# Patient Record
Sex: FEMALE | Race: BLACK OR AFRICAN AMERICAN | Employment: OTHER | URBAN - METROPOLITAN AREA
[De-identification: names, ages, dates, MRNs, and addresses within clinical notes are randomized per-mention and may not be internally consistent; named-entity substitution may affect disease eponyms.]

---

## 2020-06-22 VITALS — BODY MASS INDEX: 38.83 KG/M2 | WEIGHT: 211 LBS | HEIGHT: 62 IN

## 2020-06-22 DIAGNOSIS — T14.91XA SUICIDE ATTEMPT (HCC): ICD-10-CM

## 2020-06-22 PROBLEM — F32.9 MAJOR DEPRESSION: Status: ACTIVE | Noted: 2020-06-22

## 2020-06-22 PROBLEM — G43.909 MIGRAINE: Status: ACTIVE | Noted: 2020-06-22

## 2020-06-22 PROBLEM — F31.9 BIPOLAR 1 DISORDER (HCC): Status: ACTIVE | Noted: 2018-01-23

## 2020-06-22 PROBLEM — E11.9 DIABETES MELLITUS TYPE 2, CONTROLLED (HCC): Status: ACTIVE | Noted: 2020-06-22

## 2020-06-22 PROBLEM — F41.9 ANXIETY: Status: ACTIVE | Noted: 2020-06-22

## 2020-06-22 PROBLEM — G47.30 SLEEP APNEA: Status: ACTIVE | Noted: 2018-01-23

## 2020-06-22 PROBLEM — K74.00 LIVER FIBROSIS: Status: ACTIVE | Noted: 2020-06-22

## 2020-06-22 PROBLEM — I10 HYPERTENSION: Status: ACTIVE | Noted: 2020-06-22

## 2020-06-22 RX ORDER — BLOOD SUGAR DIAGNOSTIC
STRIP MISCELLANEOUS SEE ADMIN INSTRUCTIONS
COMMUNITY
End: 2020-12-16 | Stop reason: SDUPTHER

## 2020-06-22 RX ORDER — METOPROLOL SUCCINATE 50 MG/1
TABLET, EXTENDED RELEASE ORAL DAILY
COMMUNITY
End: 2020-12-16 | Stop reason: ALTCHOICE

## 2020-06-22 RX ORDER — ALLOPURINOL 300 MG/1
TABLET ORAL DAILY
COMMUNITY

## 2020-06-22 RX ORDER — LANOLIN ALCOHOL/MO/W.PET/CERES
400 CREAM (GRAM) TOPICAL 3 TIMES DAILY
COMMUNITY

## 2020-06-22 RX ORDER — DULAGLUTIDE 0.75 MG/.5ML
0.75 INJECTION, SOLUTION SUBCUTANEOUS
COMMUNITY
End: 2020-12-16 | Stop reason: ALTCHOICE

## 2020-06-22 RX ORDER — ARIPIPRAZOLE 15 MG/1
15 TABLET ORAL DAILY
COMMUNITY
End: 2020-10-15 | Stop reason: SDUPTHER

## 2020-06-22 RX ORDER — ALPRAZOLAM 0.5 MG/1
TABLET ORAL
COMMUNITY
End: 2020-07-30

## 2020-06-22 RX ORDER — PANTOPRAZOLE SODIUM 40 MG/1
40 TABLET, DELAYED RELEASE ORAL DAILY
COMMUNITY

## 2020-06-22 RX ORDER — HYDRALAZINE HYDROCHLORIDE 25 MG/1
25 TABLET, FILM COATED ORAL 2 TIMES DAILY
COMMUNITY
End: 2020-12-16 | Stop reason: DRUGHIGH

## 2020-06-22 RX ORDER — ATORVASTATIN CALCIUM 40 MG/1
TABLET, FILM COATED ORAL DAILY
COMMUNITY
End: 2020-12-16 | Stop reason: ALTCHOICE

## 2020-06-22 RX ORDER — CITALOPRAM 20 MG/1
TABLET, FILM COATED ORAL DAILY
COMMUNITY
End: 2021-01-06

## 2020-06-22 RX ORDER — SODIUM BICARBONATE 650 MG/1
TABLET ORAL 2 TIMES DAILY
COMMUNITY

## 2020-06-22 RX ORDER — NITROFURANTOIN 25; 75 MG/1; MG/1
100 CAPSULE ORAL 2 TIMES DAILY
COMMUNITY
End: 2020-12-16 | Stop reason: ALTCHOICE

## 2020-06-22 RX ORDER — TERCONAZOLE 4 MG/G
1 CREAM VAGINAL
COMMUNITY
End: 2020-12-16 | Stop reason: ALTCHOICE

## 2020-06-22 RX ORDER — LANCETS 33 GAUGE
EACH MISCELLANEOUS
COMMUNITY
End: 2020-12-16 | Stop reason: SDUPTHER

## 2020-06-22 RX ORDER — GABAPENTIN 100 MG/1
CAPSULE ORAL 3 TIMES DAILY
COMMUNITY

## 2020-07-29 LAB
CREATININE, EXTERNAL: 1.96
LDL-C, EXTERNAL: 66

## 2020-07-30 DIAGNOSIS — F41.1 GENERALIZED ANXIETY DISORDER: Primary | ICD-10-CM

## 2020-07-30 RX ORDER — ALPRAZOLAM 0.5 MG/1
TABLET ORAL
Qty: 60 TAB | Refills: 2 | Status: SHIPPED | OUTPATIENT
Start: 2020-07-30 | End: 2020-11-02 | Stop reason: SDUPTHER

## 2020-08-10 ENCOUNTER — VIRTUAL VISIT (OUTPATIENT)
Dept: BEHAVIORAL/MENTAL HEALTH CLINIC | Age: 60
End: 2020-08-10
Payer: MEDICARE

## 2020-08-10 DIAGNOSIS — F33.1 MODERATE EPISODE OF RECURRENT MAJOR DEPRESSIVE DISORDER (HCC): Primary | ICD-10-CM

## 2020-08-10 PROCEDURE — 99441 PR PHYS/QHP TELEPHONE EVALUATION 5-10 MIN: CPT | Performed by: NURSE PRACTITIONER

## 2020-08-10 RX ORDER — BUPROPION HYDROCHLORIDE 150 MG/1
150 TABLET, EXTENDED RELEASE ORAL 2 TIMES DAILY
Qty: 180 TAB | Refills: 0 | Status: SHIPPED | OUTPATIENT
Start: 2020-08-10 | End: 2020-10-15 | Stop reason: SDUPTHER

## 2020-08-10 NOTE — PROGRESS NOTES
Landry Shaw is a 64 y.o. female who presents today for the following:  Chief Complaint   Patient presents with    Follow-up     \"I'm down in the dumps. \"    Depression       Allergies   Allergen Reactions    Septra [Sulfamethoprim] Unknown (comments)       Current Outpatient Medications   Medication Sig    buPROPion SR (WELLBUTRIN SR) 150 mg SR tablet Take 1 Tab by mouth two (2) times a day for 90 days.  ALPRAZolam (XANAX) 0.5 mg tablet Take 1 tablet by mouth twice daily as needed    allopurinoL (ZYLOPRIM) 300 mg tablet Take  by mouth daily.  ARIPiprazole (ABILIFY) 15 mg tablet Take 15 mg by mouth daily.  atorvastatin (LIPITOR) 40 mg tablet Take  by mouth daily.  citalopram (CELEXA) 20 mg tablet Take  by mouth daily.  gabapentin (NEURONTIN) 100 mg capsule Take  by mouth three (3) times daily.  hydrALAZINE (APRESOLINE) 25 mg tablet Take 25 mg by mouth two (2) times a day.  magnesium oxide (MAG-OX) 400 mg tablet Take 400 mg by mouth three (3) times daily.  metoprolol succinate (TOPROL-XL) 50 mg XL tablet Take  by mouth daily.  nitrofurantoin, macrocrystal-monohydrate, (MACROBID) 100 mg capsule Take 100 mg by mouth two (2) times a day.  insulin NPH/insulin regular (NovoLIN 70/30 U-100 Insulin) 100 unit/mL (70-30) injection 40 Units by SubCUTAneous route Before breakfast and dinner.  lancets (One Touch Delica) 33 gauge misc by Does Not Apply route.  glucose blood VI test strips (OneTouch Verio test strips) strip by Does Not Apply route See Admin Instructions.  pantoprazole (PROTONIX) 40 mg tablet Take 40 mg by mouth daily.  sodium bicarbonate 650 mg tablet Take  by mouth two (2) times a day.  terconazole (TERAZOL 7) 0.4 % vaginal cream Insert 1 Applicator into vagina nightly.  dulaglutide (Trulicity) 0.42 SL/7.9 mL sub-q pen 0.75 mg by SubCUTAneous route every seven (7) days. No current facility-administered medications for this visit.         Past Medical History:   Diagnosis Date    Anxiety 6/22/2020    Bipolar 1 disorder ([facility] 1/23/2018    Diabetes mellitus type 2, controlled ([facility] 6/22/2020    Hypertension 6/22/2020    Liver fibrosis 6/22/2020    Major depression 6/22/2020    Migraine 6/22/2020    Sleep apnea 1/23/2018    Suicide attempt ([facility] 6/22/2020 2013       Past Surgical History:   Procedure Laterality Date    HX APPENDECTOMY      HX CHOLECYSTECTOMY         No family history on file.     Social History     Socioeconomic History    Marital status: SINGLE     Spouse name: Not on file    Number of children: 0    Years of education: Not on file    Highest education level: Associate degree: academic program   Occupational History    Not on file   Social Needs    Financial resource strain: Not very hard    Food insecurity     Worry: Never true     Inability: Never true    Transportation needs     Medical: No     Non-medical: No   Tobacco Use    Smoking status: Never Smoker    Smokeless tobacco: Never Used   Substance and Sexual Activity    Alcohol use: Not Currently    Drug use: Never    Sexual activity: Not Currently   Lifestyle    Physical activity     Days per week: Not on file     Minutes per session: Not on file    Stress: Not on file   Relationships    Social connections     Talks on phone: Not on file     Gets together: Not on file     Attends Sikhism service: Not on file     Active member of club or organization: Not on file     Attends meetings of clubs or organizations: Not on file     Relationship status: Not on file    Intimate partner violence     Fear of current or ex partner: Not on file     Emotionally abused: Not on file     Physically abused: Not on file     Forced sexual activity: Not on file   Other Topics Concern     Service Not Asked    Blood Transfusions Not Asked    Caffeine Concern Not Asked    Occupational Exposure Not Asked   [place] Hazards Not Asked    Sleep Concern Not Asked    Stress Concern Not Asked    Weight Concern Not Asked    Special Diet Not Asked    Back Care Not Asked    Exercise Not Asked    Bike Helmet Not Asked   [address] Not Asked    Self-Exams Not Asked   Social History Narrative    Not on file         Miss Aimee Jacobs consents to telephone visit. She follows up at the clinic for anxiety disorder and recurrent major depression. Patient is prescribed Xanax 0.5 mg tablet take 1 tablet twice daily as needed for anxiety, citalopram 20 mg tablet take 1 tablet daily and Abilify 15 mg tablet take 1 tablet at bedtime. She reports feeling depressed for the past few months and symptoms have worsened over the past 6 weeks. She reports reduced appetite, lack of motivation, low energy, depressed mood, crying spells and increased sleeping. No suicidal or homicidal thinking noted. Risk for suicide is low to moderate based on history but there is no acute concern at this time. No psychotic symptoms reported. On today's visit, she is requesting to take another medication along with current medications. She has tried BuSpar in the past, which caused confusion. She is receptive to trying Wellbutrin. No smoking, alcohol or drug use noted. Review of Systems   Gastrointestinal: Positive for diarrhea. Musculoskeletal: Positive for joint pain. Psychiatric/Behavioral: Positive for depression. All other systems reviewed and are negative. There were no vitals taken for this visit. Physical Exam  Neurological:      Mental Status: She is alert and oriented to person, place, and time. Psychiatric:         Attention and Perception: Attention and perception normal.         Mood and Affect: Mood is depressed. Speech: Speech normal.         Behavior: Behavior normal. Behavior is cooperative. Thought Content:  Thought content normal.         Cognition and Memory: Cognition and memory normal.         Judgment: Judgment normal.        Plan:    I will do a trial of bupropion  mg tablet take 1 tablet twice daily. Side effect profile discussed. She may continue citalopram, Abilify and Xanax at current doses. Counseling recommended. Advised patient to avoid alcohol and drug use. There are no diagnoses linked to this encounter.

## 2020-10-13 ENCOUNTER — TELEPHONE (OUTPATIENT)
Dept: BEHAVIORAL/MENTAL HEALTH CLINIC | Age: 60
End: 2020-10-13

## 2020-10-15 ENCOUNTER — VIRTUAL VISIT (OUTPATIENT)
Dept: BEHAVIORAL/MENTAL HEALTH CLINIC | Age: 60
End: 2020-10-15
Payer: MEDICARE

## 2020-10-15 DIAGNOSIS — F33.0 MILD EPISODE OF RECURRENT MAJOR DEPRESSIVE DISORDER (HCC): ICD-10-CM

## 2020-10-15 DIAGNOSIS — F33.1 MODERATE EPISODE OF RECURRENT MAJOR DEPRESSIVE DISORDER (HCC): ICD-10-CM

## 2020-10-15 DIAGNOSIS — F31.70 BIPOLAR DISORDER IN REMISSION (HCC): Primary | ICD-10-CM

## 2020-10-15 PROCEDURE — 99212 OFFICE O/P EST SF 10 MIN: CPT | Performed by: NURSE PRACTITIONER

## 2020-10-15 RX ORDER — ARIPIPRAZOLE 10 MG/1
10 TABLET ORAL DAILY
Qty: 90 TAB | Refills: 0 | Status: SHIPPED | OUTPATIENT
Start: 2020-10-15 | End: 2020-12-16 | Stop reason: ALTCHOICE

## 2020-10-15 RX ORDER — BUPROPION HYDROCHLORIDE 200 MG/1
200 TABLET, EXTENDED RELEASE ORAL DAILY
Qty: 90 TAB | Refills: 0 | Status: SHIPPED | OUTPATIENT
Start: 2020-10-15 | End: 2020-12-16 | Stop reason: ALTCHOICE

## 2020-10-15 NOTE — PROGRESS NOTES
Jevon Moyer is a 64 y.o. female who presents today for the following:  Chief Complaint   Patient presents with    Follow-up     \"I'm so sleepy. \"    Depression    Anxiety       Allergies   Allergen Reactions    Septra [Sulfamethoprim] Unknown (comments)       Current Outpatient Medications   Medication Sig    ARIPiprazole (ABILIFY) 10 mg tablet Take 1 Tab by mouth daily for 90 days.  buPROPion SR (WELLBUTRIN, ZYBAN) 200 mg SR tablet Take 1 Tab by mouth daily for 90 days. Indications: bipolar depression    ALPRAZolam (XANAX) 0.5 mg tablet Take 1 tablet by mouth twice daily as needed    allopurinoL (ZYLOPRIM) 300 mg tablet Take  by mouth daily.  atorvastatin (LIPITOR) 40 mg tablet Take  by mouth daily.  citalopram (CELEXA) 20 mg tablet Take  by mouth daily.  gabapentin (NEURONTIN) 100 mg capsule Take  by mouth three (3) times daily.  hydrALAZINE (APRESOLINE) 25 mg tablet Take 25 mg by mouth two (2) times a day.  magnesium oxide (MAG-OX) 400 mg tablet Take 400 mg by mouth three (3) times daily.  metoprolol succinate (TOPROL-XL) 50 mg XL tablet Take  by mouth daily.  nitrofurantoin, macrocrystal-monohydrate, (MACROBID) 100 mg capsule Take 100 mg by mouth two (2) times a day.  insulin NPH/insulin regular (NovoLIN 70/30 U-100 Insulin) 100 unit/mL (70-30) injection 40 Units by SubCUTAneous route Before breakfast and dinner.  lancets (One Touch Delica) 33 gauge misc by Does Not Apply route.  glucose blood VI test strips (OneTouch Verio test strips) strip by Does Not Apply route See Admin Instructions.  pantoprazole (PROTONIX) 40 mg tablet Take 40 mg by mouth daily.  sodium bicarbonate 650 mg tablet Take  by mouth two (2) times a day.  terconazole (TERAZOL 7) 0.4 % vaginal cream Insert 1 Applicator into vagina nightly.  dulaglutide (Trulicity) 3.89 HB/9.1 mL sub-q pen 0.75 mg by SubCUTAneous route every seven (7) days.      No current facility-administered medications for this visit. Past Medical History:   Diagnosis Date    Anxiety 6/22/2020    Bipolar 1 disorder ([facility] 1/23/2018    Diabetes mellitus type 2, controlled ([facility] 6/22/2020    Hypertension 6/22/2020    Liver fibrosis 6/22/2020    Major depression 6/22/2020    Migraine 6/22/2020    Sleep apnea 1/23/2018    Suicide attempt ([facility] 6/22/2020 2013       Past Surgical History:   Procedure Laterality Date    HX APPENDECTOMY      HX CHOLECYSTECTOMY         History reviewed. No pertinent family history.     Social History     Socioeconomic History    Marital status: SINGLE     Spouse name: Not on file    Number of children: 0    Years of education: Not on file    Highest education level: Associate degree: academic program   Occupational History    Not on file   Social Needs    Financial resource strain: Not very hard    Food insecurity     Worry: Never true     Inability: Never true    Transportation needs     Medical: No     Non-medical: No   Tobacco Use    Smoking status: Never Smoker    Smokeless tobacco: Never Used   Substance and Sexual Activity    Alcohol use: Not Currently    Drug use: Never    Sexual activity: Not Currently   Lifestyle    Physical activity     Days per week: Not on file     Minutes per session: Not on file    Stress: Not on file   Relationships    Social connections     Talks on phone: Not on file     Gets together: Not on file     Attends Mosque service: Not on file     Active member of club or organization: Not on file     Attends meetings of clubs or organizations: Not on file     Relationship status: Not on file    Intimate partner violence     Fear of current or ex partner: Not on file     Emotionally abused: Not on file     Physically abused: Not on file     Forced sexual activity: Not on file   Other Topics Concern     Service Not Asked    Blood Transfusions Not Asked    Caffeine Concern Not Asked    Occupational Exposure Not Asked   [place] Hazards Not Asked    Sleep Concern Not Asked    Stress Concern Not Asked    Weight Concern Not Asked    Special Diet Not Asked    Back Care Not Asked    Exercise Not Asked    Bike Helmet Not Asked   [address] Not Asked    Self-Exams Not Asked   Social History Narrative    Not on file         Ms. Brad Jameson follows up in the clinic for major depression and anxiety disorder. It is noted patient has history of bipolar disorder, inpatient psychiatric admission and history of suicide attempt. Patient is prescribed Xanax 0.5 mg tablet take 1 tablet twice daily as needed for anxiety, citalopram 20 mg tablet take 1 tablet daily, bupropion  mg tablet take 1 tablet twice daily and Abilify 15 mg tablet take 1 tablet at bedtime. Telephone visit requested. Patient reports that for the past few weeks she has been sleeping a lot more than usual and experiencing low energy. Patient shares that she has \"chronically low\" magnesium and vitamin B12, however her vitamin B12 is not low enough to receive injections at this time. She plans to follow-up with her primary care provider next week. She denies feeling depressed and anxiety is well controlled with Xanax as needed. She reports initially the Wellbutrin helped as evidenced by she had more energy and felt better. Patient believes the Wellbutrin has definitely improved her mood because she no longer feels depressed and is looking forward to [place]. She denies suicidal/homicidal thinking, risk for suicide is low to moderate based on history but there is no acute concern at this time. Protective factor-family. Patient denies psychotic symptoms and none noted. No problems with appetite. Patient lives in the home with her sister in a stable and supportive home environment. No smoking, alcohol or drug use noted. Review of Systems   Constitutional: Positive for malaise/fatigue.    Musculoskeletal:        Pain in upper right quad sometimes-patient reports pain is related to \"history of pancreatitis and stage 2 fibrosis of the liver. \"   All other systems reviewed and are negative. There were no vitals taken for this visit. Physical Exam  Psychiatric:         Attention and Perception: Attention and perception normal.         Mood and Affect: Mood normal.         Speech: Speech normal.         Behavior: Behavior normal. Behavior is cooperative. Thought Content: Thought content normal.         Cognition and Memory: Cognition and memory normal.         Judgment: Judgment normal.        Plan:    Reduce Abilify to 10 mg tablet take 1 tablet daily. Change bupropion SR to 200 mg tablet 1 tablet in the morning and continue 150 mg tablet 1 tablet in the evening. Continue citalopram and Xanax as prescribed. Advised patient to follow-up with primary care provider as appropriate. Advised patient to call 911 or go to the emergency department for suicidal or homicidal thinking. Advised patient to avoid alcohol and drug use. There are no diagnoses linked to this encounter.

## 2020-11-01 DIAGNOSIS — F33.1 MODERATE EPISODE OF RECURRENT MAJOR DEPRESSIVE DISORDER (HCC): ICD-10-CM

## 2020-11-01 DIAGNOSIS — F41.1 GENERALIZED ANXIETY DISORDER: ICD-10-CM

## 2020-11-02 RX ORDER — ALPRAZOLAM 0.5 MG/1
TABLET ORAL
Qty: 60 TAB | Refills: 0 | OUTPATIENT
Start: 2020-11-02

## 2020-11-02 RX ORDER — BUPROPION HYDROCHLORIDE 150 MG/1
TABLET, EXTENDED RELEASE ORAL
Qty: 180 TAB | Refills: 0 | OUTPATIENT
Start: 2020-11-02

## 2020-11-02 RX ORDER — ALPRAZOLAM 0.5 MG/1
TABLET ORAL
Qty: 60 TAB | Refills: 2 | Status: SHIPPED | OUTPATIENT
Start: 2020-11-02 | End: 2020-12-16 | Stop reason: ALTCHOICE

## 2020-12-16 ENCOUNTER — OFFICE VISIT (OUTPATIENT)
Dept: ENDOCRINOLOGY | Age: 60
End: 2020-12-16
Payer: MEDICARE

## 2020-12-16 VITALS
WEIGHT: 218 LBS | DIASTOLIC BLOOD PRESSURE: 81 MMHG | TEMPERATURE: 98.2 F | BODY MASS INDEX: 36.32 KG/M2 | SYSTOLIC BLOOD PRESSURE: 154 MMHG | HEART RATE: 86 BPM | HEIGHT: 65 IN | OXYGEN SATURATION: 98 %

## 2020-12-16 DIAGNOSIS — E11.40 TYPE 2 DIABETES MELLITUS WITH DIABETIC NEUROPATHY, WITH LONG-TERM CURRENT USE OF INSULIN (HCC): ICD-10-CM

## 2020-12-16 DIAGNOSIS — Z79.4 TYPE 2 DIABETES MELLITUS WITH HYPERGLYCEMIA, WITH LONG-TERM CURRENT USE OF INSULIN (HCC): Primary | ICD-10-CM

## 2020-12-16 DIAGNOSIS — N18.30 CKD STAGE 3 DUE TO TYPE 2 DIABETES MELLITUS (HCC): ICD-10-CM

## 2020-12-16 DIAGNOSIS — I12.9 HYPERTENSIVE RENAL DISEASE: ICD-10-CM

## 2020-12-16 DIAGNOSIS — Z79.4 TYPE 2 DIABETES MELLITUS WITH DIABETIC NEUROPATHY, WITH LONG-TERM CURRENT USE OF INSULIN (HCC): ICD-10-CM

## 2020-12-16 DIAGNOSIS — E78.2 MIXED HYPERLIPIDEMIA: ICD-10-CM

## 2020-12-16 DIAGNOSIS — E66.01 SEVERE OBESITY (HCC): ICD-10-CM

## 2020-12-16 DIAGNOSIS — E11.22 CKD STAGE 3 DUE TO TYPE 2 DIABETES MELLITUS (HCC): ICD-10-CM

## 2020-12-16 DIAGNOSIS — E11.65 TYPE 2 DIABETES MELLITUS WITH HYPERGLYCEMIA, WITH LONG-TERM CURRENT USE OF INSULIN (HCC): Primary | ICD-10-CM

## 2020-12-16 LAB
GLUCOSE POC: 183 MG/DL
HBA1C MFR BLD HPLC: 8.9 %

## 2020-12-16 PROCEDURE — 99214 OFFICE O/P EST MOD 30 MIN: CPT | Performed by: INTERNAL MEDICINE

## 2020-12-16 PROCEDURE — G8754 DIAS BP LESS 90: HCPCS | Performed by: INTERNAL MEDICINE

## 2020-12-16 PROCEDURE — 83036 HEMOGLOBIN GLYCOSYLATED A1C: CPT | Performed by: INTERNAL MEDICINE

## 2020-12-16 PROCEDURE — G9717 DOC PT DX DEP/BP F/U NT REQ: HCPCS | Performed by: INTERNAL MEDICINE

## 2020-12-16 PROCEDURE — 3017F COLORECTAL CA SCREEN DOC REV: CPT | Performed by: INTERNAL MEDICINE

## 2020-12-16 PROCEDURE — G8753 SYS BP > OR = 140: HCPCS | Performed by: INTERNAL MEDICINE

## 2020-12-16 PROCEDURE — 2022F DILAT RTA XM EVC RTNOPTHY: CPT | Performed by: INTERNAL MEDICINE

## 2020-12-16 PROCEDURE — 82962 GLUCOSE BLOOD TEST: CPT | Performed by: INTERNAL MEDICINE

## 2020-12-16 PROCEDURE — G8417 CALC BMI ABV UP PARAM F/U: HCPCS | Performed by: INTERNAL MEDICINE

## 2020-12-16 PROCEDURE — 3046F HEMOGLOBIN A1C LEVEL >9.0%: CPT | Performed by: INTERNAL MEDICINE

## 2020-12-16 PROCEDURE — G8427 DOCREV CUR MEDS BY ELIG CLIN: HCPCS | Performed by: INTERNAL MEDICINE

## 2020-12-16 RX ORDER — BLOOD SUGAR DIAGNOSTIC
STRIP MISCELLANEOUS
Qty: 100 STRIP | Refills: 3 | Status: SHIPPED | OUTPATIENT
Start: 2020-12-16 | End: 2022-01-05

## 2020-12-16 RX ORDER — INSULIN ASPART 100 [IU]/ML
INJECTION, SUSPENSION SUBCUTANEOUS
Qty: 9 ADJUSTABLE DOSE PRE-FILLED PEN SYRINGE | Refills: 3 | Status: SHIPPED | OUTPATIENT
Start: 2020-12-16 | End: 2021-03-17 | Stop reason: SDUPTHER

## 2020-12-16 RX ORDER — PEN NEEDLE, DIABETIC 30 GX3/16"
NEEDLE, DISPOSABLE MISCELLANEOUS
Qty: 1 PACKAGE | Refills: 5 | Status: SHIPPED | OUTPATIENT
Start: 2020-12-16 | End: 2021-03-17 | Stop reason: SDUPTHER

## 2020-12-16 RX ORDER — ALPRAZOLAM 0.5 MG/1
TABLET ORAL
COMMUNITY
End: 2021-01-29

## 2020-12-16 RX ORDER — ATORVASTATIN CALCIUM 40 MG/1
TABLET, FILM COATED ORAL
COMMUNITY
End: 2020-12-16 | Stop reason: SDUPTHER

## 2020-12-16 RX ORDER — HYDRALAZINE HYDROCHLORIDE 50 MG/1
50 TABLET, FILM COATED ORAL 3 TIMES DAILY
Qty: 90 TAB | Refills: 3 | Status: SHIPPED | OUTPATIENT
Start: 2020-12-16 | End: 2021-01-15

## 2020-12-16 RX ORDER — ACETAMINOPHEN AND CODEINE PHOSPHATE 300; 30 MG/1; MG/1
TABLET ORAL
Status: ON HOLD | COMMUNITY
End: 2021-08-22 | Stop reason: CLARIF

## 2020-12-16 RX ORDER — LANCETS 33 GAUGE
EACH MISCELLANEOUS
Qty: 100 LANCET | Refills: 3 | Status: SHIPPED | OUTPATIENT
Start: 2020-12-16 | End: 2022-06-16

## 2020-12-16 RX ORDER — ARIPIPRAZOLE 15 MG/1
TABLET ORAL
COMMUNITY
End: 2021-01-02

## 2020-12-16 RX ORDER — ATORVASTATIN CALCIUM 40 MG/1
40 TABLET, FILM COATED ORAL
Qty: 30 TAB | Refills: 3 | Status: SHIPPED | OUTPATIENT
Start: 2020-12-16 | End: 2021-01-15

## 2020-12-16 NOTE — LETTER
12/16/2020 Patient: Geneva Gann YOB: 1960 Date of Visit: 12/16/2020 Isadora Gonzalez PA-C 
[address] [place] [id number] Via Fax: [phone] Dear Isadora Gonzalez PA-C, Thank you for referring Ms. Denita Moser to [address] [place] [address] for evaluation. My notes for this consultation are attached. If you have questions, please do not hesitate to call me. I look forward to following your patient along with you. Sincerely, Hermilo Lo MD

## 2020-12-16 NOTE — PROGRESS NOTES
History and Physical    Patient: Elsa Segura MRN: [id number]  SSN: [id number]    YOB: 1960  Age: 64 y.o. Sex: female      Subjective:      Elsa Segura is a 64 y.o. female with past medical history of hypertension, hyperlipidemia, chronic kidney disease stage III, bipolar disorder, GERD is here for type 2 diabetes mellitus. She is in primary care of Zully Gill np. She is also in care of nephrologist Dr. Jordin Magallon. Last visit patient had to stop taking Trulicity because she could not afford the co-pay. She is taking NovoLog 70/30, 45 units twice a day. She has low blood glucose in the 60s few times per month and she has to drink juice to bring her glucose up. During the day she does not generally have low blood glucose. She is checking blood glucose twice a day. She tells me it is ranging from 120-1 170s. She did not bring her glucometer today. She has not been taking atorvastatin. Reporting compliance with blood pressure medications but blood pressure stays high. She is currently still suffering with a UTI. She was doing better on Trulicity but she thinks when she will have new insurance in January, it might cover it. Glucometer reading: Patient did not bring glucometer today.     Updated diabetes history:  · Diagnosis: 8 years    · Current treatment: NovoLog 70/30, 45 units twice a day,     · Past treatment: Lantus, Humalog, Levemir, Novolin N, Novolin R, Trulicity (expensive)    · Glucose checks: once a day, fasting runs in 400s    · Hyperglycemia: yes    · Hypoglycemia: no    · Meals per day: 3, Breakfast: cereal, sandwich, boiled eggs, toast, lunch: skips, Dinner: fried chicken, pork, beans, hamburger, , snacks: banana, crackers, pop corn, sweet tea    · Exercise: no    · DM related hospitalizations: no        Complications of DM:    · CAD: no    · CVA: no    · PVD: no    · Amputations: no     · Retinopathy: no; last exam was 6-2020    · Gastropathy: no    · Nephropathy: yes ckd stage 3, Dr. Jillian Avalos  · Neuropathy: no        Medications:    · Statin: atorvastatin 40    · ACE-I: no    · ASA: no        · Diabetes education: no    Past Medical History:   Diagnosis Date    Anxiety 6/22/2020    Bipolar 1 disorder ([facility] 1/23/2018    Diabetes mellitus type 2, controlled ([facility] 6/22/2020    Hypertension 6/22/2020    Liver fibrosis 6/22/2020    Major depression 6/22/2020    Migraine 6/22/2020    Sleep apnea 1/23/2018    Suicide attempt ([facility] 6/22/2020 2013     Past Surgical History:   Procedure Laterality Date    HX APPENDECTOMY      HX CHOLECYSTECTOMY        Family History   Problem Relation Age of Onset    Heart Attack Mother     Heart Attack Father      Social History     Tobacco Use    Smoking status: Never Smoker    Smokeless tobacco: Never Used   Substance Use Topics    Alcohol use: Not Currently      Prior to Admission medications    Medication Sig Start Date End Date Taking? Authorizing Provider   acetaminophen-codeine (TYLENOL #3) 300-30 mg per tablet acetaminophen 300 mg-codeine 30 mg tablet   Yes Provider, Historical   ALPRAZolam (Xanax) 0.5 mg tablet Xanax 0.5 mg tablet   Take 1 tablet 3 times a day by oral route. Yes Provider, Historical   ARIPiprazole (Abilify) 15 mg tablet Abilify 15 mg tablet   Take 1 tablet every day by oral route. Yes Provider, Historical   hydrALAZINE (APRESOLINE) 50 mg tablet Take 1 Tab by mouth three (3) times daily for 30 days. 12/16/20 1/15/21 Yes Mary Kate Chung MD   atorvastatin (LIPITOR) 40 mg tablet Take 1 Tab by mouth nightly for 30 days.  12/16/20 1/15/21 Yes Mary Kate Chung MD   insulin aspart protamine/insulin aspart (NOVOLOG MIX 70/30) 100 unit/mL (70-30) inpn Take 50 units in the morning and 40 units in evening 12/16/20  Yes Mary Kate Chung MD   lancets (One Touch Delica) 33 gauge misc Check glucose 3 times a day 12/16/20  Yes Mary Kate Chung MD   glucose blood VI test strips (OneTouch Verio test strips) strip Check glucose 3 times a day 12/16/20  Yes Fernando Rodrigues MD   Insulin Needles, Disposable, 31 gauge x 5/16\" ndle Twice a day 12/16/20  Yes Fernando Rodrigues MD   allopurinoL (ZYLOPRIM) 300 mg tablet Take  by mouth daily. Yes Provider, Historical   citalopram (CELEXA) 20 mg tablet Take  by mouth daily. Yes Provider, Historical   gabapentin (NEURONTIN) 100 mg capsule Take  by mouth three (3) times daily. Yes Provider, Historical   magnesium oxide (MAG-OX) 400 mg tablet Take 400 mg by mouth three (3) times daily. Yes Provider, Historical   pantoprazole (PROTONIX) 40 mg tablet Take 40 mg by mouth daily. Yes Provider, Historical   sodium bicarbonate 650 mg tablet Take  by mouth two (2) times a day. Yes Provider, Historical        Allergies   Allergen Reactions    Adhesive Tape-Silicones Unknown (comments)     Blisters    Septra [Sulfamethoprim] Unknown (comments)    Sulfa (Sulfonamide Antibiotics) Hives       Review of Systems:  ROS    A comprehensive review of systems was preformed and it is negative except mentioned in HPI    Objective:     Vitals:    12/16/20 1137   BP: (!) 154/81   Pulse: 86   Temp: 98.2 °F (36.8 °C)   TempSrc: Temporal   SpO2: 98%   Weight: 218 lb (98.9 kg)   Height: 5' 5\" (1.651 m)        Physical Exam:    Physical Exam  Vitals signs and nursing note reviewed. Constitutional:       Appearance: Normal appearance. HENT:      Head: Normocephalic and atraumatic. Cardiovascular:      Rate and Rhythm: Normal rate and regular rhythm. Pulmonary:      Effort: Pulmonary effort is normal.      Breath sounds: Normal breath sounds. Neurological:      Mental Status: She is alert.           Labs and Imaging:    Last 3 Recorded Weights in this Encounter    12/16/20 1137   Weight: 218 lb (98.9 kg)        No results found for: HBA1C, HGBE8, EJS8CGHE, KCN4PNPB, PZM5UMAN     Assessment:     Patient Active Problem List   Diagnosis Code    Bipolar 1 disorder ([facility] F31.9    Sleep apnea G47.30    Major depression F32.9    Anxiety F41.9    Type 2 diabetes mellitus with hyperglycemia, with long-term current use of insulin (HCC) E11.65, Z79.4    Liver fibrosis K74.00    Hypertension I10    Migraine G43.909    Suicide attempt ([facility] T14.91XA    Severe obesity ([facility] E66.01    Type 2 diabetes mellitus with diabetic neuropathy [facility] E11.40    CKD stage 3 due to type 2 diabetes mellitus ([facility] E11.22, N18.30    Mixed hyperlipidemia E78.2           Plan:     type II diabetes mellitus uncontrolled  Hemoglobin A1c is 11.6% on 11-6-2019, 11.2% on 2-5-2020, 8.9% today    Fingerstick blood glucose is 183 mg/dL today. Up to date with diabetes related annual labs: yes    Up to date with diabetic eye exam: yes 6/2020    plan:  Patient is not able to afford Trulicity currently, but new insurance in January might cover it. So we will try to put her back next year. For now I will decrease NovoLog 70/30 to 40 units in the evening instead of 45 units as patient is having nocturnal hypoglycemia sometimes. Increased to 50 units in the morning. Advised patient to avoid skipping lunch. Walk more regularly and encouraged to follow diabetic diet. Check glucose 3 times a day and bring glucometer to next visit in 3 months    Not a good candidate for metformin because of CKD stage III, not to get good candidate for SGLT2 inhibitor because of frequent UTI and vaginal yeast infections. hypertensive disorder  Currently on metoprolol succinate 50 mg daily, hydralazine was increased from 10 mg daily to 25 mg bid. Blood pressure continues to be high. Increase hydralazine to 50 mg 3 times a day. mixed hyperlipidemia  11-6-2019: Triglycerides elevated at 379, LDL normal at 61. Patient has not been taking atorvastatin regularly. Discussed with patient importance of compliance. Check lipid profile today.     peripheral neuropathy with type 2 diabetes  on gabapentin    chronic kidney disease stage 3  GFR was 40 in October 2019. Following with nephrologist every 4 months. Last appointment was last month and she was told that everything is staying stable. .    Orders Placed This Encounter    LIPID PANEL    METABOLIC PANEL, COMPREHENSIVE    TSH RFX ON ABNORMAL TO FREE T4    hydrALAZINE (APRESOLINE) 50 mg tablet     Sig: Take 1 Tab by mouth three (3) times daily for 30 days. Dispense:  90 Tab     Refill:  3    atorvastatin (LIPITOR) 40 mg tablet     Sig: Take 1 Tab by mouth nightly for 30 days.      Dispense:  30 Tab     Refill:  3    insulin aspart protamine/insulin aspart (NOVOLOG MIX 70/30) 100 unit/mL (70-30) inpn     Sig: Take 50 units in the morning and 40 units in evening     Dispense:  9 Adjustable Dose Pre-filled Pen Syringe     Refill:  3    lancets (One Touch Delica) 33 gauge misc     Sig: Check glucose 3 times a day     Dispense:  100 Lancet     Refill:  3    glucose blood VI test strips (OneTouch Verio test strips) strip     Sig: Check glucose 3 times a day     Dispense:  100 Strip     Refill:  3    Insulin Needles, Disposable, 31 gauge x 5/16\" ndle     Sig: Twice a day     Dispense:  1 Package     Refill:  5        Signed By: Morgan Lyons MD     December 16, 2020      Return to clinic 3 months

## 2020-12-16 NOTE — PATIENT INSTRUCTIONS
Increase Novolog 70-30 to 50 units in the morning (cut back to 45 if you start having low glucose during the day), decrease to 40 units in the evening, always take it before meals. Increase Hydralazine to 50 mg 3 times a day Check glucose 3 times a day

## 2020-12-17 LAB
ALBUMIN SERPL-MCNC: 4 G/DL (ref 3.8–4.9)
ALBUMIN/GLOB SERPL: 1 {RATIO} (ref 1.2–2.2)
ALP SERPL-CCNC: 181 IU/L (ref 39–117)
ALT SERPL-CCNC: 16 IU/L (ref 0–32)
AST SERPL-CCNC: 38 IU/L (ref 0–40)
BILIRUB SERPL-MCNC: 0.6 MG/DL (ref 0–1.2)
BUN SERPL-MCNC: 17 MG/DL (ref 6–24)
BUN/CREAT SERPL: 8 (ref 9–23)
CALCIUM SERPL-MCNC: 9.5 MG/DL (ref 8.7–10.2)
CHLORIDE SERPL-SCNC: 100 MMOL/L (ref 96–106)
CHOLEST SERPL-MCNC: 233 MG/DL (ref 100–199)
CO2 SERPL-SCNC: 23 MMOL/L (ref 20–29)
CREAT SERPL-MCNC: 2.12 MG/DL (ref 0.57–1)
GLOBULIN SER CALC-MCNC: 4 G/DL (ref 1.5–4.5)
GLUCOSE SERPL-MCNC: 148 MG/DL (ref 65–99)
HDLC SERPL-MCNC: 26 MG/DL
LDLC SERPL CALC-MCNC: 139 MG/DL (ref 0–99)
POTASSIUM SERPL-SCNC: 4.3 MMOL/L (ref 3.5–5.2)
PROT SERPL-MCNC: 8 G/DL (ref 6–8.5)
SODIUM SERPL-SCNC: 138 MMOL/L (ref 134–144)
TRIGL SERPL-MCNC: 370 MG/DL (ref 0–149)
TSH SERPL DL<=0.005 MIU/L-ACNC: 1.31 UIU/ML (ref 0.45–4.5)
VLDLC SERPL CALC-MCNC: 68 MG/DL (ref 5–40)

## 2020-12-17 NOTE — PROGRESS NOTES
Please inform patient that her cholesterol is very high. She had told me that she has not been taking atorvastatin. Please take it regularly at bedtime, otherwise you are at very high risk for heart attack or stroke. Thyroid function is okay, kidney function is low but she is seeing the nephrologist for this. Liver enzymes look okay.

## 2020-12-22 ENCOUNTER — TELEPHONE (OUTPATIENT)
Dept: ENDOCRINOLOGY | Age: 60
End: 2020-12-22

## 2020-12-22 NOTE — TELEPHONE ENCOUNTER
Left patient a vm asking to give the office a call back in regards to labs    ----- Message from Allen Sigala MD sent at 12/17/2020  1:07 PM EST -----  Please inform patient that her cholesterol is very high. She had told me that she has not been taking atorvastatin. Please take it regularly at bedtime, otherwise you are at very high risk for heart attack or stroke. Thyroid function is okay, kidney function is low but she is seeing the nephrologist for this. Liver enzymes look okay.

## 2020-12-22 NOTE — TELEPHONE ENCOUNTER
Patient notified    ----- Message from Harleen Troncoso MD sent at 12/17/2020  1:07 PM EST -----  Please inform patient that her cholesterol is very high. She had told me that she has not been taking atorvastatin. Please take it regularly at bedtime, otherwise you are at very high risk for heart attack or stroke. Thyroid function is okay, kidney function is low but she is seeing the nephrologist for this. Liver enzymes look okay.

## 2021-01-02 RX ORDER — ARIPIPRAZOLE 15 MG/1
TABLET ORAL
Qty: 90 TAB | Refills: 0 | Status: SHIPPED | OUTPATIENT
Start: 2021-01-02 | End: 2021-04-18

## 2021-01-06 ENCOUNTER — HOSPITAL ENCOUNTER (EMERGENCY)
Age: 61
Discharge: HOME | End: 2021-01-06
Attending: EMERGENCY MEDICINE
Payer: MEDICARE

## 2021-01-06 ENCOUNTER — APPOINTMENT (OUTPATIENT)
Dept: GENERAL RADIOLOGY | Age: 61
End: 2021-01-06
Attending: EMERGENCY MEDICINE
Payer: MEDICARE

## 2021-01-06 VITALS
OXYGEN SATURATION: 97 % | SYSTOLIC BLOOD PRESSURE: 184 MMHG | DIASTOLIC BLOOD PRESSURE: 91 MMHG | HEIGHT: 59 IN | RESPIRATION RATE: 20 BRPM | WEIGHT: 217 LBS | BODY MASS INDEX: 43.75 KG/M2 | TEMPERATURE: 98.6 F | HEART RATE: 85 BPM

## 2021-01-06 DIAGNOSIS — R07.89 ATYPICAL CHEST PAIN: Primary | ICD-10-CM

## 2021-01-06 LAB
ALBUMIN SERPL-MCNC: 3.6 G/DL (ref 3.5–5)
ALBUMIN/GLOB SERPL: 0.7 {RATIO} (ref 1.1–2.2)
ALP SERPL-CCNC: 164 U/L (ref 45–117)
ALT SERPL-CCNC: 20 U/L (ref 12–78)
ANION GAP SERPL CALC-SCNC: 13 MMOL/L (ref 5–15)
AST SERPL W P-5'-P-CCNC: 40 U/L (ref 15–37)
BASOPHILS # BLD: 0.1 K/UL (ref 0–0.2)
BASOPHILS NFR BLD: 1 % (ref 0–2.5)
BILIRUB SERPL-MCNC: 0.4 MG/DL (ref 0.2–1)
BUN SERPL-MCNC: 16 MG/DL (ref 6–20)
BUN/CREAT SERPL: 8 (ref 12–20)
CA-I BLD-MCNC: 9.3 MG/DL (ref 8.5–10.1)
CHLORIDE SERPL-SCNC: 103 MMOL/L (ref 97–108)
CO2 SERPL-SCNC: 25 MMOL/L (ref 21–32)
CREAT SERPL-MCNC: 2.02 MG/DL (ref 0.55–1.02)
EOSINOPHIL # BLD: 0.1 K/UL (ref 0–0.7)
EOSINOPHIL NFR BLD: 2 % (ref 0.9–2.9)
ERYTHROCYTE [DISTWIDTH] IN BLOOD BY AUTOMATED COUNT: 15.7 % (ref 11.5–14.5)
GLOBULIN SER CALC-MCNC: 5.2 G/DL (ref 2–4)
GLUCOSE SERPL-MCNC: 105 MG/DL (ref 65–100)
HCT VFR BLD AUTO: 31.9 % (ref 36–46)
HGB BLD-MCNC: 10.1 G/DL (ref 13.5–17.5)
LYMPHOCYTES # BLD: 2.2 K/UL (ref 1–4.8)
LYMPHOCYTES NFR BLD: 31 % (ref 20.5–51.1)
MCH RBC QN AUTO: 25.2 PG (ref 31–34)
MCHC RBC AUTO-ENTMCNC: 31.8 G/DL (ref 31–36)
MCV RBC AUTO: 79.5 FL (ref 80–100)
MONOCYTES # BLD: 0.4 K/UL (ref 0.2–2.4)
MONOCYTES NFR BLD: 6 % (ref 1.7–9.3)
NEUTS SEG # BLD: 4.4 K/UL (ref 1.8–7.7)
NEUTS SEG NFR BLD: 60 % (ref 42–75)
NRBC # BLD: 0 K/UL
NRBC BLD-RTO: 0.1 PER 100 WBC
PLATELET # BLD AUTO: 195 K/UL
PMV BLD AUTO: 8.6 FL (ref 6.5–11.5)
POTASSIUM SERPL-SCNC: 3.9 MMOL/L (ref 3.5–5.1)
PROT SERPL-MCNC: 8.8 G/DL (ref 6.4–8.2)
RBC # BLD AUTO: 4.01 M/UL (ref 4.5–5.9)
SODIUM SERPL-SCNC: 141 MMOL/L (ref 136–145)
TROPONIN I SERPL-MCNC: <0.05 NG/ML
WBC # BLD AUTO: 7.2 K/UL (ref 4.4–11.3)

## 2021-01-06 PROCEDURE — 84484 ASSAY OF TROPONIN QUANT: CPT

## 2021-01-06 PROCEDURE — 71046 X-RAY EXAM CHEST 2 VIEWS: CPT

## 2021-01-06 PROCEDURE — 36415 COLL VENOUS BLD VENIPUNCTURE: CPT

## 2021-01-06 PROCEDURE — 99283 EMERGENCY DEPT VISIT LOW MDM: CPT

## 2021-01-06 PROCEDURE — 85025 COMPLETE CBC W/AUTO DIFF WBC: CPT

## 2021-01-06 PROCEDURE — 80053 COMPREHEN METABOLIC PANEL: CPT

## 2021-01-06 PROCEDURE — 93005 ELECTROCARDIOGRAM TRACING: CPT

## 2021-01-06 RX ORDER — BUPROPION HYDROCHLORIDE 200 MG/1
TABLET, EXTENDED RELEASE ORAL
COMMUNITY
Start: 2021-01-04 | End: 2021-03-17 | Stop reason: ALTCHOICE

## 2021-01-06 RX ORDER — CITALOPRAM 20 MG/1
TABLET, FILM COATED ORAL
Qty: 90 TAB | Refills: 0 | Status: SHIPPED | OUTPATIENT
Start: 2021-01-06 | End: 2021-05-06

## 2021-01-06 NOTE — ED TRIAGE NOTES
Pt rpeorts L side chest pain, L breast pain, and Sob x3 days. SOB worse with exertion. Denies cough, fever, chills. A&O, ambulatory. Resp even and unlabored.  No acute distress noted

## 2021-01-07 ENCOUNTER — TELEPHONE (OUTPATIENT)
Dept: BEHAVIORAL/MENTAL HEALTH CLINIC | Age: 61
End: 2021-01-07

## 2021-01-07 LAB
ATRIAL RATE: 84 BPM
CALCULATED P AXIS, ECG09: 47 DEGREES
CALCULATED R AXIS, ECG10: 20 DEGREES
CALCULATED T AXIS, ECG11: 36 DEGREES
DIAGNOSIS, 93000: NORMAL
P-R INTERVAL, ECG05: 156 MS
Q-T INTERVAL, ECG07: 419 MS
QRS DURATION, ECG06: 99 MS
QTC CALCULATION (BEZET), ECG08: 496 MS
VENTRICULAR RATE, ECG03: 84 BPM

## 2021-01-08 ENCOUNTER — TELEPHONE (OUTPATIENT)
Dept: BEHAVIORAL/MENTAL HEALTH CLINIC | Age: 61
End: 2021-01-08

## 2021-01-11 ENCOUNTER — TRANSCRIBE ORDER (OUTPATIENT)
Dept: REGISTRATION | Age: 61
End: 2021-01-11

## 2021-01-11 ENCOUNTER — TRANSCRIBE ORDER (OUTPATIENT)
Dept: SCHEDULING | Age: 61
End: 2021-01-11

## 2021-01-11 ENCOUNTER — HOSPITAL ENCOUNTER (OUTPATIENT)
Dept: CT IMAGING | Age: 61
Discharge: HOME | End: 2021-01-11
Payer: MEDICARE

## 2021-01-11 DIAGNOSIS — R51.9 FACIAL PAIN: Primary | ICD-10-CM

## 2021-01-11 DIAGNOSIS — R51.9 HEAD ACHE: Primary | ICD-10-CM

## 2021-01-11 DIAGNOSIS — R51.9 HEAD ACHE: ICD-10-CM

## 2021-01-11 PROCEDURE — 70450 CT HEAD/BRAIN W/O DYE: CPT

## 2021-01-29 ENCOUNTER — TELEPHONE (OUTPATIENT)
Dept: BEHAVIORAL/MENTAL HEALTH CLINIC | Age: 61
End: 2021-01-29

## 2021-01-29 DIAGNOSIS — F41.1 GENERALIZED ANXIETY DISORDER: Primary | ICD-10-CM

## 2021-01-29 RX ORDER — ALPRAZOLAM 0.5 MG/1
TABLET ORAL
Qty: 60 TAB | Refills: 0 | Status: SHIPPED | OUTPATIENT
Start: 2021-01-29 | End: 2021-03-01

## 2021-02-09 ENCOUNTER — VIRTUAL VISIT (OUTPATIENT)
Dept: BEHAVIORAL/MENTAL HEALTH CLINIC | Age: 61
End: 2021-02-09
Payer: MEDICARE

## 2021-02-09 DIAGNOSIS — F41.1 GENERALIZED ANXIETY DISORDER: Primary | ICD-10-CM

## 2021-02-09 DIAGNOSIS — F31.9 BIPOLAR DEPRESSION (HCC): ICD-10-CM

## 2021-02-09 PROCEDURE — G8417 CALC BMI ABV UP PARAM F/U: HCPCS | Performed by: NURSE PRACTITIONER

## 2021-02-09 PROCEDURE — G8756 NO BP MEASURE DOC: HCPCS | Performed by: NURSE PRACTITIONER

## 2021-02-09 PROCEDURE — 3017F COLORECTAL CA SCREEN DOC REV: CPT | Performed by: NURSE PRACTITIONER

## 2021-02-09 PROCEDURE — G9717 DOC PT DX DEP/BP F/U NT REQ: HCPCS | Performed by: NURSE PRACTITIONER

## 2021-02-09 PROCEDURE — G8427 DOCREV CUR MEDS BY ELIG CLIN: HCPCS | Performed by: NURSE PRACTITIONER

## 2021-02-09 PROCEDURE — 99441 PR PHYS/QHP TELEPHONE EVALUATION 5-10 MIN: CPT | Performed by: NURSE PRACTITIONER

## 2021-02-09 RX ORDER — BUPROPION HYDROCHLORIDE 150 MG/1
150 TABLET, EXTENDED RELEASE ORAL EVERY EVENING
Qty: 90 TAB | Refills: 0 | Status: SHIPPED | OUTPATIENT
Start: 2021-02-09 | End: 2021-04-19 | Stop reason: SDUPTHER

## 2021-02-09 NOTE — PROGRESS NOTES
Ev Shaw is a 64 y.o. female who presents today for the following:  Chief Complaint   Patient presents with    Medication Management     \"A few weeks I was down in the dumps but am better now. \"    Depression    Anxiety    Follow-up     Ev Shaw, who was evaluated through a synchronous (real-time) audio telephone encounter, and/or her healthcare decision maker, is aware that it is a billable service, with coverage as determined by her insurance carrier. She provided verbal consent to proceed: YES Consent obtained within past 12 months: Yes, and patient identification was verified. It was conducted pursuant to the emergency declaration under the [facility], [address] [place] [address] and the [employer] Act. A caregiver was present when appropriate. Ability to conduct physical exam was limited. I was home. The patient was home. Length of telephone visit 8 minutes and 41 seconds. Allergies   Allergen Reactions    Adhesive Tape-Silicones Unknown (comments)     Blisters    Septra [Sulfamethoprim] Unknown (comments)    Sulfa (Sulfonamide Antibiotics) Hives       Current Outpatient Medications   Medication Sig    buPROPion SR (WELLBUTRIN SR) 150 mg SR tablet Take 1 Tab by mouth every evening for 90 days.     ALPRAZolam (XANAX) 0.5 mg tablet Take 1 tablet by mouth twice daily as needed    citalopram (CELEXA) 20 mg tablet Take 1 tablet by mouth once daily    buPROPion SR (WELLBUTRIN, ZYBAN) 200 mg SR tablet TAKE 1 TABLET BY MOUTH ONCE DAILY FOR BIPOLAR DEPRESSION    ARIPiprazole (ABILIFY) 15 mg tablet TAKE 1 TABLET BY MOUTH AT BEDTIME    acetaminophen-codeine (TYLENOL #3) 300-30 mg per tablet acetaminophen 300 mg-codeine 30 mg tablet    insulin aspart protamine/insulin aspart (NOVOLOG MIX 70/30) 100 unit/mL (70-30) inpn Take 50 units in the morning and 40 units in evening    lancets (One Touch Delica) 33 gauge misc Check glucose 3 times a day    glucose blood VI test strips (OneTouch Verio test strips) strip Check glucose 3 times a day    Insulin Needles, Disposable, 31 gauge x 5/16\" ndle Twice a day    allopurinoL (ZYLOPRIM) 300 mg tablet Take  by mouth daily.  gabapentin (NEURONTIN) 100 mg capsule Take  by mouth three (3) times daily.  magnesium oxide (MAG-OX) 400 mg tablet Take 400 mg by mouth three (3) times daily.  pantoprazole (PROTONIX) 40 mg tablet Take 40 mg by mouth daily.  sodium bicarbonate 650 mg tablet Take  by mouth two (2) times a day. No current facility-administered medications for this visit.         Past Medical History:   Diagnosis Date    Anxiety 6/22/2020    [facility] 1/23/2018    Diabetes mellitus type 2, controlled ([facility] 6/22/2020    Hypertension 6/22/2020    Liver fibrosis 6/22/2020    Major depression 6/22/2020    Migraine 6/22/2020    Sleep apnea 1/23/2018    Suicide attempt ([facility] 6/22/2020 2013       Past Surgical History:   Procedure Laterality Date    HX APPENDECTOMY      HX CHOLECYSTECTOMY         Family History   Problem Relation Age of Onset    Heart Attack Mother     Heart Attack Father        Social History     Socioeconomic History    Marital status: SINGLE     Spouse name: Not on file    Number of children: 0    Years of education: Not on file    Highest education level: Associate degree: academic program   Occupational History    Not on file   Social Needs    Financial resource strain: Not very hard    Food insecurity     Worry: Never true     Inability: Never true    Transportation needs     Medical: No     Non-medical: No   Tobacco Use    Smoking status: Never Smoker    Smokeless tobacco: Never Used   Substance and Sexual Activity    Alcohol use: Not Currently    Drug use: Never    Sexual activity: Not Currently   Lifestyle    Physical activity     Days per week: Not on file     Minutes per session: Not on file  Stress: Not on file   Relationships    Social connections     Talks on phone: Not on file     Gets together: Not on file     Attends [employer] service: Not on file     Active member of club or organization: Not on file     Attends meetings of clubs or organizations: Not on file     Relationship status: Not on file    Intimate partner violence     Fear of current or ex partner: Not on file     Emotionally abused: Not on file     Physically abused: Not on file     Forced sexual activity: Not on file   Other Topics Concern     Service Not Asked    Blood Transfusions Not Asked    Caffeine Concern Not Asked    Occupational Exposure Not Asked   Brendolyn Timbo Hazards Not Asked    Sleep Concern Not Asked    Stress Concern Not Asked    Weight Concern Not Asked    Special Diet Not Asked    Back Care Not Asked    Exercise Not Asked    Bike Helmet Not Asked   [address] Not Asked    Self-Exams Not Asked   Social History Narrative    Not on file         Ms. Abraham Peralta follows up in the clinic for major depression and anxiety disorder. Patient is unable to do virtual visit due to no camera, telephone visit required. It is noted patient has history of bipolar disorder, inpatient psychiatric admission and history of suicide attempt. Patient is prescribed Xanax 0.5 mg tablet take 1 tablet twice daily as needed for anxiety, citalopram 20 mg tablet take 1 tablet daily, bupropion  mg tablet take 1 tab daily and Abilify 15 mg tablet take 1 tablet at bedtime. Patient reports mild depression and anxiety for the past 2 months. She denies suicidal/homicidal thinking, risk for suicide is low to moderate based on history but there is no acute concern at this time. She reports low energy and increased need for sleep. Patient shares that her sister is concerned that she sleeps too much. Appetite is stable. She denies psychotic symptoms and none noted.   Patient reports that overall her mood has been stable and she is not experiencing deep states of depression like she did in the past.  She is interested in joining a support group and is receptive to medication adjustment. No smoking, alcohol or drug use noted. She lives with her sister in a stable home environment.           Review of Systems   Neurological: Positive for headaches. Psychiatric/Behavioral: Positive for depression. The patient is nervous/anxious. All other systems reviewed and are negative. There were no vitals taken for this visit. Physical Exam  Psychiatric:         Attention and Perception: Attention and perception normal.         Mood and Affect: Mood is anxious and depressed. Speech: Speech normal.         Behavior: Behavior normal. Behavior is cooperative. Thought Content: Thought content normal.         Cognition and Memory: Cognition and memory normal.         Judgment: Judgment normal.        Plan: For depression-add bupropion  mg tablet take 1 tablet at 5 PM.  She may continue the above medications as prescribed. Counseling is encouraged. Advised patient to avoid alcohol and drug use. Follow-up with medical provider as appropriate. Advised patient to call 911 or go to the emergency department for suicidal/homicidal thinking, patient may call the clinic for any issues.

## 2021-03-01 DIAGNOSIS — F41.1 GENERALIZED ANXIETY DISORDER: ICD-10-CM

## 2021-03-01 RX ORDER — ALPRAZOLAM 0.5 MG/1
TABLET ORAL
Qty: 60 TAB | Refills: 0 | Status: SHIPPED | OUTPATIENT
Start: 2021-03-01 | End: 2021-03-30

## 2021-03-17 ENCOUNTER — OFFICE VISIT (OUTPATIENT)
Dept: ENDOCRINOLOGY | Age: 61
End: 2021-03-17
Payer: MEDICARE

## 2021-03-17 VITALS
OXYGEN SATURATION: 99 % | TEMPERATURE: 97.5 F | HEART RATE: 84 BPM | DIASTOLIC BLOOD PRESSURE: 71 MMHG | HEIGHT: 59 IN | BODY MASS INDEX: 44.55 KG/M2 | SYSTOLIC BLOOD PRESSURE: 131 MMHG | WEIGHT: 221 LBS

## 2021-03-17 DIAGNOSIS — E78.2 MIXED HYPERLIPIDEMIA: ICD-10-CM

## 2021-03-17 DIAGNOSIS — E11.65 TYPE 2 DIABETES MELLITUS WITH HYPERGLYCEMIA, WITH LONG-TERM CURRENT USE OF INSULIN (HCC): Primary | ICD-10-CM

## 2021-03-17 DIAGNOSIS — Z79.4 TYPE 2 DIABETES MELLITUS WITH HYPERGLYCEMIA, WITH LONG-TERM CURRENT USE OF INSULIN (HCC): Primary | ICD-10-CM

## 2021-03-17 DIAGNOSIS — I12.9 HYPERTENSIVE RENAL DISEASE: ICD-10-CM

## 2021-03-17 LAB
GLUCOSE POC: 132 MG/DL
HBA1C MFR BLD HPLC: 8.1 %

## 2021-03-17 PROCEDURE — 83036 HEMOGLOBIN GLYCOSYLATED A1C: CPT | Performed by: INTERNAL MEDICINE

## 2021-03-17 PROCEDURE — 3017F COLORECTAL CA SCREEN DOC REV: CPT | Performed by: INTERNAL MEDICINE

## 2021-03-17 PROCEDURE — 2022F DILAT RTA XM EVC RTNOPTHY: CPT | Performed by: INTERNAL MEDICINE

## 2021-03-17 PROCEDURE — G8417 CALC BMI ABV UP PARAM F/U: HCPCS | Performed by: INTERNAL MEDICINE

## 2021-03-17 PROCEDURE — G8754 DIAS BP LESS 90: HCPCS | Performed by: INTERNAL MEDICINE

## 2021-03-17 PROCEDURE — G9717 DOC PT DX DEP/BP F/U NT REQ: HCPCS | Performed by: INTERNAL MEDICINE

## 2021-03-17 PROCEDURE — 82962 GLUCOSE BLOOD TEST: CPT | Performed by: INTERNAL MEDICINE

## 2021-03-17 PROCEDURE — 3046F HEMOGLOBIN A1C LEVEL >9.0%: CPT | Performed by: INTERNAL MEDICINE

## 2021-03-17 PROCEDURE — G8427 DOCREV CUR MEDS BY ELIG CLIN: HCPCS | Performed by: INTERNAL MEDICINE

## 2021-03-17 PROCEDURE — 99214 OFFICE O/P EST MOD 30 MIN: CPT | Performed by: INTERNAL MEDICINE

## 2021-03-17 PROCEDURE — G8752 SYS BP LESS 140: HCPCS | Performed by: INTERNAL MEDICINE

## 2021-03-17 RX ORDER — METOPROLOL SUCCINATE 50 MG/1
TABLET, EXTENDED RELEASE ORAL
COMMUNITY
Start: 2021-03-02

## 2021-03-17 RX ORDER — AMLODIPINE BESYLATE 2.5 MG/1
TABLET ORAL
COMMUNITY
Start: 2021-03-04 | End: 2021-08-25

## 2021-03-17 RX ORDER — HYDRALAZINE HYDROCHLORIDE 25 MG/1
TABLET, FILM COATED ORAL
COMMUNITY
Start: 2021-01-29 | End: 2021-03-17 | Stop reason: DRUGHIGH

## 2021-03-17 RX ORDER — DULAGLUTIDE 0.75 MG/.5ML
0.75 INJECTION, SOLUTION SUBCUTANEOUS
Qty: 4 SYRINGE | Refills: 3 | Status: SHIPPED | OUTPATIENT
Start: 2021-03-17 | End: 2021-04-14

## 2021-03-17 RX ORDER — INSULIN ASPART 100 [IU]/ML
INJECTION, SUSPENSION SUBCUTANEOUS
Qty: 8 ADJUSTABLE DOSE PRE-FILLED PEN SYRINGE | Refills: 3 | Status: SHIPPED | OUTPATIENT
Start: 2021-03-17 | End: 2021-07-07 | Stop reason: SDUPTHER

## 2021-03-17 RX ORDER — ATORVASTATIN CALCIUM 40 MG/1
TABLET, FILM COATED ORAL
COMMUNITY
Start: 2021-03-01 | End: 2021-03-18 | Stop reason: SDUPTHER

## 2021-03-17 RX ORDER — HYDRALAZINE HYDROCHLORIDE 50 MG/1
50 TABLET, FILM COATED ORAL 3 TIMES DAILY
Qty: 90 TAB | Refills: 3 | Status: SHIPPED | OUTPATIENT
Start: 2021-03-17 | End: 2021-04-16

## 2021-03-17 RX ORDER — PEN NEEDLE, DIABETIC 30 GX3/16"
NEEDLE, DISPOSABLE MISCELLANEOUS
Qty: 1 PACKAGE | Refills: 5 | Status: SHIPPED | OUTPATIENT
Start: 2021-03-17 | End: 2021-07-07 | Stop reason: SDUPTHER

## 2021-03-17 NOTE — PATIENT INSTRUCTIONS
Novolog 70/30 45 units in the morning and 30 units before dinner If you start trulicity, decrease Novolog 70/30 as follows: 
40 units in morning and 25 units before dinner

## 2021-03-17 NOTE — LETTER
3/17/2021 Patient: Rosaura Maguire YOB: 1960 Date of Visit: 3/17/2021 Dung Uriarte PA-C 
[address] [place] [id number] [address] Fax: [phone] Dear Dung Uriarte PA-C, Thank you for referring Ms. Reuben Alcantara to [address] [place] [address] for evaluation. My notes for this consultation are attached. If you have questions, please do not hesitate to call me. I look forward to following your patient along with you. Sincerely, Pooja Torres MD

## 2021-03-17 NOTE — PROGRESS NOTES
History and Physical    Patient: Calderon Granger MRN: [id number]  SSN: [id number]    YOB: 1960  Age: 64 y.o. Sex: female      Subjective:      Calderon Granger is a 64 y.o. female with past medical history of hypertension, hyperlipidemia, chronic kidney disease stage III, bipolar disorder, GERD is here for type 2 diabetes mellitus. She is in primary care of Ashok Bhatti np. She is also in care of nephrologist Dr. Welton Kayser. Last year we had to stop Trulicity because of high co-pay. She has new insurance this year and she would like to see if Trulicity will be affordable with that. She was doing well on it when she took it last year. At the last appointment NovoLog 70/30 was decreased from 45 units twice a day to 45 units in the morning and 40 units in the evening before dinner, as patient was having low blood glucose at night. However, she continues to wake up in the middle of the night feeling hungry and when she checks her blood glucose it is down in the 80s and 70s. This has not happened during the day. Labs done after the last visit came back showing that cholesterol was high. She was not taking atorvastatin regularly at that time. After the labs she started taking atorvastatin 40 mg regularly at bedtime. Regarding CKD stage III: Last appointment with nephrologist was earlier in March 2021. She was told everything is looking stable. Glucometer reading: Patient is checking blood glucose 1-2 times per day.   Readings are ranging from 86 to 239 mg/dL    Updated diabetes history:  · Diagnosis: 8 years    · Current treatment: NovoLog 70/30, 45 units in the morning and 40 units before dinner    · Past treatment: Lantus, Humalog, Levemir, Novolin N, Novolin R, Trulicity (expensive)    · Glucose checks: once a day, fasting runs in 400s    · Hyperglycemia: yes    · Hypoglycemia: no    · Meals per day: 3, Breakfast: cereal, sandwich, boiled eggs, toast, lunch: skips, Dinner: fried chicken, pork, beans, hamburger, , snacks: banana, crackers, pop corn, sweet tea    · Exercise: no    · DM related hospitalizations: no        Complications of DM:    · CAD: no    · CVA: no    · PVD: no    · Amputations: no     · Retinopathy: no; last exam was 6-2020    · Gastropathy: no    · Nephropathy: yes ckd stage 3, Dr. Manju Gardner  · Neuropathy: no        Medications:    · Statin: atorvastatin 40    · ACE-I: no    · ASA: no        · Diabetes education: no    Past Medical History:   Diagnosis Date    Anxiety 6/22/2020    [facility] 1/23/2018    Diabetes mellitus type 2, controlled ([facility] 6/22/2020    Hypertension 6/22/2020    Liver fibrosis 6/22/2020    Major depression 6/22/2020    Migraine 6/22/2020    Sleep apnea 1/23/2018    Suicide attempt ([facility] 6/22/2020 2013     Past Surgical History:   Procedure Laterality Date    HX APPENDECTOMY      HX CHOLECYSTECTOMY        Family History   Problem Relation Age of Onset    Heart Attack Mother     Heart Attack Father      Social History     Tobacco Use    Smoking status: Never Smoker    Smokeless tobacco: Never Used   Substance Use Topics    Alcohol use: Not Currently      Prior to Admission medications    Medication Sig Start Date End Date Taking? Authorizing Provider   metoprolol succinate (TOPROL-XL) 50 mg XL tablet TAKE 1 TABLET BY MOUTH ONCE DAILY 3/2/21  Yes Provider, Historical   amLODIPine (NORVASC) 2.5 mg tablet TAKE 1 TABLET BY MOUTH ONCE DAILY 3/4/21  Yes Provider, Historical   atorvastatin (LIPITOR) 40 mg tablet TAKE 1 TABLET BY MOUTH AT BEDTIME MEDICATION INCREASED 3/1/21  Yes Provider, Historical   dulaglutide (Trulicity) 4.31 TL/5.0 mL sub-q pen 0.5 mL by SubCUTAneous route every seven (7) days for 28 days.  3/17/21 4/14/21 Yes Caty Glover MD   insulin aspart protamine/insulin aspart (NOVOLOG MIX 70/30) 100 unit/mL (70-30) inpn Take 45 units in the morning and 30 units in evening 3/17/21  Yes Izabel Block MD   Insulin Needles, Disposable, 31 gauge x 5/16\" ndle Twice a day 3/17/21  Yes Izabel Block MD   hydrALAZINE (APRESOLINE) 50 mg tablet Take 1 Tab by mouth three (3) times daily for 30 days. 3/17/21 4/16/21 Yes Izabel Block MD   ALPRAZolam [facility]) 0.5 mg tablet Take 1 tablet by mouth twice daily as needed 3/1/21  Yes Farshad-Lyndon Cecillia Butter, NP   buPROPion SR (WELLBUTRIN SR) 150 mg SR tablet Take 1 Tab by mouth every evening for 90 days. 2/9/21 5/10/21 Yes Farshad-LyndonJoyceillia Butter, NP   citalopram (CELEXA) 20 mg tablet Take 1 tablet by mouth once daily 1/6/21  Yes Yamel Hernandeza Butter, NP   ARIPiprazole (ABILIFY) 15 mg tablet TAKE 1 TABLET BY MOUTH AT BEDTIME 1/2/21  Yes Farshad-LyndonJoyceillia Butter, NP   acetaminophen-codeine (TYLENOL #3) 300-30 mg per tablet acetaminophen 300 mg-codeine 30 mg tablet   Yes Provider, Historical   lancets (One Touch Delica) 33 gauge misc Check glucose 3 times a day 12/16/20  Yes Izabel Block MD   glucose blood VI test strips (OneTouch Verio test strips) strip Check glucose 3 times a day 12/16/20  Yes Izabel Block MD   allopurinoL (ZYLOPRIM) 300 mg tablet Take  by mouth daily. Yes Provider, Historical   gabapentin (NEURONTIN) 100 mg capsule Take  by mouth three (3) times daily. Yes Provider, Historical   magnesium oxide (MAG-OX) 400 mg tablet Take 400 mg by mouth three (3) times daily. Yes Provider, Historical   pantoprazole (PROTONIX) 40 mg tablet Take 40 mg by mouth daily. Yes Provider, Historical   sodium bicarbonate 650 mg tablet Take  by mouth two (2) times a day.    Yes Provider, Historical        Allergies   Allergen Reactions    Adhesive Tape-Silicones Unknown (comments)     Blisters    Septra [Sulfamethoprim] Unknown (comments)    Sulfa (Sulfonamide Antibiotics) Hives       Review of Systems:  ROS    A comprehensive review of systems was preformed and it is negative except mentioned in HPI    Objective:     Vitals:    03/17/21 1027   BP: 131/71   Pulse: 84 Temp: 97.5 °F (36.4 °C)   TempSrc: Temporal   SpO2: 99%   Weight: 221 lb (100.2 kg)   Height: 4' 11\" (1.499 m)        Physical Exam:    Physical Exam  Vitals signs and nursing note reviewed. Constitutional:       Appearance: Normal appearance. HENT:      Head: Normocephalic and atraumatic. Cardiovascular:      Rate and Rhythm: Normal rate and regular rhythm. Pulmonary:      Effort: Pulmonary effort is normal.      Breath sounds: Normal breath sounds. Neurological:      Mental Status: She is alert. Labs and Imaging:  Results for Mike Baig (MRN [id number]) as of 3/17/2021 10:12   Ref. Range 1/6/2021 16:20   Sodium Latest Ref Range: 136 - 145 mmol/L 141   Potassium Latest Ref Range: 3.5 - 5.1 mmol/L 3.9   Chloride Latest Ref Range: 97 - 108 mmol/L 103   CO2 Latest Ref Range: 21 - 32 mmol/L 25   Anion gap Latest Ref Range: 5 - 15 mmol/L 13   Glucose Latest Ref Range: 65 - 100 mg/dL 105 (H)   BUN Latest Ref Range: 6 - 20 mg/dL 16   Creatinine Latest Ref Range: 0.55 - 1.02 mg/dL 2.02 (H)   BUN/Creatinine ratio Latest Ref Range: 12 - 20   8 (L)   Calcium Latest Ref Range: 8.5 - 10.1 mg/dL 9.3   GFR est non-AA Latest Ref Range: >60 ml/min/1.73m2 25 (L)   GFR est AA Latest Ref Range: >60 ml/min/1.73m2 31 (L)   Bilirubin, total Latest Ref Range: 0.2 - 1.0 mg/dL 0.4   Protein, total Latest Ref Range: 6.4 - 8.2 g/dL 8.8 (H)   Albumin Latest Ref Range: 3.5 - 5.0 g/dL 3.6   Globulin Latest Ref Range: 2.0 - 4.0 g/dL 5.2 (H)   A-G Ratio Latest Ref Range: 1.1 - 2.2   0.7 (L)   ALT Latest Ref Range: 12 - 78 U/L 20   AST Latest Ref Range: 15 - 37 U/L 40 (H)   Alk. phosphatase Latest Ref Range: 45 - 117 U/L 164 (H)   Results for Mike Baig (MRN [id number]) as of 3/17/2021 10:12   Ref.  Range 12/16/2020 12:08   Cholesterol, total Latest Ref Range: 100 - 199 mg/dL 233 (H)   HDL Cholesterol Latest Ref Range: >39 mg/dL 26 (L)   VLDL, calculated Latest Ref Range: 5 - 40 mg/dL 68 (H)   LDL, calculated Latest Ref Range: 0 - 99 mg/dL 139 (H)   Results for Yared Laureano (MRN [id number]) as of 3/17/2021 10:12   Ref. Range 12/16/2020 12:08   TSH Latest Ref Range: 0.450 - 4.500 uIU/mL 1.310     Last 3 Recorded Weights in this Encounter    03/17/21 1027   Weight: 221 lb (100.2 kg)        No results found for: HBA1C, HGBE8, AAH2EHMS, OEH8XSBP, CIR7CHSN     Assessment:     Patient Active Problem List   Diagnosis Code    Bipolar 1 disorder ([facility] F31.9    Sleep apnea G47.30    Major depression F32.9    Anxiety F41.9    Type 2 diabetes mellitus with hyperglycemia, with long-term current use of insulin ([facility] E11.65, Z79.4    Liver fibrosis K74.00    Hypertension I10    Migraine G43.909    Suicide attempt ([facility] T14.91XA    Severe obesity ([facility] E66.01    Type 2 diabetes mellitus with diabetic neuropathy ([facility] E11.40    CKD stage 3 due to type 2 diabetes mellitus ([facility] E11.22, N18.30    Mixed hyperlipidemia E78.2    Hypertensive renal disease I12.9           Plan:     type II diabetes mellitus uncontrolled  Hemoglobin A1c is 11.6% on 11-6-2019, 11.2% on 2-5-2020, 8.9% on 12-, 8.1% today. Fingerstick blood glucose is 132 mg/dL today. Up to date with diabetes related annual labs: yes     Up to date with diabetic eye exam: yes 6/2020    plan:  Patient is having low blood glucose at night. Decrease NovoLog 70/30 as follows: Continue 45 units in the morning, decreased to 30 units in the evening before dinner. I am going to send prescription for Trulicity 3.47 mg weekly. If she is able to get this, she should decrease NovoLog 70/30 as follows:  40 units in the morning, 25 units before dinner. Continue to check blood glucose twice a day and I will see her back in 3 months. Not a good candidate for metformin because of CKD stage III, not to get good candidate for SGLT2 inhibitor because of frequent UTI and vaginal yeast infections.     hypertensive disorder  Hydralazine was increased from 25 mg to 50 mg 3 times a day at the last visit. Blood pressure is looking good today. Continue the same. mixed hyperlipidemia  2019: Triglycerides elevated at 379, LDL normal at 61. Patient has not been taking atorvastatin regularly. 2020: Total cholesterol elevated at 233, triglycerides 370, . Advised patient to start taking atorvastatin 40 mg regularly. Plan:  Recheck lipid profile today. I will send a refill based on the result. peripheral neuropathy with type 2 diabetes  on gabapentin    chronic kidney disease stage 3  GFR was 40 in 2019. Following with nephrologist every 4 months. GFR was 31 in 2021. Orders Placed This Encounter    LIPID PANEL    AMB POC HEMOGLOBIN A1C    AMB POC GLUCOSE BLOOD, BY GLUCOSE MONITORING DEVICE    dulaglutide (Trulicity) 5.55 GM/3.2 mL sub-q pen     Si.5 mL by SubCUTAneous route every seven (7) days for 28 days. Dispense:  4 Syringe     Refill:  3    insulin aspart protamine/insulin aspart (NOVOLOG MIX 70/30) 100 unit/mL (70-30) inpn     Sig: Take 45 units in the morning and 30 units in evening     Dispense:  8 Adjustable Dose Pre-filled Pen Syringe     Refill:  3    Insulin Needles, Disposable, 31 gauge x 5/16\" ndle     Sig: Twice a day     Dispense:  1 Package     Refill:  5    hydrALAZINE (APRESOLINE) 50 mg tablet     Sig: Take 1 Tab by mouth three (3) times daily for 30 days.      Dispense:  90 Tab     Refill:  3        Signed By: Wendy Graff MD     2021      Return to clinic 3 months

## 2021-03-18 DIAGNOSIS — E78.2 MIXED HYPERLIPIDEMIA: Primary | ICD-10-CM

## 2021-03-18 LAB
CHOLEST SERPL-MCNC: 145 MG/DL (ref 100–199)
HDLC SERPL-MCNC: 30 MG/DL
LDLC SERPL CALC-MCNC: 71 MG/DL (ref 0–99)
TRIGL SERPL-MCNC: 273 MG/DL (ref 0–149)
VLDLC SERPL CALC-MCNC: 44 MG/DL (ref 5–40)

## 2021-03-18 RX ORDER — ATORVASTATIN CALCIUM 40 MG/1
40 TABLET, FILM COATED ORAL
Qty: 30 TAB | Refills: 4 | Status: SHIPPED | OUTPATIENT
Start: 2021-03-18 | End: 2021-04-17

## 2021-03-18 NOTE — PROGRESS NOTES
Please inform patient that her cholesterol is looking better. Continue to take atorvastatin 40 mg regularly at bedtime. I am going to send a refill.

## 2021-03-22 ENCOUNTER — TELEPHONE (OUTPATIENT)
Dept: ENDOCRINOLOGY | Age: 61
End: 2021-03-22

## 2021-03-22 NOTE — TELEPHONE ENCOUNTER
Left patient a vm asking to give the office a call back in regards to results    ----- Message from Mahnaz Alves MD sent at 3/18/2021 11:40 AM EDT -----  Please inform patient that her cholesterol is looking better. Continue to take atorvastatin 40 mg regularly at bedtime. I am going to send a refill.

## 2021-03-30 DIAGNOSIS — F41.1 GENERALIZED ANXIETY DISORDER: ICD-10-CM

## 2021-03-30 RX ORDER — ALPRAZOLAM 0.5 MG/1
TABLET ORAL
Qty: 60 TAB | Refills: 2 | Status: SHIPPED | OUTPATIENT
Start: 2021-03-30 | End: 2021-06-27

## 2021-04-01 ENCOUNTER — HOSPITAL ENCOUNTER (EMERGENCY)
Age: 61
Discharge: HOME | End: 2021-04-01
Attending: FAMILY MEDICINE
Payer: MEDICARE

## 2021-04-01 ENCOUNTER — APPOINTMENT (OUTPATIENT)
Dept: CT IMAGING | Age: 61
End: 2021-04-01
Attending: FAMILY MEDICINE
Payer: MEDICARE

## 2021-04-01 ENCOUNTER — TELEPHONE (OUTPATIENT)
Dept: ENDOCRINOLOGY | Age: 61
End: 2021-04-01

## 2021-04-01 VITALS
DIASTOLIC BLOOD PRESSURE: 87 MMHG | WEIGHT: 218 LBS | OXYGEN SATURATION: 97 % | BODY MASS INDEX: 40.12 KG/M2 | SYSTOLIC BLOOD PRESSURE: 149 MMHG | TEMPERATURE: 98.2 F | RESPIRATION RATE: 20 BRPM | HEART RATE: 76 BPM | HEIGHT: 62 IN

## 2021-04-01 DIAGNOSIS — D50.9 MICROCYTIC ANEMIA: ICD-10-CM

## 2021-04-01 DIAGNOSIS — R42 VERTIGO: Primary | ICD-10-CM

## 2021-04-01 LAB
BASOPHILS # BLD: 0.1 K/UL (ref 0–0.2)
BASOPHILS NFR BLD: 1 % (ref 0–2.5)
BNP SERPL-MCNC: 203 PG/ML
EOSINOPHIL # BLD: 0.1 K/UL (ref 0–0.7)
EOSINOPHIL NFR BLD: 1 % (ref 0.9–2.9)
ERYTHROCYTE [DISTWIDTH] IN BLOOD BY AUTOMATED COUNT: 16.5 % (ref 11.5–14.5)
HCT VFR BLD AUTO: 29.1 % (ref 36–46)
HGB BLD-MCNC: 9.3 G/DL (ref 13.5–17.5)
LYMPHOCYTES # BLD: 1.4 K/UL (ref 1–4.8)
LYMPHOCYTES NFR BLD: 20 % (ref 20.5–51.1)
MCH RBC QN AUTO: 24.7 PG (ref 31–34)
MCHC RBC AUTO-ENTMCNC: 32 G/DL (ref 31–36)
MCV RBC AUTO: 77.2 FL (ref 80–100)
MONOCYTES # BLD: 0.4 K/UL (ref 0.2–2.4)
MONOCYTES NFR BLD: 5 % (ref 1.7–9.3)
NEUTS SEG # BLD: 5.3 K/UL (ref 1.8–7.7)
NEUTS SEG NFR BLD: 73 % (ref 42–75)
NRBC # BLD: 0 K/UL
NRBC BLD-RTO: 0 PER 100 WBC
PLATELET # BLD AUTO: 192 K/UL
PMV BLD AUTO: 8.3 FL (ref 6.5–11.5)
RBC # BLD AUTO: 3.77 M/UL (ref 4.5–5.9)
TROPONIN I SERPL-MCNC: <0.05 NG/ML
WBC # BLD AUTO: 7.2 K/UL (ref 4.4–11.3)

## 2021-04-01 PROCEDURE — 36415 COLL VENOUS BLD VENIPUNCTURE: CPT

## 2021-04-01 PROCEDURE — 96374 THER/PROPH/DIAG INJ IV PUSH: CPT

## 2021-04-01 PROCEDURE — 85025 COMPLETE CBC W/AUTO DIFF WBC: CPT

## 2021-04-01 PROCEDURE — 84484 ASSAY OF TROPONIN QUANT: CPT

## 2021-04-01 PROCEDURE — 74011250636 HC RX REV CODE- 250/636: Performed by: FAMILY MEDICINE

## 2021-04-01 PROCEDURE — 70450 CT HEAD/BRAIN W/O DYE: CPT

## 2021-04-01 PROCEDURE — 99283 EMERGENCY DEPT VISIT LOW MDM: CPT

## 2021-04-01 PROCEDURE — 83880 ASSAY OF NATRIURETIC PEPTIDE: CPT

## 2021-04-01 PROCEDURE — 93005 ELECTROCARDIOGRAM TRACING: CPT

## 2021-04-01 RX ORDER — MECLIZINE HYDROCHLORIDE 25 MG/1
25 TABLET ORAL
Qty: 15 TAB | Refills: 0 | Status: SHIPPED | OUTPATIENT
Start: 2021-04-01 | End: 2021-04-11

## 2021-04-01 RX ORDER — ONDANSETRON 2 MG/ML
4 INJECTION INTRAMUSCULAR; INTRAVENOUS
Status: COMPLETED | OUTPATIENT
Start: 2021-04-01 | End: 2021-04-01

## 2021-04-01 RX ORDER — ONDANSETRON 4 MG/1
4 TABLET, ORALLY DISINTEGRATING ORAL
Qty: 15 TAB | Refills: 0 | Status: SHIPPED | OUTPATIENT
Start: 2021-04-01 | End: 2021-04-06

## 2021-04-01 RX ORDER — MECLIZINE HCL 12.5 MG 12.5 MG/1
25 TABLET ORAL DAILY
Status: DISCONTINUED | OUTPATIENT
Start: 2021-04-01 | End: 2021-04-01 | Stop reason: HOSPADM

## 2021-04-01 RX ORDER — MECLIZINE HCL 12.5 MG 12.5 MG/1
25 TABLET ORAL DAILY
Status: DISCONTINUED | OUTPATIENT
Start: 2021-04-02 | End: 2021-04-01

## 2021-04-01 RX ADMIN — SODIUM CHLORIDE 1000 ML: 9 INJECTION, SOLUTION INTRAVENOUS at 17:49

## 2021-04-01 RX ADMIN — MECLIZINE 25 MG: 12.5 TABLET ORAL at 17:50

## 2021-04-01 RX ADMIN — ONDANSETRON 4 MG: 2 INJECTION INTRAMUSCULAR; INTRAVENOUS at 17:49

## 2021-04-01 NOTE — ED PROVIDER NOTES
EMERGENCY DEPARTMENT HISTORY AND PHYSICAL EXAM      Date: 4/1/2021  Patient Name: Becky Barnes    History of Presenting Illness     Chief Complaint   Patient presents with    Dizziness       History Provided By:     HPI: Becky Barnes, is an extremely pleasant 64 y.o. female presenting to the ED with a chief complaint of dizziness and nausea with vomiting. She states the symptoms came on earlier this afternoon. She denies any associated chest pain, shortness of breath. She does not believe she has had similar symptoms in the past.  She has been trying to eat and drink but has not been able to keep things down. She denies any headaches, vision changes, hearing disturbances, numbness tingling or weakness in her extremities. She denies any fevers, chills or rigors. She denies neck pain. She is not having any abdominal pain nor urinary symptoms. There are no other complaints, changes, or physical findings at this time. PCP: Jarad Vital PA-C    No current facility-administered medications on file prior to encounter. Current Outpatient Medications on File Prior to Encounter   Medication Sig Dispense Refill    ALPRAZolam (XANAX) 0.5 mg tablet Take 1 tablet by mouth twice daily as needed 60 Tab 2    atorvastatin (LIPITOR) 40 mg tablet Take 1 Tab by mouth nightly for 30 days. 30 Tab 4    dulaglutide (Trulicity) 2.60 RW/5.1 mL sub-q pen 0.5 mL by SubCUTAneous route every seven (7) days for 28 days. 4 Syringe 3    insulin aspart protamine/insulin aspart (NOVOLOG MIX 70/30) 100 unit/mL (70-30) inpn Take 45 units in the morning and 30 units in evening 8 Adjustable Dose Pre-filled Pen Syringe 3    Insulin Needles, Disposable, 31 gauge x 5/16\" ndle Twice a day 1 Package 5    hydrALAZINE (APRESOLINE) 50 mg tablet Take 1 Tab by mouth three (3) times daily for 30 days. 90 Tab 3    buPROPion SR (WELLBUTRIN SR) 150 mg SR tablet Take 1 Tab by mouth every evening for 90 days.  90 Tab 0    citalopram (CELEXA) 20 mg tablet Take 1 tablet by mouth once daily 90 Tab 0    ARIPiprazole (ABILIFY) 15 mg tablet TAKE 1 TABLET BY MOUTH AT BEDTIME 90 Tab 0    lancets (One Touch Delica) 33 gauge misc Check glucose 3 times a day 100 Lancet 3    glucose blood VI test strips (OneTouch Verio test strips) strip Check glucose 3 times a day 100 Strip 3    metoprolol succinate (TOPROL-XL) 50 mg XL tablet TAKE 1 TABLET BY MOUTH ONCE DAILY      amLODIPine (NORVASC) 2.5 mg tablet TAKE 1 TABLET BY MOUTH ONCE DAILY      acetaminophen-codeine (TYLENOL #3) 300-30 mg per tablet acetaminophen 300 mg-codeine 30 mg tablet      allopurinoL (ZYLOPRIM) 300 mg tablet Take  by mouth daily.  gabapentin (NEURONTIN) 100 mg capsule Take  by mouth three (3) times daily.  magnesium oxide (MAG-OX) 400 mg tablet Take 400 mg by mouth three (3) times daily.  pantoprazole (PROTONIX) 40 mg tablet Take 40 mg by mouth daily.  sodium bicarbonate 650 mg tablet Take  by mouth two (2) times a day. Past History     Past Medical History:  Past Medical History:   Diagnosis Date    Anxiety 6/22/2020    Bipolar 1 disorder ([facility] [address] 1/23/2018    Diabetes mellitus type 2, controlled ([facility] [address] 6/22/2020    Hypertension 6/22/2020    Liver fibrosis 6/22/2020    Major depression 6/22/2020    Migraine 6/22/2020    Sleep apnea 1/23/2018    Suicide attempt ([facility] [address] 6/22/2020 2013       Past Surgical History:  Past Surgical History:   Procedure Laterality Date    HX APPENDECTOMY      HX CHOLECYSTECTOMY         Family History:  Family History   Problem Relation Age of Onset    Heart Attack Mother     Heart Attack Father        Social History:  Social History     Tobacco Use    Smoking status: Never Smoker    Smokeless tobacco: Never Used   Substance Use Topics    Alcohol use: Not Currently    Drug use: Never       Allergies:   Allergies   Allergen Reactions    Adhesive Tape-Silicones Unknown (comments)     Blisters    Septra [Sulfamethoprim] Unknown (comments)    Sulfa (Sulfonamide Antibiotics) Hives         Review of Systems     Review of Systems   Constitutional: Negative for activity change, appetite change, chills, fatigue and fever. HENT: Negative for congestion and sore throat. Eyes: Negative for photophobia and visual disturbance. Respiratory: Negative for cough, shortness of breath and wheezing. Cardiovascular: Negative for chest pain, palpitations and leg swelling. Gastrointestinal: Positive for nausea and vomiting. Negative for abdominal pain and diarrhea. Endocrine: Negative for cold intolerance and heat intolerance. Musculoskeletal: Negative for gait problem and joint swelling. Skin: Negative for color change and rash. Neurological: Positive for dizziness. Negative for headaches. Physical Exam     Physical Exam  Constitutional:       Appearance: She is well-developed. HENT:      Head: Normocephalic and atraumatic. Mouth/Throat:      Mouth: Mucous membranes are moist.      Pharynx: Oropharynx is clear. Eyes:      Extraocular Movements: Extraocular movements intact. Conjunctiva/sclera: Conjunctivae normal.      Pupils: Pupils are equal, round, and reactive to light. Neck:      Musculoskeletal: Normal range of motion and neck supple. Cardiovascular:      Rate and Rhythm: Normal rate and regular rhythm. Pulses: Normal pulses. Heart sounds: Normal heart sounds. No murmur. Pulmonary:      Effort: No respiratory distress. Breath sounds: No stridor. No wheezing, rhonchi or rales. Abdominal:      General: There is no distension. Tenderness: There is no abdominal tenderness. There is no rebound. Skin:     General: Skin is warm and dry. Neurological:      General: No focal deficit present. Mental Status: She is alert and oriented to person, place, and time. Cranial Nerves: No cranial nerve deficit. Sensory: No sensory deficit.       Motor: No weakness. Coordination: Coordination normal.      Gait: Gait normal.      Deep Tendon Reflexes: Reflexes normal.      Comments: No neurovascular deficits on exam.   Psychiatric:         Mood and Affect: Mood normal.         Behavior: Behavior normal.         Lab and Diagnostic Study Results     Labs -     Recent Results (from the past 12 hour(s))   EKG, 12 LEAD, INITIAL    Collection Time: 04/01/21  5:11 PM   Result Value Ref Range    Ventricular Rate 79 BPM    Atrial Rate 79 BPM    P-R Interval 164 ms    QRS Duration 101 ms    Q-T Interval 413 ms    QTC Calculation (Bezet) 474 ms    Calculated P Axis 44 degrees    Calculated R Axis 12 degrees    Calculated T Axis 33 degrees    Diagnosis       Sinus rhythm  Probable left atrial enlargement  Anteroseptal infarct, age indeterminate  Baseline wander in lead(s) I,III,aVR,aVL,V4,V6     CBC WITH AUTOMATED DIFF    Collection Time: 04/01/21  5:32 PM   Result Value Ref Range    WBC 7.2 4.4 - 11.3 K/uL    RBC 3.77 (L) 4.50 - 5.90 M/uL    HGB 9.3 (L) 13.5 - 17.5 g/dL    HCT 29.1 (L) 36 - 46 %    MCV 77.2 (L) 80 - 100 FL    MCH 24.7 (L) 31 - 34 PG    MCHC 32.0 31.0 - 36.0 g/dL    RDW 16.5 (H) 11.5 - 14.5 %    PLATELET 335 K/uL    MPV 8.3 6.5 - 11.5 FL    NRBC 0.0  WBC    ABSOLUTE NRBC 0.00 K/uL    NEUTROPHILS 73 42 - 75 %    LYMPHOCYTES 20 (L) 20.5 - 51.1 %    MONOCYTES 5 1.7 - 9.3 %    EOSINOPHILS 1 0.9 - 2.9 %    BASOPHILS 1 0.0 - 2.5 %    ABS. NEUTROPHILS 5.3 1.8 - 7.7 K/UL    ABS. LYMPHOCYTES 1.4 1.0 - 4.8 K/UL    ABS. MONOCYTES 0.4 0.2 - 2.4 K/UL    ABS. EOSINOPHILS 0.1 0.0 - 0.7 K/UL    ABS.  BASOPHILS 0.1 0.0 - 0.2 K/UL   BNP    Collection Time: 04/01/21  5:32 PM   Result Value Ref Range    NT pro- (H) <125 pg/mL   TROPONIN I    Collection Time: 04/01/21  5:32 PM   Result Value Ref Range    Troponin-I, Qt. <0.05 <0.05 ng/mL       Radiologic Studies -   @lastxrresult@  CT Results  (Last 48 hours)               04/01/21 1824  CT HEAD WO CONT Final result Impression:  Negative exam.                   Dose reduction: All CT scans at this facility are performed using radiation dose   reduction optimization techniques as appropriate to a performed exam, including   the following: Automated exposure control (8 cc), adjustment of the MAA and/or   KUB according to the patient's size, or the patient's use of iterative   reconstruction techniques (ASIR). Narrative:  CT SCAN OF THE HEAD WITHOUT IV CONTRAST:       INDICATION: Vertigo       COMPARISON:1/11/2021       The exam is negative for hemorrhage, infarct, mass lesion, midline shift or   abnormal extra-axial fluid collection. The ventricles and cisterns are normal.    The orbits, paranasal sinuses and temporal bones are normal.               CXR Results  (Last 48 hours)    None            Medical Decision Making   - I am the first provider for this patient. - I reviewed the vital signs, available nursing notes, past medical history, past surgical history, family history and social history. - Initial assessment performed. The patients presenting problems have been discussed, and they are in agreement with the care plan formulated and outlined with them. I have encouraged them to ask questions as they arise throughout their visit. Vital Signs-Reviewed the patient's vital signs. Patient Vitals for the past 12 hrs:   Temp Pulse Resp BP SpO2   04/01/21 1707     97 %   04/01/21 1702 98.2 °F (36.8 °C) 81 18 (!) 153/75 97 %         ED Course/ Provider Notes (Medical Decision Making):     Patient presented to the emergency department with a chief complaint of, nausea and vomiting. EKG is normal sinus rhythm without ST nor T wave abnormalities. Troponin is less than 0.05. CBC is notable for a microcytic anemia with a hemoglobin of 9.3. This is close to her baseline of 10. Patient was given a liter of normal saline as well as Zofran and meclizine.   She states she feels excellent and is no longer symptomatic. At the time of this dictation her CMP is pending to be drawn, however she states she wants to go home and does not want to wait for this lab. She understands this is an important part of her work-up but states she still wants to go home. She was ultimately discharged home in stable and ambulatory condition. Madhavi Hermosillo was given a thorough list of signs and symptoms that would warrant an immediate return to the emergency department. Otherwise Madhavi Hermosillo will follow up with PCP. Procedures   Medical Decision Makingedical Decision Making  Performed by: Mayra Mcnamara, DO  Procedures  None       Disposition   Disposition:     Home    All of the diagnostic tests were reviewed and questions answered. Diagnosis, care plan and treatment options were discussed. The patient understands the instructions and will follow up as directed. The patients results have been reviewed with them. They have been counseled regarding their diagnosis. The patient verbally convey understanding and agreement of the signs, symptoms, diagnosis, treatment and prognosis and additionally agrees to follow up as recommended with their PCP in 24 - 48 hours. They also agree with the care-plan and convey that all of their questions have been answered. I have also put together some discharge instructions for them that include: 1) educational information regarding their diagnosis, 2) how to care for their diagnosis at home, as well a 3) list of reasons why they would want to return to the ED prior to their follow-up appointment, should their condition change. DISCHARGE PLAN:    1. Current Discharge Medication List      START taking these medications    Details   meclizine (ANTIVERT) 25 mg tablet Take 1 Tab by mouth three (3) times daily as needed for Dizziness for up to 10 days.   Qty: 15 Tab, Refills: 0      ondansetron (Zofran ODT) 4 mg disintegrating tablet 1 Tab by SubLINGual route every eight (8) hours as needed for Nausea or Vomiting for up to 5 days. Qty: 15 Tab, Refills: 0         CONTINUE these medications which have NOT CHANGED    Details   ALPRAZolam (XANAX) 0.5 mg tablet Take 1 tablet by mouth twice daily as needed  Qty: 60 Tab, Refills: 2    Associated Diagnoses: Generalized anxiety disorder      atorvastatin (LIPITOR) 40 mg tablet Take 1 Tab by mouth nightly for 30 days. Qty: 30 Tab, Refills: 4    Associated Diagnoses: Mixed hyperlipidemia      dulaglutide (Trulicity) 1.55 EC/8.5 mL sub-q pen 0.5 mL by SubCUTAneous route every seven (7) days for 28 days. Qty: 4 Syringe, Refills: 3    Associated Diagnoses: Type 2 diabetes mellitus with hyperglycemia, with long-term current use of insulin ([facility])      insulin aspart protamine/insulin aspart (NOVOLOG MIX 70/30) 100 unit/mL (70-30) inpn Take 45 units in the morning and 30 units in evening  Qty: 8 Adjustable Dose Pre-filled Pen Syringe, Refills: 3    Associated Diagnoses: Type 2 diabetes mellitus with hyperglycemia, with long-term current use of insulin ([facility])      Insulin Needles, Disposable, 31 gauge x 5/16\" ndle Twice a day  Qty: 1 Package, Refills: 5    Associated Diagnoses: Type 2 diabetes mellitus with hyperglycemia, with long-term current use of insulin ([facility])      hydrALAZINE (APRESOLINE) 50 mg tablet Take 1 Tab by mouth three (3) times daily for 30 days. Qty: 90 Tab, Refills: 3    Associated Diagnoses: Hypertensive renal disease      buPROPion SR (WELLBUTRIN SR) 150 mg SR tablet Take 1 Tab by mouth every evening for 90 days.   Qty: 90 Tab, Refills: 0    Associated Diagnoses: Bipolar depression ([facility] [address]      citalopram (CELEXA) 20 mg tablet Take 1 tablet by mouth once daily  Qty: 90 Tab, Refills: 0      ARIPiprazole (ABILIFY) 15 mg tablet TAKE 1 TABLET BY MOUTH AT BEDTIME  Qty: 90 Tab, Refills: 0      lancets (One Touch Delica) 33 gauge misc Check glucose 3 times a day  Qty: 100 Lancet, Refills: 3    Associated Diagnoses: Type 2 diabetes mellitus with hyperglycemia, with long-term current use of insulin ([facility])      glucose blood VI test strips (OneTouch Verio test strips) strip Check glucose 3 times a day  Qty: 100 Strip, Refills: 3    Associated Diagnoses: Type 2 diabetes mellitus with hyperglycemia, with long-term current use of insulin ([facility])      metoprolol succinate (TOPROL-XL) 50 mg XL tablet TAKE 1 TABLET BY MOUTH ONCE DAILY      amLODIPine (NORVASC) 2.5 mg tablet TAKE 1 TABLET BY MOUTH ONCE DAILY      acetaminophen-codeine (TYLENOL #3) 300-30 mg per tablet acetaminophen 300 mg-codeine 30 mg tablet      allopurinoL (ZYLOPRIM) 300 mg tablet Take  by mouth daily. gabapentin (NEURONTIN) 100 mg capsule Take  by mouth three (3) times daily. magnesium oxide (MAG-OX) 400 mg tablet Take 400 mg by mouth three (3) times daily. pantoprazole (PROTONIX) 40 mg tablet Take 40 mg by mouth daily. sodium bicarbonate 650 mg tablet Take  by mouth two (2) times a day. 2.   Follow-up Information     Follow up With Specialties Details Why Contact Info    Jarad Vital PA-C Physician Assistant Schedule an appointment as soon as possible for a visit   [phone]              3.  Return to ED if worse       4. Current Discharge Medication List      START taking these medications    Details   meclizine (ANTIVERT) 25 mg tablet Take 1 Tab by mouth three (3) times daily as needed for Dizziness for up to 10 days. Qty: 15 Tab, Refills: 0      ondansetron (Zofran ODT) 4 mg disintegrating tablet 1 Tab by SubLINGual route every eight (8) hours as needed for Nausea or Vomiting for up to 5 days. Qty: 15 Tab, Refills: 0               Diagnosis     Clinical Impression:    1. Vertigo    2. Microcytic anemia        Attestations:    Arielle Bruce, DO    Please note that this dictation was completed with [employer], the [employer] voice recognition software.   Quite often unanticipated grammatical, syntax, homophones, and other interpretive errors are inadvertently transcribed by the computer software. Please disregard these errors. Please excuse any errors that have escaped final proofreading. Thank you.

## 2021-04-01 NOTE — DISCHARGE INSTRUCTIONS
Thank you! Thank you for allowing me to care for you in the emergency department. I sincerely hope that you are satisfied with your visit today. It is my goal to provide you with excellent care. Below you will find a list of your labs and imaging from your visit today. Should you have any questions regarding these results please do not hesitate to call the emergency department. Labs -     Recent Results (from the past 12 hour(s))   EKG, 12 LEAD, INITIAL    Collection Time: 04/01/21  5:11 PM   Result Value Ref Range    Ventricular Rate 79 BPM    Atrial Rate 79 BPM    P-R Interval 164 ms    QRS Duration 101 ms    Q-T Interval 413 ms    QTC Calculation (Bezet) 474 ms    Calculated P Axis 44 degrees    Calculated R Axis 12 degrees    Calculated T Axis 33 degrees    Diagnosis       Sinus rhythm  Probable left atrial enlargement  Anteroseptal infarct, age indeterminate  Baseline wander in lead(s) I,III,aVR,aVL,V4,V6     CBC WITH AUTOMATED DIFF    Collection Time: 04/01/21  5:32 PM   Result Value Ref Range    WBC 7.2 4.4 - 11.3 K/uL    RBC 3.77 (L) 4.50 - 5.90 M/uL    HGB 9.3 (L) 13.5 - 17.5 g/dL    HCT 29.1 (L) 36 - 46 %    MCV 77.2 (L) 80 - 100 FL    MCH 24.7 (L) 31 - 34 PG    MCHC 32.0 31.0 - 36.0 g/dL    RDW 16.5 (H) 11.5 - 14.5 %    PLATELET 845 K/uL    MPV 8.3 6.5 - 11.5 FL    NRBC 0.0  WBC    ABSOLUTE NRBC 0.00 K/uL    NEUTROPHILS 73 42 - 75 %    LYMPHOCYTES 20 (L) 20.5 - 51.1 %    MONOCYTES 5 1.7 - 9.3 %    EOSINOPHILS 1 0.9 - 2.9 %    BASOPHILS 1 0.0 - 2.5 %    ABS. NEUTROPHILS 5.3 1.8 - 7.7 K/UL    ABS. LYMPHOCYTES 1.4 1.0 - 4.8 K/UL    ABS. MONOCYTES 0.4 0.2 - 2.4 K/UL    ABS. EOSINOPHILS 0.1 0.0 - 0.7 K/UL    ABS.  BASOPHILS 0.1 0.0 - 0.2 K/UL   BNP    Collection Time: 04/01/21  5:32 PM   Result Value Ref Range    NT pro- (H) <125 pg/mL   TROPONIN I    Collection Time: 04/01/21  5:32 PM   Result Value Ref Range    Troponin-I, Qt. <0.05 <0.05 ng/mL       Radiologic Studies -   CT HEAD WO CONT   Final Result   Negative exam.               Dose reduction: All CT scans at this facility are performed using radiation dose   reduction optimization techniques as appropriate to a performed exam, including   the following: Automated exposure control (8 cc), adjustment of the MAA and/or   KUB according to the patient's size, or the patient's use of iterative   reconstruction techniques (ASIR). CT Results  (Last 48 hours)                 04/01/21 1824  CT HEAD WO CONT Final result    Impression:  Negative exam.                   Dose reduction: All CT scans at this facility are performed using radiation dose   reduction optimization techniques as appropriate to a performed exam, including   the following: Automated exposure control (8 cc), adjustment of the MAA and/or   KUB according to the patient's size, or the patient's use of iterative   reconstruction techniques (ASIR). Narrative:  CT SCAN OF THE HEAD WITHOUT IV CONTRAST:       INDICATION: Vertigo       COMPARISON:1/11/2021       The exam is negative for hemorrhage, infarct, mass lesion, midline shift or   abnormal extra-axial fluid collection. The ventricles and cisterns are normal.    The orbits, paranasal sinuses and temporal bones are normal.                 CXR Results  (Last 48 hours)      None               If you feel that you have not received excellent quality care or timely care, please ask to speak to the nurse manager. Please choose us in the future for your continued health care needs. ------------------------------------------------------------------------------------------------------------  The exam and treatment you received in the Emergency Department were for an urgent problem and are not intended as complete care.  It is important that you follow-up with a doctor, nurse practitioner, or physician assistant to:  (1) confirm your diagnosis,  (2) re-evaluation of changes in your illness and treatment, and  (3) for ongoing care. If your symptoms become worse or you do not improve as expected and you are unable to reach your usual health care provider, you should return to the Emergency Department. We are available 24 hours a day. Please take your discharge instructions with you when you go to your follow-up appointment. If you have any problem arranging a follow-up appointment, contact the Emergency Department immediately. If a prescription has been provided, please have it filled as soon as possible to prevent a delay in treatment. Read the entire medication instruction sheet provided to you by the pharmacy. If you have any questions or reservations about taking the medication due to side effects or interactions with other medications, please call your primary care physician or contact the ER to speak with the charge nurse. Make an appointment with your family doctor or the physician you were referred to for follow-up of this visit as instructed on your discharge paperwork, as this is a mandatory follow-up. Return to the ER if you are unable to be seen or if you are unable to be seen in a timely manner. If you have any problem arranging the follow-up visit, contact the Emergency Department immediately.

## 2021-04-01 NOTE — TELEPHONE ENCOUNTER
Spoke to patient. She tells me that since past 2 days she feels like the room is spinning, even when she is laying down, and when she stands up she starts to fall and she has been throwing up. She is not sure if this is because of hydralazine because we increased her dose from 25 to 50 mg. Explained to patient that her symptoms sounds like vertigo. We had increase hydralazine around 4 months back and she was doing well when I saw her a few weeks back so I do not think her symptoms are from hydralazine. Advised patient to call PCP for vertigo symptoms. Patient expressed understanding.

## 2021-04-01 NOTE — ED TRIAGE NOTES
3/27/21 pt had Hydralazine increased to 50mg x 3 times per day, since the increase pt reports \"being dizzy and sick to my stomach. \"     Pt has received both doses of Covid Vaccine. Pt ambulatory with minimal assistance. Pt denies CP, denies SOB.

## 2021-04-01 NOTE — TELEPHONE ENCOUNTER
Patient is calling about her medication hydralazine 50 mg making her sick room is spinning & vomiting started last night unable to take any medication. Can you please call her?

## 2021-04-02 LAB
ATRIAL RATE: 79 BPM
CALCULATED P AXIS, ECG09: 44 DEGREES
CALCULATED R AXIS, ECG10: 12 DEGREES
CALCULATED T AXIS, ECG11: 33 DEGREES
DIAGNOSIS, 93000: NORMAL
P-R INTERVAL, ECG05: 164 MS
Q-T INTERVAL, ECG07: 413 MS
QRS DURATION, ECG06: 101 MS
QTC CALCULATION (BEZET), ECG08: 474 MS
VENTRICULAR RATE, ECG03: 79 BPM

## 2021-04-18 RX ORDER — ARIPIPRAZOLE 15 MG/1
TABLET ORAL
Qty: 90 TAB | Refills: 0 | Status: SHIPPED | OUTPATIENT
Start: 2021-04-18 | End: 2021-07-19 | Stop reason: SDUPTHER

## 2021-04-19 ENCOUNTER — VIRTUAL VISIT (OUTPATIENT)
Dept: BEHAVIORAL/MENTAL HEALTH CLINIC | Age: 61
End: 2021-04-19
Payer: MEDICARE

## 2021-04-19 DIAGNOSIS — F31.9 BIPOLAR DEPRESSION (HCC): ICD-10-CM

## 2021-04-19 PROCEDURE — 99212 OFFICE O/P EST SF 10 MIN: CPT | Performed by: NURSE PRACTITIONER

## 2021-04-19 RX ORDER — BUPROPION HYDROCHLORIDE 150 MG/1
150 TABLET, EXTENDED RELEASE ORAL 2 TIMES DAILY
Qty: 180 TAB | Refills: 0 | Status: SHIPPED | OUTPATIENT
Start: 2021-04-19 | End: 2021-07-18

## 2021-04-19 NOTE — PROGRESS NOTES
Nima Mendoza is a 64 y.o. female who presents today for the following:  Chief Complaint   Patient presents with    Follow-up     \"Here lately I've been down in the dumps just from being sick. \"    Depression    Medication Management     Nima Mendoza, who was evaluated through a synchronous (real-time) {vaudio telephone  encounter, and/or her healthcare decision maker, is aware that it is a billable service, with coverage as determined by her insurance carrier. She provided verbal consent to proceed: YES Consent obtained within past 12 months: Yes, and patient identification was verified. This visit was conducted pursuant to the emergency declaration under the [facility], [address] [place] [address] authority and the [employer] and [employer] General Act. A caregiver was present when appropriate. Ability to conduct physical exam was limited. The patient was located in a state where the provider was credentialed to provide care. --Svetlana Ring NP on 4/19/2021 at 2:55 PM    Length of visit 5 minutes 43 minutes. Allergies   Allergen Reactions    Adhesive Tape-Silicones Unknown (comments)     Blisters    Septra [Sulfamethoprim] Unknown (comments)    Sulfa (Sulfonamide Antibiotics) Hives       Current Outpatient Medications   Medication Sig    buPROPion SR (WELLBUTRIN SR) 150 mg SR tablet Take 1 Tab by mouth two (2) times a day for 90 days.     ARIPiprazole (ABILIFY) 15 mg tablet TAKE 1 TABLET BY MOUTH AT BEDTIME    ALPRAZolam (XANAX) 0.5 mg tablet Take 1 tablet by mouth twice daily as needed    metoprolol succinate (TOPROL-XL) 50 mg XL tablet TAKE 1 TABLET BY MOUTH ONCE DAILY    amLODIPine (NORVASC) 2.5 mg tablet TAKE 1 TABLET BY MOUTH ONCE DAILY    insulin aspart protamine/insulin aspart (NOVOLOG MIX 70/30) 100 unit/mL (70-30) inpn Take 45 units in the morning and 30 units in evening    Insulin Needles, Disposable, 31 gauge x 5/16\" ndle Twice a day    citalopram (CELEXA) 20 mg tablet Take 1 tablet by mouth once daily    acetaminophen-codeine (TYLENOL #3) 300-30 mg per tablet acetaminophen 300 mg-codeine 30 mg tablet    lancets (One Touch Delica) 33 gauge misc Check glucose 3 times a day    glucose blood VI test strips (OneTouch Verio test strips) strip Check glucose 3 times a day    allopurinoL (ZYLOPRIM) 300 mg tablet Take  by mouth daily.  gabapentin (NEURONTIN) 100 mg capsule Take  by mouth three (3) times daily.  magnesium oxide (MAG-OX) 400 mg tablet Take 400 mg by mouth three (3) times daily.  pantoprazole (PROTONIX) 40 mg tablet Take 40 mg by mouth daily.  sodium bicarbonate 650 mg tablet Take  by mouth two (2) times a day. No current facility-administered medications for this visit.         Past Medical History:   Diagnosis Date    Anxiety 6/22/2020    Bipolar 1 disorder ([facility] 1/23/2018    Diabetes mellitus type 2, controlled ([facility] 6/22/2020    Hypertension 6/22/2020    Liver fibrosis 6/22/2020    Major depression 6/22/2020    Migraine 6/22/2020    Sleep apnea 1/23/2018    Suicide attempt ([facility] 6/22/2020 2013       Past Surgical History:   Procedure Laterality Date    HX APPENDECTOMY      HX CHOLECYSTECTOMY         Family History   Problem Relation Age of Onset    Heart Attack Mother     Heart Attack Father        Social History     Socioeconomic History    Marital status: SINGLE     Spouse name: Not on file    Number of children: 0    Years of education: Not on file    Highest education level: Associate degree: academic program   Occupational History    Not on file   Social Needs    Financial resource strain: Not very hard    Food insecurity     Worry: Never true     Inability: Never true    Transportation needs     Medical: No     Non-medical: No   Tobacco Use    Smoking status: Never Smoker    Smokeless tobacco: Never Used   Substance and Sexual Activity    Alcohol use: Not Currently    Drug use: Never    Sexual activity: Not Currently   Lifestyle    Physical activity     Days per week: Not on file     Minutes per session: Not on file    Stress: Not on file   Relationships    Social connections     Talks on phone: Not on file     Gets together: Not on file     Attends Sikhism service: Not on file     Active member of club or organization: Not on file     Attends meetings of clubs or organizations: Not on file     Relationship status: Not on file    Intimate partner violence     Fear of current or ex partner: Not on file     Emotionally abused: Not on file     Physically abused: Not on file     Forced sexual activity: Not on file   Other Topics Concern     Service Not Asked    Blood Transfusions Not Asked    Caffeine Concern Not Asked    Occupational Exposure Not Asked   Flora Emmer Hazards Not Asked    Sleep Concern Not Asked    Stress Concern Not Asked    Weight Concern Not Asked    Special Diet Not Asked    Back Care Not Asked    Exercise Not Asked    Bike Helmet Not Asked   [address] Not Asked    Self-Exams Not Asked   Social History Narrative    Not on file         Ms. eFrny Herndon follows up in the clinic for major depression and anxiety disorder. Patient is unable to do virtual visit, telephone visit required. It is noted patient has history of bipolar disorder, inpatient psychiatric admission and history of suicide attempt. Patient is prescribed Xanax 0.5 mg tablet take 1 tablet twice daily as needed for anxiety, citalopram 20 mg tablet take 1 tablet daily, bupropion  mg tablet take 1 tab daily and Abilify 15 mg tablet take 1 tablet at bedtime. Patient reports that over the past 2 weeks she has not been feeling well due to low hemoglobin which she was seen at the emergency department and plans to follow-up with her primary care provider Mrs. Renee Guo.   She reports feeling mildly depressed and having fluctuating appetite, lack of motivation and increased need for sleep. She recently had a tooth extracted which she reports required stitches. Patient denies suicidal/homicidal thinking, risk for suicide is low to moderate based on history but there is no acute concern at this time, protective factor-family. No psychotic symptoms noted and she denies on direct questioning. Patient denies smoking, alcohol and drug use. Review of Systems   Constitutional: Positive for malaise/fatigue. Psychiatric/Behavioral: Positive for depression. All other systems reviewed and are negative. There were no vitals taken for this visit. Physical Exam  Psychiatric:         Attention and Perception: Attention and perception normal.         Mood and Affect: Mood is depressed. Speech: Speech normal.         Behavior: Behavior normal. Behavior is cooperative. Thought Content: Thought content normal.         Cognition and Memory: Cognition and memory normal.         Judgment: Judgment normal.         Plan:    Change bupropion  mg tablet to take 1 tablet twice daily at 9 AM and 5 PM.  She may continue Xanax, Abilify and citalopram.  Patient is aware of risks associated with combination antidepressant use. Patient is aware of risks of benzodiazepine use such as increased risk for falls, physical dependence and confusion. Advised patient to avoid alcohol and drug use. Follow-up with medical provider as appropriate. Advised patient to call 911 or go to the emergency department for suicidal/homicidal thinking. Patient may call the clinic for any issues.

## 2021-05-06 RX ORDER — CITALOPRAM 20 MG/1
TABLET, FILM COATED ORAL
Qty: 90 TAB | Refills: 0 | Status: ON HOLD | OUTPATIENT
Start: 2021-05-06 | End: 2021-08-22 | Stop reason: CLARIF

## 2021-05-07 ENCOUNTER — TELEPHONE (OUTPATIENT)
Dept: ENDOCRINOLOGY | Age: 61
End: 2021-05-07

## 2021-05-07 NOTE — TELEPHONE ENCOUNTER
Patient notified    ----- Message from Ana Toledo MD sent at 3/18/2021 11:40 AM EDT -----  Please inform patient that her cholesterol is looking better. Continue to take atorvastatin 40 mg regularly at bedtime. I am going to send a refill.

## 2021-06-04 ENCOUNTER — TELEPHONE (OUTPATIENT)
Dept: BEHAVIORAL/MENTAL HEALTH CLINIC | Age: 61
End: 2021-06-04

## 2021-06-04 DIAGNOSIS — F41.1 GENERALIZED ANXIETY DISORDER: Primary | ICD-10-CM

## 2021-06-04 NOTE — TELEPHONE ENCOUNTER
Patient reports for the past week she has been very depressed and staying in bed. She reports needing counseling so she has already reached out to a counselor and is waiting for callback. For depressive symptoms-change bupropion SR to 200 mg take 1 tablet in the morning and continue 150 mg 1 tablet at 5 PM.  Patient reports that she has a lot of bupropion 200 mg left from previous prescription. Patient will follow up in the clinic Monday to make follow-up appointment. Length of telephone call 5 minutes and 20 seconds.

## 2021-06-24 ENCOUNTER — TELEPHONE (OUTPATIENT)
Dept: BEHAVIORAL/MENTAL HEALTH CLINIC | Age: 61
End: 2021-06-24

## 2021-06-24 NOTE — TELEPHONE ENCOUNTER
Pt sister left   wanting to let Cira Canas know the pt is not taking her wellbutrin, she said she does not want the pt to know she is calling. She said the pt wants to sleep all the time and can not do so taking the medication, She said the pt will tell you that it makes her nauseous.

## 2021-06-26 DIAGNOSIS — F41.1 GENERALIZED ANXIETY DISORDER: ICD-10-CM

## 2021-06-27 RX ORDER — ALPRAZOLAM 0.5 MG/1
TABLET ORAL
Qty: 60 TABLET | Refills: 0 | Status: SHIPPED | OUTPATIENT
Start: 2021-06-27 | End: 2021-07-29

## 2021-07-07 ENCOUNTER — OFFICE VISIT (OUTPATIENT)
Dept: ENDOCRINOLOGY | Age: 61
End: 2021-07-07
Payer: MEDICARE

## 2021-07-07 VITALS
OXYGEN SATURATION: 95 % | SYSTOLIC BLOOD PRESSURE: 133 MMHG | HEIGHT: 62 IN | TEMPERATURE: 97.9 F | DIASTOLIC BLOOD PRESSURE: 68 MMHG | HEART RATE: 87 BPM | WEIGHT: 218.4 LBS | BODY MASS INDEX: 40.19 KG/M2

## 2021-07-07 DIAGNOSIS — E11.65 TYPE 2 DIABETES MELLITUS WITH HYPERGLYCEMIA, WITH LONG-TERM CURRENT USE OF INSULIN (HCC): Primary | ICD-10-CM

## 2021-07-07 DIAGNOSIS — E78.2 MIXED HYPERLIPIDEMIA: ICD-10-CM

## 2021-07-07 DIAGNOSIS — Z79.4 TYPE 2 DIABETES MELLITUS WITH HYPERGLYCEMIA, WITH LONG-TERM CURRENT USE OF INSULIN (HCC): Primary | ICD-10-CM

## 2021-07-07 LAB
GLUCOSE POC: 171 MG/DL
HBA1C MFR BLD HPLC: 8.2 %

## 2021-07-07 PROCEDURE — G8417 CALC BMI ABV UP PARAM F/U: HCPCS | Performed by: INTERNAL MEDICINE

## 2021-07-07 PROCEDURE — 99214 OFFICE O/P EST MOD 30 MIN: CPT | Performed by: INTERNAL MEDICINE

## 2021-07-07 PROCEDURE — G8752 SYS BP LESS 140: HCPCS | Performed by: INTERNAL MEDICINE

## 2021-07-07 PROCEDURE — 2022F DILAT RTA XM EVC RTNOPTHY: CPT | Performed by: INTERNAL MEDICINE

## 2021-07-07 PROCEDURE — G8427 DOCREV CUR MEDS BY ELIG CLIN: HCPCS | Performed by: INTERNAL MEDICINE

## 2021-07-07 PROCEDURE — 3017F COLORECTAL CA SCREEN DOC REV: CPT | Performed by: INTERNAL MEDICINE

## 2021-07-07 PROCEDURE — G8754 DIAS BP LESS 90: HCPCS | Performed by: INTERNAL MEDICINE

## 2021-07-07 PROCEDURE — G9717 DOC PT DX DEP/BP F/U NT REQ: HCPCS | Performed by: INTERNAL MEDICINE

## 2021-07-07 PROCEDURE — 83036 HEMOGLOBIN GLYCOSYLATED A1C: CPT | Performed by: INTERNAL MEDICINE

## 2021-07-07 PROCEDURE — 3052F HG A1C>EQUAL 8.0%<EQUAL 9.0%: CPT | Performed by: INTERNAL MEDICINE

## 2021-07-07 PROCEDURE — 82962 GLUCOSE BLOOD TEST: CPT | Performed by: INTERNAL MEDICINE

## 2021-07-07 RX ORDER — ATORVASTATIN CALCIUM 40 MG/1
TABLET, FILM COATED ORAL
COMMUNITY
Start: 2021-06-03 | End: 2021-07-07 | Stop reason: SDUPTHER

## 2021-07-07 RX ORDER — CHLORHEXIDINE GLUCONATE 1.2 MG/ML
RINSE ORAL
Status: ON HOLD | COMMUNITY
Start: 2021-04-13 | End: 2021-08-22 | Stop reason: CLARIF

## 2021-07-07 RX ORDER — INSULIN ASPART 100 [IU]/ML
INJECTION, SUSPENSION SUBCUTANEOUS
Qty: 8 ADJUSTABLE DOSE PRE-FILLED PEN SYRINGE | Refills: 3 | Status: SHIPPED | OUTPATIENT
Start: 2021-07-07 | End: 2021-10-06 | Stop reason: SDUPTHER

## 2021-07-07 RX ORDER — ATORVASTATIN CALCIUM 40 MG/1
40 TABLET, FILM COATED ORAL
Qty: 90 TABLET | Refills: 1 | Status: ON HOLD | OUTPATIENT
Start: 2021-07-07 | End: 2021-08-22 | Stop reason: CLARIF

## 2021-07-07 RX ORDER — HYDRALAZINE HYDROCHLORIDE 50 MG/1
TABLET, FILM COATED ORAL
Status: ON HOLD | COMMUNITY
Start: 2021-06-26 | End: 2021-08-25 | Stop reason: SDUPTHER

## 2021-07-07 RX ORDER — PEN NEEDLE, DIABETIC 30 GX3/16"
NEEDLE, DISPOSABLE MISCELLANEOUS
Qty: 1 PACKAGE | Refills: 5 | Status: SHIPPED | OUTPATIENT
Start: 2021-07-07 | End: 2021-10-06 | Stop reason: SDUPTHER

## 2021-07-07 NOTE — LETTER
7/7/2021    Patient: Adam Giordano   YOB: 1960   Date of Visit: 7/7/2021     Lizbeth Diaz PA-C  [address] [place] [phone]  Via Fax: [phone]    Dear Lizbeth Diaz PA-C,      Thank you for referring Ms. Doris Stevenson to [address] [place] [address] ENDOCRINOLOGY for evaluation. My notes for this consultation are attached. If you have questions, please do not hesitate to call me. I look forward to following your patient along with you.       Sincerely,    Cari Middleton MD

## 2021-07-07 NOTE — PROGRESS NOTES
History and Physical    Patient: Vincent Ramsey MRN: [id number]  SSN: [id number]    YOB: 1960  Age: 63 y.o. Sex: female      Subjective:      Vincent Ramsey is a 63 y.o. female with past medical history of hypertension, hyperlipidemia, chronic kidney disease stage III, bipolar disorder, GERD is here for type 2 diabetes mellitus. She is in primary care of Atif Ibarra np. She is also in care of nephrologist Dr. Jordan Diaz. At the last visit I prescribed Trulicity again to see if it would be covered by her new insurance but it was not covered and it is too expensive, so she continues to be on only NovoLog 70/30. At the last visit we continued 45 units in the morning but decreased evening dose from 40 to 35 units, as she was having nocturnal hypoglycemia. Since the last visit she has had maybe 2 episodes of nocturnal hypoglycemia where she wakes up in the middle of the night feeling hungry. She has been very depressed and she has gone back to her psychiatrist.  No suicidal ideation. She is taking atorvastatin 40 mg regularly at bedtime. Regarding CKD stage III: Last appointment with nephrologist was earlier in March 2021. She was told everything is looking stable. Next appointment is next week. Glucometer reading: Patient is checking blood glucose 1-2 times per day.   Readings are ranging from 69 to 176 mg/dL    Updated diabetes history:  · Diagnosis: 8 years    · Current treatment: NovoLog 70/30, 45 units in the morning and 35 units before dinner    · Past treatment: Lantus, Humalog, Levemir, Novolin N, Novolin R, Trulicity (expensive)    · Glucose checks: once a day, fasting runs in 400s    · Hyperglycemia: yes    · Hypoglycemia: no    · Meals per day: 3, Breakfast: cereal, sandwich, boiled eggs, toast, lunch: skips, Dinner: fried chicken, pork, beans, hamburger, , snacks: banana, crackers, pop corn, sweet tea    · Exercise: no    · DM related hospitalizations: no        Complications of DM:    · CAD: no    · CVA: no    · PVD: no    · Amputations: no     · Retinopathy: no; last exam was 6-2020    · Gastropathy: no    · Nephropathy: yes ckd stage 3, Dr. Brad Beltran  · Neuropathy: no        Medications:    · Statin: atorvastatin 40    · ACE-I: no    · ASA: no        · Diabetes education: no    Past Medical History:   Diagnosis Date    Anxiety 6/22/2020    Bipolar 1 disorder ([facility] 1/23/2018    Diabetes mellitus type 2, controlled ([facility] 6/22/2020    Hypertension 6/22/2020    Liver fibrosis 6/22/2020    Major depression 6/22/2020    Migraine 6/22/2020    Sleep apnea 1/23/2018    Suicide attempt ([facility] 6/22/2020 2013     Past Surgical History:   Procedure Laterality Date    HX APPENDECTOMY      HX CHOLECYSTECTOMY        Family History   Problem Relation Age of Onset    Heart Attack Mother     Heart Attack Father      Social History     Tobacco Use    Smoking status: Never Smoker    Smokeless tobacco: Never Used   Substance Use Topics    Alcohol use: Not Currently      Prior to Admission medications    Medication Sig Start Date End Date Taking? Authorizing Provider   chlorhexidine (PERIDEX) 0.12 % solution RINSE WITH 1/2 OUNCE BY MOUTH FOR 30 SECONDS THEN SPIT OUT. use twice a day for 10 days 4/13/21  Yes Provider, Historical   hydrALAZINE (APRESOLINE) 50 mg tablet TAKE 1 TABLET BY MOUTH THREE TIMES DAILY 6/26/21  Yes Provider, Historical   atorvastatin (LIPITOR) 40 mg tablet Take 1 Tablet by mouth nightly for 90 days.  7/7/21 10/5/21 Yes Mirlande Cazares MD   insulin aspart protamine/insulin aspart (NOVOLOG MIX 70/30) 100 unit/mL (70-30) inpn Take 45 units in the morning and 30 units in evening 7/7/21  Yes Mirlande Cazares MD   Insulin Needles, Disposable, 31 gauge x 5/16\" ndle Twice a day 7/7/21  Yes Mirlande Cazares MD   ALPRAZolam Jennie Bagley) 0.5 mg tablet Take 1 tablet by mouth twice daily as needed 6/27/21  Yes Sally Hernandez, NP   citalopram (CELEXA) 20 mg tablet Take 1 tablet by mouth once daily 5/6/21  Yes Lupe Hernandez NP   buPROPion SR (WELLBUTRIN SR) 150 mg SR tablet Take 1 Tab by mouth two (2) times a day for 90 days. 4/19/21 7/18/21 Yes Lupe Hernandez NP   ARIPiprazole (ABILIFY) 15 mg tablet TAKE 1 TABLET BY MOUTH AT BEDTIME 4/18/21  Yes Lupe Hernandez NP   metoprolol succinate (TOPROL-XL) 50 mg XL tablet TAKE 1 TABLET BY MOUTH ONCE DAILY 3/2/21  Yes Provider, Historical   amLODIPine (NORVASC) 2.5 mg tablet TAKE 1 TABLET BY MOUTH ONCE DAILY 3/4/21  Yes Provider, Historical   acetaminophen-codeine (TYLENOL #3) 300-30 mg per tablet acetaminophen 300 mg-codeine 30 mg tablet   Yes Provider, Historical   lancets (One Touch Delica) 33 gauge misc Check glucose 3 times a day 12/16/20  Yes Karan Hall MD   glucose blood VI test strips (OneTouch Verio test strips) strip Check glucose 3 times a day 12/16/20  Yes Karan Hall MD   allopurinoL (ZYLOPRIM) 300 mg tablet Take  by mouth daily. Yes Provider, Historical   gabapentin (NEURONTIN) 100 mg capsule Take  by mouth three (3) times daily. Yes Provider, Historical   magnesium oxide (MAG-OX) 400 mg tablet Take 400 mg by mouth three (3) times daily. Yes Provider, Historical   pantoprazole (PROTONIX) 40 mg tablet Take 40 mg by mouth daily. Yes Provider, Historical   sodium bicarbonate 650 mg tablet Take  by mouth two (2) times a day.    Yes Provider, Historical        Allergies   Allergen Reactions    Adhesive Tape-Silicones Unknown (comments)     Blisters    Septra [Sulfamethoprim] Unknown (comments)    Sulfa (Sulfonamide Antibiotics) Hives       Review of Systems:  ROS    A comprehensive review of systems was preformed and it is negative except mentioned in HPI    Objective:     Vitals:    07/07/21 0923   BP: 133/68   Pulse: 87   Temp: 97.9 °F (36.6 °C)   TempSrc: Temporal   SpO2: 95%   Weight: 218 lb 6.4 oz (99.1 kg)   Height: 5' 2\" (1.575 m)        Physical Exam:    Physical Exam  Vitals and nursing note reviewed. Constitutional:       Appearance: Normal appearance. HENT:      Head: Normocephalic and atraumatic. Cardiovascular:      Rate and Rhythm: Normal rate and regular rhythm. Pulmonary:      Effort: Pulmonary effort is normal.      Breath sounds: Normal breath sounds. Neurological:      Mental Status: She is alert. diabetic foot exam:  Bilateral diabetic foot exam was performed today. Dorsalis pedis pulses 2+ bilaterally. Monofilament sensation normal bilaterally. No ulcers or skin breakdown. Labs and Imaging:  Results for Adebayo Barber (MRN [id number]) as of 3/17/2021 10:12   Ref. Range 1/6/2021 16:20   Sodium Latest Ref Range: 136 - 145 mmol/L 141   Potassium Latest Ref Range: 3.5 - 5.1 mmol/L 3.9   Chloride Latest Ref Range: 97 - 108 mmol/L 103   CO2 Latest Ref Range: 21 - 32 mmol/L 25   Anion gap Latest Ref Range: 5 - 15 mmol/L 13   Glucose Latest Ref Range: 65 - 100 mg/dL 105 (H)   BUN Latest Ref Range: 6 - 20 mg/dL 16   Creatinine Latest Ref Range: 0.55 - 1.02 mg/dL 2.02 (H)   BUN/Creatinine ratio Latest Ref Range: 12 - 20   8 (L)   Calcium Latest Ref Range: 8.5 - 10.1 mg/dL 9.3   GFR est non-AA Latest Ref Range: >60 ml/min/1.73m2 25 (L)   GFR est AA Latest Ref Range: >60 ml/min/1.73m2 31 (L)   Bilirubin, total Latest Ref Range: 0.2 - 1.0 mg/dL 0.4   Protein, total Latest Ref Range: 6.4 - 8.2 g/dL 8.8 (H)   Albumin Latest Ref Range: 3.5 - 5.0 g/dL 3.6   Globulin Latest Ref Range: 2.0 - 4.0 g/dL 5.2 (H)   A-G Ratio Latest Ref Range: 1.1 - 2.2   0.7 (L)   ALT Latest Ref Range: 12 - 78 U/L 20   AST Latest Ref Range: 15 - 37 U/L 40 (H)   Alk. phosphatase Latest Ref Range: 45 - 117 U/L 164 (H)   Results for Adebayo Barber (MRN [id number]) as of 3/17/2021 10:12   Ref.  Range 12/16/2020 12:08   Cholesterol, total Latest Ref Range: 100 - 199 mg/dL 233 (H)   HDL Cholesterol Latest Ref Range: >39 mg/dL 26 (L)   VLDL, calculated Latest Ref Range: 5 - 40 mg/dL 68 (H)   LDL, calculated Latest Ref Range: 0 - 99 mg/dL 139 (H)   Results for Denisse Curran (MRN [id number]) as of 3/17/2021 10:12   Ref. Range 12/16/2020 12:08   TSH Latest Ref Range: 0.450 - 4.500 uIU/mL 1.310     Last 3 Recorded Weights in this Encounter    07/07/21 0923   Weight: 218 lb 6.4 oz (99.1 kg)        No results found for: HBA1C, YUK2XIOR, RTU5BHJR, NTN6AHEF     Assessment:     Patient Active Problem List   Diagnosis Code    Bipolar 1 disorder ([facility] F31.9    Sleep apnea G47.30    Major depression F32.9    Anxiety F41.9    Type 2 diabetes mellitus with hyperglycemia, with long-term current use of insulin ([facility] E11.65, Z79.4    Liver fibrosis K74.00    Hypertension I10    Migraine G43.909    Suicide attempt ([facility] T14.91XA    Severe obesity ([facility] E66.01    Type 2 diabetes mellitus with diabetic neuropathy ([facility] E11.40    CKD stage 3 due to type 2 diabetes mellitus ([facility] E11.22, N18.30    Mixed hyperlipidemia E78.2    Hypertensive renal disease I12.9           Plan:     type II diabetes mellitus uncontrolled  Hemoglobin A1c is 11.6% on 11-6-2019, 11.2% on 2-5-2020, 8.9% on 12-, 8.1% on 3-, 8.2% today. Fingerstick blood glucose is 171 mg/dL today. Up to date with diabetes related annual labs: yes     Up to date with diabetic eye exam: yes 6/2020    plan:  Frequency of hypoglycemia has decreased but she is still having some fasting hypoglycemia. Decrease NovoLog 70/30 as follows: Continue 45 units in the morning, decreased to 30 units in the evening before dinner. Continue to check blood glucose twice a day and I will see her back in 3 months. Not a good candidate for metformin because of CKD stage III, not to get good candidate for SGLT2 inhibitor because of frequent UTI and vaginal yeast infections. hypertensive disorder  Hydralazine was increased from 25 mg to 50 mg 3 times a day at the last visit. Blood pressure is looking good today.   Continue the same.    mixed hyperlipidemia  11-6-2019: Triglycerides elevated at 379, LDL normal at 61. Patient has not been taking atorvastatin regularly. 12-6-2020: Total cholesterol elevated at 233, triglycerides 370, . Advised patient to start taking atorvastatin 40 mg regularly. 3-: Total cholesterol 145, triglycerides 273, LDL 71. Atorvastatin 40 mg was continued. peripheral neuropathy with type 2 diabetes  on gabapentin    chronic kidney disease stage 3  GFR was 40 in October 2019. Following with nephrologist every 4 months. GFR was 31 in January 2021. Next appointment with nephrologist is next week. Orders Placed This Encounter    AMB POC GLUCOSE BLOOD, BY GLUCOSE MONITORING DEVICE    AMB POC HEMOGLOBIN A1C    atorvastatin (LIPITOR) 40 mg tablet     Sig: Take 1 Tablet by mouth nightly for 90 days.      Dispense:  90 Tablet     Refill:  1    insulin aspart protamine/insulin aspart (NOVOLOG MIX 70/30) 100 unit/mL (70-30) inpn     Sig: Take 45 units in the morning and 30 units in evening     Dispense:  8 Adjustable Dose Pre-filled Pen Syringe     Refill:  3    Insulin Needles, Disposable, 31 gauge x 5/16\" ndle     Sig: Twice a day     Dispense:  1 Package     Refill:  5        Signed By: Duane Crumble, MD     July 7, 2021      Return to clinic 3 months

## 2021-07-19 ENCOUNTER — TELEPHONE (OUTPATIENT)
Dept: ENDOCRINOLOGY | Age: 61
End: 2021-07-19

## 2021-07-19 ENCOUNTER — OFFICE VISIT (OUTPATIENT)
Dept: BEHAVIORAL/MENTAL HEALTH CLINIC | Age: 61
End: 2021-07-19
Payer: MEDICARE

## 2021-07-19 VITALS — BODY MASS INDEX: 41.01 KG/M2 | WEIGHT: 224.2 LBS

## 2021-07-19 DIAGNOSIS — E11.65 TYPE 2 DIABETES MELLITUS WITH HYPERGLYCEMIA, WITH LONG-TERM CURRENT USE OF INSULIN (HCC): Primary | ICD-10-CM

## 2021-07-19 DIAGNOSIS — Z79.4 TYPE 2 DIABETES MELLITUS WITH HYPERGLYCEMIA, WITH LONG-TERM CURRENT USE OF INSULIN (HCC): Primary | ICD-10-CM

## 2021-07-19 DIAGNOSIS — F31.9 BIPOLAR DEPRESSION (HCC): Primary | ICD-10-CM

## 2021-07-19 PROCEDURE — G8756 NO BP MEASURE DOC: HCPCS | Performed by: NURSE PRACTITIONER

## 2021-07-19 PROCEDURE — 99214 OFFICE O/P EST MOD 30 MIN: CPT | Performed by: NURSE PRACTITIONER

## 2021-07-19 PROCEDURE — 3017F COLORECTAL CA SCREEN DOC REV: CPT | Performed by: NURSE PRACTITIONER

## 2021-07-19 PROCEDURE — G8417 CALC BMI ABV UP PARAM F/U: HCPCS | Performed by: NURSE PRACTITIONER

## 2021-07-19 PROCEDURE — G8427 DOCREV CUR MEDS BY ELIG CLIN: HCPCS | Performed by: NURSE PRACTITIONER

## 2021-07-19 PROCEDURE — G9717 DOC PT DX DEP/BP F/U NT REQ: HCPCS | Performed by: NURSE PRACTITIONER

## 2021-07-19 RX ORDER — ARIPIPRAZOLE 20 MG/1
20 TABLET ORAL DAILY
Qty: 90 TABLET | Refills: 0 | Status: SHIPPED | OUTPATIENT
Start: 2021-07-19 | End: 2021-11-29

## 2021-07-19 RX ORDER — INSULIN LISPRO 100 [IU]/ML
INJECTION, SUSPENSION SUBCUTANEOUS
Qty: 15 PEN | Refills: 1 | Status: ON HOLD | OUTPATIENT
Start: 2021-07-19 | End: 2021-08-22 | Stop reason: CLARIF

## 2021-07-19 NOTE — PROGRESS NOTES
Odalys Ibanez is a 63 y.o. female who presents today for the following:  Chief Complaint   Patient presents with    Follow-up     \"I'm down in the dumps. \"    Depression    Bipolar       Allergies   Allergen Reactions    Adhesive Tape-Silicones Unknown (comments)     Blisters    Septra [Sulfamethoprim] Unknown (comments)    Sulfa (Sulfonamide Antibiotics) Hives       Current Outpatient Medications   Medication Sig    ARIPiprazole (ABILIFY) 20 mg tablet Take 1 Tablet by mouth daily for 90 days.  insulin lispro protamin-lispro (HUMALOG 75-25 MIX) flexpen Inject 45 units in am and 30 units in pm    chlorhexidine (PERIDEX) 0.12 % solution RINSE WITH 1/2 OUNCE BY MOUTH FOR 30 SECONDS THEN SPIT OUT. use twice a day for 10 days    hydrALAZINE (APRESOLINE) 50 mg tablet TAKE 1 TABLET BY MOUTH THREE TIMES DAILY    atorvastatin (LIPITOR) 40 mg tablet Take 1 Tablet by mouth nightly for 90 days.  insulin aspart protamine/insulin aspart (NOVOLOG MIX 70/30) 100 unit/mL (70-30) inpn Take 45 units in the morning and 30 units in evening    Insulin Needles, Disposable, 31 gauge x 5/16\" ndle Twice a day    ALPRAZolam (XANAX) 0.5 mg tablet Take 1 tablet by mouth twice daily as needed    citalopram (CELEXA) 20 mg tablet Take 1 tablet by mouth once daily    metoprolol succinate (TOPROL-XL) 50 mg XL tablet TAKE 1 TABLET BY MOUTH ONCE DAILY    amLODIPine (NORVASC) 2.5 mg tablet TAKE 1 TABLET BY MOUTH ONCE DAILY    acetaminophen-codeine (TYLENOL #3) 300-30 mg per tablet acetaminophen 300 mg-codeine 30 mg tablet    lancets (One Touch Delica) 33 gauge misc Check glucose 3 times a day    glucose blood VI test strips (OneTouch Verio test strips) strip Check glucose 3 times a day    allopurinoL (ZYLOPRIM) 300 mg tablet Take  by mouth daily.  gabapentin (NEURONTIN) 100 mg capsule Take  by mouth three (3) times daily.  magnesium oxide (MAG-OX) 400 mg tablet Take 400 mg by mouth three (3) times daily.     pantoprazole (PROTONIX) 40 mg tablet Take 40 mg by mouth daily.  sodium bicarbonate 650 mg tablet Take  by mouth two (2) times a day. No current facility-administered medications for this visit.        Past Medical History:   Diagnosis Date    Anxiety 6/22/2020    Bipolar 1 disorder ([facility] 1/23/2018    [facility] 6[facility]22/2020    Hypertension 6/22/2020    Liver fibrosis 6/22/2020    Major depression 6/22/2020    Migraine 6/22/2020    Sleep apnea 1/23/2018    [facility] 6/22/2020 2013       Past Surgical History:   Procedure Laterality Date    HX APPENDECTOMY      HX CHOLECYSTECTOMY         Family History   Problem Relation Age of Onset    Heart Attack Mother     Heart Attack Father        Social History     Socioeconomic History    Marital status: SINGLE     Spouse name: Not on file    Number of children: 0    Years of education: Not on file    Highest education level: Associate degree: academic program   Occupational History    Not on file   Tobacco Use    Smoking status: Never Smoker    Smokeless tobacco: Never Used   Vaping Use    Vaping Use: Never assessed   Substance and Sexual Activity    Alcohol use: Not Currently    Drug use: Never    Sexual activity: Not Currently   Other Topics Concern     Service Not Asked    Blood Transfusions Not Asked    Caffeine Concern Not Asked    Occupational Exposure Not Asked    Hobby Hazards Not Asked    Sleep Concern Not Asked    Stress Concern Not Asked    Weight Concern Not Asked    Special Diet Not Asked    Back Care Not Asked    Exercise Not Asked    Bike Helmet Not Asked    Seat Belt Not Asked    Self-Exams Not Asked   Social History Narrative    Not on file     Social Determinants of Health     Financial Resource Strain: Low Risk     Difficulty of Paying Living Expenses: Not very hard   Food Insecurity: No Food Insecurity    Worried About Running Out of Food in the Last Year: Never true   [facility] Ran Out of Food in the Last Year: Never true   Transportation Needs: No Transportation Needs    Lack of Transportation (Medical): No    Lack of Transportation (Non-Medical): No   Physical Activity:     Days of Exercise per Week:     Minutes of Exercise per Session:    Stress:     Feeling of Stress :    Social Connections:     Frequency of Communication with Friends and Family:     Frequency of Social Gatherings with Friends and Family:     Attends Oriental orthodox Services:     Active Member of Clubs or Organizations:     Attends Club or Organization Meetings:     Marital Status:    Intimate Partner Violence:     Fear of Current or Ex-Partner:     Emotionally Abused:     Physically Abused:     Sexually Abused:          Ms. Aly Thorne follows up in the clinic for bipolar depression and anxiety disorder. Patient is currently prescribed Abilify 15 mg take 1 tablet daily, citalopram 20 mg take 1 tablet daily and Xanax 0.5 mg tablet take 1 tablet twice daily as needed for anxiety. Patient reports that she stopped taking bupropion because it made her sick. She has been tried on Zoloft, Effexor, Cymbalta, Prozac, Wellbutrin and BuSpar. Patient reports Mer Cruz made drive into a building and Cymbalta caused suicidal thinking. Patient presents to the clinic unaccompanied, fully alert and oriented. Her appearance is mildly disheveled. Patient reports she has been depressed since her sister moved in her home over the past several months. She reports her sister was recently diagnosed with lupus and had surgery on a brain aneurysm about 2 years ago. Patient mentions reduced appetite however she continues to gain weight. Patient shares that her mother told her that she was \"99 pounds at 3years old. \"  She reports that during her childhood she ate comet, last had comet 1 year ago daily for 4 months.   Patient also mentions eating cornstarch about 8 months ago and she is currently now taking 1 teaspoon of baking soda daily. Patient reports that she feels \"so much better\" after ingesting baking soda. Sleep-stable. No psychotic symptoms observed or reported. No suicidal/homicidal thinking noted, risk for suicide is moderate based on history but there is no acute concern at this time. She also mentions her cat Flualanna gets UTIs regularly. Patient lives in the home with his sister. No smoking, alcohol or drug use noted. Review of Systems   Constitutional: Positive for malaise/fatigue. Musculoskeletal: Positive for back pain. Psychiatric/Behavioral: Positive for depression. All other systems reviewed and are negative. Visit Vitals  Wt 101.7 kg (224 lb 3.2 oz)   BMI 41.01 kg/m²     Physical Exam  Psychiatric:         Attention and Perception: Attention and perception normal.         Mood and Affect: Mood is depressed. Speech: Speech normal.         Behavior: Behavior is withdrawn. Thought Content: Thought content normal.         Cognition and Memory: Cognition and memory normal.         Judgment: Judgment normal.            Plan: For mood-increase Abilify to 20 mg take 1 tablet daily. She may continue citalopram and Xanax as prescribed. On next visit, discussed GeneSight testing with patient if symptoms do not improve. Continue counseling with Stacia Fonseca. Follow-up with medical provider as appropriate. For emergencies-call 911 or go to the emergency department for suicidal/homicidal thinking. Advised patient to avoid alcohol and drug use.

## 2021-07-28 DIAGNOSIS — F41.1 GENERALIZED ANXIETY DISORDER: ICD-10-CM

## 2021-07-29 RX ORDER — ALPRAZOLAM 0.5 MG/1
TABLET ORAL
Qty: 60 TABLET | Refills: 0 | Status: SHIPPED | OUTPATIENT
Start: 2021-07-29 | End: 2021-08-26 | Stop reason: SDUPTHER

## 2021-08-10 ENCOUNTER — HOSPITAL ENCOUNTER (EMERGENCY)
Age: 61
Discharge: HOME | End: 2021-08-10
Attending: EMERGENCY MEDICINE
Payer: MEDICARE

## 2021-08-10 ENCOUNTER — APPOINTMENT (OUTPATIENT)
Dept: GENERAL RADIOLOGY | Age: 61
End: 2021-08-10
Attending: EMERGENCY MEDICINE
Payer: MEDICARE

## 2021-08-10 VITALS
RESPIRATION RATE: 20 BRPM | HEART RATE: 91 BPM | BODY MASS INDEX: 40.12 KG/M2 | SYSTOLIC BLOOD PRESSURE: 170 MMHG | WEIGHT: 218 LBS | HEIGHT: 62 IN | DIASTOLIC BLOOD PRESSURE: 93 MMHG | OXYGEN SATURATION: 97 % | TEMPERATURE: 98.6 F

## 2021-08-10 DIAGNOSIS — R07.9 ACUTE CHEST PAIN: Primary | ICD-10-CM

## 2021-08-10 DIAGNOSIS — N30.00 ACUTE CYSTITIS WITHOUT HEMATURIA: ICD-10-CM

## 2021-08-10 LAB
ALBUMIN SERPL-MCNC: 3 G/DL (ref 3.5–5)
ALBUMIN/GLOB SERPL: 0.6 {RATIO} (ref 1.1–2.2)
ALP SERPL-CCNC: 164 U/L (ref 45–117)
ALT SERPL-CCNC: 25 U/L (ref 12–78)
AMORPH CRY URNS QL MICRO: ABNORMAL
ANION GAP SERPL CALC-SCNC: 7 MMOL/L (ref 5–15)
APPEARANCE UR: ABNORMAL
AST SERPL W P-5'-P-CCNC: 40 U/L (ref 15–37)
ATRIAL RATE: 94 BPM
BACTERIA URNS QL MICRO: ABNORMAL /HPF
BASOPHILS # BLD: 0.1 K/UL (ref 0–0.2)
BASOPHILS NFR BLD: 1 % (ref 0–2.5)
BILIRUB SERPL-MCNC: 0.4 MG/DL (ref 0.2–1)
BILIRUB UR QL: NEGATIVE
BUN SERPL-MCNC: 22 MG/DL (ref 6–20)
BUN/CREAT SERPL: 9 (ref 12–20)
CA-I BLD-MCNC: 8.5 MG/DL (ref 8.5–10.1)
CALCULATED P AXIS, ECG09: 46 DEGREES
CALCULATED R AXIS, ECG10: 17 DEGREES
CALCULATED T AXIS, ECG11: 31 DEGREES
CHLORIDE SERPL-SCNC: 105 MMOL/L (ref 97–108)
CO2 SERPL-SCNC: 30 MMOL/L (ref 21–32)
COLOR UR: ABNORMAL
CREAT SERPL-MCNC: 2.57 MG/DL (ref 0.55–1.02)
DIAGNOSIS, 93000: NORMAL
EOSINOPHIL # BLD: 0.1 K/UL (ref 0–0.7)
EOSINOPHIL NFR BLD: 2 % (ref 0.9–2.9)
ERYTHROCYTE [DISTWIDTH] IN BLOOD BY AUTOMATED COUNT: 16.8 % (ref 11.5–14.5)
GLOBULIN SER CALC-MCNC: 4.7 G/DL (ref 2–4)
GLUCOSE SERPL-MCNC: 278 MG/DL (ref 65–100)
GLUCOSE UR STRIP.AUTO-MCNC: >1000 MG/DL
HCT VFR BLD AUTO: 24.2 % (ref 36–46)
HGB BLD-MCNC: 8.1 G/DL (ref 13.5–17.5)
HGB UR QL STRIP: NEGATIVE
INR PPP: 1.1 (ref 0.9–1.1)
KETONES UR QL STRIP.AUTO: NEGATIVE MG/DL
LEUKOCYTE ESTERASE UR QL STRIP.AUTO: ABNORMAL
LYMPHOCYTES # BLD: 1.7 K/UL (ref 1–4.8)
LYMPHOCYTES NFR BLD: 30 % (ref 20.5–51.1)
MAGNESIUM SERPL-MCNC: 1.3 MG/DL (ref 1.6–2.4)
MCH RBC QN AUTO: 24 PG (ref 31–34)
MCHC RBC AUTO-ENTMCNC: 33.5 G/DL (ref 31–36)
MCV RBC AUTO: 71.7 FL (ref 80–100)
MONOCYTES # BLD: 0.4 K/UL (ref 0.2–2.4)
MONOCYTES NFR BLD: 7 % (ref 1.7–9.3)
NEUTS SEG # BLD: 3.4 K/UL (ref 1.8–7.7)
NEUTS SEG NFR BLD: 60 % (ref 42–75)
NITRITE UR QL STRIP.AUTO: NEGATIVE
NRBC # BLD: 0 K/UL
NRBC BLD-RTO: 0.1 PER 100 WBC
P-R INTERVAL, ECG05: 151 MS
PH UR STRIP: 8.5 [PH] (ref 5–8)
PLATELET # BLD AUTO: 176 K/UL (ref 150–400)
PMV BLD AUTO: 7.7 FL (ref 6.5–11.5)
POTASSIUM SERPL-SCNC: 3.9 MMOL/L (ref 3.5–5.1)
PROT SERPL-MCNC: 7.7 G/DL (ref 6.4–8.2)
PROT UR STRIP-MCNC: 100 MG/DL
PROTHROMBIN TIME: 10.7 SEC (ref 9–11.1)
Q-T INTERVAL, ECG07: 379 MS
QRS DURATION, ECG06: 95 MS
QTC CALCULATION (BEZET), ECG08: 474 MS
RBC # BLD AUTO: 3.38 M/UL (ref 4.5–5.9)
RBC #/AREA URNS HPF: ABNORMAL /HPF (ref 0–3)
SODIUM SERPL-SCNC: 142 MMOL/L (ref 136–145)
SP GR UR REFRACTOMETRY: 1.02 (ref 1–1.03)
TROPONIN I SERPL-MCNC: <0.05 NG/ML
TROPONIN I SERPL-MCNC: <0.05 NG/ML
UROBILINOGEN UR QL STRIP.AUTO: 0.2 EU/DL (ref 0.2–1)
VENTRICULAR RATE, ECG03: 94 BPM
WBC # BLD AUTO: 5.6 K/UL (ref 4.4–11.3)
WBC URNS QL MICRO: ABNORMAL /HPF (ref 0–5)

## 2021-08-10 PROCEDURE — 85025 COMPLETE CBC W/AUTO DIFF WBC: CPT

## 2021-08-10 PROCEDURE — 83735 ASSAY OF MAGNESIUM: CPT

## 2021-08-10 PROCEDURE — 85610 PROTHROMBIN TIME: CPT

## 2021-08-10 PROCEDURE — 36415 COLL VENOUS BLD VENIPUNCTURE: CPT

## 2021-08-10 PROCEDURE — 80053 COMPREHEN METABOLIC PANEL: CPT

## 2021-08-10 PROCEDURE — 74011250636 HC RX REV CODE- 250/636: Performed by: EMERGENCY MEDICINE

## 2021-08-10 PROCEDURE — 84484 ASSAY OF TROPONIN QUANT: CPT

## 2021-08-10 PROCEDURE — 74011250637 HC RX REV CODE- 250/637: Performed by: EMERGENCY MEDICINE

## 2021-08-10 PROCEDURE — 99285 EMERGENCY DEPT VISIT HI MDM: CPT

## 2021-08-10 PROCEDURE — 71046 X-RAY EXAM CHEST 2 VIEWS: CPT

## 2021-08-10 PROCEDURE — 81001 URINALYSIS AUTO W/SCOPE: CPT

## 2021-08-10 PROCEDURE — 96365 THER/PROPH/DIAG IV INF INIT: CPT

## 2021-08-10 PROCEDURE — 96375 TX/PRO/DX INJ NEW DRUG ADDON: CPT

## 2021-08-10 PROCEDURE — 93005 ELECTROCARDIOGRAM TRACING: CPT

## 2021-08-10 RX ORDER — NITROGLYCERIN 0.4 MG/1
0.4 TABLET SUBLINGUAL
Status: COMPLETED | OUTPATIENT
Start: 2021-08-10 | End: 2021-08-10

## 2021-08-10 RX ORDER — GUAIFENESIN 100 MG/5ML
324 LIQUID (ML) ORAL
Status: COMPLETED | OUTPATIENT
Start: 2021-08-10 | End: 2021-08-10

## 2021-08-10 RX ORDER — CEPHALEXIN 500 MG/1
500 CAPSULE ORAL 2 TIMES DAILY
Qty: 10 CAPSULE | Refills: 0 | Status: SHIPPED | OUTPATIENT
Start: 2021-08-10 | End: 2021-08-15

## 2021-08-10 RX ORDER — MAGNESIUM SULFATE HEPTAHYDRATE 40 MG/ML
2 INJECTION, SOLUTION INTRAVENOUS
Status: COMPLETED | OUTPATIENT
Start: 2021-08-10 | End: 2021-08-10

## 2021-08-10 RX ORDER — CEPHALEXIN 250 MG/1
500 CAPSULE ORAL
Status: COMPLETED | OUTPATIENT
Start: 2021-08-10 | End: 2021-08-10

## 2021-08-10 RX ORDER — KETOROLAC TROMETHAMINE 30 MG/ML
30 INJECTION, SOLUTION INTRAMUSCULAR; INTRAVENOUS
Status: COMPLETED | OUTPATIENT
Start: 2021-08-10 | End: 2021-08-10

## 2021-08-10 RX ADMIN — NITROGLYCERIN 0.4 MG: 0.4 TABLET, ORALLY DISINTEGRATING SUBLINGUAL at 14:33

## 2021-08-10 RX ADMIN — ASPIRIN 324 MG: 81 TABLET, CHEWABLE ORAL at 14:32

## 2021-08-10 RX ADMIN — CEPHALEXIN 500 MG: 250 CAPSULE ORAL at 17:54

## 2021-08-10 RX ADMIN — KETOROLAC TROMETHAMINE 30 MG: 30 INJECTION, SOLUTION INTRAMUSCULAR; INTRAVENOUS at 16:07

## 2021-08-10 RX ADMIN — MAGNESIUM SULFATE HEPTAHYDRATE 2 G: 40 INJECTION, SOLUTION INTRAVENOUS at 16:07

## 2021-08-10 NOTE — ED TRIAGE NOTES
.  Chief Complaint   Patient presents with   Kiley Hawleydy Fall     pt states that she fell at home in shower 2 days ago, pt states that she was dizzy and lost balance in bathroom falling in shower. Pt complaining of pain to left throacic region of back pt states there is a bruise there. Pt VS stable on arrival    Shortness of Breath     pt states that she is SOB that begain 12 hrs ago, pt 97% on room air.  pt states using cpap at home and still feels SOB    Chest Pain     left breast pain radiates around trunk to back

## 2021-08-10 NOTE — ED PROVIDER NOTES
EMERGENCY DEPARTMENT HISTORY AND PHYSICAL EXAM      Date: 8/10/2021  Patient Name: Claire Zhou      History of Presenting Illness     Chief Complaint   Patient presents with   [place] Fall     pt states that she fell at home in shower 2 days ago, pt states that she was dizzy and lost balance in bathroom falling in shower. Pt complaining of pain to left throacic region of back pt states there is a bruise there. Pt VS stable on arrival    Shortness of Breath     pt states that she is SOB that begain 12 hrs ago, pt 97% on room air. pt states using cpap at home and still feels SOB    Chest Pain     left breast pain radiates around trunk to back       History Provided By: Patient    HPI: Claire Zhou, 63 y.o. female with a past medical history significant diabetes and hypertension presents to the ED with cc of pressure sensation in chest over the last 12 hours with intensity of 7/10 and shortness of breath while at rest not made worse with exertion, nonradiating left chest pain    There are no other complaints, changes, or physical findings at this time. PCP: Kenia Sykes PA-C    Current Outpatient Medications   Medication Sig Dispense Refill    ALPRAZolam (XANAX) 0.5 mg tablet Take 1 tablet by mouth twice daily as needed 60 Tablet 0    ARIPiprazole (ABILIFY) 20 mg tablet Take 1 Tablet by mouth daily for 90 days. 90 Tablet 0    insulin lispro protamin-lispro (HUMALOG 75-25 MIX) flexpen Inject 45 units in am and 30 units in pm 15 Pen 1    chlorhexidine (PERIDEX) 0.12 % solution RINSE WITH 1/2 OUNCE BY MOUTH FOR 30 SECONDS THEN SPIT OUT. use twice a day for 10 days      hydrALAZINE (APRESOLINE) 50 mg tablet TAKE 1 TABLET BY MOUTH THREE TIMES DAILY      atorvastatin (LIPITOR) 40 mg tablet Take 1 Tablet by mouth nightly for 90 days.  90 Tablet 1    insulin aspart protamine/insulin aspart (NOVOLOG MIX 70/30) 100 unit/mL (70-30) inpn Take 45 units in the morning and 30 units in evening 8 Adjustable Dose Pre-filled Pen Syringe 3    Insulin Needles, Disposable, 31 gauge x 5/16\" ndle Twice a day 1 Package 5    citalopram (CELEXA) 20 mg tablet Take 1 tablet by mouth once daily 90 Tab 0    metoprolol succinate (TOPROL-XL) 50 mg XL tablet TAKE 1 TABLET BY MOUTH ONCE DAILY      amLODIPine (NORVASC) 2.5 mg tablet TAKE 1 TABLET BY MOUTH ONCE DAILY      acetaminophen-codeine (TYLENOL #3) 300-30 mg per tablet acetaminophen 300 mg-codeine 30 mg tablet      lancets (One Touch Delica) 33 gauge misc Check glucose 3 times a day 100 Lancet 3    glucose blood VI test strips (OneTouch Verio test strips) strip Check glucose 3 times a day 100 Strip 3    allopurinoL (ZYLOPRIM) 300 mg tablet Take  by mouth daily.  gabapentin (NEURONTIN) 100 mg capsule Take  by mouth three (3) times daily.  magnesium oxide (MAG-OX) 400 mg tablet Take 400 mg by mouth three (3) times daily.  pantoprazole (PROTONIX) 40 mg tablet Take 40 mg by mouth daily.  sodium bicarbonate 650 mg tablet Take  by mouth two (2) times a day. Past History     Past Medical History:  Past Medical History:   Diagnosis Date    Anxiety 6/22/2020    Bipolar 1 disorder ([facility] 1/23/2018    Diabetes mellitus type 2, controlled ([facility] 6/22/2020    Hypertension 6/22/2020    Liver fibrosis 6/22/2020    Major depression 6/22/2020    Migraine 6/22/2020    Sleep apnea 1/23/2018    [facility]2020 [facility]       Past Surgical History:  Past Surgical History:   Procedure Laterality Date    HX APPENDECTOMY      HX CHOLECYSTECTOMY         Family History:  Family History   Problem Relation Age of Onset    Heart Attack Mother     Heart Attack Father        Social History:  Social History     Tobacco Use    Smoking status: Never Smoker    Smokeless tobacco: Never Used   Vaping Use    Vaping Use: Never assessed   Substance Use Topics    Alcohol use: Not Currently    Drug use: Never       Allergies:   Allergies   Allergen Reactions    Adhesive Tape-Silicones Unknown (comments)     Blisters    Septra [Sulfamethoprim] Unknown (comments)    Sulfa (Sulfonamide Antibiotics) Hives         Review of Systems     Review of Systems   Constitutional: Negative for chills and fever. HENT: Negative for rhinorrhea and sore throat. Eyes: Negative for pain and visual disturbance. Respiratory: Negative for cough and shortness of breath. Cardiovascular: Positive for chest pain. Negative for palpitations and leg swelling. Gastrointestinal: Negative for abdominal pain and vomiting. Endocrine: Negative for polydipsia and polyuria. Genitourinary: Negative for dysuria and urgency. Musculoskeletal: Negative for back pain and neck pain. Skin: Negative for color change and pallor. Neurological: Negative for weakness and numbness. Psychiatric/Behavioral: Negative. Physical Exam     Physical Exam  Vitals and nursing note reviewed. Constitutional:       General: She is not in acute distress. Appearance: She is well-developed. She is not ill-appearing, toxic-appearing or diaphoretic. HENT:      Head: Normocephalic and atraumatic. Eyes:      Extraocular Movements: Extraocular movements intact. Pupils: Pupils are equal, round, and reactive to light. Cardiovascular:      Rate and Rhythm: Normal rate and regular rhythm. Pulses: Normal pulses. Heart sounds: Normal heart sounds. Pulmonary:      Effort: Pulmonary effort is normal. No tachypnea. Breath sounds: Normal breath sounds. Abdominal:      General: Bowel sounds are normal.      Palpations: Abdomen is soft. Musculoskeletal:         General: Normal range of motion. Cervical back: Normal range of motion and neck supple. Skin:     General: Skin is warm and dry. Capillary Refill: Capillary refill takes less than 2 seconds. Neurological:      General: No focal deficit present. Mental Status: She is alert and oriented to person, place, and time. Psychiatric:         Mood and Affect: Mood normal.         Behavior: Behavior normal.         Lab and Diagnostic Study Results     Labs -     Recent Results (from the past 12 hour(s))   EKG, 12 LEAD, INITIAL    Collection Time: 08/10/21  1:39 PM   Result Value Ref Range    Ventricular Rate 94 BPM    Atrial Rate 94 BPM    P-R Interval 151 ms    QRS Duration 95 ms    Q-T Interval 379 ms    QTC Calculation (Bezet) 474 ms    Calculated P Axis 46 degrees    Calculated R Axis 17 degrees    Calculated T Axis 31 degrees    Diagnosis Sinus rhythm  Consider anterior infarct          Radiologic Studies -   [unfilled]  CT Results  (Last 48 hours)    None        CXR Results  (Last 48 hours)    None          Medical Decision Making and ED Course   - I am the first and primary provider for this patient AND AM THE PRIMARY PROVIDER OF RECORD. - I reviewed the vital signs, available nursing notes, past medical history, past surgical history, family history and social history. - Initial assessment performed. The patients presenting problems have been discussed, and the staff are in agreement with the care plan formulated and outlined with them. I have encouraged them to ask questions as they arise throughout their visit. Vital Signs-Reviewed the patient's vital signs.     Patient Vitals for the past 12 hrs:   Temp Pulse Resp BP SpO2   08/10/21 1338 98.6 °F (37 °C) 91 20 (!) 197/91 98 %     Records Reviewed: Nursing Notes    The patient presents with chest pain with a differential diagnosis of  ACS, arrhythmia, costochondritis and pnuemonia    ED Course:       ED Course as of Aug 10 1744   Tue Aug 10, 2021   1519 Patient states pressure sensation in chest is relieved still complaining of pain to her left back    [SB]   1743 Patient is pain-free wishes to go home she will follow-up with her primary and I told her I will give her referral to Tamika Alexander for cardiology follow-up    [SB]      ED Course User Index  [SB] Fabi Schrader MD         Provider Notes (Medical Decision Making): [facility]           Consultations:       Consultations: - NONE        Procedures and Critical Care       Performed by: Vic Shaw MD  PROCEDURES:  Procedures         HYPERTENSION COUNSELING: Education was provided to the patient today regarding their hypertension. Patient is made aware of their elevated blood pressure and is instructed to follow up this week with their Primary Care for a recheck. Patient is counseled regarding consequences of chronic, uncontrolled hypertension including kidney disease, heart disease, stroke or even death. Patient states their understanding and agrees to follow up this week. Additionally, during their visit, I discussed sodium restriction, maintaining ideal body weight and regular exercise program as physiologic means to achieve blood pressure control. The patient will strive towards this. Vic Shaw MD        Disposition     Disposition: Condition stable and improved  DC- Adult Discharges: All of the diagnostic tests were reviewed and questions answered. Diagnosis, care plan and treatment options were discussed. The patient understands the instructions and will follow up as directed. The patients results have been reviewed with them. They have been counseled regarding their diagnosis. The patient verbally convey understanding and agreement of the signs, symptoms, diagnosis, treatment and prognosis and additionally agrees to follow up as recommended with their PCP in 24 - 48 hours. They also agree with the care-plan and convey that all of their questions have been answered.   I have also put together some discharge instructions for them that include: 1) educational information regarding their diagnosis, 2) how to care for their diagnosis at home, as well a 3) list of reasons why they would want to return to the ED prior to their follow-up appointment, should their condition change. Remove if not discharged  DISCHARGE PLAN:  1. Current Discharge Medication List      CONTINUE these medications which have NOT CHANGED    Details   ALPRAZolam (XANAX) 0.5 mg tablet Take 1 tablet by mouth twice daily as needed  Qty: 60 Tablet, Refills: 0    Associated Diagnoses: Generalized anxiety disorder      ARIPiprazole (ABILIFY) 20 mg tablet Take 1 Tablet by mouth daily for 90 days. Qty: 90 Tablet, Refills: 0    Associated Diagnoses: Bipolar depression ([address]      insulin lispro protamin-lispro (HUMALOG 75-25 MIX) flexpen Inject 45 units in am and 30 units in pm  Qty: 15 Pen, Refills: 1    Associated Diagnoses: Type 2 diabetes mellitus with hyperglycemia, with long-term current use of insulin ([facility])      chlorhexidine (PERIDEX) 0.12 % solution RINSE WITH 1/2 OUNCE BY MOUTH FOR 30 SECONDS THEN SPIT OUT. use twice a day for 10 days      hydrALAZINE (APRESOLINE) 50 mg tablet TAKE 1 TABLET BY MOUTH THREE TIMES DAILY      atorvastatin (LIPITOR) 40 mg tablet Take 1 Tablet by mouth nightly for 90 days.   Qty: 90 Tablet, Refills: 1    Associated Diagnoses: Mixed hyperlipidemia      insulin aspart protamine/insulin aspart (NOVOLOG MIX 70/30) 100 unit/mL (70-30) inpn Take 45 units in the morning and 30 units in evening  Qty: 8 Adjustable Dose Pre-filled Pen Syringe, Refills: 3    Associated Diagnoses: Type 2 diabetes mellitus with hyperglycemia, with long-term current use of insulin ([facility])      Insulin Needles, Disposable, 31 gauge x 5/16\" ndle Twice a day  Qty: 1 Package, Refills: 5    Associated Diagnoses: Type 2 diabetes mellitus with hyperglycemia, with long-term current use of insulin ([facility])      citalopram (CELEXA) 20 mg tablet Take 1 tablet by mouth once daily  Qty: 90 Tab, Refills: 0      metoprolol succinate (TOPROL-XL) 50 mg XL tablet TAKE 1 TABLET BY MOUTH ONCE DAILY      amLODIPine (NORVASC) 2.5 mg tablet TAKE 1 TABLET BY MOUTH ONCE DAILY      acetaminophen-codeine (TYLENOL #3) 300-30 mg per tablet acetaminophen 300 mg-codeine 30 mg tablet      lancets (One Touch Delica) 33 gauge misc Check glucose 3 times a day  Qty: 100 Lancet, Refills: 3    Associated Diagnoses: Type 2 diabetes mellitus with hyperglycemia, with long-term current use of insulin ([facility])      glucose blood VI test strips (OneTouch Verio test strips) strip Check glucose 3 times a day  Qty: 100 Strip, Refills: 3    Associated Diagnoses: Type 2 diabetes mellitus with hyperglycemia, with long-term current use of insulin ([facility])      allopurinoL (ZYLOPRIM) 300 mg tablet Take  by mouth daily. gabapentin (NEURONTIN) 100 mg capsule Take  by mouth three (3) times daily. magnesium oxide (MAG-OX) 400 mg tablet Take 400 mg by mouth three (3) times daily. pantoprazole (PROTONIX) 40 mg tablet Take 40 mg by mouth daily. sodium bicarbonate 650 mg tablet Take  by mouth two (2) times a day. 2.   Follow-up Information    None       3. Return to ED if worse   4. Current Discharge Medication List          Diagnosis     Clinical Impression: No diagnosis found. Attestations:    Nivia Chan MD    Please note that this dictation was completed with [employer], the computer voice recognition software. Quite often unanticipated grammatical, syntax, homophones, and other interpretive errors are inadvertently transcribed by the computer software. Please disregard these errors. Please excuse any errors that have escaped final proofreading. Thank you.

## 2021-08-11 LAB
ATRIAL RATE: 94 BPM
CALCULATED P AXIS, ECG09: 46 DEGREES
CALCULATED R AXIS, ECG10: 17 DEGREES
CALCULATED T AXIS, ECG11: 31 DEGREES
DIAGNOSIS, 93000: NORMAL
P-R INTERVAL, ECG05: 151 MS
Q-T INTERVAL, ECG07: 379 MS
QRS DURATION, ECG06: 95 MS
QTC CALCULATION (BEZET), ECG08: 474 MS
VENTRICULAR RATE, ECG03: 94 BPM

## 2021-08-22 ENCOUNTER — HOSPITAL ENCOUNTER (INPATIENT)
Age: 61
LOS: 3 days | Discharge: HOME | DRG: 880 | End: 2021-08-25
Attending: EMERGENCY MEDICINE | Admitting: INTERNAL MEDICINE
Payer: MEDICARE

## 2021-08-22 ENCOUNTER — APPOINTMENT (OUTPATIENT)
Dept: GENERAL RADIOLOGY | Age: 61
DRG: 880 | End: 2021-08-22
Attending: EMERGENCY MEDICINE
Payer: MEDICARE

## 2021-08-22 DIAGNOSIS — R79.89 ELEVATED D-DIMER: ICD-10-CM

## 2021-08-22 DIAGNOSIS — R07.9 CHEST PAIN, UNSPECIFIED TYPE: Primary | ICD-10-CM

## 2021-08-22 DIAGNOSIS — N18.9 CHRONIC RENAL IMPAIRMENT, UNSPECIFIED CKD STAGE: ICD-10-CM

## 2021-08-22 LAB
ALBUMIN SERPL-MCNC: 3.4 G/DL (ref 3.5–5)
ALBUMIN/GLOB SERPL: 0.6 {RATIO} (ref 1.1–2.2)
ALP SERPL-CCNC: 220 U/L (ref 45–117)
ALT SERPL-CCNC: 32 U/L (ref 12–78)
ANION GAP SERPL CALC-SCNC: 12 MMOL/L (ref 5–15)
AST SERPL W P-5'-P-CCNC: 56 U/L (ref 15–37)
BASOPHILS # BLD: 0.1 K/UL (ref 0–0.2)
BASOPHILS NFR BLD: 1 % (ref 0–2.5)
BILIRUB SERPL-MCNC: 0.8 MG/DL (ref 0.2–1)
BNP SERPL-MCNC: 423 PG/ML
BUN SERPL-MCNC: 22 MG/DL (ref 6–20)
BUN/CREAT SERPL: 9 (ref 12–20)
CA-I BLD-MCNC: 9.5 MG/DL (ref 8.5–10.1)
CHLORIDE SERPL-SCNC: 104 MMOL/L (ref 97–108)
CO2 SERPL-SCNC: 23 MMOL/L (ref 21–32)
CREAT SERPL-MCNC: 2.43 MG/DL (ref 0.55–1.02)
D DIMER PPP FEU-MCNC: 0.69 MG/L FEU (ref 0.19–0.5)
EOSINOPHIL # BLD: 0.1 K/UL (ref 0–0.7)
EOSINOPHIL NFR BLD: 3 % (ref 0.9–2.9)
ERYTHROCYTE [DISTWIDTH] IN BLOOD BY AUTOMATED COUNT: 18.8 % (ref 11.5–14.5)
GLOBULIN SER CALC-MCNC: 5.3 G/DL (ref 2–4)
GLUCOSE BLD STRIP.AUTO-MCNC: 230 MG/DL (ref 65–117)
GLUCOSE BLD STRIP.AUTO-MCNC: 238 MG/DL (ref 65–117)
GLUCOSE BLD STRIP.AUTO-MCNC: 261 MG/DL (ref 65–117)
GLUCOSE BLD STRIP.AUTO-MCNC: 311 MG/DL (ref 65–117)
GLUCOSE SERPL-MCNC: 220 MG/DL (ref 65–100)
HCT VFR BLD AUTO: 27 % (ref 36–46)
HGB BLD-MCNC: 8.7 G/DL (ref 13.5–17.5)
LYMPHOCYTES # BLD: 1.8 K/UL (ref 1–4.8)
LYMPHOCYTES NFR BLD: 34 % (ref 20.5–51.1)
MAGNESIUM SERPL-MCNC: 1.5 MG/DL (ref 1.6–2.4)
MCH RBC QN AUTO: 24.5 PG (ref 31–34)
MCHC RBC AUTO-ENTMCNC: 32.2 G/DL (ref 31–36)
MCV RBC AUTO: 76 FL (ref 80–100)
MONOCYTES # BLD: 0.3 K/UL (ref 0.2–2.4)
MONOCYTES NFR BLD: 6 % (ref 1.7–9.3)
NEUTS SEG # BLD: 2.9 K/UL (ref 1.8–7.7)
NEUTS SEG NFR BLD: 56 % (ref 42–75)
NRBC # BLD: 0.01 K/UL
NRBC BLD-RTO: 0.1 PER 100 WBC
PERFORMED BY, TECHID: ABNORMAL
PLATELET # BLD AUTO: 241 K/UL (ref 150–400)
PMV BLD AUTO: 8.7 FL (ref 6.5–11.5)
POTASSIUM SERPL-SCNC: 4.9 MMOL/L (ref 3.5–5.1)
PROT SERPL-MCNC: 8.7 G/DL (ref 6.4–8.2)
RBC # BLD AUTO: 3.55 M/UL (ref 4.5–5.9)
SODIUM SERPL-SCNC: 139 MMOL/L (ref 136–145)
TROPONIN I SERPL-MCNC: <0.05 NG/ML
WBC # BLD AUTO: 5.2 K/UL (ref 4.4–11.3)

## 2021-08-22 PROCEDURE — 99285 EMERGENCY DEPT VISIT HI MDM: CPT

## 2021-08-22 PROCEDURE — 74011250636 HC RX REV CODE- 250/636: Performed by: EMERGENCY MEDICINE

## 2021-08-22 PROCEDURE — 94760 N-INVAS EAR/PLS OXIMETRY 1: CPT

## 2021-08-22 PROCEDURE — 74011000258 HC RX REV CODE- 258: Performed by: EMERGENCY MEDICINE

## 2021-08-22 PROCEDURE — 74011636637 HC RX REV CODE- 636/637: Performed by: INTERNAL MEDICINE

## 2021-08-22 PROCEDURE — 74011636637 HC RX REV CODE- 636/637: Performed by: EMERGENCY MEDICINE

## 2021-08-22 PROCEDURE — 71045 X-RAY EXAM CHEST 1 VIEW: CPT

## 2021-08-22 PROCEDURE — 93005 ELECTROCARDIOGRAM TRACING: CPT

## 2021-08-22 PROCEDURE — 84484 ASSAY OF TROPONIN QUANT: CPT

## 2021-08-22 PROCEDURE — 83880 ASSAY OF NATRIURETIC PEPTIDE: CPT

## 2021-08-22 PROCEDURE — 85025 COMPLETE CBC W/AUTO DIFF WBC: CPT

## 2021-08-22 PROCEDURE — 82962 GLUCOSE BLOOD TEST: CPT

## 2021-08-22 PROCEDURE — 74011250636 HC RX REV CODE- 250/636: Performed by: INTERNAL MEDICINE

## 2021-08-22 PROCEDURE — 96372 THER/PROPH/DIAG INJ SC/IM: CPT

## 2021-08-22 PROCEDURE — 74011250637 HC RX REV CODE- 250/637: Performed by: EMERGENCY MEDICINE

## 2021-08-22 PROCEDURE — 85379 FIBRIN DEGRADATION QUANT: CPT

## 2021-08-22 PROCEDURE — 36415 COLL VENOUS BLD VENIPUNCTURE: CPT

## 2021-08-22 PROCEDURE — 96374 THER/PROPH/DIAG INJ IV PUSH: CPT

## 2021-08-22 PROCEDURE — 65270000029 HC RM PRIVATE

## 2021-08-22 PROCEDURE — 80053 COMPREHEN METABOLIC PANEL: CPT

## 2021-08-22 PROCEDURE — 74011250637 HC RX REV CODE- 250/637: Performed by: INTERNAL MEDICINE

## 2021-08-22 PROCEDURE — 83735 ASSAY OF MAGNESIUM: CPT

## 2021-08-22 RX ORDER — INSULIN LISPRO 100 [IU]/ML
INJECTION, SOLUTION INTRAVENOUS; SUBCUTANEOUS
Status: DISCONTINUED | OUTPATIENT
Start: 2021-08-22 | End: 2021-08-22

## 2021-08-22 RX ORDER — ATORVASTATIN CALCIUM 40 MG/1
40 TABLET, FILM COATED ORAL
Status: DISCONTINUED | OUTPATIENT
Start: 2021-08-22 | End: 2021-08-25 | Stop reason: HOSPADM

## 2021-08-22 RX ORDER — METOPROLOL SUCCINATE 25 MG/1
50 TABLET, EXTENDED RELEASE ORAL
Status: COMPLETED | OUTPATIENT
Start: 2021-08-22 | End: 2021-08-22

## 2021-08-22 RX ORDER — INSULIN LISPRO 100 [IU]/ML
INJECTION, SOLUTION INTRAVENOUS; SUBCUTANEOUS
Status: DISCONTINUED | OUTPATIENT
Start: 2021-08-22 | End: 2021-08-25 | Stop reason: HOSPADM

## 2021-08-22 RX ORDER — AMLODIPINE BESYLATE 2.5 MG/1
2.5 TABLET ORAL
Status: COMPLETED | OUTPATIENT
Start: 2021-08-22 | End: 2021-08-22

## 2021-08-22 RX ORDER — ALPRAZOLAM 0.25 MG/1
0.25 TABLET ORAL 2 TIMES DAILY
Status: DISCONTINUED | OUTPATIENT
Start: 2021-08-22 | End: 2021-08-25 | Stop reason: HOSPADM

## 2021-08-22 RX ORDER — POLYETHYLENE GLYCOL 3350 17 G/17G
17 POWDER, FOR SOLUTION ORAL DAILY PRN
Status: DISCONTINUED | OUTPATIENT
Start: 2021-08-22 | End: 2021-08-25 | Stop reason: HOSPADM

## 2021-08-22 RX ORDER — ONDANSETRON 2 MG/ML
4 INJECTION INTRAMUSCULAR; INTRAVENOUS
Status: COMPLETED | OUTPATIENT
Start: 2021-08-22 | End: 2021-08-22

## 2021-08-22 RX ORDER — INSULIN LISPRO 100 [IU]/ML
5 INJECTION, SOLUTION INTRAVENOUS; SUBCUTANEOUS ONCE
Status: COMPLETED | OUTPATIENT
Start: 2021-08-22 | End: 2021-08-22

## 2021-08-22 RX ORDER — ONDANSETRON 4 MG/1
4 TABLET, ORALLY DISINTEGRATING ORAL
Status: DISCONTINUED | OUTPATIENT
Start: 2021-08-22 | End: 2021-08-25 | Stop reason: HOSPADM

## 2021-08-22 RX ORDER — FUROSEMIDE 10 MG/ML
20 INJECTION INTRAMUSCULAR; INTRAVENOUS EVERY 8 HOURS
Status: DISCONTINUED | OUTPATIENT
Start: 2021-08-22 | End: 2021-08-23

## 2021-08-22 RX ORDER — SODIUM CHLORIDE 0.9 % (FLUSH) 0.9 %
5-40 SYRINGE (ML) INJECTION AS NEEDED
Status: DISCONTINUED | OUTPATIENT
Start: 2021-08-22 | End: 2021-08-25 | Stop reason: HOSPADM

## 2021-08-22 RX ORDER — INSULIN GLARGINE 100 [IU]/ML
25 INJECTION, SOLUTION SUBCUTANEOUS
Status: DISCONTINUED | OUTPATIENT
Start: 2021-08-22 | End: 2021-08-25 | Stop reason: HOSPADM

## 2021-08-22 RX ORDER — ALLOPURINOL 100 MG/1
100 TABLET ORAL DAILY
Status: DISCONTINUED | OUTPATIENT
Start: 2021-08-23 | End: 2021-08-25 | Stop reason: HOSPADM

## 2021-08-22 RX ORDER — ACETAMINOPHEN 500 MG
1000 TABLET ORAL ONCE
Status: COMPLETED | OUTPATIENT
Start: 2021-08-22 | End: 2021-08-22

## 2021-08-22 RX ORDER — DEXTROSE 50 % IN WATER (D50W) INTRAVENOUS SYRINGE
25-50 AS NEEDED
Status: DISCONTINUED | OUTPATIENT
Start: 2021-08-22 | End: 2021-08-25 | Stop reason: HOSPADM

## 2021-08-22 RX ORDER — SODIUM CHLORIDE 0.9 % (FLUSH) 0.9 %
5-40 SYRINGE (ML) INJECTION EVERY 8 HOURS
Status: DISCONTINUED | OUTPATIENT
Start: 2021-08-22 | End: 2021-08-25 | Stop reason: HOSPADM

## 2021-08-22 RX ORDER — LANOLIN ALCOHOL/MO/W.PET/CERES
400 CREAM (GRAM) TOPICAL 3 TIMES DAILY
Status: DISCONTINUED | OUTPATIENT
Start: 2021-08-22 | End: 2021-08-23

## 2021-08-22 RX ORDER — ACETAMINOPHEN 650 MG/1
650 SUPPOSITORY RECTAL
Status: DISCONTINUED | OUTPATIENT
Start: 2021-08-22 | End: 2021-08-25 | Stop reason: HOSPADM

## 2021-08-22 RX ORDER — TRAMADOL HYDROCHLORIDE 50 MG/1
50 TABLET ORAL
Status: DISCONTINUED | OUTPATIENT
Start: 2021-08-22 | End: 2021-08-25 | Stop reason: HOSPADM

## 2021-08-22 RX ORDER — INSULIN GLARGINE 100 [IU]/ML
25 INJECTION, SOLUTION SUBCUTANEOUS
Status: COMPLETED | OUTPATIENT
Start: 2021-08-22 | End: 2021-08-22

## 2021-08-22 RX ORDER — LANOLIN ALCOHOL/MO/W.PET/CERES
1 CREAM (GRAM) TOPICAL
Status: DISCONTINUED | OUTPATIENT
Start: 2021-08-23 | End: 2021-08-25 | Stop reason: HOSPADM

## 2021-08-22 RX ORDER — AMLODIPINE BESYLATE 2.5 MG/1
2.5 TABLET ORAL DAILY
Status: DISCONTINUED | OUTPATIENT
Start: 2021-08-23 | End: 2021-08-24

## 2021-08-22 RX ORDER — PANTOPRAZOLE SODIUM 40 MG/1
40 TABLET, DELAYED RELEASE ORAL
Status: DISCONTINUED | OUTPATIENT
Start: 2021-08-23 | End: 2021-08-25 | Stop reason: HOSPADM

## 2021-08-22 RX ORDER — HYDRALAZINE HYDROCHLORIDE 25 MG/1
50 TABLET, FILM COATED ORAL 3 TIMES DAILY
Status: DISCONTINUED | OUTPATIENT
Start: 2021-08-22 | End: 2021-08-23

## 2021-08-22 RX ORDER — ONDANSETRON 2 MG/ML
4 INJECTION INTRAMUSCULAR; INTRAVENOUS
Status: DISCONTINUED | OUTPATIENT
Start: 2021-08-22 | End: 2021-08-22

## 2021-08-22 RX ORDER — INSULIN LISPRO 100 [IU]/ML
6 INJECTION, SOLUTION INTRAVENOUS; SUBCUTANEOUS ONCE
Status: COMPLETED | OUTPATIENT
Start: 2021-08-22 | End: 2021-08-22

## 2021-08-22 RX ORDER — MAGNESIUM SULFATE 100 %
4 CRYSTALS MISCELLANEOUS AS NEEDED
Status: DISCONTINUED | OUTPATIENT
Start: 2021-08-22 | End: 2021-08-25 | Stop reason: HOSPADM

## 2021-08-22 RX ORDER — CITALOPRAM 20 MG/1
20 TABLET, FILM COATED ORAL DAILY
Status: DISCONTINUED | OUTPATIENT
Start: 2021-08-23 | End: 2021-08-25 | Stop reason: HOSPADM

## 2021-08-22 RX ORDER — ACETAMINOPHEN 325 MG/1
650 TABLET ORAL
Status: DISCONTINUED | OUTPATIENT
Start: 2021-08-22 | End: 2021-08-25 | Stop reason: HOSPADM

## 2021-08-22 RX ORDER — ONDANSETRON 2 MG/ML
4 INJECTION INTRAMUSCULAR; INTRAVENOUS
Status: DISCONTINUED | OUTPATIENT
Start: 2021-08-22 | End: 2021-08-25 | Stop reason: HOSPADM

## 2021-08-22 RX ADMIN — ONDANSETRON 4 MG: 2 INJECTION INTRAMUSCULAR; INTRAVENOUS at 08:43

## 2021-08-22 RX ADMIN — INSULIN LISPRO 5 UNITS: 100 INJECTION, SOLUTION INTRAVENOUS; SUBCUTANEOUS at 09:29

## 2021-08-22 RX ADMIN — FUROSEMIDE 20 MG: 10 INJECTION, SOLUTION INTRAMUSCULAR; INTRAVENOUS at 21:42

## 2021-08-22 RX ADMIN — ONDANSETRON 4 MG: 4 TABLET, ORALLY DISINTEGRATING ORAL at 22:58

## 2021-08-22 RX ADMIN — INSULIN GLARGINE 25 UNITS: 100 INJECTION, SOLUTION SUBCUTANEOUS at 09:30

## 2021-08-22 RX ADMIN — ALPRAZOLAM 0.25 MG: 0.25 TABLET ORAL at 21:41

## 2021-08-22 RX ADMIN — ATORVASTATIN CALCIUM 40 MG: 40 TABLET, FILM COATED ORAL at 21:42

## 2021-08-22 RX ADMIN — MAGNESIUM OXIDE 400 MG: 400 TABLET ORAL at 16:26

## 2021-08-22 RX ADMIN — HYDRALAZINE HYDROCHLORIDE 50 MG: 25 TABLET, FILM COATED ORAL at 21:41

## 2021-08-22 RX ADMIN — Medication 10 ML: at 21:57

## 2021-08-22 RX ADMIN — APIXABAN 5 MG: 5 TABLET, FILM COATED ORAL at 21:41

## 2021-08-22 RX ADMIN — FUROSEMIDE 20 MG: 10 INJECTION, SOLUTION INTRAMUSCULAR; INTRAVENOUS at 13:20

## 2021-08-22 RX ADMIN — ACETAMINOPHEN 1000 MG: 500 TABLET ORAL at 09:36

## 2021-08-22 RX ADMIN — METOPROLOL SUCCINATE 50 MG: 25 TABLET, EXTENDED RELEASE ORAL at 09:11

## 2021-08-22 RX ADMIN — INSULIN LISPRO 6 UNITS: 100 INJECTION, SOLUTION INTRAVENOUS; SUBCUTANEOUS at 13:20

## 2021-08-22 RX ADMIN — Medication 10 ML: at 13:21

## 2021-08-22 RX ADMIN — METOCLOPRAMIDE 5 MG: 5 INJECTION, SOLUTION INTRAMUSCULAR; INTRAVENOUS at 10:23

## 2021-08-22 RX ADMIN — INSULIN LISPRO 4 UNITS: 100 INJECTION, SOLUTION INTRAVENOUS; SUBCUTANEOUS at 21:42

## 2021-08-22 RX ADMIN — AMLODIPINE BESYLATE 2.5 MG: 2.5 TABLET ORAL at 09:11

## 2021-08-22 RX ADMIN — INSULIN GLARGINE 25 UNITS: 100 INJECTION, SOLUTION SUBCUTANEOUS at 21:42

## 2021-08-22 RX ADMIN — INSULIN LISPRO 8 UNITS: 100 INJECTION, SOLUTION INTRAVENOUS; SUBCUTANEOUS at 16:26

## 2021-08-22 RX ADMIN — HYDRALAZINE HYDROCHLORIDE 50 MG: 25 TABLET, FILM COATED ORAL at 16:26

## 2021-08-22 NOTE — ROUTINE PROCESS
TRANSFER - IN REPORT:    Verbal report received from [address] [facility] (name) on Jose David Arora  being received from(unit) for routine progression of care      Report consisted of patients Situation, Background, Assessment and   Recommendations(SBAR). Information from the following report(s) SBAR was reviewed with the receiving nurse. Opportunity for questions and clarification was provided. Assessment completed upon patients arrival to unit and care assumed.

## 2021-08-22 NOTE — ED TRIAGE NOTES
Patient reports that she was seen here a few days ago for the same thing but states that her chest pain is still present. Reports pain in her left breast, also reports SOB that is worse at night when she lays down. Reports that she has been using pillows to prop herself up but it hasn't help. Reports that she also receives iron infusions due to low hemoglobin but was unable to receive her full treatment. States that she has gained 11 lbs in 2 weeks.

## 2021-08-22 NOTE — ED PROVIDER NOTES
HPI: 82-year-old female with a history of bipolar disorder diabetes hypertension liver fibrosis major depression  returns to the ED complaining of persistent chest pain for the past 6 weeks. Patient fell in the bathroom feels he may have injured her chest at that time is continued to have pain though denies associated nausea dyspnea diaphoresis she has had an 11 pound weight gain noted denies edema swelling fever cough or GI symptoms.   No prior history of heart disease or thromboembolic disease    Past Medical History:   Diagnosis Date    Anxiety 6/22/2020    Bipolar 1 disorder ([facility] 1/23/2018    Diabetes mellitus type 2, controlled ([facility] 6/22/2020    Hypertension 6/22/2020    Liver fibrosis 6/22/2020    Major depression 6/22/2020    Migraine 6/22/2020    Sleep apnea 1/23/2018    Suicide attempt ([facility] 6/22/2020 2013       Past Surgical History:   Procedure Laterality Date    HX APPENDECTOMY      HX CHOLECYSTECTOMY           Family History:   Problem Relation Age of Onset    Heart Attack Mother     Heart Attack Father        Social History     Socioeconomic History    Marital status: SINGLE     Spouse name: Not on file    Number of children: 0    Years of education: Not on file    Highest education level: Associate degree: academic program   Occupational History    Not on file   Tobacco Use    Smoking status: Never Smoker    Smokeless tobacco: Never Used   Vaping Use    Vaping Use: Never assessed   Substance and Sexual Activity    Alcohol use: Not Currently    Drug use: Never    Sexual activity: Not Currently   Other Topics Concern     Service Not Asked    Blood Transfusions Not Asked    Caffeine Concern Not Asked    Occupational Exposure Not Asked    Hobby Hazards Not Asked    Sleep Concern Not Asked    Stress Concern Not Asked    Weight Concern Not Asked    Special Diet Not Asked    Back Care Not Asked    Exercise Not Asked    Bike Helmet Not Asked   [address] [facility] Not Asked    Self-Exams Not Asked   Social History Narrative    Not on file     Social Determinants of Health     Financial Resource Strain:     Difficulty of Paying Living Expenses:    Food Insecurity:     Worried About Running Out of Food in the Last Year:     [address] in the Last Year:    Transportation Needs:     Lack of Transportation (Medical):  Lack of Transportation (Non-Medical):    Physical Activity:     Days of Exercise per Week:     Minutes of Exercise per Session:    Stress:     Feeling of Stress :    Social Connections:     Frequency of Communication with Friends and Family:     Frequency of Social Gatherings with Friends and Family:     Attends Muslim Services:     Active Member of Clubs or Organizations:     Attends Club or Organization Meetings:     Marital Status:    Intimate Partner Violence:     Fear of Current or Ex-Partner:     Emotionally Abused:     Physically Abused:     Sexually Abused: ALLERGIES: Adhesive tape-silicones, Septra [sulfamethoprim], and Sulfa (sulfonamide antibiotics)    Review of Systems   Constitutional: Negative. HENT: Negative. Eyes: Negative. Respiratory: Positive for shortness of breath. Negative for cough. Cardiovascular: Positive for chest pain. Gastrointestinal: Negative. Endocrine: Negative. Genitourinary: Negative. Musculoskeletal: Negative. Neurological: Negative. Hematological: Negative. Psychiatric/Behavioral: Negative. Vitals:    08/22/21 0732   BP: (!) 191/97   Pulse: 94   Resp: 24   Temp: 98.7 °F (37.1 °C)   SpO2: 98%   Weight: 100.7 kg (222 lb)   Height: 5' 2\" (1.575 m)            Physical Exam  Vitals and nursing note reviewed. Constitutional:       Appearance: Normal appearance. She is obese. HENT:      Head: Normocephalic and atraumatic.       Right Ear: Tympanic membrane and ear canal normal.      Left Ear: Tympanic membrane and ear canal normal.      Nose: Nose normal. Mouth/Throat:      Mouth: Mucous membranes are moist.      Pharynx: Oropharynx is clear. Eyes:      Extraocular Movements: Extraocular movements intact. Conjunctiva/sclera: Conjunctivae normal.      Pupils: Pupils are equal, round, and reactive to light. Cardiovascular:      Rate and Rhythm: Normal rate and regular rhythm. Pulses: Normal pulses. Heart sounds: Normal heart sounds. Pulmonary:      Effort: Pulmonary effort is normal.      Breath sounds: Normal breath sounds. Abdominal:      General: Abdomen is flat. Bowel sounds are normal.      Palpations: Abdomen is soft. Musculoskeletal:         General: Normal range of motion. Cervical back: Normal range of motion and neck supple. Skin:     General: Skin is warm and dry. Neurological:      General: No focal deficit present. Mental Status: She is alert and oriented to person, place, and time. Psychiatric:         Mood and Affect: Mood normal.         Behavior: Behavior normal.          MDM  Number of Diagnoses or Management Options  Risk of Complications, Morbidity, and/or Mortality  Presenting problems: high  Diagnostic procedures: high           Procedures    0921: ER MD day shift note: patient feeling some better though still with nausea troponin is negative mildly elevated D-dimer renal impairment precludes doing CTA clinically low suspicion of thromboembolic disease the patient does have some risk factors. Second visit now for chest pain discussed with hospitalist will place on observation.   Given morning hypertension medication and insulin

## 2021-08-22 NOTE — H&P
History and Physical    Subjective:     Fatemeh Tellez is a 63 y.o. female  has a past medical history of Anxiety (6/22/2020), Bipolar 1 disorder ([facility] (1/23/2018), Diabetes mellitus type 2, controlled ([facility] (6/22/2020), Hypertension (6/22/2020), Liver fibrosis (6/22/2020), Major depression (6/22/2020), Migraine (6/22/2020), Sleep apnea (1/23/2018), and Suicide attempt ([facility] (6/22/2020). Patient presents with over 6-week complaint of sided chest pain that she describes as constant, unremitting that began initially as mild but has been worsening. Patient reports falling on her bathroom and hitting her commode about 6 weeks ago. Denies any associated symptoms of nausea, diaphoresis and patient has been reporting shortness of breath though not necessarily associated with chest pain. There are no aggravating or relieving factors with the chest pain. Patient also reports dyspnea on exertion for the past few weeks along with paroxysmal nocturnal dyspnea and orthopnea. Patient states she has had an 11 pound weight gain over the past few weeks but denies any swelling or change in her eating habits. Patient states she has been taking Tylenol which has not helped any and has not been taking any other pain meds. Patient's sister, however, pulled me aside and volunteered to me that patient does not do anything that she is instructed to do by the doctors, not taking her medications, eating anything she wants, laying down in bed all day and even taking Naprosyn which she is aware that she is not to do. Also states she has been told by patient's counselor that she is attention seeking though this is not documented on psych notes and wants to make sure that patient does not know this information is coming from her. Patient was seen in the ER about 2 weeks ago for this chest pain and had a x-ray that was normal and was sent home with documentation that she was chest pain-free.  Patient denies any cardiac or Pulmonary history. Evaluation in the ER revealed normal chest x-ray, EKG, troponin with a slightly elevated D-dimer. On my exam patient's chest pain is reproducible making it more likely to be musculoskeletal in nature but given D-dimer and shortness of breath will admit and start on anticoagulation with plans to get a VQ scan. Given the shortness of breath, PND and weight gain plan to work-up with a 2D echo and cardiology consult      Past Medical History:   Diagnosis Date    Anxiety 6/22/2020    Bipolar 1 disorder ([facility] 1/23/2018    Diabetes mellitus type 2, controlled ([facility] 6/22/2020    Hypertension 6/22/2020    Liver fibrosis 6/22/2020    Major depression 6/22/2020    Migraine 6/22/2020    Sleep apnea 1/23/2018    Suicide attempt ([facility] 6/22/2020 2013      Past Surgical History:   Procedure Laterality Date    HX APPENDECTOMY      HX CHOLECYSTECTOMY       Family History   Problem Relation Age of Onset    Heart Attack Mother     Heart Attack Father       Social History     Tobacco Use    Smoking status: Never Smoker    Smokeless tobacco: Never Used   Substance Use Topics    Alcohol use: Not Currently       Prior to Admission medications    Medication Sig Start Date End Date Taking? Authorizing Provider   ALPRAZolam Maribel Ji) 0.5 mg tablet Take 1 tablet by mouth twice daily as needed 7/29/21   Marily Villanueva NP   ARIPiprazole (ABILIFY) 20 mg tablet Take 1 Tablet by mouth daily for 90 days.  7/19/21 10/17/21  Marily Villanueva NP   insulin lispro protamin-lispro (HUMALOG 75-25 MIX) flexpen Inject 45 units in am and 30 units in pm 7/19/21   Nan Escobedo MD   chlorhexidine (PERIDEX) 0.12 % solution RINSE WITH 1/2 OUNCE BY MOUTH FOR 30 SECONDS THEN SPIT OUT. use twice a day for 10 days 4/13/21   Provider, Historical   hydrALAZINE (APRESOLINE) 50 mg tablet TAKE 1 TABLET BY MOUTH THREE TIMES DAILY 6/26/21   Provider, Historical   atorvastatin (LIPITOR) 40 mg tablet Take 1 Tablet by mouth nightly for 90 days. 7/7/21 10/5/21  Aruna Winters MD   insulin aspart protamine/insulin aspart (NOVOLOG MIX 70/30) 100 unit/mL (70-30) inpn Take 45 units in the morning and 30 units in evening 7/7/21   Aruna Winters MD   Insulin Needles, Disposable, 31 gauge x 5/16\" ndle Twice a day 7/7/21   Aruna Winters MD   citalopram (CELEXA) 20 mg tablet Take 1 tablet by mouth once daily 5/6/21   Carmen Hernandez, WILLIAM   metoprolol succinate (TOPROL-XL) 50 mg XL tablet TAKE 1 TABLET BY MOUTH ONCE DAILY 3/2/21   Provider, Historical   amLODIPine (NORVASC) 2.5 mg tablet TAKE 1 TABLET BY MOUTH ONCE DAILY 3/4/21   Provider, Historical   acetaminophen-codeine (TYLENOL #3) 300-30 mg per tablet acetaminophen 300 mg-codeine 30 mg tablet    Provider, Historical   lancets (One Touch Delica) 33 gauge misc Check glucose 3 times a day 12/16/20   Aruna Winters MD   glucose blood VI test strips (OneTouch Verio test strips) strip Check glucose 3 times a day 12/16/20   Aruna Winters MD   allopurinoL (ZYLOPRIM) 300 mg tablet Take  by mouth daily. Provider, Historical   gabapentin (NEURONTIN) 100 mg capsule Take  by mouth three (3) times daily. Provider, Historical   magnesium oxide (MAG-OX) 400 mg tablet Take 400 mg by mouth three (3) times daily. Provider, Historical   pantoprazole (PROTONIX) 40 mg tablet Take 40 mg by mouth daily. Provider, Historical   sodium bicarbonate 650 mg tablet Take  by mouth two (2) times a day. Provider, Historical     Allergies   Allergen Reactions    Adhesive Tape-Silicones Unknown (comments)     Blisters    Septra [Sulfamethoprim] Unknown (comments)    Sulfa (Sulfonamide Antibiotics) Hives        Review of Systems:  Review of Systems   Constitutional: Negative for activity change, appetite change, chills, diaphoresis, fatigue, fever and unexpected weight change. HENT: Negative for congestion, postnasal drip, rhinorrhea and sore throat. Eyes: Negative for discharge and redness.    Respiratory: Positive for choking and shortness of breath. Negative for apnea, chest tightness, wheezing and stridor. Cardiovascular: Negative for chest pain, palpitations and leg swelling. Gastrointestinal: Negative for abdominal distention, abdominal pain, anal bleeding, blood in stool, constipation, diarrhea, nausea and rectal pain. Genitourinary: Negative for dysuria, frequency and hematuria. Musculoskeletal: Negative for arthralgias and back pain. Neurological: Negative for seizures, syncope, weakness and headaches. Psychiatric/Behavioral: Positive for decreased concentration. Negative for agitation, behavioral problems and confusion. Objective:     Vitals:  Visit Vitals  BP (!) 169/82   Pulse 91   Temp 98.7 °F (37.1 °C)   Resp 18   Ht 5' 2\" (1.575 m)   Wt 100.7 kg (222 lb)   SpO2 96%   BMI 40.60 kg/m²       Physical Exam:  General: Alert, cooperative, no distress. Head:  Normocephalic, without obvious abnormality, atraumatic. Eyes:  Conjunctivae/corneas clear. Pupils equal, round, reactive to light. Extraocular movements intact. Lungs:  Clear to auscultation bilaterally, no wheezes, crackles  Chest wall: No tenderness or deformity. Heart:  Regular rate and rhythm, S1, S2 normal, no murmur, click, rub, or gallop. Abdomen:   Soft, non-tender. Bowel sounds normal. No masses. No organomegaly. Back:  No spine tenderness to palpation  Extremities: Extremities normal, atraumatic, no cyanosis or edema. Pulses: Symmetric all extremities.   Neurologic: Awake and oriented,       Labs:  Recent Results (from the past 24 hour(s))   EKG, 12 LEAD, INITIAL    Collection Time: 08/22/21  7:26 AM   Result Value Ref Range    Ventricular Rate 102 BPM    Atrial Rate 102 BPM    P-R Interval 184 ms    QRS Duration 94 ms    Q-T Interval 372 ms    QTC Calculation (Bezet) 485 ms    Calculated P Axis 46 degrees    Calculated R Axis 12 degrees    Calculated T Axis 35 degrees    Diagnosis Sinus tachycardia    METABOLIC PANEL, COMPREHENSIVE    Collection Time: 08/22/21  7:35 AM   Result Value Ref Range    Sodium 139 136 - 145 mmol/L    Potassium 4.9 3.5 - 5.1 mmol/L    Chloride 104 97 - 108 mmol/L    CO2 23 21 - 32 mmol/L    Anion gap 12 5 - 15 mmol/L    Glucose 220 (H) 65 - 100 mg/dL    BUN 22 (H) 6 - 20 mg/dL    Creatinine 2.43 (H) 0.55 - 1.02 mg/dL    BUN/Creatinine ratio 9 (L) 12 - 20      GFR est AA 25 (L) >60 ml/min/1.73m2    GFR est non-AA 20 (L) >60 ml/min/1.73m2    Calcium 9.5 8.5 - 10.1 mg/dL    Bilirubin, total 0.8 0.2 - 1.0 mg/dL    AST (SGOT) 56 (H) 15 - 37 U/L    ALT (SGPT) 32 12 - 78 U/L    Alk. phosphatase 220 (H) 45 - 117 U/L    Protein, total 8.7 (H) 6.4 - 8.2 g/dL    Albumin 3.4 (L) 3.5 - 5.0 g/dL    Globulin 5.3 (H) 2.0 - 4.0 g/dL    A-G Ratio 0.6 (L) 1.1 - 2.2     CBC WITH AUTOMATED DIFF    Collection Time: 08/22/21  7:35 AM   Result Value Ref Range    WBC 5.2 4.4 - 11.3 K/uL    RBC 3.55 (L) 4.50 - 5.90 M/uL    HGB 8.7 (L) 13.5 - 17.5 g/dL    HCT 27.0 (L) 36 - 46 %    MCV 76.0 (L) 80 - 100 FL    MCH 24.5 (L) 31 - 34 PG    MCHC 32.2 31.0 - 36.0 g/dL    RDW 18.8 (H) 11.5 - 14.5 %    PLATELET 210 992 - 990 K/uL    MPV 8.7 6.5 - 11.5 FL    NRBC 0.1  WBC    ABSOLUTE NRBC 0.01 K/uL    NEUTROPHILS 56 42 - 75 %    LYMPHOCYTES 34 20.5 - 51.1 %    MONOCYTES 6 1.7 - 9.3 %    EOSINOPHILS 3 (H) 0.9 - 2.9 %    BASOPHILS 1 0.0 - 2.5 %    ABS. NEUTROPHILS 2.9 1.8 - 7.7 K/UL    ABS. LYMPHOCYTES 1.8 1.0 - 4.8 K/UL    ABS. MONOCYTES 0.3 0.2 - 2.4 K/UL    ABS. EOSINOPHILS 0.1 0.0 - 0.7 K/UL    ABS.  BASOPHILS 0.1 0.0 - 0.2 K/UL   TROPONIN I    Collection Time: 08/22/21  7:35 AM   Result Value Ref Range    Troponin-I, Qt. <0.05 <0.05 ng/mL   MAGNESIUM    Collection Time: 08/22/21  7:35 AM   Result Value Ref Range    Magnesium 1.5 (L) 1.6 - 2.4 mg/dL   BNP    Collection Time: 08/22/21  7:35 AM   Result Value Ref Range    NT pro- (H) <125 pg/mL   D DIMER    Collection Time: 08/22/21  8:00 AM   Result Value Ref Range    D-dimer 0.69 (H) 0.19 - 0.50 mg/L FEU   GLUCOSE, POC    Collection Time: 08/22/21  9:23 AM   Result Value Ref Range    Glucose (POC) 230 (H) 65 - 117 mg/dL    Performed by [facility]        Imaging:  XR CHEST PORT    Result Date: 8/22/2021  Chest one view. Comparison 8/10/2021. The cardiomediastinal silhouette is stable. The pulmonary blood flow remains normal. The lungs are clear. The pleural surfaces are smooth. There is no acute skeletal abnormality. No interval change. Assessment & Plan:       No problem-specific Assessment & Plan notes found for this encounter.   Chest pain and borderline D-dimer  -Trend cardiac enzymes  -Start Eliquis  -Get VQ scan  -Give tramadol and reassess response since patient states Tylenol not working and is unable to take NSAIDs    Shortness of breath with weight gain  -Need to rule out CHF  -Get 2D echo  -Diurese  -Consult cardiology    Chronic kidney disease stage III  -Creatinine around baseline but anticipate will increase with the diuresis  -Monitor renal function    Diabetes mellitus  -Resume preadmission dose of insulin 70/30  -Monitor blood sugars on sliding scale insulin  -Last A1c 8.2% on 7/7/2021    Hypertension  -BP is elevated  -Resume metoprolol hydralazine and monitor blood pressures    Hyperlipidemia  -Check lipid panel in the morning  -Resume atorvastatin from home          Prophylaxis: SCD and Lovenox 30 mg subcu daily     Electronically Signed :   Gale Floyd MD, [address] [facility], [address] [facility]

## 2021-08-22 NOTE — PROGRESS NOTES
Problem: Falls - Risk of  Goal: *Absence of Falls  Description: Document Nicole Frances Fall Risk and appropriate interventions in the flowsheet.   Outcome: Progressing Towards Goal  Note: Fall Risk Interventions:            Medication Interventions: Bed/chair exit alarm, Patient to call before getting OOB    Elimination Interventions: Call light in reach, Bed/chair exit alarm, Patient to call for help with toileting needs

## 2021-08-23 ENCOUNTER — APPOINTMENT (OUTPATIENT)
Dept: NUCLEAR MEDICINE | Age: 61
DRG: 880 | End: 2021-08-23
Attending: INTERNAL MEDICINE
Payer: MEDICARE

## 2021-08-23 ENCOUNTER — APPOINTMENT (OUTPATIENT)
Dept: VASCULAR SURGERY | Age: 61
DRG: 880 | End: 2021-08-23
Attending: HOSPITALIST
Payer: MEDICARE

## 2021-08-23 LAB
ANION GAP SERPL CALC-SCNC: 12 MMOL/L (ref 5–15)
ATRIAL RATE: 102 BPM
BUN SERPL-MCNC: 30 MG/DL (ref 6–20)
BUN/CREAT SERPL: 11 (ref 12–20)
CA-I BLD-MCNC: 9.1 MG/DL (ref 8.5–10.1)
CALCULATED P AXIS, ECG09: 46 DEGREES
CALCULATED R AXIS, ECG10: 12 DEGREES
CALCULATED T AXIS, ECG11: 35 DEGREES
CHLORIDE SERPL-SCNC: 100 MMOL/L (ref 97–108)
CO2 SERPL-SCNC: 25 MMOL/L (ref 21–32)
CREAT SERPL-MCNC: 2.83 MG/DL (ref 0.55–1.02)
DIAGNOSIS, 93000: NORMAL
ECHO AO ROOT DIAM: 3.3 CM
ECHO AV AREA PEAK VELOCITY: 1.86 CM2
ECHO AV AREA VTI: 1.94 CM2
ECHO AV AREA VTI: 1.94 CM2
ECHO AV AREA/BSA PEAK VELOCITY: 0.9 CM2/M2
ECHO AV MEAN GRADIENT: 6.95 MMHG
ECHO AV MEAN VELOCITY: 125.16 CM/S
ECHO AV PEAK GRADIENT: 14.24 MMHG
ECHO AV PEAK VELOCITY: 188.65 CM/S
ECHO AV VTI: 31 CM
ECHO LA VOL 2C: 36.45 ML (ref 22–52)
ECHO LA VOL 4C: 30.64 ML (ref 22–52)
ECHO LA VOL BP: 36.37 ML (ref 22–52)
ECHO LA VOL/BSA BIPLANE: 18.46 ML/M2 (ref 16–28)
ECHO LA VOLUME INDEX A2C: 18.5 ML/M2 (ref 16–28)
ECHO LA VOLUME INDEX A4C: 15.55 ML/M2 (ref 16–28)
ECHO LV EDV A2C: 70.06 ML
ECHO LV EDV BP: 62.64 ML (ref 56–104)
ECHO LV EDV INDEX BP: 31.8 ML/M2
ECHO LV EDV NDEX A2C: 35.6 ML/M2
ECHO LV EJECTION FRACTION A2C: 60 PERCENT
ECHO LV EJECTION FRACTION A4C: 65 PERCENT
ECHO LV EJECTION FRACTION BIPLANE: 61.8 PERCENT (ref 55–100)
ECHO LV ESV A2C: 20.39 ML
ECHO LV ESV BP: 23.93 ML (ref 19–49)
ECHO LV ESV INDEX A2C: 10.4 ML/M2
ECHO LV ESV INDEX BP: 12.1 ML/M2
ECHO LV INTERNAL DIMENSION DIASTOLIC: 4 CM (ref 3.9–5.3)
ECHO LV INTERNAL DIMENSION SYSTOLIC: 2.43 CM
ECHO LV IVSD: 1.07 CM (ref 0.6–0.9)
ECHO LV MASS 2D: 141.8 G (ref 67–162)
ECHO LV MASS INDEX 2D: 72 G/M2 (ref 43–95)
ECHO LV POSTERIOR WALL DIASTOLIC: 1.09 CM (ref 0.6–0.9)
ECHO LVOT DIAM: 1.89 CM
ECHO LVOT PEAK GRADIENT: 6.28 MMHG
ECHO LVOT PEAK VELOCITY: 125.26 CM/S
ECHO LVOT SV: 49.8 ML
ECHO LVOT SV: 49.8 ML
ECHO LVOT VTI: 21.45 CM
ECHO MV A VELOCITY: 98.27 CM/S
ECHO MV AREA PHT: 4.1 CM2
ECHO MV AREA PHT: 4.1 CM2
ECHO MV E DECELERATION TIME (DT): 185.01 MS
ECHO MV E VELOCITY: 93.02 CM/S
ECHO MV E/A RATIO: 0.95
ECHO MV PRESSURE HALF TIME (PHT): 53.65 MS
ECHO RA AREA 4C: 11 CM2
ECHO RV INTERNAL DIMENSION: 2.62 CM
ERYTHROCYTE [DISTWIDTH] IN BLOOD BY AUTOMATED COUNT: 19.4 % (ref 11.5–14.5)
GLUCOSE BLD STRIP.AUTO-MCNC: 246 MG/DL (ref 65–117)
GLUCOSE BLD STRIP.AUTO-MCNC: 264 MG/DL (ref 65–117)
GLUCOSE BLD STRIP.AUTO-MCNC: 272 MG/DL (ref 65–117)
GLUCOSE BLD STRIP.AUTO-MCNC: 277 MG/DL (ref 65–117)
GLUCOSE SERPL-MCNC: 257 MG/DL (ref 65–100)
HCT VFR BLD AUTO: 27.8 % (ref 36–46)
HGB BLD-MCNC: 8.9 G/DL (ref 13.5–17.5)
LVOT MG: 3.37 MMHG
MAGNESIUM SERPL-MCNC: 1.5 MG/DL (ref 1.6–2.4)
MCH RBC QN AUTO: 24.5 PG (ref 31–34)
MCHC RBC AUTO-ENTMCNC: 32 G/DL (ref 31–36)
MCV RBC AUTO: 76.5 FL (ref 80–100)
NRBC # BLD: 0.01 K/UL
NRBC BLD-RTO: 0.1 PER 100 WBC
P-R INTERVAL, ECG05: 184 MS
PERFORMED BY, TECHID: ABNORMAL
PLATELET # BLD AUTO: 205 K/UL (ref 150–400)
PMV BLD AUTO: 8.8 FL (ref 6.5–11.5)
POTASSIUM SERPL-SCNC: 5 MMOL/L (ref 3.5–5.1)
Q-T INTERVAL, ECG07: 372 MS
QRS DURATION, ECG06: 94 MS
QTC CALCULATION (BEZET), ECG08: 485 MS
RBC # BLD AUTO: 3.63 M/UL (ref 4.5–5.9)
SODIUM SERPL-SCNC: 137 MMOL/L (ref 136–145)
TROPONIN I SERPL-MCNC: <0.05 NG/ML
VENTRICULAR RATE, ECG03: 102 BPM
WBC # BLD AUTO: 7.1 K/UL (ref 4.4–11.3)

## 2021-08-23 PROCEDURE — 74011636637 HC RX REV CODE- 636/637: Performed by: INTERNAL MEDICINE

## 2021-08-23 PROCEDURE — 85027 COMPLETE CBC AUTOMATED: CPT

## 2021-08-23 PROCEDURE — 97161 PT EVAL LOW COMPLEX 20 MIN: CPT

## 2021-08-23 PROCEDURE — 83735 ASSAY OF MAGNESIUM: CPT

## 2021-08-23 PROCEDURE — 74011250637 HC RX REV CODE- 250/637: Performed by: HOSPITALIST

## 2021-08-23 PROCEDURE — 74011250636 HC RX REV CODE- 250/636: Performed by: INTERNAL MEDICINE

## 2021-08-23 PROCEDURE — 84484 ASSAY OF TROPONIN QUANT: CPT

## 2021-08-23 PROCEDURE — 80048 BASIC METABOLIC PNL TOTAL CA: CPT

## 2021-08-23 PROCEDURE — 93306 TTE W/DOPPLER COMPLETE: CPT

## 2021-08-23 PROCEDURE — 36415 COLL VENOUS BLD VENIPUNCTURE: CPT

## 2021-08-23 PROCEDURE — 65270000029 HC RM PRIVATE

## 2021-08-23 PROCEDURE — 74011250636 HC RX REV CODE- 250/636: Performed by: HOSPITALIST

## 2021-08-23 PROCEDURE — 94760 N-INVAS EAR/PLS OXIMETRY 1: CPT

## 2021-08-23 PROCEDURE — A9540 TC99M MAA: HCPCS

## 2021-08-23 PROCEDURE — 77010033678 HC OXYGEN DAILY

## 2021-08-23 PROCEDURE — 74011250637 HC RX REV CODE- 250/637: Performed by: NURSE PRACTITIONER

## 2021-08-23 PROCEDURE — 82962 GLUCOSE BLOOD TEST: CPT

## 2021-08-23 PROCEDURE — 74011250637 HC RX REV CODE- 250/637: Performed by: INTERNAL MEDICINE

## 2021-08-23 RX ORDER — METOPROLOL SUCCINATE 50 MG/1
50 TABLET, EXTENDED RELEASE ORAL DAILY
Status: DISCONTINUED | OUTPATIENT
Start: 2021-08-24 | End: 2021-08-25 | Stop reason: HOSPADM

## 2021-08-23 RX ORDER — MAGNESIUM SULFATE HEPTAHYDRATE 40 MG/ML
2 INJECTION, SOLUTION INTRAVENOUS ONCE
Status: COMPLETED | OUTPATIENT
Start: 2021-08-23 | End: 2021-08-23

## 2021-08-23 RX ORDER — LANOLIN ALCOHOL/MO/W.PET/CERES
400 CREAM (GRAM) TOPICAL 3 TIMES DAILY
Status: DISCONTINUED | OUTPATIENT
Start: 2021-08-24 | End: 2021-08-25 | Stop reason: HOSPADM

## 2021-08-23 RX ORDER — LANOLIN ALCOHOL/MO/W.PET/CERES
400 CREAM (GRAM) TOPICAL 2 TIMES DAILY
Status: DISCONTINUED | OUTPATIENT
Start: 2021-08-23 | End: 2021-08-23

## 2021-08-23 RX ORDER — HYDRALAZINE HYDROCHLORIDE 100 MG/1
100 TABLET, FILM COATED ORAL 3 TIMES DAILY
Status: DISCONTINUED | OUTPATIENT
Start: 2021-08-23 | End: 2021-08-25 | Stop reason: HOSPADM

## 2021-08-23 RX ADMIN — TRAMADOL HYDROCHLORIDE 50 MG: 50 TABLET, FILM COATED ORAL at 22:09

## 2021-08-23 RX ADMIN — INSULIN LISPRO 4 UNITS: 100 INJECTION, SOLUTION INTRAVENOUS; SUBCUTANEOUS at 12:30

## 2021-08-23 RX ADMIN — TRAMADOL HYDROCHLORIDE 50 MG: 50 TABLET, FILM COATED ORAL at 00:49

## 2021-08-23 RX ADMIN — APIXABAN 5 MG: 5 TABLET, FILM COATED ORAL at 09:31

## 2021-08-23 RX ADMIN — Medication 10 ML: at 22:16

## 2021-08-23 RX ADMIN — INSULIN LISPRO 6 UNITS: 100 INJECTION, SOLUTION INTRAVENOUS; SUBCUTANEOUS at 09:16

## 2021-08-23 RX ADMIN — ATORVASTATIN CALCIUM 40 MG: 40 TABLET, FILM COATED ORAL at 22:09

## 2021-08-23 RX ADMIN — APIXABAN 5 MG: 5 TABLET, FILM COATED ORAL at 22:10

## 2021-08-23 RX ADMIN — INSULIN GLARGINE 25 UNITS: 100 INJECTION, SOLUTION SUBCUTANEOUS at 22:10

## 2021-08-23 RX ADMIN — PANTOPRAZOLE SODIUM 40 MG: 40 TABLET, DELAYED RELEASE ORAL at 09:24

## 2021-08-23 RX ADMIN — TRAMADOL HYDROCHLORIDE 50 MG: 50 TABLET, FILM COATED ORAL at 09:24

## 2021-08-23 RX ADMIN — FUROSEMIDE 20 MG: 10 INJECTION, SOLUTION INTRAMUSCULAR; INTRAVENOUS at 05:42

## 2021-08-23 RX ADMIN — ALPRAZOLAM 0.25 MG: 0.25 TABLET ORAL at 22:10

## 2021-08-23 RX ADMIN — Medication 10 ML: at 15:04

## 2021-08-23 RX ADMIN — ARIPIPRAZOLE 15 MG: 10 TABLET ORAL at 09:31

## 2021-08-23 RX ADMIN — INSULIN LISPRO 6 UNITS: 100 INJECTION, SOLUTION INTRAVENOUS; SUBCUTANEOUS at 16:43

## 2021-08-23 RX ADMIN — INSULIN LISPRO 6 UNITS: 100 INJECTION, SOLUTION INTRAVENOUS; SUBCUTANEOUS at 22:10

## 2021-08-23 RX ADMIN — ALLOPURINOL 100 MG: 100 TABLET ORAL at 09:31

## 2021-08-23 RX ADMIN — MAGNESIUM OXIDE 400 MG: 400 TABLET ORAL at 22:10

## 2021-08-23 RX ADMIN — Medication 10 ML: at 05:42

## 2021-08-23 RX ADMIN — MAGNESIUM SULFATE HEPTAHYDRATE 2 G: 40 INJECTION, SOLUTION INTRAVENOUS at 09:16

## 2021-08-23 RX ADMIN — ONDANSETRON 4 MG: 2 INJECTION INTRAMUSCULAR; INTRAVENOUS at 09:12

## 2021-08-23 RX ADMIN — FERROUS SULFATE TAB 325 MG (65 MG ELEMENTAL FE) 325 MG: 325 (65 FE) TAB at 09:43

## 2021-08-23 RX ADMIN — ALPRAZOLAM 0.25 MG: 0.25 TABLET ORAL at 09:31

## 2021-08-23 RX ADMIN — FUROSEMIDE 20 MG: 10 INJECTION, SOLUTION INTRAMUSCULAR; INTRAVENOUS at 15:00

## 2021-08-23 RX ADMIN — HYDRALAZINE HYDROCHLORIDE 50 MG: 25 TABLET, FILM COATED ORAL at 09:22

## 2021-08-23 RX ADMIN — HYDRALAZINE HYDROCHLORIDE 100 MG: 100 TABLET, FILM COATED ORAL at 22:09

## 2021-08-23 RX ADMIN — AMLODIPINE BESYLATE 2.5 MG: 2.5 TABLET ORAL at 09:32

## 2021-08-23 RX ADMIN — CITALOPRAM HYDROBROMIDE 20 MG: 20 TABLET ORAL at 09:31

## 2021-08-23 NOTE — PROGRESS NOTES
PHYSICAL THERAPY EVALUATION/DISCHARGE  Patient: Odalys Ibanez (16 y.o. female)  Date: 8/23/2021  Primary Diagnosis: Chest pain [R07.9]       Precautions:          ASSESSMENT  Based on the objective data described below, the patient presents with generalized LE weakness however upon completion of initial evaluation pt does not present with limitations in functional mobility. Pt was able to perform bed mobility tasks independently and only required supervision with performance of transfers, ambulation and stair negotiation. Pt performed functional mobility tasks without use of supplemental O2 and upon completion pt O2sat was 95%. Other factors to consider for discharge: Pt lives with sister who is able to assist with performance of household ADLs. Further skilled acute physical therapy is not indicated at this time. PLAN :  Recommendation for discharge: (in order for the patient to meet his/her long term goals)  No skilled physical therapy/ follow up rehabilitation needs identified at this time. This discharge recommendation:  Has been made in collaboration with the attending provider and/or case management    IF patient discharges home will need the following DME: none       SUBJECTIVE:   Patient stated I do not use any assistive devices at home and am still able to take care of myself.     OBJECTIVE DATA SUMMARY:   HISTORY:    Past Medical History:   Diagnosis Date    Anxiety 6/22/2020    Bipolar 1 disorder ([address] 1/23/2018    Diabetes mellitus type 2, controlled ([address] 6/22/2020    Hypertension 6/22/2020    Liver fibrosis 6/22/2020    Major depression 6/22/2020    Migraine 6/22/2020    Sleep apnea 1/23/2018    Suicide attempt ([facility] 6/22/2020 2013     Past Surgical History:   Procedure Laterality Date    HX APPENDECTOMY      HX CHOLECYSTECTOMY         Prior level of function: Independent without use of AD  Personal factors and/or comorbidities impacting plan of care:     Home Situation  Home Environment: Private residence  # Steps to Enter: 3  One/Two Story Residence: One story  Living Alone: No  Support Systems: Family member(s)  Patient Expects to be Discharged to[de-identified] [employer]  Current DME Used/Available at Home: CPAP    EXAMINATION/PRESENTATION/DECISION MAKING:   Critical Behavior:  Neurologic State: Alert  Orientation Level: Oriented X4  Cognition: Follows commands     Hearing: Auditory  Auditory Impairment: None  Range Of Motion:  AROM: Within functional limits                       Strength:    Strength: Generally decreased, functional   Functional Mobility:  Bed Mobility:  Rolling: Independent  Supine to Sit: Independent  Sit to Supine: Independent  Scooting: Independent  Transfers:  Sit to Stand: Supervision  Stand to Sit: Supervision                       Balance:   Sitting: Intact; Without support  Standing: Intact; Without support  Ambulation/Gait Training:  Distance (ft): 250 Feet (ft)  Assistive Device: Gait belt  Ambulation - Level of Assistance: Supervision     Gait Description (WDL): Exceptions to WDL                                          Stairs:  Number of Stairs Trained: 8  Stairs - Level of Assistance: Supervision   Rail Use: Both    Treatment Session:  No treatment was indicated due to pt not presenting with any limitations in functional mobility at this time.        Physical Therapy Evaluation Charge Determination   History Examination Presentation Decision-Making   LOW Complexity : Zero comorbidities / personal factors that will impact the outcome / POC LOW Complexity : 1-2 Standardized tests and measures addressing body structure, function, activity limitation and / or participation in recreation  LOW Complexity : Stable, uncomplicated  Low      Based on the above components, the patient evaluation is determined to be of the following complexity level: LOW     Pain Ratin/10    Activity Tolerance:   Good      After treatment patient left in no apparent distress: Supine in bed, Call bell within reach and Bed / chair alarm activated    COMMUNICATION/EDUCATION:   The patients plan of care was discussed with: Physician and Case management. Patient/family have participated as able in goal setting and plan of care.     Thank you for this referral.  Ana Glez, PT, DPT   Time Calculation: 17 mins

## 2021-08-23 NOTE — PROGRESS NOTES
.Reason for Admission: Chest pain                      RUR Score: 21-moderate risk                 PCP: First and Last name:   Evan sAhby PA-C     Name of Practice: [address] [place] [address]   Are you a current patient: Yes/No:yes    Approximate date of last visit: 2 weeks ago   Can you participate in a virtual visit if needed: yes    Do you (patient/family) have any concerns for transition/discharge? No                 Plan for utilizing home health:  Pending PT eval     Current Advanced Directive/Advance Care Plan:  Full Code      Healthcare Decision Maker:   Click here to complete [id number] including selection of the Healthcare Decision Maker Relationship (ie \"Primary\")            Primary Decision Maker: Lu Burger - Other Relative - [phone]    Transition of Care Plan:   Plan to discharge home. Pending PT evaluation to determine any home health needs.

## 2021-08-23 NOTE — PROGRESS NOTES
Care Management Interventions  PCP Verified by CM: Yes Yeison Gilbert NP- last seen 2 weeks ago.)  Mode of Transport at Discharge: Other (see comment) (Sister)  Transition of Care Consult (CM Consult): Discharge Planning  Current Support Network: Own Home (Sister lives with patient.)  Confirm Follow Up Transport: Self  The Plan for Transition of Care is Related to the Following Treatment Goals : Plan to discharge home. Pending PT evaluation.

## 2021-08-23 NOTE — PROGRESS NOTES
.Nutrition Education     · Verbally reviewed information with Patient  · Educated on Diabetic Heart Healthy Diet  · Nutrition Intervention why following a heart healthy diabetic diet is important related to heart disease, blood pressure, blood glucose, A1C level. Education for healthy fat choices, cho choices, how many cho choices at meals and snacks, tip of how to cut back on sodium, and provided a one day menu sample. · Patient would benefit from Alfredito Mehta  · Written educational materials provided from the [facility]. · Refer to Patient Education activity for more details.         Total Time: 10 minutes

## 2021-08-23 NOTE — ACP (ADVANCE CARE PLANNING)
Advance Care Planning   Healthcare Decision Maker:       Primary Decision Maker: Lu Burger - Other Relative - [phone]    Click here to complete [address] including selection of the Healthcare Decision Maker Relationship (ie \"Primary\")

## 2021-08-23 NOTE — CONSULTS
CONSULTATION    REASON FOR CONSULT:  Chest Pain    REQUESTING PROVIDER:  Dr. Seymour Organ:    Chief Complaint   Patient presents with    Chest Pain    Shortness of Breath         HISTORY OF PRESENT ILLNESS:  Yoselyn Payne is a 63y.o. year-old female with past medical history significant for Anxiety, Bipolar Disorder, HTN, DM,  Liver Fibrosis, EFRAÍN (compliant with CPAP), Anemia (requiring recent iron transfusions), and CKD (followed by [facility]) who presented to ED for evaluation of Chest Pain. Patient reports she has been having left sided intermittent chest pain for some time and it is worse at night. She has associated PND and Orthopnea. She is very sedentary at baseline and reports +ORDOÑEZ. She has had 11lb weight gain in 2 weeks per her report. Exertion worsens sx. Reports Tramadol relieves her chest pain. Workup in ED revealed H/H 8.9/27.8, Creat 2.83, Glucose 257, Mag 1.5, Troponin negative, , and Dimer 0.69. She was referred for admission for further treatment/monitoring. Today she reports continued pain when Tramadol wears off and intermittent SOB. BP is above goal. VQ scan is negative. She has no other new complaints. Reports last stress test was >2 years ago. Records from hospital admission course thus far reviewed.       Telemetry Review: SR, no ectopy noted      PAST MEDICAL HISTORY:    Past Medical History:   Diagnosis Date    Anxiety 6/22/2020    Bipolar 1 disorder ([address] 1/23/2018    Diabetes mellitus type 2, controlled ([address] 6/22/2020    Hypertension 6/22/2020    Liver fibrosis 6/22/2020    Major depression 6/22/2020    Migraine 6/22/2020    Sleep apnea 1/23/2018    Suicide attempt ([address] 6/22/2020 2013       PAST SURGICAL HISTORY:   Past Surgical History:   Procedure Laterality Date    HX APPENDECTOMY      HX CHOLECYSTECTOMY         ALLERGIES:    Allergies   Allergen Reactions    Adhesive Tape-Silicones Unknown (comments)     Blisters    Septra [Sulfamethoprim] Unknown (comments)    Sulfa (Sulfonamide Antibiotics) Hives       FAMILY HISTORY:    Family History   Problem Relation Age of Onset    Heart Attack Mother     Heart Attack Father        SOCIAL HISTORY:    Social History     Tobacco Use    Smoking status: Never Smoker    Smokeless tobacco: Never Used   Vaping Use    Vaping Use: Never assessed   Substance Use Topics    Alcohol use: Not Currently    Drug use: Never         HOME MEDICATIONS:    Prior to Admission Medications   Prescriptions Last Dose Informant Patient Reported? Taking? ALPRAZolam (XANAX) 0.5 mg tablet   No No   Sig: Take 1 tablet by mouth twice daily as needed   ARIPiprazole (ABILIFY) 20 mg tablet   No No   Sig: Take 1 Tablet by mouth daily for 90 days. Insulin Needles, Disposable, 31 gauge x 5/16\" ndle   No No   Sig: Twice a day   allopurinoL (ZYLOPRIM) 300 mg tablet   Yes No   Sig: Take  by mouth daily. amLODIPine (NORVASC) 2.5 mg tablet   Yes No   Sig: TAKE 1 TABLET BY MOUTH ONCE DAILY   gabapentin (NEURONTIN) 100 mg capsule   Yes No   Sig: Take  by mouth three (3) times daily. glucose blood VI test strips (OneTouch Verio test strips) strip   No No   Sig: Check glucose 3 times a day   hydrALAZINE (APRESOLINE) 50 mg tablet   Yes No   Sig: TAKE 1 TABLET BY MOUTH THREE TIMES DAILY   insulin aspart protamine/insulin aspart (NOVOLOG MIX 70/30) 100 unit/mL (70-30) inpn   No No   Sig: Take 45 units in the morning and 30 units in evening   Patient taking differently: Take 45 units in the morning and 35 units in evening   lancets (One Touch Delica) 33 gauge misc   No No   Sig: Check glucose 3 times a day   magnesium oxide (MAG-OX) 400 mg tablet   Yes No   Sig: Take 400 mg by mouth three (3) times daily. metoprolol succinate (TOPROL-XL) 50 mg XL tablet   Yes No   Sig: TAKE 1 TABLET BY MOUTH ONCE DAILY   pantoprazole (PROTONIX) 40 mg tablet   Yes No   Sig: Take 40 mg by mouth daily.    sodium bicarbonate 650 mg tablet   Yes No Sig: Take  by mouth two (2) times a day. Facility-Administered Medications: None       REVIEW OF SYSTEMS:  Complete review of systems performed, pertinents noted above, all other systems are negative. Patient Vitals for the past 24 hrs:   Temp Pulse Resp BP SpO2   08/23/21 1500  93  (!) 151/79    08/23/21 1233 98.7 °F (37.1 °C) 92 17 (!) 105/47 98 %   08/23/21 0922 98.6 °F (37 °C) 96 18 (!) 172/74 99 %   08/23/21 0845     99 %   08/23/21 0407 97.8 °F (36.6 °C) 95 18 129/79 96 %   08/22/21 2331 98.1 °F (36.7 °C) 95 18 (!) 162/78 100 %   08/22/21 1923 98 °F (36.7 °C) 89 18 (!) 151/85 98 %       PHYSICAL EXAMINATION:    General: Well nourished chronically ill appearing female lying in bed, NAD, A&O  HEENT: Normocephalic, PERRL, no drainage  Neck: Supple, Trachea midline, No JVD  RESP: CTA bilaterally. + Symmetrical chest movement. No SOB or distress. On O2 via NC. Cardiovascular: RRR no MRG  PVS: No rubor, cyanosis, no edema edema, Radial, DP, PT pulses equal bilaterally  ABD: obese/protuberant, soft, NT, Normoactive BS  Derm: Warm/Dry/Intact with no lesions, normal turgor  Neuro: A&O PPTS, cranial nerves II- XII grossly intact via interaction with patient. No focal deficits  PSYCH: No anxiety or agitation      Electrocardiogram performed earlier reviewed, it shows ST. Recent labs results and imaging reviewed.       Recent Results (from the past 24 hour(s))   GLUCOSE, POC    Collection Time: 08/22/21  9:24 PM   Result Value Ref Range    Glucose (POC) 238 (H) 65 - 117 mg/dL    Performed by [address] [place] [address]    Collection Time: 08/23/21  6:00 AM   Result Value Ref Range    Magnesium 1.5 (L) 1.6 - 2.4 mg/dL   TROPONIN I    Collection Time: 08/23/21  6:00 AM   Result Value Ref Range    Troponin-I, Qt. <0.05 <7.81 ng/mL   METABOLIC PANEL, BASIC    Collection Time: 08/23/21  6:11 AM   Result Value Ref Range    Sodium 137 136 - 145 mmol/L    Potassium 5.0 3.5 - 5.1 mmol/L    Chloride 100 97 - 108 mmol/L    CO2 25 21 - 32 mmol/L    Anion gap 12 5 - 15 mmol/L    Glucose 257 (H) 65 - 100 mg/dL    BUN 30 (H) 6 - 20 mg/dL    Creatinine 2.83 (H) 0.55 - 1.02 mg/dL    BUN/Creatinine ratio 11 (L) 12 - 20      GFR est AA 21 (L) >60 ml/min/1.73m2    GFR est non-AA 17 (L) >60 ml/min/1.73m2    Calcium 9.1 8.5 - 10.1 mg/dL   CBC W/O DIFF    Collection Time: 08/23/21  6:11 AM   Result Value Ref Range    WBC 7.1 4.4 - 11.3 K/uL    RBC 3.63 (L) 4.50 - 5.90 M/uL    HGB 8.9 (L) 13.5 - 17.5 g/dL    HCT 27.8 (L) 36 - 46 %    MCV 76.5 (L) 80 - 100 FL    MCH 24.5 (L) 31 - 34 PG    MCHC 32.0 31.0 - 36.0 g/dL    RDW 19.4 (H) 11.5 - 14.5 %    PLATELET 107 487 - 856 K/uL    MPV 8.8 6.5 - 11.5 FL    NRBC 0.1  WBC    ABSOLUTE NRBC 0.01 K/uL   GLUCOSE, POC    Collection Time: 08/23/21  8:43 AM   Result Value Ref Range    Glucose (POC) 277 (H) 65 - 117 mg/dL    Performed by Alex CRABTREE    GLUCOSE, POC    Collection Time: 08/23/21 11:58 AM   Result Value Ref Range    Glucose (POC) 246 (H) 65 - 117 mg/dL    Performed by Riya Conn    ECHO ADULT COMPLETE    Collection Time: 08/23/21  2:00 PM   Result Value Ref Range    LV ED Vol A2C 70.06 mL    IVSd 1.07 (A) 0.60 - 0.90 cm    LVIDd 4.00 3.90 - 5.30 cm    LVIDs 2.43 cm    LVOT d 1.89 cm    LVPWd 1.09 (A) 0.60 - 0.90 cm    LVOT SV 49.8 mL    LVOT SV 49.8 mL    BP EF 61.8 55.0 - 100.0 percent    LV Ejection Fraction MOD 2C 60 percent    LV Ejection Fraction MOD 4C 65 percent    LV ED Vol BP 62.64 56.0 - 104.0 mL    LV ES Vol A2C 20.39 mL    LV ES Vol BP 23.93 19.0 - 49.0 mL    LVOT Peak Gradient 6.28 mmHg    Left Ventricular Outflow Tract Mean Gradient 3.37 mmHg    LVOT Peak Velocity 125.26 cm/s    LVOT VTI 21.45 cm    RVIDd 2.62 cm    LA Volume 36.37 22.0 - 52.0 mL    LA Vol 2C 36.45 22.00 - 52.00 mL    LA Vol 4C 30.64 22.00 - 52.00 mL    Aortic Valve Area by Continuity of Peak Velocity 1.86 cm2    Aortic Valve Area by Continuity of VTI 1.94 cm2    Aortic Valve Area by Continuity of VTI 1.94 cm2    AoV PG 14.24 mmHg    Aortic Valve Systolic Mean Gradient 4.93 mmHg    Aortic Valve Systolic Peak Velocity 276.45 cm/s    Aortic valve mean velocity 125.16 cm/s    AoV VTI 31.00 cm    MV A Yonatan 98.27 cm/s    Mitral Valve E Wave Deceleration Time 185.01 ms    MV E Yonatan 93.02 cm/s    MV E/A 0.95     Mitral Valve Pressure Half-time 53.65 ms    MVA (PHT) 4.10 cm2    MVA (PHT) 4.10 cm2    Ao Root D 3.30 cm    LV Mass .8 67.0 - 162.0 g    LV Mass AL Index 72.0 43.0 - 95.0 g/m2    Right Atrial Area 4C 11.0 cm2    LVES Vol Index BP 12.1 mL/m2    LVED Vol Index BP 31.8 mL/m2    LA Vol Index 18.46 16.00 - 28.00 ml/m2    LA Vol Index 18.50 16.00 - 28.00 ml/m2    LA Vol Index 15.55 16.00 - 28.00 ml/m2    LVED Vol Index A2C 35.6 mL/m2    LVES Vol Index A2C 10.4 mL/m2    VALDEMAR/BSA Pk Yonatan 0.9 cm2/m2       XR Results (maximum last 3): Results from [facility] encounter on 08/22/21    XR CHEST PORT    Impression  No interval change. Results from [facility] encounter on 08/10/21    XR CHEST PA LAT      Results from [facility] encounter on 01/06/21    XR CHEST PA LAT    Impression  IMPRESSION: Mild cardiomegaly. No other significant change from the prior  examination dated 17 June 2018      CT Results (maximum last 3): Results from [facility] encounter on 04/01/21    CT HEAD WO CONT    Impression  Negative exam.          Dose reduction: All CT scans at this facility are performed using radiation dose  reduction optimization techniques as appropriate to a performed exam, including  the following: Automated exposure control (8 cc), adjustment of the MAA and/or  KUB according to the patient's size, or the patient's use of iterative  reconstruction techniques (ASIR). Results from [facility] encounter on 01/11/21    CT HEAD WO CONT    Impression  Impression:    1. Unremarkable brain for age. 2.  Atherosclerotic disease.           Current Facility-Administered Medications:     technetium TC-99M pentetic acid (DRAXIMAGE DTPA) injection 90-51 millicurie, 23-37 millicurie, Inhalation, RAD ONCE, Zoe De Leon MD    magnesium oxide (MAG-OX) tablet 400 mg, 400 mg, Oral, BID, Lucia Diamond MD    sodium chloride (NS) flush 5-40 mL, 5-40 mL, IntraVENous, Q8H, Zoe De Leon MD, 10 mL at 08/23/21 1504    sodium chloride (NS) flush 5-40 mL, 5-40 mL, IntraVENous, PRN, Zoe De Leon MD    acetaminophen (TYLENOL) tablet 650 mg, 650 mg, Oral, Q6H PRN **OR** acetaminophen (TYLENOL) suppository 650 mg, 650 mg, Rectal, Q6H PRN, Zoe De Leon MD    polyethylene glycol (MIRALAX) packet 17 g, 17 g, Oral, DAILY PRN, Zoe De Leon MD    ondansetron (ZOFRAN ODT) tablet 4 mg, 4 mg, Oral, Q8H PRN, 4 mg at 08/22/21 2258 **OR** ondansetron (ZOFRAN) injection 4 mg, 4 mg, IntraVENous, Q6H PRN, Zoe De Leon MD, 4 mg at 08/23/21 0912    furosemide (LASIX) injection 20 mg, 20 mg, IntraVENous, Q8H, Jose Landaverde MD, 20 mg at 08/23/21 1500    apixaban (ELIQUIS) tablet 5 mg, 5 mg, Oral, BID, Zoe De Leon MD, 5 mg at 08/23/21 0931    glucose chewable tablet 16 g, 4 Tablet, Oral, PRN, Zoe De Leon MD    dextrose (D50W) injection syrg 12.5-25 g, 25-50 mL, IntraVENous, PRN, Zoe De Leon MD    glucagon (GLUCAGEN) injection 1 mg, 1 mg, IntraMUSCular, PRN, Zoe De Leon MD    ALPRAZolam Leveda Silvius) tablet 0.25 mg, 0.25 mg, Oral, BID, Zoe De Leon MD, 0.25 mg at 08/23/21 0931    ARIPiprazole (ABILIFY) tablet 15 mg, 15 mg, Oral, DAILY, Zoe De Leon MD, 15 mg at 08/23/21 0931    amLODIPine (NORVASC) tablet 2.5 mg, 2.5 mg, Oral, DAILY, Zoe De Leon MD, 2.5 mg at 08/23/21 0932    atorvastatin (LIPITOR) tablet 40 mg, 40 mg, Oral, QHS, Zoe De Leon MD, 40 mg at 08/22/21 2142    citalopram (CELEXA) tablet 20 mg, 20 mg, Oral, DAILY, Zeo De Leon MD, 20 mg at 08/23/21 0248    ferrous sulfate tablet 325 mg, 1 Tablet, Oral, DAILY WITH BREAKFAST, Aisha Gayle MD, 325 mg at 08/23/21 0943    hydrALAZINE (APRESOLINE) tablet 50 mg, 50 mg, Oral, TID, Aisha Gayle MD, 50 mg at 08/23/21 3364    pantoprazole (PROTONIX) tablet 40 mg, 40 mg, Oral, ACB, Aisha Gayle MD, 40 mg at 08/23/21 7564    allopurinoL (ZYLOPRIM) tablet 100 mg, 100 mg, Oral, DAILY, Aisha Gayle MD, 100 mg at 08/23/21 0931    traMADoL (ULTRAM) tablet 50 mg, 50 mg, Oral, Q6H PRN, Aisha Gayle MD, 50 mg at 08/23/21 0924    insulin glargine (LANTUS) injection 25 Units, 25 Units, SubCUTAneous, QHS, Aisha Gayle MD, 25 Units at 08/22/21 2142    insulin lispro (HUMALOG) injection, , SubCUTAneous, AC&HS, Aisha Gayle MD, 4 Units at 08/23/21 1230          Case discussed with collaborating physician Dr. Danley Fleischer and our impression and recommendations are as follows:   1. Chest Pain: ACS ruled out per EKG and Troponin criteria. TTE pending. Given risk factors and sx would benefit from further ischemic evaluation, that can be completed in OP setting. Would recommend further risk factor modification and control of BP.    2. HTN:  BP above goal.  Will increase Hydralazine to 100mg TID. Stop IV Lasix as patient is not hypervolemic, BNP not elevated to appropriate range given renal function, and IVC was normal in size on prelim TTE. 3. Hypomagnesemia - replete to goal >2. Repeat labs in am. Continue to monitor potassium level. 4. CKD: Will need close follow-up with Nephrology in OP setting. 5. Chronic Anemia - reports receiving iron transfusions recently, with last dose last week. Managed by Nephrology. Thank you for involving us in the care of this patient. Please do not hesitate to call if additional questions arise.  If after hours please call [phone]

## 2021-08-23 NOTE — ROUTINE PROCESS
Head of bed elevated. C/o chest and neck pain. Also c/o nausea and being short of breath, pulse ox 94-5% on room air, states she uses a CPAP @ home and Rudi Cassidy, respiratory therapist went in to talk with patient and stated patient will have her CPAP brought in tomorrow. Zofran p.o given. Does call for assistance to bathroom due to having history of recent falls.

## 2021-08-23 NOTE — PROGRESS NOTES
Problem: Falls - Risk of  Goal: *Absence of Falls  Description: Document Selena Mckee Fall Risk and appropriate interventions in the flowsheet.   Outcome: Progressing Towards Goal  Note: Fall Risk Interventions:            Medication Interventions: Bed/chair exit alarm, Patient to call before getting OOB    Elimination Interventions: Bed/chair exit alarm, Call light in reach    History of Falls Interventions: Bed/chair exit alarm, Door open when patient unattended         Problem: Patient Education: Go to Patient Education Activity  Goal: Patient/Family Education  Outcome: Progressing Towards Goal

## 2021-08-23 NOTE — PROGRESS NOTES
Problem: Falls - Risk of  Goal: *Absence of Falls  Description: Document Diana Payneasin Fall Risk and appropriate interventions in the flowsheet.   Outcome: Progressing Towards Goal  Note: Fall Risk Interventions:            Medication Interventions: Bed/chair exit alarm, Patient to call before getting OOB    Elimination Interventions: Bed/chair exit alarm, Call light in reach    History of Falls Interventions: Bed/chair exit alarm, Door open when patient unattended         Problem: Patient Education: Go to Patient Education Activity  Goal: Patient/Family Education  Outcome: Progressing Towards Goal

## 2021-08-23 NOTE — ROUTINE PROCESS
States I know my oxygen sats are good but I still feel short of breath and I know something is wrong. O2 2LPM/nc applied. Tramadol 50mg 1 tab p.o given for c/o neck and chest pain.

## 2021-08-24 ENCOUNTER — APPOINTMENT (OUTPATIENT)
Dept: CARDIAC REHAB | Age: 61
End: 2021-08-24

## 2021-08-24 ENCOUNTER — APPOINTMENT (OUTPATIENT)
Dept: ULTRASOUND IMAGING | Age: 61
DRG: 880 | End: 2021-08-24
Attending: HOSPITALIST
Payer: MEDICARE

## 2021-08-24 LAB
ALBUMIN SERPL-MCNC: 3.6 G/DL (ref 3.5–5)
AMORPH CRY URNS QL MICRO: ABNORMAL
ANION GAP SERPL CALC-SCNC: 10 MMOL/L (ref 5–15)
ANION GAP SERPL CALC-SCNC: 14 MMOL/L (ref 5–15)
APPEARANCE UR: CLEAR
BACTERIA URNS QL MICRO: ABNORMAL /HPF
BASOPHILS # BLD: 0.1 K/UL (ref 0–0.2)
BASOPHILS NFR BLD: 1 % (ref 0–2.5)
BILIRUB UR QL: NEGATIVE
BUN SERPL-MCNC: 43 MG/DL (ref 6–20)
BUN SERPL-MCNC: 43 MG/DL (ref 6–20)
BUN/CREAT SERPL: 11 (ref 12–20)
BUN/CREAT SERPL: 11 (ref 12–20)
CA-I BLD-MCNC: 9.1 MG/DL (ref 8.5–10.1)
CA-I BLD-MCNC: 9.2 MG/DL (ref 8.5–10.1)
CHLORIDE SERPL-SCNC: 96 MMOL/L (ref 97–108)
CHLORIDE SERPL-SCNC: 98 MMOL/L (ref 97–108)
CHLORIDE UR-SCNC: 80 MMOL/L
CO2 SERPL-SCNC: 23 MMOL/L (ref 21–32)
CO2 SERPL-SCNC: 25 MMOL/L (ref 21–32)
COLOR UR: ABNORMAL
CREAT SERPL-MCNC: 3.86 MG/DL (ref 0.55–1.02)
CREAT SERPL-MCNC: 3.87 MG/DL (ref 0.55–1.02)
CREAT UR-MCNC: 142.69 MG/DL
CREAT UR-MCNC: 143.67 MG/DL
EOSINOPHIL # BLD: 0.1 K/UL (ref 0–0.7)
EOSINOPHIL NFR BLD: 2 % (ref 0.9–2.9)
ERYTHROCYTE [DISTWIDTH] IN BLOOD BY AUTOMATED COUNT: 19 % (ref 11.5–14.5)
GLUCOSE BLD STRIP.AUTO-MCNC: 220 MG/DL (ref 65–117)
GLUCOSE BLD STRIP.AUTO-MCNC: 287 MG/DL (ref 65–117)
GLUCOSE BLD STRIP.AUTO-MCNC: 291 MG/DL (ref 65–117)
GLUCOSE BLD STRIP.AUTO-MCNC: 339 MG/DL (ref 65–117)
GLUCOSE SERPL-MCNC: 278 MG/DL (ref 65–100)
GLUCOSE SERPL-MCNC: 320 MG/DL (ref 65–100)
GLUCOSE UR STRIP.AUTO-MCNC: 250 MG/DL
HCT VFR BLD AUTO: 28.8 % (ref 36–46)
HGB BLD-MCNC: 9.2 G/DL (ref 13.5–17.5)
HGB UR QL STRIP: ABNORMAL
KETONES UR QL STRIP.AUTO: NEGATIVE MG/DL
LEUKOCYTE ESTERASE UR QL STRIP.AUTO: ABNORMAL
LYMPHOCYTES # BLD: 1.9 K/UL (ref 1–4.8)
LYMPHOCYTES NFR BLD: 25 % (ref 20.5–51.1)
MAGNESIUM SERPL-MCNC: 2.1 MG/DL (ref 1.6–2.4)
MCH RBC QN AUTO: 24.7 PG (ref 31–34)
MCHC RBC AUTO-ENTMCNC: 31.8 G/DL (ref 31–36)
MCV RBC AUTO: 77.5 FL (ref 80–100)
MONOCYTES # BLD: 0.5 K/UL (ref 0.2–2.4)
MONOCYTES NFR BLD: 6 % (ref 1.7–9.3)
NEUTS SEG # BLD: 5.1 K/UL (ref 1.8–7.7)
NEUTS SEG NFR BLD: 66 % (ref 42–75)
NITRITE UR QL STRIP.AUTO: NEGATIVE
NRBC # BLD: 0.01 K/UL
NRBC BLD-RTO: 0.1 PER 100 WBC
PERFORMED BY, TECHID: ABNORMAL
PH UR STRIP: 5.5 [PH] (ref 5–8)
PHOSPHATE SERPL-MCNC: 5.7 MG/DL (ref 2.6–4.7)
PLATELET # BLD AUTO: 238 K/UL (ref 150–400)
PMV BLD AUTO: 9 FL (ref 6.5–11.5)
POTASSIUM SERPL-SCNC: 5.4 MMOL/L (ref 3.5–5.1)
POTASSIUM SERPL-SCNC: 6.6 MMOL/L (ref 3.5–5.1)
POTASSIUM UR-SCNC: 33 MMOL/L
PROT UR STRIP-MCNC: 100 MG/DL
PROT UR-MCNC: 154 MG/DL (ref 0–11.9)
PROT/CREAT UR-RTO: 1.1
RBC # BLD AUTO: 3.71 M/UL (ref 4.5–5.9)
RBC #/AREA URNS HPF: ABNORMAL /HPF (ref 0–3)
SODIUM SERPL-SCNC: 131 MMOL/L (ref 136–145)
SODIUM SERPL-SCNC: 135 MMOL/L (ref 136–145)
SODIUM UR-SCNC: 84 MMOL/L
SP GR UR REFRACTOMETRY: 1.02 (ref 1–1.03)
UROBILINOGEN UR QL STRIP.AUTO: 0.2 EU/DL (ref 0.2–1)
WBC # BLD AUTO: 7.6 K/UL (ref 4.4–11.3)
WBC URNS QL MICRO: ABNORMAL /HPF (ref 0–5)

## 2021-08-24 PROCEDURE — 74011250637 HC RX REV CODE- 250/637: Performed by: HOSPITALIST

## 2021-08-24 PROCEDURE — 80069 RENAL FUNCTION PANEL: CPT

## 2021-08-24 PROCEDURE — 81001 URINALYSIS AUTO W/SCOPE: CPT

## 2021-08-24 PROCEDURE — 74011250636 HC RX REV CODE- 250/636: Performed by: HOSPITALIST

## 2021-08-24 PROCEDURE — 74011636637 HC RX REV CODE- 636/637: Performed by: INTERNAL MEDICINE

## 2021-08-24 PROCEDURE — 82962 GLUCOSE BLOOD TEST: CPT

## 2021-08-24 PROCEDURE — 77010033678 HC OXYGEN DAILY

## 2021-08-24 PROCEDURE — 74011250636 HC RX REV CODE- 250/636: Performed by: INTERNAL MEDICINE

## 2021-08-24 PROCEDURE — 36415 COLL VENOUS BLD VENIPUNCTURE: CPT

## 2021-08-24 PROCEDURE — 93005 ELECTROCARDIOGRAM TRACING: CPT

## 2021-08-24 PROCEDURE — 80048 BASIC METABOLIC PNL TOTAL CA: CPT

## 2021-08-24 PROCEDURE — 82436 ASSAY OF URINE CHLORIDE: CPT

## 2021-08-24 PROCEDURE — 84156 ASSAY OF PROTEIN URINE: CPT

## 2021-08-24 PROCEDURE — 94760 N-INVAS EAR/PLS OXIMETRY 1: CPT

## 2021-08-24 PROCEDURE — 84300 ASSAY OF URINE SODIUM: CPT

## 2021-08-24 PROCEDURE — 74011000250 HC RX REV CODE- 250: Performed by: INTERNAL MEDICINE

## 2021-08-24 PROCEDURE — 85025 COMPLETE CBC W/AUTO DIFF WBC: CPT

## 2021-08-24 PROCEDURE — 82570 ASSAY OF URINE CREATININE: CPT

## 2021-08-24 PROCEDURE — 65270000029 HC RM PRIVATE

## 2021-08-24 PROCEDURE — 74011250637 HC RX REV CODE- 250/637: Performed by: INTERNAL MEDICINE

## 2021-08-24 PROCEDURE — 83735 ASSAY OF MAGNESIUM: CPT

## 2021-08-24 PROCEDURE — 84133 ASSAY OF URINE POTASSIUM: CPT

## 2021-08-24 PROCEDURE — 74011250637 HC RX REV CODE- 250/637: Performed by: NURSE PRACTITIONER

## 2021-08-24 PROCEDURE — 76770 US EXAM ABDO BACK WALL COMP: CPT

## 2021-08-24 RX ORDER — HYDROCORTISONE ACETATE 25 MG/1
25 SUPPOSITORY RECTAL EVERY 12 HOURS
Status: DISCONTINUED | OUTPATIENT
Start: 2021-08-24 | End: 2021-08-25 | Stop reason: HOSPADM

## 2021-08-24 RX ORDER — SODIUM POLYSTYRENE SULFONATE 15 G/60ML
30 SUSPENSION ORAL; RECTAL
Status: COMPLETED | OUTPATIENT
Start: 2021-08-24 | End: 2021-08-24

## 2021-08-24 RX ORDER — SODIUM CHLORIDE 9 MG/ML
75 INJECTION, SOLUTION INTRAVENOUS CONTINUOUS
Status: DISCONTINUED | OUTPATIENT
Start: 2021-08-24 | End: 2021-08-24

## 2021-08-24 RX ADMIN — ALLOPURINOL 100 MG: 100 TABLET ORAL at 08:24

## 2021-08-24 RX ADMIN — ACETAMINOPHEN 650 MG: 325 TABLET ORAL at 17:46

## 2021-08-24 RX ADMIN — ALPRAZOLAM 0.25 MG: 0.25 TABLET ORAL at 22:02

## 2021-08-24 RX ADMIN — APIXABAN 5 MG: 5 TABLET, FILM COATED ORAL at 22:02

## 2021-08-24 RX ADMIN — SODIUM CHLORIDE 75 ML/HR: 9 INJECTION, SOLUTION INTRAVENOUS at 08:32

## 2021-08-24 RX ADMIN — APIXABAN 5 MG: 5 TABLET, FILM COATED ORAL at 09:51

## 2021-08-24 RX ADMIN — MAGNESIUM OXIDE 400 MG: 400 TABLET ORAL at 16:09

## 2021-08-24 RX ADMIN — SODIUM BICARBONATE: 84 INJECTION, SOLUTION INTRAVENOUS at 22:55

## 2021-08-24 RX ADMIN — ALPRAZOLAM 0.25 MG: 0.25 TABLET ORAL at 08:24

## 2021-08-24 RX ADMIN — ARIPIPRAZOLE 15 MG: 10 TABLET ORAL at 08:24

## 2021-08-24 RX ADMIN — PANTOPRAZOLE SODIUM 40 MG: 40 TABLET, DELAYED RELEASE ORAL at 08:24

## 2021-08-24 RX ADMIN — FERROUS SULFATE TAB 325 MG (65 MG ELEMENTAL FE) 325 MG: 325 (65 FE) TAB at 08:24

## 2021-08-24 RX ADMIN — CITALOPRAM HYDROBROMIDE 20 MG: 20 TABLET ORAL at 08:24

## 2021-08-24 RX ADMIN — HYDROCORTISONE ACETATE 25 MG: 25 SUPPOSITORY RECTAL at 22:02

## 2021-08-24 RX ADMIN — HYDRALAZINE HYDROCHLORIDE 100 MG: 100 TABLET, FILM COATED ORAL at 16:09

## 2021-08-24 RX ADMIN — SODIUM POLYSTYRENE SULFONATE 30 G: 15 SUSPENSION ORAL; RECTAL at 08:24

## 2021-08-24 RX ADMIN — METOPROLOL SUCCINATE 50 MG: 50 TABLET, FILM COATED, EXTENDED RELEASE ORAL at 08:24

## 2021-08-24 RX ADMIN — Medication 10 ML: at 05:59

## 2021-08-24 RX ADMIN — Medication 10 ML: at 16:13

## 2021-08-24 RX ADMIN — Medication 10 ML: at 22:05

## 2021-08-24 RX ADMIN — HYDRALAZINE HYDROCHLORIDE 100 MG: 100 TABLET, FILM COATED ORAL at 08:24

## 2021-08-24 RX ADMIN — INSULIN LISPRO 6 UNITS: 100 INJECTION, SOLUTION INTRAVENOUS; SUBCUTANEOUS at 08:25

## 2021-08-24 RX ADMIN — ONDANSETRON 4 MG: 2 INJECTION INTRAMUSCULAR; INTRAVENOUS at 18:36

## 2021-08-24 RX ADMIN — INSULIN LISPRO 6 UNITS: 100 INJECTION, SOLUTION INTRAVENOUS; SUBCUTANEOUS at 12:15

## 2021-08-24 RX ADMIN — HYDRALAZINE HYDROCHLORIDE 100 MG: 100 TABLET, FILM COATED ORAL at 22:02

## 2021-08-24 RX ADMIN — MAGNESIUM OXIDE 400 MG: 400 TABLET ORAL at 08:24

## 2021-08-24 RX ADMIN — TRAMADOL HYDROCHLORIDE 50 MG: 50 TABLET, FILM COATED ORAL at 22:55

## 2021-08-24 RX ADMIN — INSULIN GLARGINE 25 UNITS: 100 INJECTION, SOLUTION SUBCUTANEOUS at 22:03

## 2021-08-24 RX ADMIN — INSULIN LISPRO 4 UNITS: 100 INJECTION, SOLUTION INTRAVENOUS; SUBCUTANEOUS at 16:09

## 2021-08-24 RX ADMIN — MAGNESIUM OXIDE 400 MG: 400 TABLET ORAL at 22:02

## 2021-08-24 RX ADMIN — ATORVASTATIN CALCIUM 40 MG: 40 TABLET, FILM COATED ORAL at 22:02

## 2021-08-24 RX ADMIN — SODIUM BICARBONATE: 84 INJECTION, SOLUTION INTRAVENOUS at 09:49

## 2021-08-24 RX ADMIN — INSULIN LISPRO 8 UNITS: 100 INJECTION, SOLUTION INTRAVENOUS; SUBCUTANEOUS at 22:03

## 2021-08-24 NOTE — ROUTINE PROCESS
Resident in bed resting with eyes closed. No c/o pain or discomfort voiced. No s/s of respiratory distress noted. Will continue to monitor for any changes.

## 2021-08-24 NOTE — PROGRESS NOTES
Progress Note  Date:8/23/2021       Room:[facility]  Patient Name:Dena Coyne     YOB: 1960     Age:61 y.o. Subjective    HPI:  Madison Jaquez is a 63 y.o. female  has a past medical history of Anxiety (6/22/2020), Bipolar 1 disorder ([facility] (1/23/2018), Diabetes mellitus type 2, controlled ([facility] (6/22/2020), Hypertension (6/22/2020), Liver fibrosis (6/22/2020), Major depression (6/22/2020), Migraine (6/22/2020), Sleep apnea (1/23/2018), and Suicide attempt ([facility] (6/22/2020). Patient presents with over 6-week complaint of sided chest pain that she describes as constant, unremitting that began initially as mild but has been worsening. Patient reports falling on her bathroom and hitting her commode about 6 weeks ago. Denies any associated symptoms of nausea, diaphoresis and patient has been reporting shortness of breath though not necessarily associated with chest pain. There are no aggravating or relieving factors with the chest pain. Patient also reports dyspnea on exertion for the past few weeks along with paroxysmal nocturnal dyspnea and orthopnea. Patient states she has had an 11 pound weight gain over the past few weeks but denies any swelling or change in her eating habits. Patient states she has been taking Tylenol which has not helped any and has not been taking any other pain meds. Patient's sister, however, pulled me aside and volunteered to me that patient does not do anything that she is instructed to do by the doctors, not taking her medications, eating anything she wants, laying down in bed all day and even taking Naprosyn which she is aware that she is not to do. Also states she has been told by patient's counselor that she is attention seeking though this is not documented on psych notes and wants to make sure that patient does not know this information is coming from her.  Patient was seen in the ER about 2 weeks ago for this chest pain and had a x-ray that was normal and was sent home with documentation that she was chest pain-free. Patient denies any cardiac or Pulmonary history. Evaluation in the ER revealed normal chest x-ray, EKG, troponin with a slightly elevated D-dimer. On my exam patient's chest pain is reproducible making it more likely to be musculoskeletal in nature but given D-dimer and shortness of breath will admit and start on anticoagulation with plans to get a VQ scan. Given the shortness of breath, PND and weight gain plan to work-up with a 2D echo and cardiology consult    Patient seen on follow-up resting in bed no acute distress patient had nausea and single episode of vomiting overnight does have history of reflux blood pressure slightly elevated . Continues on 2 L nasal cannula and lies on CPAP at night has CKD issues and follows with Dr. Norma Navarro. Review of Systems   Constitutional: Negative. HENT: Negative. Eyes: Negative. Respiratory: Negative. Cardiovascular: Negative. Gastrointestinal: Positive for nausea and vomiting. Endocrine: Negative. Genitourinary: Negative. Musculoskeletal: Negative. Skin: Negative. Allergic/Immunologic: Negative. Neurological: Negative. Hematological: Negative. Psychiatric/Behavioral: Negative. Objective           Vitals Last 24 Hours:  Patient Vitals for the past 24 hrs:   Temp Pulse Resp BP SpO2   08/23/21 2016 97.9 °F (36.6 °C) 83 18 112/69 97 %   08/23/21 1952     96 %   08/23/21 1630 97.9 °F (36.6 °C) 89 18 114/68 98 %   08/23/21 1500  93  (!) 151/79    08/23/21 1233 98.7 °F (37.1 °C) 92 17 (!) 105/47 98 %   08/23/21 0922 98.6 °F (37 °C) 96 18 (!) 172/74 99 %   08/23/21 0845     99 %   08/23/21 0407 97.8 °F (36.6 °C) 95 18 129/79 96 %   08/22/21 2331 98.1 °F (36.7 °C) 95 18 (!) 162/78 100 %        I/O (24Hr):     Intake/Output Summary (Last 24 hours) at 8/23/2021 2217  Last data filed at 8/23/2021 1508  Gross per 24 hour   Intake    Output 2400 ml   Net -2400 ml Physical Exam:  General: Alert, cooperative, no distress, appears stated age. Head:  Normocephalic, without obvious abnormality, atraumatic. Eyes:  Conjunctivae/corneas clear. Pupils equal, round, reactive to light. Extraocular movements intact. Lungs:  Clear to auscultation bilaterally. no wheeze, rales, crackles, rhonchi   Chest wall: No tenderness or deformity. Heart:  Regular rate and rhythm, S1, S2 normal, no murmur, click, rub or gallop. Abdomen:  Soft, non-tender. Bowel sounds normal. No masses,  No organomegaly. Extremities: Extremities normal, atraumatic, no cyanosis or edema. Pulses: 2+ and symmetric all extremities. Skin: Skin color, texture, turgor normal. No rashes or lesions  Neurologic: Awake, Alert, oriented.  No obvious gross sensory or motor deficits      Medications           Current Facility-Administered Medications   Medication Dose Route Frequency    magnesium oxide (MAG-OX) tablet 400 mg  400 mg Oral BID    hydrALAZINE (APRESOLINE) tablet 100 mg  100 mg Oral TID    sodium chloride (NS) flush 5-40 mL  5-40 mL IntraVENous Q8H    sodium chloride (NS) flush 5-40 mL  5-40 mL IntraVENous PRN    acetaminophen (TYLENOL) tablet 650 mg  650 mg Oral Q6H PRN    Or    acetaminophen (TYLENOL) suppository 650 mg  650 mg Rectal Q6H PRN    polyethylene glycol (MIRALAX) packet 17 g  17 g Oral DAILY PRN    ondansetron (ZOFRAN ODT) tablet 4 mg  4 mg Oral Q8H PRN    Or    ondansetron (ZOFRAN) injection 4 mg  4 mg IntraVENous Q6H PRN    apixaban (ELIQUIS) tablet 5 mg  5 mg Oral BID    glucose chewable tablet 16 g  4 Tablet Oral PRN    dextrose (D50W) injection syrg 12.5-25 g  25-50 mL IntraVENous PRN    glucagon (GLUCAGEN) injection 1 mg  1 mg IntraMUSCular PRN    ALPRAZolam (XANAX) tablet 0.25 mg  0.25 mg Oral BID    ARIPiprazole (ABILIFY) tablet 15 mg  15 mg Oral DAILY    amLODIPine (NORVASC) tablet 2.5 mg  2.5 mg Oral DAILY    atorvastatin (LIPITOR) tablet 40 mg  40 mg Oral QHS    citalopram (CELEXA) tablet 20 mg  20 mg Oral DAILY    ferrous sulfate tablet 325 mg  1 Tablet Oral DAILY WITH BREAKFAST    pantoprazole (PROTONIX) tablet 40 mg  40 mg Oral ACB    allopurinoL (ZYLOPRIM) tablet 100 mg  100 mg Oral DAILY    traMADoL (ULTRAM) tablet 50 mg  50 mg Oral Q6H PRN    insulin glargine (LANTUS) injection 25 Units  25 Units SubCUTAneous QHS    insulin lispro (HUMALOG) injection   SubCUTAneous AC&HS         Allergies         Adhesive tape-silicones, Septra [sulfamethoprim], and Sulfa (sulfonamide antibiotics)       Labs/Imaging/Diagnostics      Labs:  Recent Results (from the past 48 hour(s))   EKG, 12 LEAD, INITIAL    Collection Time: 08/22/21  7:26 AM   Result Value Ref Range    Ventricular Rate 102 BPM    Atrial Rate 102 BPM    P-R Interval 184 ms    QRS Duration 94 ms    Q-T Interval 372 ms    QTC Calculation (Bezet) 485 ms    Calculated P Axis 46 degrees    Calculated R Axis 12 degrees    Calculated T Axis 35 degrees    Diagnosis       Sinus tachycardia    Confirmed by Bartolome De Leon ([id number]) on 8/23/2021 70:76:11 AM     METABOLIC PANEL, COMPREHENSIVE    Collection Time: 08/22/21  7:35 AM   Result Value Ref Range    Sodium 139 136 - 145 mmol/L    Potassium 4.9 3.5 - 5.1 mmol/L    Chloride 104 97 - 108 mmol/L    CO2 23 21 - 32 mmol/L    Anion gap 12 5 - 15 mmol/L    Glucose 220 (H) 65 - 100 mg/dL    BUN 22 (H) 6 - 20 mg/dL    Creatinine 2.43 (H) 0.55 - 1.02 mg/dL    BUN/Creatinine ratio 9 (L) 12 - 20      GFR est AA 25 (L) >60 ml/min/1.73m2    GFR est non-AA 20 (L) >60 ml/min/1.73m2    Calcium 9.5 8.5 - 10.1 mg/dL    Bilirubin, total 0.8 0.2 - 1.0 mg/dL    AST (SGOT) 56 (H) 15 - 37 U/L    ALT (SGPT) 32 12 - 78 U/L    Alk.  phosphatase 220 (H) 45 - 117 U/L    Protein, total 8.7 (H) 6.4 - 8.2 g/dL    Albumin 3.4 (L) 3.5 - 5.0 g/dL    Globulin 5.3 (H) 2.0 - 4.0 g/dL    A-G Ratio 0.6 (L) 1.1 - 2.2     CBC WITH AUTOMATED DIFF    Collection Time: 08/22/21  7:35 AM   Result Value Ref Range WBC 5.2 4.4 - 11.3 K/uL    RBC 3.55 (L) 4.50 - 5.90 M/uL    HGB 8.7 (L) 13.5 - 17.5 g/dL    HCT 27.0 (L) 36 - 46 %    MCV 76.0 (L) 80 - 100 FL    MCH 24.5 (L) 31 - 34 PG    MCHC 32.2 31.0 - 36.0 g/dL    RDW 18.8 (H) 11.5 - 14.5 %    PLATELET 703 101 - 491 K/uL    MPV 8.7 6.5 - 11.5 FL    NRBC 0.1  WBC    ABSOLUTE NRBC 0.01 K/uL    NEUTROPHILS 56 42 - 75 %    LYMPHOCYTES 34 20.5 - 51.1 %    MONOCYTES 6 1.7 - 9.3 %    EOSINOPHILS 3 (H) 0.9 - 2.9 %    BASOPHILS 1 0.0 - 2.5 %    ABS. NEUTROPHILS 2.9 1.8 - 7.7 K/UL    ABS. LYMPHOCYTES 1.8 1.0 - 4.8 K/UL    ABS. MONOCYTES 0.3 0.2 - 2.4 K/UL    ABS. EOSINOPHILS 0.1 0.0 - 0.7 K/UL    ABS.  BASOPHILS 0.1 0.0 - 0.2 K/UL   TROPONIN I    Collection Time: 08/22/21  7:35 AM   Result Value Ref Range    Troponin-I, Qt. <0.05 <0.05 ng/mL   MAGNESIUM    Collection Time: 08/22/21  7:35 AM   Result Value Ref Range    Magnesium 1.5 (L) 1.6 - 2.4 mg/dL   BNP    Collection Time: 08/22/21  7:35 AM   Result Value Ref Range    NT pro- (H) <125 pg/mL   D DIMER    Collection Time: 08/22/21  8:00 AM   Result Value Ref Range    D-dimer 0.69 (H) 0.19 - 0.50 mg/L FEU   GLUCOSE, POC    Collection Time: 08/22/21  9:23 AM   Result Value Ref Range    Glucose (POC) 230 (H) 65 - 117 mg/dL    Performed by Fadia Mclaughlin, POC    Collection Time: 08/22/21  1:01 PM   Result Value Ref Range    Glucose (POC) 261 (H) 65 - 117 mg/dL    Performed by Fadia Mclaughlin, POC    Collection Time: 08/22/21  4:13 PM   Result Value Ref Range    Glucose (POC) 311 (H) 65 - 117 mg/dL    Performed by [id number], POC    Collection Time: 08/22/21  9:24 PM   Result Value Ref Range    Glucose (POC) 238 (H) 65 - 117 mg/dL    Performed by [facility]    Collection Time: 08/23/21  6:00 AM   Result Value Ref Range    Magnesium 1.5 (L) 1.6 - 2.4 mg/dL   TROPONIN I    Collection Time: 08/23/21  6:00 AM   Result Value Ref Range    Troponin-I, Qt. <0.05 <8.51 ng/mL   METABOLIC PANEL, BASIC    Collection Time: 08/23/21  6:11 AM   Result Value Ref Range    Sodium 137 136 - 145 mmol/L    Potassium 5.0 3.5 - 5.1 mmol/L    Chloride 100 97 - 108 mmol/L    CO2 25 21 - 32 mmol/L    Anion gap 12 5 - 15 mmol/L    Glucose 257 (H) 65 - 100 mg/dL    BUN 30 (H) 6 - 20 mg/dL    Creatinine 2.83 (H) 0.55 - 1.02 mg/dL    BUN/Creatinine ratio 11 (L) 12 - 20      GFR est AA 21 (L) >60 ml/min/1.73m2    GFR est non-AA 17 (L) >60 ml/min/1.73m2    Calcium 9.1 8.5 - 10.1 mg/dL   CBC W/O DIFF    Collection Time: 08/23/21  6:11 AM   Result Value Ref Range    WBC 7.1 4.4 - 11.3 K/uL    RBC 3.63 (L) 4.50 - 5.90 M/uL    HGB 8.9 (L) 13.5 - 17.5 g/dL    HCT 27.8 (L) 36 - 46 %    MCV 76.5 (L) 80 - 100 FL    MCH 24.5 (L) 31 - 34 PG    MCHC 32.0 31.0 - 36.0 g/dL    RDW 19.4 (H) 11.5 - 14.5 %    PLATELET 814 438 - 869 K/uL    MPV 8.8 6.5 - 11.5 FL    NRBC 0.1  WBC    ABSOLUTE NRBC 0.01 K/uL   GLUCOSE, POC    Collection Time: 08/23/21  8:43 AM   Result Value Ref Range    Glucose (POC) 277 (H) 65 - 117 mg/dL    Performed by Himanshu CRABTREE    GLUCOSE, POC    Collection Time: 08/23/21 11:58 AM   Result Value Ref Range    Glucose (POC) 246 (H) 65 - 117 mg/dL    Performed by Vitaliy Feliz    ECHO ADULT COMPLETE    Collection Time: 08/23/21  2:00 PM   Result Value Ref Range    LV ED Vol A2C 70.06 mL    IVSd 1.07 (A) 0.60 - 0.90 cm    LVIDd 4.00 3.90 - 5.30 cm    LVIDs 2.43 cm    LVOT d 1.89 cm    LVPWd 1.09 (A) 0.60 - 0.90 cm    LVOT SV 49.8 mL    LVOT SV 49.8 mL    BP EF 61.8 55.0 - 100.0 percent    LV Ejection Fraction MOD 2C 60 percent    LV Ejection Fraction MOD 4C 65 percent    LV ED Vol BP 62.64 56.0 - 104.0 mL    LV ES Vol A2C 20.39 mL    LV ES Vol BP 23.93 19.0 - 49.0 mL    LVOT Peak Gradient 6.28 mmHg    Left Ventricular Outflow Tract Mean Gradient 3.37 mmHg    LVOT Peak Velocity 125.26 cm/s    LVOT VTI 21.45 cm    RVIDd 2.62 cm    LA Volume 36.37 22.0 - 52.0 mL    LA Vol 2C 36.45 22.00 - 52.00 mL    LA Vol 4C 30.64 22.00 - 52.00 mL    Aortic Valve Area by Continuity of Peak Velocity 1.86 cm2    Aortic Valve Area by Continuity of VTI 1.94 cm2    Aortic Valve Area by Continuity of VTI 1.94 cm2    AoV PG 14.24 mmHg    Aortic Valve Systolic Mean Gradient 5.15 mmHg    Aortic Valve Systolic Peak Velocity 153.43 cm/s    Aortic valve mean velocity 125.16 cm/s    AoV VTI 31.00 cm    MV A Yonatan 98.27 cm/s    Mitral Valve E Wave Deceleration Time 185.01 ms    MV E Yonatan 93.02 cm/s    MV E/A 0.95     Mitral Valve Pressure Half-time 53.65 ms    MVA (PHT) 4.10 cm2    MVA (PHT) 4.10 cm2    Ao Root D 3.30 cm    LV Mass .8 67.0 - 162.0 g    LV Mass AL Index 72.0 43.0 - 95.0 g/m2    Right Atrial Area 4C 11.0 cm2    LVES Vol Index BP 12.1 mL/m2    LVED Vol Index BP 31.8 mL/m2    LA Vol Index 18.46 16.00 - 28.00 ml/m2    LA Vol Index 18.50 16.00 - 28.00 ml/m2    LA Vol Index 15.55 16.00 - 28.00 ml/m2    LVED Vol Index A2C 35.6 mL/m2    LVES Vol Index A2C 10.4 mL/m2    VALDEMAR/BSA Pk Yonatan 0.9 cm2/m2   GLUCOSE, POC    Collection Time: 08/23/21  4:28 PM   Result Value Ref Range    Glucose (POC) 264 (H) 65 - 117 mg/dL    Performed by Do CRABTREE    GLUCOSE, POC    Collection Time: 08/23/21  9:22 PM   Result Value Ref Range    Glucose (POC) 272 (H) 65 - 117 mg/dL    Performed by [facility]         Imaging:  NM LUNG SCAN PERF    Result Date: 8/23/2021  No perfusion defect definitively identified. XR CHEST PORT    Result Date: 8/22/2021  No interval change.        Assessment//Plan           Problem List:  Hospital Problems  Date Reviewed: 7/7/2021        Codes Class Noted POA    * (Principal) Chest pain ICD-10-CM: R07.9  ICD-9-CM: 786.50  8/22/2021 Unknown            Chest pain  -Continue monitor on telemetry initial and repeat troponins are negative and no ischemic changes on EKG  -Cardiology consult appreciated and will continue further outpatient ischemic work-up    Hypertension  -Elevated morning and Lasix discontinued as echo shows IVC not dilated  -Increased hydralazine 200 mg 3 times daily and continued on metoprolol succinate 50 mg and amlodipine 2.5 mg  -Follow 2D echo    HLD  -Continue atorvastatin 40 mg at bedtime  -Lipid profile pending    Elevated D-dimer  -Mildly elevated 0.69 and with episode of chest pain nuclear medicine VQ scan was done and found to be negative for PE    Hypomagnesemia  -Low at 1.5 and 2 g IV magnesium sulfate rider given and patient takes magnesium oxide 400 mg 3 times a day chronically  -We will repeat mag level in a.m. CKD  -Appears to be at baseline creatinine and follows with Dr. Yoselin Gilmore  -Continue bicarb supplementation  -Monitor closely    Chronic anemia  -8.7 and then repeat is 8.9 and stable  -Obtain iron panel  -Has received iron transfusions in the past with nephrology    Obstructive sleep apnea  -Has home CPAP and requested patient to have family bring so that she can use it tonight    Nausea vomiting  -History of GERD presented with single episode of vomiting overnight  -Monitor closely  -Continue pantoprazole    Anxiety  -Continue home alprazolam as needed    Chronic gout  -Continue home allopurinol    Diabetes mellitus 2  -A1c of 8.2 and nutrition consultation appreciated  -Continue home Lantus and regular insulin sliding scale with Accu-Cheks AC at bedtime    Generalized weakness  -Patient evaluated by physical therapy and overall did well and no therapy needs on discharge     GI prophylaxis  -Home pantoprazole    DVT prophylaxis  -Eliquis twice daily    Full Code     Spent 35 minutes evaluting and coordinating patient care of which >50% was spent coordinating and counseling.      Electronically signed by Dennis Luna MD on 8/23/2021 at 10:17 PM

## 2021-08-24 NOTE — PROGRESS NOTES
Progress Note    Patient: James Alcala MRN: [id number]  SSN: [id number]    YOB: 1960  Age: 63 y.o. Sex: female      Admit Date: 8/22/2021    LOS: 2 days     Subjective:     Patient seen and examined. James Alcala is a 63y.o. year-old female with past medical history significant for Anxiety, Bipolar Disorder, HTN, DM,  Liver Fibrosis, EFRAÍN (compliant with CPAP), Anemia (requiring recent iron transfusions), and CKD (followed by [facility]) who is followed for Chest Pain. This am patient denies having any pain. She reports that SOB has improved. No other new complaints. Does have small RANDI on CKD from Diuresis. Telemetry Review: SR    Review of Symptoms:   Review of Systems   Constitutional: Positive for malaise/fatigue. HENT: Negative. Eyes: Negative. Cardiovascular: Negative. Respiratory: Negative. Skin: Negative. Gastrointestinal: Negative. Genitourinary: Negative. Neurological: Negative. Objective:     Vitals:    08/24/21 0438 08/24/21 0748 08/24/21 0836 08/24/21 1445   BP: (!) 149/84 (!) 142/76     Pulse: 96 96     Resp: 18 18     Temp: 97.9 °F (36.6 °C) 97.9 °F (36.6 °C)     SpO2: 94% 98% 93% 95%   Weight:  101.2 kg (223 lb)     Height:            Intake and Output:  Current Shift: 08/24 0701 - 08/24 1900  In: -   Out: 900 [Urine:900]  Last three shifts: 08/22 1901 - 08/24 0700  In: -   Out: 3000 [Urine:3000]    Physical Exam:   General: Well nourished chronically ill appearing female lying in bed, NAD, A&O  HEENT: Normocephalic, PERRL, no drainage  Neck: Supple, Trachea midline, No JVD  RESP: CTA bilaterally. + Symmetrical chest movement. No SOB or distress. On O2 via NC.   Cardiovascular: RRR no MRG  PVS: No rubor, cyanosis, no edema, Radial, DP, PT pulses equal bilaterally  ABD: obese/protuberant, soft, NT, Normoactive BS  Derm: Warm/Dry/Intact with no lesions, normal turgor  Neuro: A&O PPTS, cranial nerves II- XII grossly intact via interaction with patient.  No focal deficits  PSYCH: No anxiety or agitation      Lab/Data Review:  BMP:   Lab Results   Component Value Date/Time     (L) 08/24/2021 12:00 PM    K 5.4 (H) 08/24/2021 12:00 PM    CL 98 08/24/2021 12:00 PM    CO2 23 08/24/2021 12:00 PM    AGAP 14 08/24/2021 12:00 PM     (H) 08/24/2021 12:00 PM    BUN 43 (H) 08/24/2021 12:00 PM    CREA 3.87 (H) 08/24/2021 12:00 PM    GFRAA 14 (L) 08/24/2021 12:00 PM    GFRNA 12 (L) 08/24/2021 12:00 PM            Current Facility-Administered Medications:     sodium bicarbonate (8.4%) 150 mEq in dextrose 5% 1,000 mL infusion, , IntraVENous, CONTINUOUS, Ellen Cook MD, Last Rate: 100 mL/hr at 08/24/21 0949, New Bag at 08/24/21 0949    hydrALAZINE (APRESOLINE) tablet 100 mg, 100 mg, Oral, TID, Kayy Scott NP, 100 mg at 08/24/21 0824    magnesium oxide (MAG-OX) tablet 400 mg, 400 mg, Oral, TID, Rakan Diamond MD, 400 mg at 08/24/21 0824    metoprolol succinate (TOPROL-XL) XL tablet 50 mg, 50 mg, Oral, DAILY, Rakan Diamond MD, 50 mg at 08/24/21 0824    sodium chloride (NS) flush 5-40 mL, 5-40 mL, IntraVENous, Q8H, Monisha Calderon MD, 10 mL at 08/24/21 0559    sodium chloride (NS) flush 5-40 mL, 5-40 mL, IntraVENous, PRN, Monisha Calderon MD    acetaminophen (TYLENOL) tablet 650 mg, 650 mg, Oral, Q6H PRN **OR** acetaminophen (TYLENOL) suppository 650 mg, 650 mg, Rectal, Q6H PRN, Monisha Calderon MD    polyethylene glycol (MIRALAX) packet 17 g, 17 g, Oral, DAILY PRN, Monisha Calderon MD    ondansetron (ZOFRAN ODT) tablet 4 mg, 4 mg, Oral, Q8H PRN, 4 mg at 08/22/21 2258 **OR** ondansetron (ZOFRAN) injection 4 mg, 4 mg, IntraVENous, Q6H PRN, Monisha Calderon MD, 4 mg at 08/23/21 0912    apixaban (ELIQUIS) tablet 5 mg, 5 mg, Oral, BID, Monisha Calderon MD, 5 mg at 08/24/21 0951    glucose chewable tablet 16 g, 4 Tablet, Oral, PRN, Monisha Calderon MD    dextrose (D50W) injection syrg 12.5-25 g, 25-50 mL, IntraVENous, PRN, Otoniel Cassidy MD    glucagon [facility]) injection 1 mg, 1 mg, IntraMUSCular, PRN, Otoniel Cassidy MD    ALPRAZolam Thurmon Dice) tablet 0.25 mg, 0.25 mg, Oral, BID, Otoniel Cassidy MD, 0.25 mg at 08/24/21 0824    ARIPiprazole (ABILIFY) tablet 15 mg, 15 mg, Oral, DAILY, Otoniel Cassidy MD, 15 mg at 08/24/21 2127    atorvastatin (LIPITOR) tablet 40 mg, 40 mg, Oral, QHS, Otoniel Cassidy MD, 40 mg at 08/23/21 2209    citalopram (CELEXA) tablet 20 mg, 20 mg, Oral, DAILY, Otoniel Cassidy MD, 20 mg at 08/24/21 9780    ferrous sulfate tablet 325 mg, 1 Tablet, Oral, DAILY WITH Esteban Frederick MD, 325 mg at 08/24/21 0824    pantoprazole (PROTONIX) tablet 40 mg, 40 mg, Oral, ACB, Otoniel Cassidy MD, 40 mg at 08/24/21 0824    allopurinoL (ZYLOPRIM) tablet 100 mg, 100 mg, Oral, DAILY, Otoniel Cassidy MD, 100 mg at 08/24/21 0824    traMADoL (ULTRAM) tablet 50 mg, 50 mg, Oral, Q6H PRN, Otoniel Cassidy MD, 50 mg at 08/23/21 2209    insulin glargine (LANTUS) injection 25 Units, 25 Units, SubCUTAneous, QHS, Otoniel Cassidy MD, 25 Units at 08/23/21 2210    insulin lispro (HUMALOG) injection, , SubCUTAneous, AC&HS, Otoniel Cassidy MD, 6 Units at 08/24/21 1215      Assessment:     Principal Problem:    Chest pain (8/22/2021)        Plan:     Case discussed with Collaborating physician Dr. Ramírez Sosa and our recommendations are as follows:     1. Chest Pain: ACS ruled out per EKG and Troponin criteria. TTE negative for acute pathology. Given risk factors and sx would benefit from further ischemic evaluation, that can be completed in OP setting. Would recommend further risk factor modification and control of BP.    2. HTN:  BP closer to goal.  Will keep Hydralazine at 100mg TID. IV Lasix stopped 8/23 as patient was not hypervolemic, BNP not elevated to appropriate range given renal function, and IVC was normal in size on prelim TTE. Continue BB for HR control. 3. Hypomagnesemia - keep @ goal >2. Repeat labs in am. Continue to monitor potassium level given elevated today requiring treatment. 4. RANDI on CKD:  likely s/t diuresis. Appreciate Nephrology input and management. Agree with mild hydration today. Monitor renal function and electrolytes closely. 5. Chronic Anemia - reports receiving iron transfusions recently, with last dose last week. Managed by Nephrology. Thank you for involving us in the care of this patient. Please do not hesitate to call if additional questions arise.  If after hours please call [phone]    Signed By: Payton Shepherd NP     August 24, 2021

## 2021-08-24 NOTE — PROGRESS NOTES
Progress Note  Date:8/24/2021       Room:[facility]  Patient Name:Dena Kessler     YOB: 1960     Age:61 y.o. Subjective    HPI:  Odalys Ibanez is a 63 y.o. female  has a past medical history of Anxiety (6/22/2020), Bipolar 1 disorder ([facility] (1/23/2018), Diabetes mellitus type 2, controlled ([facility] (6/22/2020), Hypertension (6/22/2020), Liver fibrosis (6/22/2020), Major depression (6/22/2020), Migraine (6/22/2020), Sleep apnea (1/23/2018), and Suicide attempt ([facility] (6/22/2020). Patient presents with over 6-week complaint of sided chest pain that she describes as constant, unremitting that began initially as mild but has been worsening. Patient reports falling on her bathroom and hitting her commode about 6 weeks ago. Denies any associated symptoms of nausea, diaphoresis and patient has been reporting shortness of breath though not necessarily associated with chest pain. There are no aggravating or relieving factors with the chest pain. Patient also reports dyspnea on exertion for the past few weeks along with paroxysmal nocturnal dyspnea and orthopnea. Patient states she has had an 11 pound weight gain over the past few weeks but denies any swelling or change in her eating habits. Patient states she has been taking Tylenol which has not helped any and has not been taking any other pain meds. Patient's sister, however, pulled me aside and volunteered to me that patient does not do anything that she is instructed to do by the doctors, not taking her medications, eating anything she wants, laying down in bed all day and even taking Naprosyn which she is aware that she is not to do. Also states she has been told by patient's counselor that she is attention seeking though this is not documented on psych notes and wants to make sure that patient does not know this information is coming from her.  Patient was seen in the ER about 2 weeks ago for this chest pain and had a x-ray that was normal and was sent home with documentation that she was chest pain-free. Patient denies any cardiac or Pulmonary history. Evaluation in the ER revealed normal chest x-ray, EKG, troponin with a slightly elevated D-dimer. On my exam patient's chest pain is reproducible making it more likely to be musculoskeletal in nature but given D-dimer and shortness of breath will admit and start on anticoagulation with plans to get a VQ scan. Given the shortness of breath, PND and weight gain plan to work-up with a 2D echo and cardiology consult    Patient seen on follow-up resting in bed no acute distress patient had nausea and single episode of vomiting overnight does have history of reflux blood pressure slightly elevated . Continues on 2 L nasal cannula and lies on CPAP at night has CKD issues and follows with Dr. Neita Halsted. Review of Systems   Constitutional: Negative. HENT: Negative. Eyes: Negative. Respiratory: Negative. Cardiovascular: Negative. Gastrointestinal: Positive for nausea and vomiting. Endocrine: Negative. Genitourinary: Negative. Musculoskeletal: Negative. Skin: Negative. Allergic/Immunologic: Negative. Neurological: Negative. Hematological: Negative. Psychiatric/Behavioral: Negative. Objective           Vitals Last 24 Hours:  Patient Vitals for the past 24 hrs:   Temp Pulse Resp BP SpO2   08/24/21 1606 98.3 °F (36.8 °C) 93  (!) 160/85 99 %   08/24/21 1445     95 %   08/24/21 1218 98 °F (36.7 °C) 95  (!) 155/72 96 %   08/24/21 0836     93 %   08/24/21 0748 97.9 °F (36.6 °C) 96 18 (!) 142/76 98 %   08/24/21 0438 97.9 °F (36.6 °C) 96 18 (!) 149/84 94 %   08/24/21 0046 98.6 °F (37 °C) 97 18 117/68 97 %   08/23/21 2331 98 °F (36.7 °C) 97 18 117/68 97 %   08/23/21 2220 97.9 °F (36.6 °C) 83 18 112/69 97 %   08/23/21 2016 97.9 °F (36.6 °C) 83 18 112/69 97 %   08/23/21 1952     96 %        I/O (24Hr):     Intake/Output Summary (Last 24 hours) at 8/24/2021 1705  Last data filed at 8/24/2021 8082  Gross per 24 hour   Intake    Output 900 ml   Net -900 ml       Physical Exam:  General: Alert, cooperative, no distress, appears stated age. Head:  Normocephalic, without obvious abnormality, atraumatic. Eyes:  Conjunctivae/corneas clear. Pupils equal, round, reactive to light. Extraocular movements intact. Lungs:  Clear to auscultation bilaterally. no wheeze, rales, crackles, rhonchi   Chest wall: No tenderness or deformity. Heart:  Regular rate and rhythm, S1, S2 normal, no murmur, click, rub or gallop. Abdomen:  Soft, non-tender. Bowel sounds normal. No masses,  No organomegaly. Extremities: Extremities normal, atraumatic, no cyanosis or edema. Pulses: 2+ and symmetric all extremities. Skin: Skin color, texture, turgor normal. No rashes or lesions  Neurologic: Awake, Alert, oriented.  No obvious gross sensory or motor deficits      Medications           Current Facility-Administered Medications   Medication Dose Route Frequency    sodium bicarbonate (8.4%) 150 mEq in dextrose 5% 1,000 mL infusion   IntraVENous CONTINUOUS    hydrALAZINE (APRESOLINE) tablet 100 mg  100 mg Oral TID    magnesium oxide (MAG-OX) tablet 400 mg  400 mg Oral TID    metoprolol succinate (TOPROL-XL) XL tablet 50 mg  50 mg Oral DAILY    sodium chloride (NS) flush 5-40 mL  5-40 mL IntraVENous Q8H    sodium chloride (NS) flush 5-40 mL  5-40 mL IntraVENous PRN    acetaminophen (TYLENOL) tablet 650 mg  650 mg Oral Q6H PRN    Or    acetaminophen (TYLENOL) suppository 650 mg  650 mg Rectal Q6H PRN    polyethylene glycol (MIRALAX) packet 17 g  17 g Oral DAILY PRN    ondansetron (ZOFRAN ODT) tablet 4 mg  4 mg Oral Q8H PRN    Or    ondansetron (ZOFRAN) injection 4 mg  4 mg IntraVENous Q6H PRN    apixaban (ELIQUIS) tablet 5 mg  5 mg Oral BID    glucose chewable tablet 16 g  4 Tablet Oral PRN    dextrose (D50W) injection syrg 12.5-25 g  25-50 mL IntraVENous PRN    glucagon (GLUCAGEN) injection 1 mg  1 mg IntraMUSCular PRN    ALPRAZolam (XANAX) tablet 0.25 mg  0.25 mg Oral BID    ARIPiprazole (ABILIFY) tablet 15 mg  15 mg Oral DAILY    atorvastatin (LIPITOR) tablet 40 mg  40 mg Oral QHS    citalopram (CELEXA) tablet 20 mg  20 mg Oral DAILY    ferrous sulfate tablet 325 mg  1 Tablet Oral DAILY WITH BREAKFAST    pantoprazole (PROTONIX) tablet 40 mg  40 mg Oral ACB    allopurinoL (ZYLOPRIM) tablet 100 mg  100 mg Oral DAILY    traMADoL (ULTRAM) tablet 50 mg  50 mg Oral Q6H PRN    insulin glargine (LANTUS) injection 25 Units  25 Units SubCUTAneous QHS    insulin lispro (HUMALOG) injection   SubCUTAneous AC&HS         Allergies         Adhesive tape-silicones, Septra [sulfamethoprim], and Sulfa (sulfonamide antibiotics)       Labs/Imaging/Diagnostics      Labs:  Recent Results (from the past 48 hour(s))   GLUCOSE, POC    Collection Time: 08/22/21  9:24 PM   Result Value Ref Range    Glucose (POC) 238 (H) 65 - 117 mg/dL    Performed by Osiris Serna    MAGNESIUM    Collection Time: 08/23/21  6:00 AM   Result Value Ref Range    Magnesium 1.5 (L) 1.6 - 2.4 mg/dL   TROPONIN I    Collection Time: 08/23/21  6:00 AM   Result Value Ref Range    Troponin-I, Qt. <0.05 <6.16 ng/mL   METABOLIC PANEL, BASIC    Collection Time: 08/23/21  6:11 AM   Result Value Ref Range    Sodium 137 136 - 145 mmol/L    Potassium 5.0 3.5 - 5.1 mmol/L    Chloride 100 97 - 108 mmol/L    CO2 25 21 - 32 mmol/L    Anion gap 12 5 - 15 mmol/L    Glucose 257 (H) 65 - 100 mg/dL    BUN 30 (H) 6 - 20 mg/dL    Creatinine 2.83 (H) 0.55 - 1.02 mg/dL    BUN/Creatinine ratio 11 (L) 12 - 20      GFR est AA 21 (L) >60 ml/min/1.73m2    GFR est non-AA 17 (L) >60 ml/min/1.73m2    Calcium 9.1 8.5 - 10.1 mg/dL   CBC W/O DIFF    Collection Time: 08/23/21  6:11 AM   Result Value Ref Range    WBC 7.1 4.4 - 11.3 K/uL    RBC 3.63 (L) 4.50 - 5.90 M/uL    HGB 8.9 (L) 13.5 - 17.5 g/dL    HCT 27.8 (L) 36 - 46 %    MCV 76.5 (L) 80 - 100 FL    MCH 24.5 (L) 31 - 34 PG    MCHC 32.0 31.0 - 36.0 g/dL    RDW 19.4 (H) 11.5 - 14.5 %    PLATELET 117 255 - 089 K/uL    MPV 8.8 6.5 - 11.5 FL    NRBC 0.1  WBC    ABSOLUTE NRBC 0.01 K/uL   GLUCOSE, POC    Collection Time: 08/23/21  8:43 AM   Result Value Ref Range    Glucose (POC) 277 (H) 65 - 117 mg/dL    Performed by Erika CRABTREE    GLUCOSE, POC    Collection Time: 08/23/21 11:58 AM   Result Value Ref Range    Glucose (POC) 246 (H) 65 - 117 mg/dL    Performed by Cassius Beckman    ECHO ADULT COMPLETE    Collection Time: 08/23/21  2:00 PM   Result Value Ref Range    LV ED Vol A2C 70.06 mL    IVSd 1.07 (A) 0.60 - 0.90 cm    LVIDd 4.00 3.90 - 5.30 cm    LVIDs 2.43 cm    LVOT d 1.89 cm    LVPWd 1.09 (A) 0.60 - 0.90 cm    LVOT SV 49.8 mL    LVOT SV 49.8 mL    BP EF 61.8 55.0 - 100.0 percent    LV Ejection Fraction MOD 2C 60 percent    LV Ejection Fraction MOD 4C 65 percent    LV ED Vol BP 62.64 56.0 - 104.0 mL    LV ES Vol A2C 20.39 mL    LV ES Vol BP 23.93 19.0 - 49.0 mL    LVOT Peak Gradient 6.28 mmHg    Left Ventricular Outflow Tract Mean Gradient 3.37 mmHg    LVOT Peak Velocity 125.26 cm/s    LVOT VTI 21.45 cm    RVIDd 2.62 cm    LA Volume 36.37 22.0 - 52.0 mL    LA Vol 2C 36.45 22.00 - 52.00 mL    LA Vol 4C 30.64 22.00 - 52.00 mL    Aortic Valve Area by Continuity of Peak Velocity 1.86 cm2    Aortic Valve Area by Continuity of VTI 1.94 cm2    Aortic Valve Area by Continuity of VTI 1.94 cm2    AoV PG 14.24 mmHg    Aortic Valve Systolic Mean Gradient 5.88 mmHg    Aortic Valve Systolic Peak Velocity 628.41 cm/s    Aortic valve mean velocity 125.16 cm/s    AoV VTI 31.00 cm    MV A Yonatan 98.27 cm/s    Mitral Valve E Wave Deceleration Time 185.01 ms    MV E Yonatan 93.02 cm/s    MV E/A 0.95     Mitral Valve Pressure Half-time 53.65 ms    MVA (PHT) 4.10 cm2    MVA (PHT) 4.10 cm2    Ao Root D 3.30 cm    LV Mass .8 67.0 - 162.0 g    LV Mass AL Index 72.0 43.0 - 95.0 g/m2    Right Atrial Area 4C 11.0 cm2    LVES Vol Index BP 12.1 mL/m2    LVED Vol Index BP 31.8 mL/m2    LA Vol Index 18.46 16.00 - 28.00 ml/m2    LA Vol Index 18.50 16.00 - 28.00 ml/m2    LA Vol Index 15.55 16.00 - 28.00 ml/m2    LVED Vol Index A2C 35.6 mL/m2    LVES Vol Index A2C 10.4 mL/m2    VALDEMAR/BSA Pk Yonatan 0.9 cm2/m2   GLUCOSE, POC    Collection Time: 08/23/21  4:28 PM   Result Value Ref Range    Glucose (POC) 264 (H) 65 - 117 mg/dL    Performed by Greg CRABTREE    GLUCOSE, POC    Collection Time: 08/23/21  9:22 PM   Result Value Ref Range    Glucose (POC) 272 (H) 65 - 117 mg/dL    Performed by [address]    Collection Time: 08/24/21  5:58 AM   Result Value Ref Range    Magnesium 2.1 1.6 - 2.4 mg/dL   METABOLIC PANEL, BASIC    Collection Time: 08/24/21  5:58 AM   Result Value Ref Range    Sodium 131 (L) 136 - 145 mmol/L    Potassium 6.6 (HH) 3.5 - 5.1 mmol/L    Chloride 96 (L) 97 - 108 mmol/L    CO2 25 21 - 32 mmol/L    Anion gap 10 5 - 15 mmol/L    Glucose 320 (H) 65 - 100 mg/dL    BUN 43 (H) 6 - 20 mg/dL    Creatinine 3.86 (H) 0.55 - 1.02 mg/dL    BUN/Creatinine ratio 11 (L) 12 - 20      GFR est AA 14 (L) >60 ml/min/1.73m2    GFR est non-AA 12 (L) >60 ml/min/1.73m2    Calcium 9.2 8.5 - 10.1 mg/dL   CBC WITH AUTOMATED DIFF    Collection Time: 08/24/21  5:58 AM   Result Value Ref Range    WBC 7.6 4.4 - 11.3 K/uL    RBC 3.71 (L) 4.50 - 5.90 M/uL    HGB 9.2 (L) 13.5 - 17.5 g/dL    HCT 28.8 (L) 36 - 46 %    MCV 77.5 (L) 80 - 100 FL    MCH 24.7 (L) 31 - 34 PG    MCHC 31.8 31.0 - 36.0 g/dL    RDW 19.0 (H) 11.5 - 14.5 %    PLATELET 195 925 - 766 K/uL    MPV 9.0 6.5 - 11.5 FL    NRBC 0.1  WBC    ABSOLUTE NRBC 0.01 K/uL    NEUTROPHILS 66 42 - 75 %    LYMPHOCYTES 25 20.5 - 51.1 %    MONOCYTES 6 1.7 - 9.3 %    EOSINOPHILS 2 0.9 - 2.9 %    BASOPHILS 1 0.0 - 2.5 %    ABS. NEUTROPHILS 5.1 1.8 - 7.7 K/UL    ABS. LYMPHOCYTES 1.9 1.0 - 4.8 K/UL    ABS. MONOCYTES 0.5 0.2 - 2.4 K/UL    ABS. EOSINOPHILS 0.1 0.0 - 0.7 K/UL    ABS.  BASOPHILS 0.1 0.0 - 0.2 K/UL   GLUCOSE, POC Collection Time: 08/24/21  7:48 AM   Result Value Ref Range    Glucose (POC) 287 (H) 65 - 117 mg/dL    Performed by Santa CRABTREE    RENAL FUNCTION PANEL    Collection Time: 08/24/21 12:00 PM   Result Value Ref Range    Sodium 135 (L) 136 - 145 mmol/L    Potassium 5.4 (H) 3.5 - 5.1 mmol/L    Chloride 98 97 - 108 mmol/L    CO2 23 21 - 32 mmol/L    Anion gap 14 5 - 15 mmol/L    Glucose 278 (H) 65 - 100 mg/dL    BUN 43 (H) 6 - 20 mg/dL    Creatinine 3.87 (H) 0.55 - 1.02 mg/dL    BUN/Creatinine ratio 11 (L) 12 - 20      GFR est AA 14 (L) >60 ml/min/1.73m2    GFR est non-AA 12 (L) >60 ml/min/1.73m2    Calcium 9.1 8.5 - 10.1 mg/dL    Phosphorus 5.7 (H) 2.6 - 4.7 mg/dL    Albumin 3.6 3.5 - 5.0 g/dL   GLUCOSE, POC    Collection Time: 08/24/21 12:10 PM   Result Value Ref Range    Glucose (POC) 291 (H) 65 - 117 mg/dL    Performed by Santa CRABTREE    PROTEIN/CREATININE RATIO, URINE    Collection Time: 08/24/21  1:50 PM   Result Value Ref Range    Protein, urine random 154 (H) 0.0 - 11.9 mg/dL    Creatinine, urine 142.69 (A) No reference range has been established. mg/dL    Protein/Creat.  urine Ratio 1.1     CHLORIDE, URINE RANDOM    Collection Time: 08/24/21  1:50 PM   Result Value Ref Range    Chloride,urine random 80 mmol/L   SODIUM, UR, RANDOM    Collection Time: 08/24/21  1:50 PM   Result Value Ref Range    Sodium,urine random 84 mmol/L   POTASSIUM, UR, RANDOM    Collection Time: 08/24/21  1:50 PM   Result Value Ref Range    Potassium urine, random 33 mmol/L   CREATININE, UR, RANDOM    Collection Time: 08/24/21  1:50 PM   Result Value Ref Range    Creatinine, urine 143.67 (A) No reference range has been established. mg/dL   URINALYSIS W/MICROSCOPIC    Collection Time: 08/24/21  1:50 PM   Result Value Ref Range    Color Yellow/Straw      Appearance Clear Clear      Specific gravity 1.025 1.003 - 1.030      pH (UA) 5.5 5.0 - 8.0      Protein 100 (A) Negative mg/dL    Glucose 250 (A) Negative mg/dL    Ketone Negative Negative mg/dL    Bilirubin Negative Negative      Blood Trace (A) Negative      Urobilinogen 0.2 0.2 - 1.0 EU/dL    Nitrites Negative Negative      Leukocyte Esterase Small (A) Negative      WBC 10-20 0 - 5 /hpf    RBC 10-20 0 - 3 /hpf    Bacteria 1+ (A) Negative /hpf    Amorphous Crystals 2+ (A) Negative   GLUCOSE, POC    Collection Time: 08/24/21  3:52 PM   Result Value Ref Range    Glucose (POC) 220 (H) 65 - 117 mg/dL    Performed by Eva CRABTREE         Imaging:  NM LUNG SCAN PERF    Result Date: 8/23/2021  No perfusion defect definitively identified. XR CHEST PORT    Result Date: 8/22/2021  No interval change. Assessment//Plan           Problem List:  Hospital Problems  Date Reviewed: 7/7/2021        Codes Class Noted POA    * (Principal) Chest pain ICD-10-CM: R07.9  ICD-9-CM: 786.50  8/22/2021 Unknown            Chest pain  -Continue monitor on telemetry initial and repeat troponins are negative and no ischemic changes on EKG  -Cardiology consult appreciated and will continue further outpatient ischemic work-up    Hypertension  -Elevated morning and Lasix discontinued as echo shows IVC not dilated  -Increased hydralazine 000 mg 3 times daily and continued on metoprolol succinate 50 mg and amlodipine 2.5 mg  -Follow 2D echo which shows preserved EF    ARF  -Due to IV diuresis initial creatinine 2.43 increased to 2.83 then up to 326 and repeat still at 3.87  -Continue current IV fluids with bicarbonate  -Neurology consult and recommendations appreciated  -Renal ultrasound  -Monitor input output and daily weights    Hypokalemia  -Please to 6.6 and Kayexalate dose x1 given no EKG changes noted repeat potassium at 5.4 and will give additional Kayexalate dose x1  -Repeat labs in a.m.     HLD  -Continue atorvastatin 40 mg at bedtime  -Lipid profile pending    Elevated D-dimer  -Mildly elevated 0.69 and with episode of chest pain nuclear medicine VQ scan was done and found to be negative for PE    Hypomagnesemia  -Low at 1.5 and 2 g IV magnesium sulfate rider given and patient takes magnesium oxide 400 mg 3 times a day chronically and repeat magnesium is at 2.1      Acute on chronic CKD  -Appears to be at baseline creatinine and follows with Dr. Kayleen Hashimoto  -Continue bicarb supplementation and IV fluids  -Monitor closely  -Nephrology consultation    Chronic anemia  -8.7 and then repeat is 8.9 and stable  -Obtain iron panel  -Has received iron transfusions in the past with nephrology    Obstructive sleep apnea  -Has home CPAP and requested patient to have family bring so that she can use it tonight    Nausea vomiting  -History of GERD presented with single episode of vomiting overnight  -Monitor closely  -Continue pantoprazole    Anxiety  -Continue home alprazolam as needed    Chronic gout  -Continue home allopurinol    Diabetes mellitus 2  -A1c of 8.2 and nutrition consultation appreciated  -Continue home Lantus and regular insulin sliding scale with Accu-Cheks AC at bedtime    Generalized weakness  -Patient evaluated by physical therapy and overall did well and no therapy needs on discharge     GI prophylaxis  -Home pantoprazole    DVT prophylaxis  -Eliquis twice daily    Full Code     Spent 30 minutes evaluting and coordinating patient care of which >50% was spent coordinating and counseling.      Electronically signed by Zarina Villanueva MD on 8/24/2021 at 10:17 PM

## 2021-08-24 NOTE — ROUTINE PROCESS
Pt is c/o hemorrhoids with increased bleeding. The hemorrhoids are not new but the increased bleeding is. she has not been constipated during this admission. she uses a hemorrhoids suppository when at home. Dr. Radha Fitzpatrick advised , Dr. Ibrahima Nieves, rectal suppository   Hydrocortisone.

## 2021-08-24 NOTE — PROGRESS NOTES
Problem: Falls - Risk of  Goal: *Absence of Falls  Description: Document Abbey Roca Fall Risk and appropriate interventions in the flowsheet.   Outcome: Progressing Towards Goal  Note: Fall Risk Interventions:            Medication Interventions: Bed/chair exit alarm    Elimination Interventions: Call light in reach, Patient to call for help with toileting needs    History of Falls Interventions: Bed/chair exit alarm         Problem: Patient Education: Go to Patient Education Activity  Goal: Patient/Family Education  Outcome: Progressing Towards Goal

## 2021-08-24 NOTE — PROGRESS NOTES
Problem: Falls - Risk of  Goal: *Absence of Falls  Description: Document Daryl Fofana Fall Risk and appropriate interventions in the flowsheet.   Outcome: Progressing Towards Goal  Note: Fall Risk Interventions:            Medication Interventions: Bed/chair exit alarm    Elimination Interventions: Call light in reach, Patient to call for help with toileting needs    History of Falls Interventions: Bed/chair exit alarm         Problem: Patient Education: Go to Patient Education Activity  Goal: Patient/Family Education  Outcome: Progressing Towards Goal

## 2021-08-25 VITALS
HEIGHT: 62 IN | TEMPERATURE: 98.3 F | RESPIRATION RATE: 18 BRPM | OXYGEN SATURATION: 91 % | DIASTOLIC BLOOD PRESSURE: 74 MMHG | WEIGHT: 217.1 LBS | BODY MASS INDEX: 39.95 KG/M2 | SYSTOLIC BLOOD PRESSURE: 152 MMHG | HEART RATE: 89 BPM

## 2021-08-25 PROBLEM — E86.9 VOLUME DEPLETION: Status: ACTIVE | Noted: 2021-08-25

## 2021-08-25 PROBLEM — D63.1 ANEMIA IN CHRONIC RENAL DISEASE: Status: ACTIVE | Noted: 2021-08-25

## 2021-08-25 PROBLEM — N17.9 ACUTE KIDNEY INJURY (HCC): Status: ACTIVE | Noted: 2021-08-25

## 2021-08-25 PROBLEM — N18.4 CKD STAGE 4 DUE TO TYPE 2 DIABETES MELLITUS (HCC): Status: ACTIVE | Noted: 2020-12-16

## 2021-08-25 PROBLEM — N18.9 ANEMIA IN CHRONIC RENAL DISEASE: Status: ACTIVE | Noted: 2021-08-25

## 2021-08-25 LAB
ALBUMIN SERPL-MCNC: 3.5 G/DL (ref 3.5–5)
ANION GAP SERPL CALC-SCNC: 10 MMOL/L (ref 5–15)
ATRIAL RATE: 89 BPM
BASOPHILS # BLD: 0.1 K/UL (ref 0–0.2)
BASOPHILS NFR BLD: 1 % (ref 0–2.5)
BUN SERPL-MCNC: 41 MG/DL (ref 6–20)
BUN/CREAT SERPL: 13 (ref 12–20)
CA-I BLD-MCNC: 8.8 MG/DL (ref 8.5–10.1)
CALCULATED P AXIS, ECG09: 48 DEGREES
CALCULATED R AXIS, ECG10: 2 DEGREES
CALCULATED T AXIS, ECG11: 29 DEGREES
CHLORIDE SERPL-SCNC: 96 MMOL/L (ref 97–108)
CO2 SERPL-SCNC: 31 MMOL/L (ref 21–32)
CREAT SERPL-MCNC: 3.13 MG/DL (ref 0.55–1.02)
DIAGNOSIS, 93000: NORMAL
EOSINOPHIL # BLD: 0.1 K/UL (ref 0–0.7)
EOSINOPHIL NFR BLD: 2 % (ref 0.9–2.9)
ERYTHROCYTE [DISTWIDTH] IN BLOOD BY AUTOMATED COUNT: 19.2 % (ref 11.5–14.5)
FERRITIN SERPL-MCNC: 82 NG/ML (ref 26–388)
GLUCOSE BLD STRIP.AUTO-MCNC: 207 MG/DL (ref 65–117)
GLUCOSE BLD STRIP.AUTO-MCNC: 250 MG/DL (ref 65–117)
GLUCOSE SERPL-MCNC: 203 MG/DL (ref 65–100)
HCT VFR BLD AUTO: 28 % (ref 36–46)
HGB BLD-MCNC: 8.9 G/DL (ref 13.5–17.5)
IRON SATN MFR SERPL: 13 % (ref 20–50)
IRON SERPL-MCNC: 43 UG/DL (ref 35–150)
LYMPHOCYTES # BLD: 2.1 K/UL (ref 1–4.8)
LYMPHOCYTES NFR BLD: 30 % (ref 20.5–51.1)
MCH RBC QN AUTO: 24.3 PG (ref 31–34)
MCHC RBC AUTO-ENTMCNC: 31.7 G/DL (ref 31–36)
MCV RBC AUTO: 76.5 FL (ref 80–100)
MONOCYTES # BLD: 0.5 K/UL (ref 0.2–2.4)
MONOCYTES NFR BLD: 7 % (ref 1.7–9.3)
NEUTS SEG # BLD: 4.3 K/UL (ref 1.8–7.7)
NEUTS SEG NFR BLD: 60 % (ref 42–75)
NRBC # BLD: 0.01 K/UL
NRBC BLD-RTO: 0.1 PER 100 WBC
P-R INTERVAL, ECG05: 162 MS
PERFORMED BY, TECHID: ABNORMAL
PERFORMED BY, TECHID: ABNORMAL
PHOSPHATE SERPL-MCNC: 4.4 MG/DL (ref 2.6–4.7)
PLATELET # BLD AUTO: 226 K/UL (ref 150–400)
PMV BLD AUTO: 8.7 FL (ref 6.5–11.5)
POTASSIUM SERPL-SCNC: 4.4 MMOL/L (ref 3.5–5.1)
Q-T INTERVAL, ECG07: 375 MS
QRS DURATION, ECG06: 95 MS
QTC CALCULATION (BEZET), ECG08: 457 MS
RBC # BLD AUTO: 3.66 M/UL (ref 4.5–5.9)
SODIUM SERPL-SCNC: 137 MMOL/L (ref 136–145)
TIBC SERPL-MCNC: 322 UG/DL (ref 250–450)
VENTRICULAR RATE, ECG03: 89 BPM
WBC # BLD AUTO: 7.1 K/UL (ref 4.4–11.3)

## 2021-08-25 PROCEDURE — 82962 GLUCOSE BLOOD TEST: CPT

## 2021-08-25 PROCEDURE — 74011250637 HC RX REV CODE- 250/637: Performed by: NURSE PRACTITIONER

## 2021-08-25 PROCEDURE — 83540 ASSAY OF IRON: CPT

## 2021-08-25 PROCEDURE — 74011250636 HC RX REV CODE- 250/636: Performed by: INTERNAL MEDICINE

## 2021-08-25 PROCEDURE — 94760 N-INVAS EAR/PLS OXIMETRY 1: CPT

## 2021-08-25 PROCEDURE — 85025 COMPLETE CBC W/AUTO DIFF WBC: CPT

## 2021-08-25 PROCEDURE — 74011250637 HC RX REV CODE- 250/637: Performed by: INTERNAL MEDICINE

## 2021-08-25 PROCEDURE — 80069 RENAL FUNCTION PANEL: CPT

## 2021-08-25 PROCEDURE — 82728 ASSAY OF FERRITIN: CPT

## 2021-08-25 PROCEDURE — 74011250637 HC RX REV CODE- 250/637: Performed by: HOSPITALIST

## 2021-08-25 PROCEDURE — 77010033678 HC OXYGEN DAILY

## 2021-08-25 PROCEDURE — 36415 COLL VENOUS BLD VENIPUNCTURE: CPT

## 2021-08-25 PROCEDURE — 74011636637 HC RX REV CODE- 636/637: Performed by: INTERNAL MEDICINE

## 2021-08-25 RX ORDER — ATORVASTATIN CALCIUM 40 MG/1
40 TABLET, FILM COATED ORAL
Qty: 30 TABLET | Refills: 0 | Status: SHIPPED | OUTPATIENT
Start: 2021-08-25 | End: 2022-06-15 | Stop reason: SDUPTHER

## 2021-08-25 RX ORDER — LANOLIN ALCOHOL/MO/W.PET/CERES
325 CREAM (GRAM) TOPICAL
Qty: 30 TABLET | Refills: 0 | Status: SHIPPED | OUTPATIENT
Start: 2021-08-26

## 2021-08-25 RX ORDER — HYDRALAZINE HYDROCHLORIDE 50 MG/1
100 TABLET, FILM COATED ORAL 3 TIMES DAILY
Qty: 90 TABLET | Refills: 0 | Status: SHIPPED | OUTPATIENT
Start: 2021-08-25 | End: 2022-01-31

## 2021-08-25 RX ORDER — HYDROCORTISONE ACETATE 25 MG/1
25 SUPPOSITORY RECTAL EVERY 12 HOURS
Qty: 20 SUPPOSITORY | Refills: 0 | Status: SHIPPED | OUTPATIENT
Start: 2021-08-25 | End: 2021-09-04

## 2021-08-25 RX ADMIN — Medication 10 ML: at 05:44

## 2021-08-25 RX ADMIN — PANTOPRAZOLE SODIUM 40 MG: 40 TABLET, DELAYED RELEASE ORAL at 07:51

## 2021-08-25 RX ADMIN — APIXABAN 5 MG: 5 TABLET, FILM COATED ORAL at 08:40

## 2021-08-25 RX ADMIN — INSULIN LISPRO 6 UNITS: 100 INJECTION, SOLUTION INTRAVENOUS; SUBCUTANEOUS at 11:38

## 2021-08-25 RX ADMIN — EPOETIN ALFA-EPBX 4000 UNITS: 4000 INJECTION, SOLUTION INTRAVENOUS; SUBCUTANEOUS at 08:40

## 2021-08-25 RX ADMIN — HYDROCORTISONE ACETATE 25 MG: 25 SUPPOSITORY RECTAL at 08:41

## 2021-08-25 RX ADMIN — ACETAMINOPHEN 650 MG: 325 TABLET ORAL at 03:55

## 2021-08-25 RX ADMIN — ALLOPURINOL 100 MG: 100 TABLET ORAL at 08:40

## 2021-08-25 RX ADMIN — HYDRALAZINE HYDROCHLORIDE 100 MG: 100 TABLET, FILM COATED ORAL at 08:40

## 2021-08-25 RX ADMIN — ONDANSETRON 4 MG: 4 TABLET, ORALLY DISINTEGRATING ORAL at 04:33

## 2021-08-25 RX ADMIN — METOPROLOL SUCCINATE 50 MG: 50 TABLET, FILM COATED, EXTENDED RELEASE ORAL at 08:40

## 2021-08-25 RX ADMIN — INSULIN LISPRO 4 UNITS: 100 INJECTION, SOLUTION INTRAVENOUS; SUBCUTANEOUS at 07:52

## 2021-08-25 RX ADMIN — ARIPIPRAZOLE 15 MG: 10 TABLET ORAL at 08:40

## 2021-08-25 RX ADMIN — MAGNESIUM OXIDE 400 MG: 400 TABLET ORAL at 08:40

## 2021-08-25 RX ADMIN — FERROUS SULFATE TAB 325 MG (65 MG ELEMENTAL FE) 325 MG: 325 (65 FE) TAB at 07:51

## 2021-08-25 RX ADMIN — ALPRAZOLAM 0.25 MG: 0.25 TABLET ORAL at 08:41

## 2021-08-25 NOTE — PROGRESS NOTES
Progress Note    Patient: Arcenio Llanos MRN: [id number]  SSN: [id number]    YOB: 1960  Age: 63 y.o. Sex: female      Admit Date: 8/22/2021    LOS: 3 days     Subjective:     Patient seen and examined. Arcenio Llanos is a 63y.o. year-old female with past medical history significant for Anxiety, Bipolar Disorder, HTN, DM,  Liver Fibrosis, EFRAÍN (compliant with CPAP), Anemia (requiring recent iron transfusions), and CKD (followed by [facility]) who is followed for Chest Pain. This am patient denies having any pain. She reports that SOB has improved. No other new complaints. Does have small RANDI on CKD from Diuresis has improved with hydration over the past 24 hours. Appreciate Nephrology input. Telemetry Review: SR    Review of Symptoms:   Review of Systems   Constitutional: Positive for malaise/fatigue. HENT: Negative. Eyes: Negative. Cardiovascular: Negative. Respiratory: Negative. Skin: Negative. Gastrointestinal: Negative. Genitourinary: Negative. Neurological: Negative. Objective:     Vitals:    08/25/21 0055 08/25/21 0529 08/25/21 0631 08/25/21 0830   BP: (!) 149/79 (!) 163/84  (!) 146/82   Pulse: 96 94  91   Resp: 18 18  18   Temp: 97.2 °F (36.2 °C) 98.9 °F (37.2 °C)  98.4 °F (36.9 °C)   SpO2: 92% 95%  93%   Weight:   98.5 kg (217 lb 1.6 oz)    Height:            Intake and Output:  Current Shift: No intake/output data recorded. Last three shifts: 08/23 1901 - 08/25 0700  In: -   Out: 900 [Urine:900]    Physical Exam:   General: Well nourished chronically ill appearing female lying in bed, NAD, A&O  HEENT: Normocephalic, PERRL, no drainage  Neck: Supple, Trachea midline, No JVD  RESP: CTA bilaterally. + Symmetrical chest movement. No SOB or distress. On O2 via NC.   Cardiovascular: RRR no MRG  PVS: No rubor, cyanosis, no edema, Radial, DP, PT pulses equal bilaterally  ABD: obese/protuberant, soft, NT, Normoactive BS  Derm: Warm/Dry/Intact with no lesions, normal turgor  Neuro: A&O PPTS, cranial nerves II- XII grossly intact via interaction with patient.  No focal deficits  PSYCH: No anxiety or agitation      Lab/Data Review:  BMP:   Lab Results   Component Value Date/Time     08/25/2021 05:49 AM    K 4.4 08/25/2021 05:49 AM    CL 96 (L) 08/25/2021 05:49 AM    CO2 31 08/25/2021 05:49 AM    AGAP 10 08/25/2021 05:49 AM     (H) 08/25/2021 05:49 AM    BUN 41 (H) 08/25/2021 05:49 AM    CREA 3.13 (H) 08/25/2021 05:49 AM    GFRAA 18 (L) 08/25/2021 05:49 AM    GFRNA 15 (L) 08/25/2021 05:49 AM            Current Facility-Administered Medications:     sodium bicarbonate (8.4%) 150 mEq in dextrose 5% 1,000 mL infusion, , IntraVENous, CONTINUOUS, Oksana Boggs MD, Last Rate: 100 mL/hr at 08/24/21 2255, New Bag at 08/24/21 2255    hydrocortisone (ANUSOL-HC) suppository 25 mg, 25 mg, Rectal, Q12H, Rakan Diamond MD, 25 mg at 08/25/21 0841    hydrALAZINE (APRESOLINE) tablet 100 mg, 100 mg, Oral, TID, Abhinav Scott, NP, 100 mg at 08/25/21 0840    magnesium oxide (MAG-OX) tablet 400 mg, 400 mg, Oral, TID, Rakan Diamond MD, 400 mg at 08/25/21 0840    metoprolol succinate (TOPROL-XL) XL tablet 50 mg, 50 mg, Oral, DAILY, Rakan Diamond MD, 50 mg at 08/25/21 0840    sodium chloride (NS) flush 5-40 mL, 5-40 mL, IntraVENous, Q8H, Vandana Desai MD, 10 mL at 08/25/21 0544    sodium chloride (NS) flush 5-40 mL, 5-40 mL, IntraVENous, PRN, Vandana Desai MD    acetaminophen (TYLENOL) tablet 650 mg, 650 mg, Oral, Q6H PRN, 650 mg at 08/25/21 0355 **OR** acetaminophen (TYLENOL) suppository 650 mg, 650 mg, Rectal, Q6H PRN, Vandana Desai MD    polyethylene glycol (MIRALAX) packet 17 g, 17 g, Oral, DAILY PRN, Vandana Desai MD    ondansetron (ZOFRAN ODT) tablet 4 mg, 4 mg, Oral, Q8H PRN, 4 mg at 08/25/21 0435 **OR** ondansetron (ZOFRAN) injection 4 mg, 4 mg, IntraVENous, Q6H PRN, Vandana Desai MD, 4 mg at 08/24/21 2868   apixaban (ELIQUIS) tablet 5 mg, 5 mg, Oral, BID, Monisha Calderon MD, 5 mg at 08/25/21 0840    glucose chewable tablet 16 g, 4 Tablet, Oral, PRN, Monisha Calderon MD    dextrose (D50W) injection syrg 12.5-25 g, 25-50 mL, IntraVENous, PRN, Monisha Calderon MD    glucagon (GLUCAGEN) injection 1 mg, 1 mg, IntraMUSCular, PRN, Monisha Calderon MD    ALPRAZolam Aretta Herd) tablet 0.25 mg, 0.25 mg, Oral, BID, Monisha Calderon MD, 0.25 mg at 08/25/21 0841    ARIPiprazole (ABILIFY) tablet 15 mg, 15 mg, Oral, DAILY, Monisha Calderon MD, 15 mg at 08/25/21 0840    atorvastatin (LIPITOR) tablet 40 mg, 40 mg, Oral, QHS, Monisha Calderon MD, 40 mg at 08/24/21 2202    citalopram (CELEXA) tablet 20 mg, 20 mg, Oral, DAILY, Monisha Calderon MD, 20 mg at 08/24/21 1071    ferrous sulfate tablet 325 mg, 1 Tablet, Oral, DAILY WITH BREAKFAST, Monisha Calderon MD, 325 mg at 08/25/21 0751    pantoprazole (PROTONIX) tablet 40 mg, 40 mg, Oral, ACB, Monisha Calderon MD, 40 mg at 08/25/21 0751    allopurinoL (ZYLOPRIM) tablet 100 mg, 100 mg, Oral, DAILY, Monisha Calderon MD, 100 mg at 08/25/21 0840    traMADoL (ULTRAM) tablet 50 mg, 50 mg, Oral, Q6H PRN, Monisha Calderon MD, 50 mg at 08/24/21 2255    insulin glargine (LANTUS) injection 25 Units, 25 Units, SubCUTAneous, QHS, Monisha Calderon MD, 25 Units at 08/24/21 2203    insulin lispro (HUMALOG) injection, , SubCUTAneous, AC&HS, Monisha Calderon MD, 4 Units at 08/25/21 2430      Assessment:     Principal Problem:    Chest pain (8/22/2021)    Active Problems:    Acute kidney injury ([facility] [address] (8/25/2021)      Overview: In the setting of baseline CKD, and likely a result of volume depletion       given concurrent high BUN, and significant hyperglycemia and glucosuria.              Hypertension (6/22/2020)      Anemia in chronic renal disease (8/25/2021)      Overview: Hb at 8.9       No recent iron studies            Volume depletion (8/25/2021)      Overview: As evidenced by clinical presentation, and lab findings of elevated CO2,       BUN and high urinary specific gravity      History of naproxen use        Plan:     Case discussed with Collaborating physician Dr. Jackie Bennett and our recommendations are as follows:     1. Chest Pain: ACS ruled out per EKG and Troponin criteria. TTE negative for acute pathology. Given risk factors and sx would benefit from further ischemic evaluation, that can be completed in OP setting. Would recommend further risk factor modification and control of BP.    2. HTN:  BP closer to goal.  Will keep Hydralazine at 100mg TID. IV Lasix stopped 8/23 as patient was not hypervolemic, BNP not elevated to appropriate range given renal function, and IVC was normal in size on prelim TTE. Continue BB for HR control. May consider transition to Coreg from Toprol XL for better control if BP does not improve with cessation of IVF. 3. Hypomagnesemia - keep @ goal >2. Repeat labs in am. Continue to monitor potassium level given elevated today requiring treatment. 4. RANDI on CKD:  likely s/t diuresis. Appreciate Nephrology input and management. Hydration over past 24 hours has improved renal function to 3.13 this am. No longer hyperkalemic. Monitor renal function and electrolytes closely. 5. Chronic Anemia - reports receiving iron transfusions recently, with last dose last week. Managed by Nephrology. Thank you for involving us in the care of this patient. Please do not hesitate to call if additional questions arise.  If after hours please call [phone]    Signed By: Juliet Diaz NP     August 25, 2021

## 2021-08-25 NOTE — DISCHARGE SUMMARY
Physician Discharge Summary       Patient: Carlitos Buckley MRN: [id number]     YOB: 1960  Age: 63 y.o. Sex: female    PCP: Elie Riddle PA-C    Allergies: Adhesive tape-silicones, Septra [sulfamethoprim], and Sulfa (sulfonamide antibiotics)    Admit date: 8/22/2021  Admitting Provider: Janice Vasquez MD    Discharge date: 8/25/2021  Discharging Provider: Bassam Dixon MD    * Admission Diagnoses:   Chest pain [R07.9]      * Discharge Diagnoses:    Hospital Problems as of 8/25/2021 Date Reviewed: 7/7/2021        Codes Class Noted - Resolved POA    * (Principal) Acute kidney injury [facility]) ICD-10-CM: N17.9  ICD-9-CM: 584.9  8/25/2021 - Present Unknown    Overview Signed 8/25/2021  7:54 AM by Jeronimo Fox MD     In the setting of baseline CKD, and likely a result of volume depletion given concurrent high BUN, and significant hyperglycemia and glucosuria.                 Anemia in chronic renal disease ICD-10-CM: N18.9, D63.1  ICD-9-CM: 285.21  8/25/2021 - Present Unknown    Overview Signed 8/25/2021  7:56 AM by Jeronimo Fox MD     Hb at 8.9   No recent iron studies               Volume depletion ICD-10-CM: E86.9  ICD-9-CM: 276.50  8/25/2021 - Present Unknown    Overview Addendum 8/25/2021  8:01 AM by Jeronimo Fox MD     As evidenced by clinical presentation, and lab findings of elevated CO2, BUN and high urinary specific gravity  History of naproxen use             Chest pain ICD-10-CM: R07.9  ICD-9-CM: 786.50  8/22/2021 - Present Unknown        Hypertension ICD-10-CM: I10  ICD-9-CM: 401.9  6/22/2020 - Present Yes                * Hospital Course:   HPI:  Sully Monge a 63 y.o. female  has a past medical history of Anxiety (6/22/2020), Bipolar 1 disorder ([facility] [address] (1/23/2018), [facility] (6/22[facility]), Hypertension (6/22/2020), Liver fibrosis (6/22/2020), Major depression (6/22/2020), Migraine (6/22/2020), Sleep apnea (1/23/2018), and [facility] ([facility]).   Patient presents with over 6-week complaint of sided chest pain that she describes as constant, unremitting that began initially as mild but has been worsening.  Patient reports falling on her bathroom and hitting her commode about 6 weeks ago. May Bouche any associated symptoms of nausea, diaphoresis and patient has been reporting shortness of breath though not necessarily associated with chest pain.  There are no aggravating or relieving factors with the chest pain.  Patient also reports dyspnea on exertion for the past few weeks along with paroxysmal nocturnal dyspnea and orthopnea. Patient states she has had an 11 pound weight gain over the past few weeks but denies any swelling or change in her eating habits.  Patient states she has been taking Tylenol which has not helped any and has not been taking any other pain meds.  Patient's sister, however, pulled me aside and volunteered to me that patient does not do anything that she is instructed to do by the doctors, not taking her medications, eating anything she wants, laying down in bed all day and even taking Naprosyn which she is aware that she is not to do. Also states she has been told by patient's counselor that she is attention seeking though this is not documented on psych notes and wants to make sure that patient does not know this information is coming from her. Patient was seen in the ER about 2 weeks ago for this chest pain and had a x-ray that was normal and was sent home with documentation that she was chest pain-free. Patient denies any cardiac or Pulmonary history. Evaluation in the ER revealed normal chest x-ray, EKG, troponin with a slightly elevated D-dimer. On my exam patient's chest pain is reproducible making it more likely to be musculoskeletal in nature but given D-dimer and shortness of breath will admit and start on anticoagulation with plans to get a VQ scan.  Given the shortness of breath, PND and weight gain plan to work-up with a 2D echo and cardiology consult     Chest pain  -Continue monitor on telemetry initial and repeat troponins are negative and no ischemic changes on EKG  -Cardiology consult appreciated and will continue further outpatient ischemic work-up and will follow with Dr. Marine Teran [place]     Hypertension  -Elevated morning and Lasix discontinued as echo shows IVC not dilated  -Increased hydralazine 100 mg 3 times daily and continued on metoprolol succinate 50 mg and amlodipine 2.5 mg  -On discharge will continue 100 mg oral 3 times daily of hydralazine and metoprolol succinate 50 discontinue amlodipine and will reevaluate with nephrology with repeat labs and need for as needed diuretic     ARF on CKD  -Due to IV diuresis initial creatinine 2.43 increased to 2.83 then up to 3.26 and repeat still at 3.87 and with continued IV fluids with bicarbonate repeat creatinine is at 3.13. Patient has good p.o. intake no further episodes of nausea vomiting encourage p.o. fluid intake and with ultrasound which did not show any hydronephrosis.   Discussed with nephrology as patient wishes to go home and okay to discharge patient home with close follow-up with Dr. Amarilys Disla and have repeat labs to be drawn at that time    Hyperkalemia  -Please to 6.6 and Kayexalate dose x1 given no EKG changes noted repeat potassium at 5.4 and will give additional Kayexalate dose x1 and additional dose was given and repeat potassium on 8/25 was 4.4       HLD  -Continue atorvastatin 40 mg at bedtime     Elevated D-dimer  -Mildly elevated 0.69 and with episode of chest pain nuclear medicine VQ scan was done and found to be negative for PE     Hypomagnesemia  -Low at 1.5 and 2 g IV magnesium sulfate rider given and patient takes magnesium oxide 400 mg 3 times a day chronically and repeat magnesium is at 2.1 continue home supplementation        Chronic anemia  -8.7 and then repeat is 8.9 and stable  -Obtain iron panel  -Has received iron transfusions in the past with nephrology     Obstructive sleep apnea  -Has home CPAP and requested patient to have family bring so that she can use it tonight     Nausea vomiting  -History of GERD presented with single episode of vomiting overnight  -Monitor closely  -Continue pantoprazole patient tolerating p.o. intake with no further episodes of nausea vomiting since initial admission     Anxiety  -Continue home alprazolam as needed     Chronic gout  -Continue home allopurinol     Diabetes mellitus 2  -A1c of 8.2 and nutrition consultation appreciated  -Continue home Lantus and regular insulin sliding scale with Accu-Cheks AC at bedtime     Generalized weakness  -Patient evaluated by physical therapy and overall did well and no therapy needs on discharge         * Procedures:   * No surgery found *        Consults:   Cardiology : 8/23  Case discussed with collaborating physician Dr. Michi Lane and our impression and recommendations are as follows:   1. Chest Pain: ACS ruled out per EKG and Troponin criteria. TTE pending. Given risk factors and sx would benefit from further ischemic evaluation, that can be completed in OP setting. Would recommend further risk factor modification and control of BP.    2. HTN:  BP above goal.  Will increase Hydralazine to 100mg TID. Stop IV Lasix as patient is not hypervolemic, BNP not elevated to appropriate range given renal function, and IVC was normal in size on prelim TTE. 3. Hypomagnesemia - replete to goal >2. Repeat labs in am. Continue to monitor potassium level. 4. CKD: Will need close follow-up with Nephrology in OP setting. 5. Chronic Anemia - reports receiving iron transfusions recently, with last dose last week. Managed by Nephrology.          Thank you for involving us in the care of this patient. Please do not hesitate to call if additional questions arise.  If after hours please call [phone]    Cardiology follow up 8/25:  Assessment:    Principal Problem:    Chest pain (8/22/2021)     Active Problems:    Acute kidney injury ([address] (8/25/2021)      Overview: In the setting of baseline CKD, and likely a result of volume depletion       given concurrent high BUN, and significant hyperglycemia and glucosuria.              Hypertension (6/22/2020)       Anemia in chronic renal disease (8/25/2021)      Overview: Hb at 8.9       No recent iron studies             Volume depletion (8/25/2021)      Overview: As evidenced by clinical presentation, and lab findings of elevated CO2,       BUN and high urinary specific gravity      History of naproxen use           Plan:      Case discussed with Collaborating physician Dr. Nette Tinajero and our recommendations are as follows:      1. Chest Pain: ACS ruled out per EKG and Troponin criteria. TTE negative for acute pathology. Given risk factors and sx would benefit from further ischemic evaluation, that can be completed in OP setting.  Would recommend further risk factor modification and control of BP.    2. HTN:  BP closer to goal. Eliezer Gonzalez keep Hydralazine at 100mg TID.  IV Lasix stopped 8/23 as patient was not hypervolemic, BNP not elevated to appropriate range given renal function, and IVC was normal in size on prelim TTE. Continue BB for HR control. May consider transition to Coreg from Toprol XL for better control if BP does not improve with cessation of IVF. 3. Hypomagnesemia - keep @ goal >2.   Repeat labs in am. Continue to monitor potassium level given elevated today requiring treatment. 4. RANDI on CKD:  likely s/t diuresis. Appreciate Nephrology input and management. Hydration over past 24 hours has improved renal function to 3.13 this am. No longer hyperkalemic. Monitor renal function and electrolytes closely. 5. Chronic Anemia - reports receiving iron transfusions recently, with last dose last week. Managed by Nephrology.      Thank you for involving us in the care of this patient.   Please do not hesitate to call if additional questions arise. If after hours please call [phone]         Consult Orders   IP CONSULT TO NEPHROLOGY [[id number]] ordered by Johnson Morataya MD          Expand AllCollapse All      []Hide copied text    []Jorge for details     Consult Date: 8/25/2021     IP CONSULT TO NEPHROLOGY  Consult performed by: Glenis Suresh MD  Consult ordered by: Johnson Morataya MD           Subjective    This is a 42-year-old female with past medical history of diabetes mellitus type 2, hypertension, and CKD stage IV, who follows with [facility] for management of chronic kidney disease. Patient's baseline estimated GFR is close to 25 to 30 mL/min. Presented with chest pain that was present for 6 weeks and started following a fall     Noted to have some psychiatric issues.   Patient taking naproxen           Potassium of 6.6  Sodium of 131  BUN 43  Creatinine of 3.86  Hemoglobin of 9.2  Baseline CKD with EGFR estimated at 26 mL/min  3+ proteinuria on UA; 2 g proteinuria     Not on ACE inhibitors     Creatinine increased from 2.83 to 3.86     Echo noted to be unremarkable        Anemia in chronic renal disease  Obtain iron studies  Epogen 6000 units q2 weeks initiation      Volume depletion  Underwent fluid resuscitation at presentation to manage volume depletion, RANDI and hyperkalemia     Can consider backing off on IV fluids at this point     Acute kidney injury ([address]  Initial IV fluids resuscitation with fluid responsiveness  Renal US noted -no hydronephrosis  Urine electrolytes appear confounded given IV fluids and baseline CKD           Hypertension  Currently on hydralazine 100 mg tid and metoprolol 50mg daily  BP slighlty elevated compared to goal of <150/90 while inpatient  Continue with current management at this time       PHYSICAL THERAPY EVALUATION/DISCHARGE  Patient: Zoran Munson (48 y.o. female)  Date: 8/23/2021  Primary Diagnosis: Chest pain [R07.9]       Precautions:       ASSESSMENT  Based on the objective data described below, the patient presents with generalized LE weakness however upon completion of initial evaluation pt does not present with limitations in functional mobility. Pt was able to perform bed mobility tasks independently and only required supervision with performance of transfers, ambulation and stair negotiation. Pt performed functional mobility tasks without use of supplemental O2 and upon completion pt O2sat was 95%.      Other factors to consider for discharge: Pt lives with sister who is able to assist with performance of household ADLs. Further skilled acute physical therapy is not indicated at this time.      PLAN :  Recommendation for discharge: (in order for the patient to meet his/her long term goals)  No skilled physical therapy/ follow up rehabilitation needs identified at this time.     This discharge recommendation:  Has been made in collaboration with the attending provider and/or case management     IF patient discharges home will need the following DME: none          Vitals Last 24 Hours:  Patient Vitals for the past 24 hrs:   Temp Pulse Resp BP SpO2   08/25/21 0830 98.4 °F (36.9 °C) 91 18 (!) 146/82 93 %   08/25/21 0529 98.9 °F (37.2 °C) 94 18 (!) 163/84 95 %   08/25/21 0055 97.2 °F (36.2 °C) 96 18 (!) 149/79 92 %   08/24/21 2020 98 °F (36.7 °C) 94 18 (!) 151/65 93 %   08/24/21 2016     96 %   08/24/21 1708 97.8 °F (36.6 °C) 80 18 134/78 93 %   08/24/21 1606 98.3 °F (36.8 °C) 93  (!) 160/85 99 %   08/24/21 1445     95 %   08/24/21 1218 98 °F (36.7 °C) 95  (!) 155/72 96 %        Discharge Exam:  General: Alert, cooperative, no distress, appears stated age. Head:  Normocephalic, without obvious abnormality, atraumatic. Eyes:  Conjunctivae/corneas clear. Pupils equal, round, reactive to light. Extraocular movements intact. Lungs:  Clear to auscultation bilaterally. no wheeze, rales, crackles, rhonchi   Chest wall: No tenderness or deformity. Heart:  Regular rate and rhythm, S1, S2 normal, no murmur, click, rub or gallop. Abdomen:  Soft, non-tender. Bowel sounds normal. No masses,  No organomegaly. Extremities: Extremities normal, atraumatic, no cyanosis or edema. Pulses: 2+ and symmetric all extremities. Skin: Skin color, texture, turgor normal. No rashes or lesions  Neurologic: Awake, Alert, oriented. No obvious gross sensory or motor deficits    Labs:  Recent Results (from the past 24 hour(s))   RENAL FUNCTION PANEL    Collection Time: 08/24/21 12:00 PM   Result Value Ref Range    Sodium 135 (L) 136 - 145 mmol/L    Potassium 5.4 (H) 3.5 - 5.1 mmol/L    Chloride 98 97 - 108 mmol/L    CO2 23 21 - 32 mmol/L    Anion gap 14 5 - 15 mmol/L    Glucose 278 (H) 65 - 100 mg/dL    BUN 43 (H) 6 - 20 mg/dL    Creatinine 3.87 (H) 0.55 - 1.02 mg/dL    BUN/Creatinine ratio 11 (L) 12 - 20      GFR est AA 14 (L) >60 ml/min/1.73m2    GFR est non-AA 12 (L) >60 ml/min/1.73m2    Calcium 9.1 8.5 - 10.1 mg/dL    Phosphorus 5.7 (H) 2.6 - 4.7 mg/dL    Albumin 3.6 3.5 - 5.0 g/dL   GLUCOSE, POC    Collection Time: 08/24/21 12:10 PM   Result Value Ref Range    Glucose (POC) 291 (H) 65 - 117 mg/dL    Performed by Nicolette CRABTREE    PROTEIN/CREATININE RATIO, URINE    Collection Time: 08/24/21  1:50 PM   Result Value Ref Range    Protein, urine random 154 (H) 0.0 - 11.9 mg/dL    Creatinine, urine 142.69 (A) No reference range has been established. mg/dL    Protein/Creat.  urine Ratio 1.1     CHLORIDE, URINE RANDOM    Collection Time: 08/24/21  1:50 PM   Result Value Ref Range    Chloride,urine random 80 mmol/L   SODIUM, UR, RANDOM    Collection Time: 08/24/21  1:50 PM   Result Value Ref Range    Sodium,urine random 84 mmol/L   POTASSIUM, UR, RANDOM    Collection Time: 08/24/21  1:50 PM   Result Value Ref Range    Potassium urine, random 33 mmol/L   CREATININE, UR, RANDOM    Collection Time: 08/24/21  1:50 PM   Result Value Ref Range    Creatinine, urine 143.67 (A) No reference range has been established. mg/dL   URINALYSIS W/MICROSCOPIC    Collection Time: 08/24/21  1:50 PM   Result Value Ref Range    Color Yellow/Straw      Appearance Clear Clear      Specific gravity 1.025 1.003 - 1.030      pH (UA) 5.5 5.0 - 8.0      Protein 100 (A) Negative mg/dL    Glucose 250 (A) Negative mg/dL    Ketone Negative Negative mg/dL    Bilirubin Negative Negative      Blood Trace (A) Negative      Urobilinogen 0.2 0.2 - 1.0 EU/dL    Nitrites Negative Negative      Leukocyte Esterase Small (A) Negative      WBC 10-20 0 - 5 /hpf    RBC 10-20 0 - 3 /hpf    Bacteria 1+ (A) Negative /hpf    Amorphous Crystals 2+ (A) Negative   EKG, 12 LEAD, SUBSEQUENT    Collection Time: 08/24/21  2:37 PM   Result Value Ref Range    Ventricular Rate 89 BPM    Atrial Rate 89 BPM    P-R Interval 162 ms    QRS Duration 95 ms    Q-T Interval 375 ms    QTC Calculation (Bezet) 457 ms    Calculated P Axis 48 degrees    Calculated R Axis 2 degrees    Calculated T Axis 29 degrees    Diagnosis       Normal sinus rhythm  Normal ECG  When compared with ECG of 8/22/21  Ventricular rate has decreased  Confirmed by Geraldo Peguero ([id number]) on 8/25/2021 10:01:26 AM     GLUCOSE, POC    Collection Time: 08/24/21  3:52 PM   Result Value Ref Range    Glucose (POC) 220 (H) 65 - 117 mg/dL    Performed by Darin CRABTREE    GLUCOSE, POC    Collection Time: 08/24/21  9:40 PM   Result Value Ref Range    Glucose (POC) 339 (H) 65 - 117 mg/dL    Performed by Vincent Whelan    RENAL FUNCTION PANEL    Collection Time: 08/25/21  5:49 AM   Result Value Ref Range    Sodium 137 136 - 145 mmol/L    Potassium 4.4 3.5 - 5.1 mmol/L    Chloride 96 (L) 97 - 108 mmol/L    CO2 31 21 - 32 mmol/L    Anion gap 10 5 - 15 mmol/L    Glucose 203 (H) 65 - 100 mg/dL    BUN 41 (H) 6 - 20 mg/dL    Creatinine 3.13 (H) 0.55 - 1.02 mg/dL    BUN/Creatinine ratio 13 12 - 20      GFR est AA 18 (L) >60 ml/min/1.73m2    GFR est non-AA 15 (L) >60 ml/min/1.73m2    Calcium 8.8 8.5 - 10.1 mg/dL    Phosphorus 4.4 2.6 - 4.7 mg/dL    Albumin 3.5 3.5 - 5.0 g/dL   CBC WITH AUTOMATED DIFF    Collection Time: 08/25/21  5:49 AM   Result Value Ref Range    WBC 7.1 4.4 - 11.3 K/uL    RBC 3.66 (L) 4.50 - 5.90 M/uL    HGB 8.9 (L) 13.5 - 17.5 g/dL    HCT 28.0 (L) 36 - 46 %    MCV 76.5 (L) 80 - 100 FL    MCH 24.3 (L) 31 - 34 PG    MCHC 31.7 31.0 - 36.0 g/dL    RDW 19.2 (H) 11.5 - 14.5 %    PLATELET 239 802 - 174 K/uL    MPV 8.7 6.5 - 11.5 FL    NRBC 0.1  WBC    ABSOLUTE NRBC 0.01 K/uL    NEUTROPHILS 60 42 - 75 %    LYMPHOCYTES 30 20.5 - 51.1 %    MONOCYTES 7 1.7 - 9.3 %    EOSINOPHILS 2 0.9 - 2.9 %    BASOPHILS 1 0.0 - 2.5 %    ABS. NEUTROPHILS 4.3 1.8 - 7.7 K/UL    ABS. LYMPHOCYTES 2.1 1.0 - 4.8 K/UL    ABS. MONOCYTES 0.5 0.2 - 2.4 K/UL    ABS. EOSINOPHILS 0.1 0.0 - 0.7 K/UL    ABS. BASOPHILS 0.1 0.0 - 0.2 K/UL   GLUCOSE, POC    Collection Time: 08/25/21  7:37 AM   Result Value Ref Range    Glucose (POC) 207 (H) 65 - 117 mg/dL    Performed by Pranav Nair          Imaging:  NM LUNG SCAN PERF    Result Date: 8/23/2021  No perfusion defect definitively identified. US RETROPERITONEUM COMP    Result Date: 8/24/2021  1. Normal sonographic appearance bilateral kidneys. XR CHEST PORT    Result Date: 8/22/2021  No interval change. * Discharge Condition: improved  * Disposition: Home w/Family      Discharge Medications:  Current Discharge Medication List      START taking these medications    Details   atorvastatin (LIPITOR) 40 mg tablet Take 1 Tablet by mouth nightly. Qty: 30 Tablet, Refills: 0  Start date: 8/25/2021      ferrous sulfate 325 mg (65 mg iron) tablet Take 1 Tablet by mouth daily (with breakfast). Qty: 30 Tablet, Refills: 0  Start date: 8/26/2021      hydrocortisone (ANUSOL-HC) 25 mg supp Insert 1 Suppository into rectum every twelve (12) hours for 10 days.   Qty: 20 Suppository, Refills: 0  Start date: 8/25/2021, End date: 9/4/2021 CONTINUE these medications which have CHANGED    Details   hydrALAZINE (APRESOLINE) 50 mg tablet Take 2 Tablets by mouth three (3) times daily. Qty: 90 Tablet, Refills: 0  Start date: 8/25/2021         CONTINUE these medications which have NOT CHANGED    Details   ALPRAZolam (XANAX) 0.5 mg tablet Take 1 tablet by mouth twice daily as needed  Qty: 60 Tablet, Refills: 0    Associated Diagnoses: Generalized anxiety disorder      ARIPiprazole (ABILIFY) 20 mg tablet Take 1 Tablet by mouth daily for 90 days. Qty: 90 Tablet, Refills: 0    Associated Diagnoses: Bipolar depression ([facility] [address]      insulin aspart protamine/insulin aspart (NOVOLOG MIX 70/30) 100 unit/mL (70-30) inpn Take 45 units in the morning and 30 units in evening  Qty: 8 Adjustable Dose Pre-filled Pen Syringe, Refills: 3    Associated Diagnoses: Type 2 diabetes mellitus with hyperglycemia, with long-term current use of insulin ([facility])      Insulin Needles, Disposable, 31 gauge x 5/16\" ndle Twice a day  Qty: 1 Package, Refills: 5    Associated Diagnoses: Type 2 diabetes mellitus with hyperglycemia, with long-term current use of insulin ([facility])      metoprolol succinate (TOPROL-XL) 50 mg XL tablet TAKE 1 TABLET BY MOUTH ONCE DAILY      lancets (One Touch Delica) 33 gauge misc Check glucose 3 times a day  Qty: 100 Lancet, Refills: 3    Associated Diagnoses: Type 2 diabetes mellitus with hyperglycemia, with long-term current use of insulin ([facility])      glucose blood VI test strips (OneTouch Verio test strips) strip Check glucose 3 times a day  Qty: 100 Strip, Refills: 3    Associated Diagnoses: Type 2 diabetes mellitus with hyperglycemia, with long-term current use of insulin ([facility])      allopurinoL (ZYLOPRIM) 300 mg tablet Take  by mouth daily. gabapentin (NEURONTIN) 100 mg capsule Take  by mouth three (3) times daily. magnesium oxide (MAG-OX) 400 mg tablet Take 400 mg by mouth three (3) times daily.       pantoprazole (PROTONIX) 40 mg tablet Take 40 mg by mouth daily. sodium bicarbonate 650 mg tablet Take  by mouth two (2) times a day. STOP taking these medications       amLODIPine (NORVASC) 2.5 mg tablet Comments:   Reason for Stopping:                 * Follow-up Care/Patient Instructions: Activity: Activity as tolerated  Diet: Cardiac Diet and Diabetic Diet fluid restrict to 1.5 L/day  Monitor daily weights      Follow-up Information     Follow up With Specialties Details Why Contact Info    Elie Riddle PA-C Physician Assistant On 8/30/2021 @ 9:45  [phone]      Jasper Schultz MD Nephrology On 9/1/2021 @ 10:00 [address]  [phone]      DR Terrence Khoury    follow up for chest pain  [facility] Cardiology  [place], [facility]         Spent 35 minutes evaluting and coordinating patient care and dc home of which >50% was spent coordinating and counseling.      Signed:  Bassam Dixon MD  8/25/2021  11:33 AM

## 2021-08-25 NOTE — ROUTINE PROCESS
Pt. Transferred via w/c to [facility] for transport home by sister.

## 2021-08-25 NOTE — PROGRESS NOTES
Problem: Falls - Risk of  Goal: *Absence of Falls  Description: Document Sumi Quintero Fall Risk and appropriate interventions in the flowsheet.   Outcome: Progressing Towards Goal  Note: Fall Risk Interventions:            Medication Interventions: Bed/chair exit alarm    Elimination Interventions: Call light in reach, Patient to call for help with toileting needs, Bed/chair exit alarm    History of Falls Interventions: Bed/chair exit alarm

## 2021-08-25 NOTE — ROUTINE PROCESS
Bedside shift change report given to brooks quiroz (oncoming nurse) by Symone Redding (offgoing nurse). Report included the following information Kardex.

## 2021-08-25 NOTE — CONSULTS
Consult Date: 8/25/2021    IP CONSULT TO NEPHROLOGY  Consult performed by: Anatoliy Vergara MD  Consult ordered by: Adrian Carrasco MD          Subjective    This is a 61-year-old female with past medical history of diabetes mellitus type 2, hypertension, and CKD stage IV, who follows with [facility] for management of chronic kidney disease. Patient's baseline estimated GFR is close to 25 to 30 mL/min. Presented with chest pain that was present for 6 weeks and started following a fall    Noted to have some psychiatric issues.   Patient taking naproxen        Potassium of 6.6  Sodium of 131  BUN 43  Creatinine of 3.86  Hemoglobin of 9.2  Baseline CKD with EGFR estimated at 26 mL/min  3+ proteinuria on UA; 2 g proteinuria    Not on ACE inhibitors    Creatinine increased from 2.83 to 3.86    Echo noted to be unremarkable      Past Medical History:   Diagnosis Date    Anxiety 6/22/2020    Bipolar 1 disorder ([facility] [address] 1/23/2018    Diabetes mellitus type 2, controlled ([facility] [address] 6/22/2020    Hypertension 6/22/2020    Liver fibrosis 6/22/2020    Major depression 6/22/2020    Migraine 6/22/2020    Sleep apnea 1/23/2018    Suicide attempt ([facility] [address] 6/22/2020 2013      Past Surgical History:   Procedure Laterality Date    HX APPENDECTOMY      HX CHOLECYSTECTOMY       Family History   Problem Relation Age of Onset    Heart Attack Mother     Heart Attack Father       Social History     Tobacco Use    Smoking status: Never Smoker    Smokeless tobacco: Never Used   Substance Use Topics    Alcohol use: Not Currently       Current Facility-Administered Medications   Medication Dose Route Frequency Provider Last Rate Last Admin    sodium bicarbonate (8.4%) 150 mEq in dextrose 5% 1,000 mL infusion   IntraVENous CONTINUOUS Anatoliy Vergara  mL/hr at 08/24/21 2255 New Bag at 08/24/21 2255    hydrocortisone (ANUSOL-HC) suppository 25 mg  25 mg Rectal Q12H Jenni SALINAS MD   25 mg at 08/24/21 2202    hydrALAZINE (APRESOLINE) tablet 100 mg  100 mg Oral TID Jermaine GATES NP   100 mg at 08/24/21 2202    magnesium oxide (MAG-OX) tablet 400 mg  400 mg Oral TID Twan SALINAS MD   400 mg at 08/24/21 2202    metoprolol succinate (TOPROL-XL) XL tablet 50 mg  50 mg Oral DAILY Rakan Diamond MD   50 mg at 08/24/21 0824    sodium chloride (NS) flush 5-40 mL  5-40 mL IntraVENous Q8H Justyna Moon MD   10 mL at 08/25/21 0544    sodium chloride (NS) flush 5-40 mL  5-40 mL IntraVENous PRN Justyna Moon MD        acetaminophen (TYLENOL) tablet 650 mg  650 mg Oral Q6H PRN Justyna Moon MD   650 mg at 08/25/21 0355    Or    acetaminophen (TYLENOL) suppository 650 mg  650 mg Rectal Q6H PRN Justyna Moon MD        polyethylene glycol (MIRALAX) packet 17 g  17 g Oral DAILY PRN Justyna Moon MD        ondansetron (ZOFRAN ODT) tablet 4 mg  4 mg Oral Q8H PRN Justyna Moon MD   4 mg at 08/25/21 1575    Or    ondansetron (ZOFRAN) injection 4 mg  4 mg IntraVENous Q6H PRN Justyna Moon MD   4 mg at 08/24/21 1836    apixaban (ELIQUIS) tablet 5 mg  5 mg Oral BID Justyna Moon MD   5 mg at 08/24/21 2202    glucose chewable tablet 16 g  4 Tablet Oral PRN Justyna Moon MD        dextrose (D50W) injection syrg 12.5-25 g  25-50 mL IntraVENous PRN Justyna Moon MD        glucagon (GLUCAGEN) injection 1 mg  1 mg IntraMUSCular PRN Justyna Moon MD        ALPRAZolam [facility]) tablet 0.25 mg  0.25 mg Oral BID Justyna Moon MD   0.25 mg at 08/24/21 2202    ARIPiprazole (ABILIFY) tablet 15 mg  15 mg Oral DAILY Justyna Moon MD   15 mg at 08/24/21 2366    atorvastatin (LIPITOR) tablet 40 mg  40 mg Oral QHS Justyna Moon MD   40 mg at 08/24/21 2202    citalopram (CELEXA) tablet 20 mg  20 mg Oral DAILY Justyna Moon MD   20 mg at 08/24/21 3610    ferrous sulfate tablet 325 mg  1 Tablet Oral DAILY WITH BREAKFAST Akhil Melody Huber MD   325 mg at 08/24/21 0824    pantoprazole (PROTONIX) tablet 40 mg  40 mg Oral ACB Thomas Millan MD   40 mg at 08/24/21 0824    allopurinoL (ZYLOPRIM) tablet 100 mg  100 mg Oral DAILY Thomas Millan MD   100 mg at 08/24/21 0824    traMADoL (ULTRAM) tablet 50 mg  50 mg Oral Q6H PRN Thomas Millan MD   50 mg at 08/24/21 2255    insulin glargine (LANTUS) injection 25 Units  25 Units SubCUTAneous QHS Thomas Millan MD   25 Units at 08/24/21 2203    insulin lispro (HUMALOG) injection   SubCUTAneous AC&HS Thomas Millan MD   8 Units at 08/24/21 2203     Objective     Vital signs for last 24 hours:  Visit Vitals  BP (!) 163/84 (BP 1 Location: Right arm, BP Patient Position: Lying)   Pulse 94   Temp 98.9 °F (37.2 °C)   Resp 18   Ht 5' 2\" (1.575 m)   Wt 98.5 kg (217 lb 1.6 oz)   SpO2 95%   BMI 39.71 kg/m²       Intake/Output this shift:  Current Shift: No intake/output data recorded.   Last 3 Shifts: 08/23 1901 - 08/25 0700  In: -   Out: 900 [Urine:900]    Data Review:   Recent Results (from the past 24 hour(s))   RENAL FUNCTION PANEL    Collection Time: 08/24/21 12:00 PM   Result Value Ref Range    Sodium 135 (L) 136 - 145 mmol/L    Potassium 5.4 (H) 3.5 - 5.1 mmol/L    Chloride 98 97 - 108 mmol/L    CO2 23 21 - 32 mmol/L    Anion gap 14 5 - 15 mmol/L    Glucose 278 (H) 65 - 100 mg/dL    BUN 43 (H) 6 - 20 mg/dL    Creatinine 3.87 (H) 0.55 - 1.02 mg/dL    BUN/Creatinine ratio 11 (L) 12 - 20      GFR est AA 14 (L) >60 ml/min/1.73m2    GFR est non-AA 12 (L) >60 ml/min/1.73m2    Calcium 9.1 8.5 - 10.1 mg/dL    Phosphorus 5.7 (H) 2.6 - 4.7 mg/dL    Albumin 3.6 3.5 - 5.0 g/dL   GLUCOSE, POC    Collection Time: 08/24/21 12:10 PM   Result Value Ref Range    Glucose (POC) 291 (H) 65 - 117 mg/dL    Performed by Chin CRABTREE    PROTEIN/CREATININE RATIO, URINE    Collection Time: 08/24/21  1:50 PM   Result Value Ref Range    Protein, urine random 154 (H) 0.0 - 11.9 mg/dL Creatinine, urine 142.69 (A) No reference range has been established. mg/dL    Protein/Creat.  urine Ratio 1.1     CHLORIDE, URINE RANDOM    Collection Time: 08/24/21  1:50 PM   Result Value Ref Range    Chloride,urine random 80 mmol/L   SODIUM, UR, RANDOM    Collection Time: 08/24/21  1:50 PM   Result Value Ref Range    Sodium,urine random 84 mmol/L   POTASSIUM, UR, RANDOM    Collection Time: 08/24/21  1:50 PM   Result Value Ref Range    Potassium urine, random 33 mmol/L   CREATININE, UR, RANDOM    Collection Time: 08/24/21  1:50 PM   Result Value Ref Range    Creatinine, urine 143.67 (A) No reference range has been established. mg/dL   URINALYSIS W/MICROSCOPIC    Collection Time: 08/24/21  1:50 PM   Result Value Ref Range    Color Yellow/Straw      Appearance Clear Clear      Specific gravity 1.025 1.003 - 1.030      pH (UA) 5.5 5.0 - 8.0      Protein 100 (A) Negative mg/dL    Glucose 250 (A) Negative mg/dL    Ketone Negative Negative mg/dL    Bilirubin Negative Negative      Blood Trace (A) Negative      Urobilinogen 0.2 0.2 - 1.0 EU/dL    Nitrites Negative Negative      Leukocyte Esterase Small (A) Negative      WBC 10-20 0 - 5 /hpf    RBC 10-20 0 - 3 /hpf    Bacteria 1+ (A) Negative /hpf    Amorphous Crystals 2+ (A) Negative   GLUCOSE, POC    Collection Time: 08/24/21  3:52 PM   Result Value Ref Range    Glucose (POC) 220 (H) 65 - 117 mg/dL    Performed by Jacque CRABTREE    GLUCOSE, POC    Collection Time: 08/24/21  9:40 PM   Result Value Ref Range    Glucose (POC) 339 (H) 65 - 117 mg/dL    Performed by Laura Olivia    RENAL FUNCTION PANEL    Collection Time: 08/25/21  5:49 AM   Result Value Ref Range    Sodium 137 136 - 145 mmol/L    Potassium 4.4 3.5 - 5.1 mmol/L    Chloride 96 (L) 97 - 108 mmol/L    CO2 31 21 - 32 mmol/L    Anion gap 10 5 - 15 mmol/L    Glucose 203 (H) 65 - 100 mg/dL    BUN 41 (H) 6 - 20 mg/dL    Creatinine 3.13 (H) 0.55 - 1.02 mg/dL    BUN/Creatinine ratio 13 12 - 20      GFR est AA 18 (L) >60 ml/min/1.73m2    GFR est non-AA 15 (L) >60 ml/min/1.73m2    Calcium 8.8 8.5 - 10.1 mg/dL    Phosphorus 4.4 2.6 - 4.7 mg/dL    Albumin 3.5 3.5 - 5.0 g/dL   CBC WITH AUTOMATED DIFF    Collection Time: 08/25/21  5:49 AM   Result Value Ref Range    WBC 7.1 4.4 - 11.3 K/uL    RBC 3.66 (L) 4.50 - 5.90 M/uL    HGB 8.9 (L) 13.5 - 17.5 g/dL    HCT 28.0 (L) 36 - 46 %    MCV 76.5 (L) 80 - 100 FL    MCH 24.3 (L) 31 - 34 PG    MCHC 31.7 31.0 - 36.0 g/dL    RDW 19.2 (H) 11.5 - 14.5 %    PLATELET 606 024 - 947 K/uL    MPV 8.7 6.5 - 11.5 FL    NRBC 0.1  WBC    ABSOLUTE NRBC 0.01 K/uL    NEUTROPHILS 60 42 - 75 %    LYMPHOCYTES 30 20.5 - 51.1 %    MONOCYTES 7 1.7 - 9.3 %    EOSINOPHILS 2 0.9 - 2.9 %    BASOPHILS 1 0.0 - 2.5 %    ABS. NEUTROPHILS 4.3 1.8 - 7.7 K/UL    ABS. LYMPHOCYTES 2.1 1.0 - 4.8 K/UL    ABS. MONOCYTES 0.5 0.2 - 2.4 K/UL    ABS. EOSINOPHILS 0.1 0.0 - 0.7 K/UL    ABS. BASOPHILS 0.1 0.0 - 0.2 K/UL   GLUCOSE, POC    Collection Time: 08/25/21  7:37 AM   Result Value Ref Range    Glucose (POC) 207 (H) 65 - 117 mg/dL    Performed by Francoise FARRAR      Anemia in chronic renal disease  Obtain iron studies  Epogen 6000 units q2 weeks initiation     Volume depletion  Underwent fluid resuscitation at presentation to manage volume depletion, RANDI and hyperkalemia    Can consider backing off on IV fluids at this point    Acute kidney injury ([address]  Initial IV fluids resuscitation with fluid responsiveness  Renal US noted -no hydronephrosis  Urine electrolytes appear confounded given IV fluids and baseline CKD        Hypertension  Currently on hydralazine 100 mg tid and metoprolol 50mg daily  BP slighlty elevated compared to goal of <150/90 while inpatient  Continue with current management at this time    This was a teleconsultation.   Patient was not evaluated in person

## 2021-08-25 NOTE — PROGRESS NOTES
Patients case reviewed during interdisciplinary team meeting in orth/Acute Care Unit. Rev.  Vivien Thrasher [address], [address]

## 2021-08-25 NOTE — PROGRESS NOTES
Problem: Falls - Risk of  Goal: *Absence of Falls  Description: Document Rizwana Thurman Fall Risk and appropriate interventions in the flowsheet.   Outcome: Progressing Towards Goal  Note: Fall Risk Interventions:            Medication Interventions: Bed/chair exit alarm    Elimination Interventions: Call light in reach, Patient to call for help with toileting needs, Bed/chair exit alarm    History of Falls Interventions: Bed/chair exit alarm         Problem: Patient Education: Go to Patient Education Activity  Goal: Patient/Family Education  Outcome: Progressing Towards Goal

## 2021-08-25 NOTE — ASSESSMENT & PLAN NOTE
Currently on hydralazine 100 mg tid and metoprolol 50mg daily  BP slighlty elevated compared to goal of <150/90 while inpatient  Continue with current management at this time

## 2021-08-25 NOTE — ASSESSMENT & PLAN NOTE
Underwent fluid resuscitation at presentation to manage volume depletion, RANDI and hyperkalemia    Can consider backing off on IV fluids at this point

## 2021-08-25 NOTE — ASSESSMENT & PLAN NOTE
Initial IV fluids resuscitation with fluid responsiveness  Renal US noted -no hydronephrosis  Urine electrolytes appear confounded given IV fluids and baseline CKD

## 2021-08-26 ENCOUNTER — HOSPITAL ENCOUNTER (EMERGENCY)
Age: 61
Discharge: HOME | End: 2021-08-26
Attending: EMERGENCY MEDICINE
Payer: MEDICARE

## 2021-08-26 ENCOUNTER — TELEPHONE (OUTPATIENT)
Dept: BEHAVIORAL/MENTAL HEALTH CLINIC | Age: 61
End: 2021-08-26

## 2021-08-26 ENCOUNTER — APPOINTMENT (OUTPATIENT)
Dept: GENERAL RADIOLOGY | Age: 61
End: 2021-08-26
Attending: EMERGENCY MEDICINE
Payer: MEDICARE

## 2021-08-26 VITALS
SYSTOLIC BLOOD PRESSURE: 174 MMHG | TEMPERATURE: 98.6 F | OXYGEN SATURATION: 97 % | HEART RATE: 96 BPM | DIASTOLIC BLOOD PRESSURE: 113 MMHG | RESPIRATION RATE: 13 BRPM

## 2021-08-26 DIAGNOSIS — R06.02 SHORTNESS OF BREATH: Primary | ICD-10-CM

## 2021-08-26 DIAGNOSIS — F41.1 GENERALIZED ANXIETY DISORDER: ICD-10-CM

## 2021-08-26 LAB
ALBUMIN SERPL-MCNC: 3.8 G/DL (ref 3.5–5)
ALBUMIN/GLOB SERPL: 0.7 {RATIO} (ref 1.1–2.2)
ALP SERPL-CCNC: 223 U/L (ref 45–117)
ALT SERPL-CCNC: 27 U/L (ref 12–78)
ANION GAP SERPL CALC-SCNC: 12 MMOL/L (ref 5–15)
AST SERPL W P-5'-P-CCNC: 59 U/L (ref 15–37)
BASOPHILS # BLD: 0.1 K/UL (ref 0–0.2)
BASOPHILS NFR BLD: 1 % (ref 0–2.5)
BILIRUB SERPL-MCNC: 0.7 MG/DL (ref 0.2–1)
BUN SERPL-MCNC: 37 MG/DL (ref 6–20)
BUN/CREAT SERPL: 12 (ref 12–20)
CA-I BLD-MCNC: 9.6 MG/DL (ref 8.5–10.1)
CHLORIDE SERPL-SCNC: 98 MMOL/L (ref 97–108)
CO2 SERPL-SCNC: 27 MMOL/L (ref 21–32)
CREAT SERPL-MCNC: 2.99 MG/DL (ref 0.55–1.02)
EOSINOPHIL # BLD: 0.1 K/UL (ref 0–0.7)
EOSINOPHIL NFR BLD: 1 % (ref 0.9–2.9)
ERYTHROCYTE [DISTWIDTH] IN BLOOD BY AUTOMATED COUNT: 18.4 % (ref 11.5–14.5)
GLOBULIN SER CALC-MCNC: 5.3 G/DL (ref 2–4)
GLUCOSE SERPL-MCNC: 226 MG/DL (ref 65–100)
HCT VFR BLD AUTO: 30.1 % (ref 36–46)
HGB BLD-MCNC: 9.7 G/DL (ref 13.5–17.5)
INR PPP: 1.2 (ref 0.9–1.1)
LYMPHOCYTES # BLD: 1.8 K/UL (ref 1–4.8)
LYMPHOCYTES NFR BLD: 22 % (ref 20.5–51.1)
MAGNESIUM SERPL-MCNC: 1.9 MG/DL (ref 1.6–2.4)
MCH RBC QN AUTO: 24.8 PG (ref 31–34)
MCHC RBC AUTO-ENTMCNC: 32.2 G/DL (ref 31–36)
MCV RBC AUTO: 77.1 FL (ref 80–100)
MONOCYTES # BLD: 0.5 K/UL (ref 0.2–2.4)
MONOCYTES NFR BLD: 6 % (ref 1.7–9.3)
NEUTS SEG # BLD: 5.7 K/UL (ref 1.8–7.7)
NEUTS SEG NFR BLD: 70 % (ref 42–75)
NRBC # BLD: 0.02 K/UL
NRBC BLD-RTO: 0.3 PER 100 WBC
PLATELET # BLD AUTO: 228 K/UL (ref 150–400)
PMV BLD AUTO: 8.4 FL (ref 6.5–11.5)
POTASSIUM SERPL-SCNC: 4.6 MMOL/L (ref 3.5–5.1)
PROT SERPL-MCNC: 9.1 G/DL (ref 6.4–8.2)
PROTHROMBIN TIME: 11.9 SEC (ref 9–11.1)
RBC # BLD AUTO: 3.91 M/UL (ref 4.5–5.9)
SODIUM SERPL-SCNC: 137 MMOL/L (ref 136–145)
TROPONIN I SERPL-MCNC: <0.05 NG/ML
WBC # BLD AUTO: 8.1 K/UL (ref 4.4–11.3)

## 2021-08-26 PROCEDURE — 36415 COLL VENOUS BLD VENIPUNCTURE: CPT

## 2021-08-26 PROCEDURE — 84484 ASSAY OF TROPONIN QUANT: CPT

## 2021-08-26 PROCEDURE — 85610 PROTHROMBIN TIME: CPT

## 2021-08-26 PROCEDURE — 74011250636 HC RX REV CODE- 250/636: Performed by: EMERGENCY MEDICINE

## 2021-08-26 PROCEDURE — 85025 COMPLETE CBC W/AUTO DIFF WBC: CPT

## 2021-08-26 PROCEDURE — 71045 X-RAY EXAM CHEST 1 VIEW: CPT

## 2021-08-26 PROCEDURE — 96361 HYDRATE IV INFUSION ADD-ON: CPT

## 2021-08-26 PROCEDURE — 80053 COMPREHEN METABOLIC PANEL: CPT

## 2021-08-26 PROCEDURE — 99284 EMERGENCY DEPT VISIT MOD MDM: CPT

## 2021-08-26 PROCEDURE — 96375 TX/PRO/DX INJ NEW DRUG ADDON: CPT

## 2021-08-26 PROCEDURE — 83735 ASSAY OF MAGNESIUM: CPT

## 2021-08-26 PROCEDURE — 96374 THER/PROPH/DIAG INJ IV PUSH: CPT

## 2021-08-26 RX ORDER — MORPHINE SULFATE 10 MG/ML
6 INJECTION, SOLUTION INTRAMUSCULAR; INTRAVENOUS
Status: COMPLETED | OUTPATIENT
Start: 2021-08-26 | End: 2021-08-26

## 2021-08-26 RX ORDER — PROCHLORPERAZINE EDISYLATE 5 MG/ML
10 INJECTION INTRAMUSCULAR; INTRAVENOUS
Status: DISCONTINUED | OUTPATIENT
Start: 2021-08-26 | End: 2021-08-26 | Stop reason: HOSPADM

## 2021-08-26 RX ORDER — ALPRAZOLAM 0.5 MG/1
TABLET ORAL
Qty: 60 TABLET | Refills: 0 | Status: SHIPPED | OUTPATIENT
Start: 2021-08-26 | End: 2021-09-27

## 2021-08-26 RX ORDER — ALBUTEROL SULFATE 90 UG/1
2 AEROSOL, METERED RESPIRATORY (INHALATION)
Qty: 1 INHALER | Refills: 0 | Status: SHIPPED | OUTPATIENT
Start: 2021-08-26 | End: 2021-08-26 | Stop reason: SDUPTHER

## 2021-08-26 RX ORDER — ALBUTEROL SULFATE 90 UG/1
2 AEROSOL, METERED RESPIRATORY (INHALATION)
Qty: 1 INHALER | Refills: 0 | Status: SHIPPED | OUTPATIENT
Start: 2021-08-26

## 2021-08-26 RX ADMIN — MORPHINE SULFATE 6 MG: 10 INJECTION, SOLUTION INTRAMUSCULAR; INTRAVENOUS at 14:22

## 2021-08-26 RX ADMIN — SODIUM CHLORIDE 1000 ML: 9 INJECTION, SOLUTION INTRAVENOUS at 14:22

## 2021-08-26 RX ADMIN — PROCHLORPERAZINE EDISYLATE 10 MG: 5 INJECTION INTRAMUSCULAR; INTRAVENOUS at 14:22

## 2021-08-26 NOTE — ADT AUTH CERT NOTES
DOS 8/24/21 by Myrna Odom RN       Review Entered Review Status   8/26/2021 11:32 In Primary      Criteria Review   Send for reconsideration.                     Cardiology  Progress Notes  Date of Service:  08/24/21 1551  Assessment:  Principal Problem:    Chest pain (8/22/2021)     Plan:  Case discussed with Collaborating physician Dr. Mellissa Myles and our recommendations are as follows:      1. Chest Pain: ACS ruled out per EKG and Troponin criteria. TTE negative for acute pathology. Given risk factors and sx would benefit from further ischemic evaluation, that can be completed in OP setting.  Would recommend further risk factor modification and control of BP.    2. HTN:  BP closer to goal. Onita Toni keep Hydralazine at 100mg TID.  IV Lasix stopped 8/23 as patient was not hypervolemic, BNP not elevated to appropriate range given renal function, and IVC was normal in size on prelim TTE. Continue BB for HR control. 3.         Hypomagnesemia - keep @ goal >2.   Repeat labs in am. Continue to monitor potassium level given elevated today requiring treatment. 4.         RANDI on CKD:  likely s/t diuresis. Appreciate Nephrology input and management. Agree with mild hydration today. Monitor renal function and electrolytes closely. 5.         Chronic Anemia - reports receiving iron transfusions recently, with last dose last week. Managed by Nephrology.               Hospitalist Internal Medicine  Progress Notes  Date of Service:  08/24/21 1705  Patient seen on follow-up resting in bed no acute distress patient had nausea and single episode of vomiting overnight does have history of reflux blood pressure slightly elevated .   Continues on 2 L nasal cannula and lies on CPAP at night has CKD issues and follows with Dr. Mickey Enciso.       Objective           Vitals Last 24 Hours:  Patient Vitals for the past 24 hrs:               Temp   Pulse   Resp    BP       SpO2  08/24/21 1606 98.3 °F (36.8 °C)         93          (!) 160/85         99 %  08/24/21 1445                                     95 %  08/24/21 1218 98 °F (36.7 °C)            95                 (!) 155/72         96 %  08/24/21 0836                                     93 %  08/24/21 0748 97.9 °F (36.6 °C)         96        18        (!) 142/76         98 %  08/24/21 0438 97.9 °F (36.6 °C)         96        18        (!) 149/84         94 %  08/24/21 0046 98.6 °F (37 °C)            97        18        117/68 97 %  08/23/21 2331 98 °F (36.7 °C)            97        18        117/68 97 %  08/23/21 2220 97.9 °F (36.6 °C)         83        18        112/69 97 %  08/23/21 2016 97.9 °F (36.6 °C)         83        18        112/69 97 %  08/23/21 1952                                     96 %     I/O (24Hr): Intake/Output Summary (Last 24 hours) at 8/24/2021 1705  Last data filed at 8/24/2021 6290  Gross per 24 hour  Intake    Output 900 ml  Net       -900 ml     Physical Exam:  General: Alert, cooperative, no distress, appears stated age. Head:   Normocephalic, without obvious abnormality, atraumatic. Eyes:   Conjunctivae/corneas clear. Pupils equal, round, reactive to light. Extraocular movements intact. Lungs:  Clear to auscultation bilaterally. no wheeze, rales, crackles, rhonchi   Chest wall: No tenderness or deformity. Heart:  Regular rate and rhythm, S1, S2 normal, no murmur, click, rub or gallop. Abdomen:  Soft, non-tender. Bowel sounds normal. No masses,  No organomegaly. Extremities: Extremities normal, atraumatic, no cyanosis or edema. Pulses: 2+ and symmetric all extremities. Skin: Skin color, texture, turgor normal. No rashes or lesions  Neurologic: Awake, Alert, oriented.  No obvious gross sensory or motor deficits     Assessment//Plan  Chest pain  -Continue monitor on telemetry initial and repeat troponins are negative and no ischemic changes on EKG  -Cardiology consult appreciated and will continue further outpatient ischemic work-up     Hypertension  -Elevated morning and Lasix discontinued as echo shows IVC not dilated  -Increased hydralazine 000 mg 3 times daily and continued on metoprolol succinate 50 mg and amlodipine 2.5 mg  -Follow 2D echo which shows preserved EF     ARF  -Due to IV diuresis initial creatinine 2.43 increased to 2.83 then up to 326 and repeat still at 3.87  -Continue current IV fluids with bicarbonate  -Neurology consult and recommendations appreciated  -Renal ultrasound  -Monitor input output and daily weights     Hypokalemia  -Please to 6.6 and Kayexalate dose x1 given no EKG changes noted repeat potassium at 5.4 and will give additional Kayexalate dose x1  -Repeat labs in a.m.     HLD  -Continue atorvastatin 40 mg at bedtime  -Lipid profile pending     Elevated D-dimer  -Mildly elevated 0.69 and with episode of chest pain nuclear medicine VQ scan was done and found to be negative for PE     Hypomagnesemia  -Low at 1.5 and 2 g IV magnesium sulfate rider given and patient takes magnesium oxide 400 mg 3 times a day chronically and repeat magnesium is at 2.1     Acute on chronic CKD  -Appears to be at baseline creatinine and follows with Dr. Mickey Enciso  -Continue bicarb supplementation and IV fluids  -Monitor closely  -Nephrology consultation     Chronic anemia  -8.7 and then repeat is 8.9 and stable  -Obtain iron panel  -Has received iron transfusions in the past with nephrology     Obstructive sleep apnea  -Has home CPAP and requested patient to have family bring so that she can use it tonight     Nausea vomiting  -History of GERD presented with single episode of vomiting overnight  -Monitor closely  -Continue pantoprazole     Anxiety  -Continue home alprazolam as needed     Chronic gout  -Continue home allopurinol     Diabetes mellitus 2  -A1c of 8.2 and nutrition consultation appreciated  -Continue home Lantus and regular insulin sliding scale with Accu-Cheks AC at bedtime     Generalized weakness  -Patient evaluated by physical therapy and overall did well and no therapy needs on discharge     GI prophylaxis  -Home pantoprazole     DVT prophylaxis  -Eliquis twice daily        8/24/2021 05:58  WBC: 7.6  NRBC: 0.1  RBC: 3.71 (L)  HGB: 9.2 (L)  HCT: 28.8 (L)  MCV: 77.5 (L)  MCH: 24.7 (L)  MCHC: 31.8  RDW: 19.0 (H)  PLATELET: 137  MPV: 9.0  NEUTROPHILS: 66  LYMPHOCYTES: 25  MONOCYTES: 6  EOSINOPHILS: 2  BASOPHILS: 1  ABSOLUTE NRBC: 0.01  ABS. NEUTROPHILS: 5.1  ABS. LYMPHOCYTES: 1.9  ABS. MONOCYTES: 0.5  ABS. EOSINOPHILS: 0.1  ABS. BASOPHILS: 0.1     Sodium: 131 (L)  Potassium: 6.6 (HH)  Chloride: 96 (L)  CO2: 25  Anion gap: 10  Glucose: 320 (H)  BUN: 43 (H)  Creatinine: 3.86 (H)  BUN/Creatinine ratio: 11 (L)  Calcium: 9.2  Magnesium: 2.1  GFR est non-AA: 12 (L)  GFR est AA: 14 (L)     8/24/2021 12:00  Sodium: 135 (L)  Potassium: 5.4 (H)  Chloride: 98  CO2: 23  Anion gap: 14  Glucose: 278 (H)  BUN: 43 (H)  Creatinine: 3.87 (H)  BUN/Creatinine ratio: 11 (L)  Calcium: 9.1  Phosphorus: 5.7 (H)  GFR est non-AA: 12 (L)  GFR est AA: 14 (L)  Albumin: 3.6     8/24/2021 13:50  Color: Yellow/Straw  Appearance: Clear  Specific gravity: 1.025  pH (UA): 5.5  Protein: 100 (A)  Glucose: 250 (A)  Ketone: Negative  Blood: Trace (A)  Bilirubin: Negative  Urobilinogen: 0.2  Nitrites: Negative  Leukocyte Esterase: Small (A)  WBC: 10-20  RBC: 10-20  Bacteria: 1+ (A)  Amorphous Crystals: 2+ (A)  Creatinine, urine: 143.67 (A)  Sodium,urine random: 84  Potassium urine, random: 33  Chloride,urine random: 80     8/24/2021 13:50  Creatinine, urine: 142.69 (A)  Protein, urine random: 154 (H)  Protein/Creat.  urine Ratio: 1.1  PROTEIN/CREATININE RATIO, URINE: Rpt (A)                                          Medications 08/19/21 08/20/21 08/21/21 08/22/21 08/23/21 08/24/21 08/25/21       Completed Medications       acetaminophen (TYLENOL) tablet 1,000 mg   Dose: 1,000 mg  Freq: ONCE Route: PO  Start: 08/22/21 0934 End: 08/22/21 0936    Admin Instructions:       Order ID: [id number]          09 (1,000 mg)                   amLODIPine (NORVASC) tablet 2.5 mg   Dose: 2.5 mg  Freq: NOW Route: PO  Start: 08/22/21 0906 End: 08/22/21 0911    Admin Instructions:   . Order ID: [id number]          09 (2.5 mg)                   epoetin fish-epbx (RETACRIT) injection 4,000 Units   Dose: 4,000 Units  Freq: ONCE Route: SC  Indications of Use: anemia due to renal failure  Start: 08/25/21 0900 End: 08/25/21 0840   Order ID: [id number]                08 (4,000 Units)            insulin glargine (LANTUS) injection 25 Units   Dose: 25 Units  Freq: NOW Route: SC  Start: 08/22/21 0912 End: 08/22/21 0930    Admin Instructions:   Caution: Long Acting Insulin. DO NOT HOLD without physician order. DO NOT MIX or dilute with any other insulin. Order ID: [id number]          09 (25 Units)                   insulin lispro (HUMALOG) injection 5 Units   Dose: 5 Units  Freq: ONCE Route: SC  Start: 08/22/21 0912 End: 08/22/21 0929    Admin Instructions:   Fast Acting - Administer Immediately - or within 15 minutes of start of meal, if mealtime coverage. Order ID: [id number]          09 (5 Units)                   insulin lispro (HUMALOG) injection 6 Units   Dose: 6 Units  Freq: ONCE Route: SC  Start: 08/22/21 1317 End: 08/22/21 1320    Admin Instructions:   Fast Acting - Administer Immediately - or within 15 minutes of start of meal, if mealtime coverage.    Order ID: [id number]          13 (6 Units)                   magnesium sulfate 2 g/50 ml IVPB (premix or compounded)   Dose: 2 g  Freq: ONCE Route: IV  Start: 08/23/21 0900 End: 08/23/21 1016   Order ID: [id number]            09 (2 g)                 metoclopramide HCl (REGLAN) 5 mg in 0.9% sodium chloride 50 mL IVPB   Dose: 5 mg  Freq: ONCE Route: IV  Start: 08/22/21 1006 End: 08/22/21 1053   Order ID: [id number]          10 (5 mg)                   metoprolol succinate (TOPROL-XL) XL tablet 50 mg   Dose: 50 mg  Freq: NOW Route: PO  Start: 08/22/21 3644 End: 08/22/21 0911    Admin Instructions:   May split tablet but DO NOT CHEW OR CRUSH tablet, swallow whole   Order ID: [id number]          09 (50 mg)                   ondansetron (ZOFRAN) injection 4 mg   Dose: 4 mg  Freq: NOW Route: IM  Start: 08/22/21 0843 End: 08/22/21 0843    Admin Instructions:   Administer 4 to 7 mg IV over 30 seconds. Administer 8mg IV over 60 seconds. Administer doses greater than 8mg IV over at least 2 minutes. Order ID: [id number]          08 (4 mg)                   sodium polystyrene (KAYEXALATE) 15 gram/60 mL oral suspension 30 g   Dose: 30 g  Freq: NOW Route: PO  Start: 08/24/21 0800 End: 08/24/21 3284    Admin Instructions: If possible, separate Kayexalate (sodium polystyrene sulfonate) oral administrations from other medications administered by mouth by at least 6 hours. If using rectally, SHAKE WELL and retain for 30-60 minutes. Order ID: [id number]              08 (30 g)               technetium albumin aggregated (MAA) solution 3 millicurie   Dose: 3 millicurie  Freq: NOW Route: IV  Start: 08/23/21 0800 End: 08/23/21 0741   Order ID: [id number]            07 (3 millicurie)      08                 Discontinued Medications       Medications 08/19/21 08/20/21 08/21/21 08/22/21 08/23/21 08/24/21 08/25/21       0.9% sodium chloride infusion   Rate: 75 mL/hr Dose: 75 mL/hr  Freq: CONTINUOUS Route: IV  Start: 08/24/21 0800 End: 08/24/21 0903   Order ID: [id number]              08 (75 mL/hr)               acetaminophen (TYLENOL) tablet 650 mg   Dose: 650 mg  Freq: EVERY 6 HOURS AS NEEDED Route: PO  PRN Reasons: Mild Pain,Fever  PRN Comment:  For temp greater than 100.4 F (38 C)  Start: 08/22/21 1205 End: 08/25/21 1539    Admin Instructions:       Order ID: [id number]              17 (650 mg)       03 (650 mg)           Or  acetaminophen (TYLENOL) suppository 650 mg   Dose: 650 mg  Freq: EVERY 6 HOURS AS NEEDED Route: RE  PRN Reasons: Mild Pain,Fever  PRN Comment: For temp greater than 100.4 F (38 C). Administer if oral route cannot be used. Start: 08/22/21 1205 End: 08/25/21 1539    Admin Instructions:       Order ID: [id number]                                allopurinoL (ZYLOPRIM) tablet 100 mg   Dose: 100 mg  Freq: DAILY Route: PO  Start: 08/23/21 0900 End: 08/25/21 1539   Order ID: [id number]            09 (100 mg)       08 (100 mg)       08 (100 mg)          ALPRAZolam (XANAX) tablet 0.25 mg   Dose: 0.25 mg  Freq: 2 TIMES DAILY Route: PO  Start: 08/22/21 2100 End: 08/25/21 1539   Order ID: [id number]          21 (0.25 mg)       09 (0.25 mg)      22 (0.25 mg)       08 (0.25 mg)      22 (0.25 mg)       08 (0.25 mg)         amLODIPine (NORVASC) tablet 2.5 mg   Dose: 2.5 mg  Freq: DAILY Route: PO  Start: 08/23/21 0900 End: 08/24/21 0748    Admin Instructions:   . Order ID: [id number]            09 (2.5 mg)                 apixaban (ELIQUIS) tablet 5 mg   Dose: 5 mg  Freq: 2 TIMES DAILY Route: PO  Start: 08/22/21 2100 End: 08/25/21 1539   Order ID: [id number]          21 (5 mg)       09 (5 mg)      22 (5 mg)       09 (5 mg)      22 (5 mg)       08 (5 mg)         ARIPiprazole (ABILIFY) tablet 15 mg   Dose: 15 mg  Freq: DAILY Route: PO  Start: 08/23/21 0900 End: 08/25/21 1539   Order ID: [id number]            09 (15 mg)       08 (15 mg)       08 (15 mg)          atorvastatin (LIPITOR) tablet 40 mg   Dose: 40 mg  Freq: EVERY BEDTIME Route: PO  Start: 08/22/21 2200 End: 08/25/21 1539   Order ID: [id number]          21 (40 mg)       22 (40 mg)       22 (40 mg)               citalopram (CELEXA) tablet 20 mg   Dose: 20 mg  Freq: DAILY Route: PO  Start: 08/23/21 0900 End: 08/25/21 1539   Order ID: [id number]            09 (20 mg)       08 (20 mg)       (09) [C]          dextrose (D50W) injection syrg 12.5-25 g   Dose: 25-50 mL  Freq: AS NEEDED Route: IV  PRN Reason: Hypoglycemia  Start: 08/22/21 1217 End: 08/25/21 1539    Admin Instructions:    If patient NPO, unconscious or unable to swallow and    If Blood Glucose between 60 and 70 mg/dL: give 25 ml D50% IV. If Blood Glucose less than 60 mg/dL: give 50 ml D50% IV. Repeat blood glucose in 15 minutes. Continue to repeat blood glucose and treatment every 15 minutes until  blood glucose is greater than 70 mg/dL and notify provider. Order ID: [id number]                        ferrous sulfate tablet 325 mg   Dose: 1 Tablet  Freq: DAILY WITH BREAKFAST Route: PO  Start: 08/23/21 0800 End: 08/25/21 1539   Order ID: [id number]            09 (325 mg)       08 (325 mg)       07 (325 mg)          furosemide (LASIX) injection 20 mg   Dose: 20 mg  Freq: EVERY 8 HOURS Route: IV  Start: 08/22/21 1400 End: 08/23/21 1629   Order ID: [id number]          13 (20 mg)      21 (20 mg)       05 (20 mg)      15 (20 mg)                 glucagon (GLUCAGEN) injection 1 mg   Dose: 1 mg  Freq: AS NEEDED Route: IM  PRN Reason: Hypoglycemia  Start: 08/22/21 1217 End: 08/25/21 1539    Admin Instructions:   Dilute with 1 mL of sterile water final concentration 1 mg/mL  Administer Glucagon IM or subcutaneous per protocol if :  Blood Glucose is less than 70 mg/dL AND patient unable to swallow or  unconscious AND no IV access    Repeat blood glucose in 15 minutes: If blood glucose is less than 70 mg/dL AND  No IV access, give 1 mg Glucagon. Continue to repeat blood glucose and treatment  every 15 minutes until  blood glucose is greater than 70 mg/dL and notify provider. Roll patient on side after administration to prevent aspiration. Order ID: [id number]                        glucose chewable tablet 16 g   Dose: 4 Tablet  Freq: AS NEEDED Route: PO  PRN Reason: Hypoglycemia  Start: 08/22/21 1217 End: 08/25/21 1539    Admin Instructions:   If Blood Glucose less than 70 mg/dL AND conscious/able to swallow:  Administer 4 Glucose tablets (16 grams). Do not administer glucose tabs if NPO for aspiration precautions.     Repeat blood glucose in 15 minutes. Continue to repeat blood glucose and treatment every 15 minutes until  blood glucose is greater than 70 mg/dL and notify provider. Order ID: [id number]                        hydrALAZINE (APRESOLINE) tablet 100 mg   Dose: 100 mg  Freq: 3 TIMES DAILY Route: PO  Start: 08/23/21 2200 End: 08/25/21 1539    Admin Instructions:   . Order ID: [id number]            22 (100 mg)       08 (100 mg)      16 (100 mg)      22 (100 mg)       08 (100 mg)          hydrALAZINE (APRESOLINE) tablet 50 mg   Dose: 50 mg  Freq: 3 TIMES DAILY Route: PO  Start: 08/22/21 1600 End: 08/23/21 1629    Admin Instructions:   . Order ID: [id number]          16 (50 mg)      21 (50 mg)       09 (50 mg)      16                 hydrocortisone (ANUSOL-HC) suppository 25 mg   Dose: 25 mg  Freq: EVERY 12 HOURS Route: RE  Start: 08/24/21 2100 End: 08/25/21 1539   Order ID: [id number]              22 (25 mg)       08 (25 mg)           insulin glargine (LANTUS) injection 25 Units   Dose: 25 Units  Freq: EVERY BEDTIME Route: SC  Start: 08/22/21 2200 End: 08/25/21 1539    Admin Instructions:   DO NOT hold without a physician's order. DO NOT mix with other insulins. Order ID: [id number]          21 (25 Units)       22 (25 Units)       22 (25 Units)               insulin lispro (HUMALOG) injection   Freq: 4 TIMES DAILY BEFORE MEALS & NIGHTLY Route: SC  Start: 08/22/21 1630 End: 08/25/21 1539    Admin Instructions:   Standard Dose ( units per day): For Blood Sugar (mg/dL) of:     Less than 150 =   0 units           150 -199 =  2 units  200 -249 =  4 units  250 -299 =  6 units  300 -349 =  8 units  350 - 399 = 10 units  400 or greater = Call Physician. Initiate Hypoglycemia protocol if blood glucose is <70 mg/dL. Fast Acting - Administer Immediately - or within 15 minutes of start of meal, if mealtime coverage.    Order ID: [id number]          16 (8 Units)      21 (4 Units)       09 (6 Units)      12 (4 Units)      16 (6 Units)    22 (6 Units)       08 (6 Units)      12 (6 Units)      16 (4 Units)      22 (8 Units)       07 (4 Units)      11 (6 Units)         insulin lispro (HUMALOG) injection   Freq: 3 TIMES DAILY WITH MEALS Route: SC  Start: 08/22/21 1242 End: 08/22/21 1244    Admin Instructions:   Initiate meal time/BOLUS dose insulin Humalog (lispro) if patient eating.  Hold meal time insulin dose if patient not eating. May be given up to 15 minutes after completing meal.  Fast Acting - Administer Immediately - or within 15 minutes of start of meal, if mealtime coverage.    Order ID: [id number]          12 [C]                   magnesium oxide (MAG-OX) tablet 400 mg   Dose: 400 mg  Freq: 3 TIMES DAILY Route: PO  Start: 08/24/21 0900 End: 08/25/21 1539   Order ID: [id number]              08 (400 mg)      16 (400 mg)      22 (400 mg)       08 (400 mg)           magnesium oxide (MAG-OX) tablet 400 mg   Dose: 400 mg  Freq: 2 TIMES DAILY Route: PO  Start: 08/23/21 2100 End: 08/23/21 2229   Order ID: [id number]            22 (400 mg)                 magnesium oxide (MAG-OX) tablet 400 mg   Dose: 400 mg  Freq: 3 TIMES DAILY Route: PO  Start: 08/22/21 1600 End: 08/23/21 0839   Order ID: [id number]          16 (400 mg)       08                 metoprolol succinate (TOPROL-XL) XL tablet 50 mg   Dose: 50 mg  Freq: DAILY Route: PO  Start: 08/24/21 0900 End: 08/25/21 1539    Admin Instructions:   May split tablet but DO NOT CHEW OR CRUSH tablet, swallow whole   Order ID: [id number]              08 (50 mg)       08 (50 mg)           ondansetron (ZOFRAN ODT) tablet 4 mg   Dose: 4 mg  Freq: EVERY 8 HOURS AS NEEDED Route: PO  PRN Reasons: Nausea,Vomiting  Start: 08/22/21 1205 End: 08/25/21 1539   Order ID: [id number]          22 (4 mg)                     04 (4 mg)         Or  ondansetron (ZOFRAN) injection 4 mg   Dose: 4 mg  Freq: EVERY 6 HOURS AS NEEDED Route: IV  PRN Reasons: Nausea,Vomiting  Start: 08/22/21 1205 End: 08/25/21 1539    Admin Instructions: Administer if oral route cannot be used. Order ID: [id number]                 09 (4 mg)       18 (4 mg)                ondansetron (ZOFRAN) injection 4 mg   Dose: 4 mg  Freq: NOW Route: IV  Start: 08/22/21 0837 End: 08/22/21 0842    Admin Instructions:   Administer 4 to 7 mg IV over 30 seconds. Administer 8mg IV over 60 seconds. Administer doses greater than 8mg IV over at least 2 minutes. Order ID: [id number]          08 [C]                   pantoprazole (PROTONIX) tablet 40 mg   Dose: 40 mg  Freq: DAILY BEFORE BREAKFAST Route: PO  Indications of Use: gastroesophageal reflux disease  Start: 08/23/21 0730 End: 08/25/21 1539    Admin Instructions:   Do not crush, break or chew.  Swallow whole. Order specific questions:   PPI INDICATION Symptomatic GERD   Order ID: [id number]            09 (40 mg)       08 (40 mg)       07 (40 mg)          polyethylene glycol (MIRALAX) packet 17 g   Dose: 17 g  Freq: DAILY PRN Route: PO  PRN Reason: Constipation  Start: 08/22/21 1205 End: 08/25/21 1539    Admin Instructions:   First line therapy for constipation   Order ID: [id number]                        sodium bicarbonate (8.4%) 150 mEq in dextrose 5% 1,000 mL infusion   Rate: 100 mL/hr Freq: CONTINUOUS Route: IV  Start: 08/24/21 1000 End: 08/25/21 1048   Order ID: [id number]              09 ( )      22 ( )               sodium chloride (NS) flush 5-40 mL   Dose: 5-40 mL  Freq: AS NEEDED Route: IV  PRN Reason: Line Patency  Start: 08/22/21 1205 End: 08/25/21 1539    Admin Instructions:   If following IV push medication, administer flush at the same rate as the IV push. Flush volume is determined by type of infusion therapy being given. For non-viscous solutions use Peripheral IV = 5 mL; Midline or Central Line = 10 mL/lumen. For viscous solutions (i.e. blood components, parenteral nutrition, contrast media, or after obtaining blood sample) use Peripheral IV= 10 mL; Midline or Central Line = 20 mL/lumen.    Order ID: [id number]                             sodium chloride (NS) flush 5-40 mL   Dose: 5-40 mL  Freq: EVERY 8 HOURS Route: IV  Start: 08/22/21 1400 End: 08/25/21 1539    Admin Instructions: For Line Patency: Peripheral IV = 5 mL; Midline or Central Line = 10 mL/lumen. Order ID: [id number]          13 (10 mL)      21 (10 mL)       05 (10 mL)      15 (10 mL)      22 (10 mL)       05 (10 mL)      16 (10 mL)      22 (10 mL)       05 (10 mL)         technetium TC-99M pentetic acid (DRAXIMAGE DTPA) injection 42-05 millicurie   Dose: 53-13 millicurie  Freq: RAD ONCE Route: IN  Start: 08/23/21 0800 End: 08/23/21 1959    Admin Instructions:   Intravenous use: Instruct patient to increase fluid intake and void frequently for next 4-6 hours. Inhalation use: Instruct patient to rinse their mouth and expectorate after DTPA inhalation. Order ID: [id number]            08                 traMADoL (ULTRAM) tablet 50 mg   Dose: 50 mg  Freq: EVERY 6 HOURS AS NEEDED Route: PO  PRN Reason:  Moderate Pain  Start: 08/22/21 1238 End: 08/25/21 1539   Order ID: [id number]            00 (50 mg)      09 (50 mg)      22 (50 mg)       22 (50 mg)               Medications 08/19/21 08/20/21 08/21/21 08/22/21 08/23/21 08/24/21 08/25/21                      PN 8/23 by Tammy Hayward       Review Entered Review Status   8/24/2021 09:40 In Primary      Criteria Review   Patient seen on follow-up resting in bed no acute distress patient had nausea and single episode of vomiting overnight does have history of reflux blood pressure slightly elevated .  Continues on 2 L nasal cannula and lies on CPAP at night has CKD issues and follows with Dr. Silver Rojas.    Assessment//Plan             Problem List:                                       Hospital Problems  Date Reviewed: 7/7/2021         Codes Class Noted POA     * (Principal) Chest pain ICD-10-CM: R07.9  ICD-9-CM: 786.50   8/22/2021 Unknown                Chest pain  -Continue monitor on telemetry initial and repeat troponins are negative and no ischemic changes on EKG  -Cardiology consult appreciated and will continue further outpatient ischemic work-up     Hypertension  -Elevated morning and Lasix discontinued as echo shows IVC not dilated  -Increased hydralazine 200 mg 3 times daily and continued on metoprolol succinate 50 mg and amlodipine 2.5 mg  -Follow 2D echo     HLD  -Continue atorvastatin 40 mg at bedtime  -Lipid profile pending     Elevated D-dimer  -Mildly elevated 0.69 and with episode of chest pain nuclear medicine VQ scan was done and found to be negative for PE     Hypomagnesemia  -Low at 1.5 and 2 g IV magnesium sulfate rider given and patient takes magnesium oxide 400 mg 3 times a day chronically  -We will repeat mag level in a.m.     CKD  -Appears to be at baseline creatinine and follows with Dr. Wilfred Decker  -Continue bicarb supplementation  -Monitor closely     Chronic anemia  -8.7 and then repeat is 8.9 and stable  -Obtain iron panel  -Has received iron transfusions in the past with nephrology     Obstructive sleep apnea  -Has home CPAP and requested patient to have family bring so that she can use it tonight     Nausea vomiting  -History of GERD presented with single episode of vomiting overnight  -Monitor closely  -Continue pantoprazole     Anxiety  -Continue home alprazolam as needed     Chronic gout  -Continue home allopurinol     Diabetes mellitus 2  -A1c of 8.2 and nutrition consultation appreciated  -Continue home Lantus and regular insulin sliding scale with Accu-Cheks AC at bedtime     Generalized weakness  -Patient evaluated by physical therapy and overall did well and no therapy needs on discharge     GI prophylaxis  -Home pantoprazole     DVT prophylaxis  -Eliquis twice daily     Cardiology     Case discussed with collaborating physician Leon Jerez our impression and recommendations are as follows:   1. Chest Pain: ACS ruled out per EKG and Troponin criteria. TTE pending.  Given risk factors and sx would benefit from further ischemic evaluation, that can be completed in OP setting.  Would recommend further risk factor modification and control of BP.    2. HTN:  BP above goal.  Will increase Hydralazine to 100mg TID.  Stop IV Lasix as patient is not hypervolemic, BNP not elevated to appropriate range given renal function, and IVC was normal in size on prelim TTE. 3. Hypomagnesemia - replete to goal >2.   Repeat labs in am. Continue to monitor potassium level. 4. CKD:  Will need close follow-up with Nephrology in OP setting.    Chronic Anemia - reports receiving iron transfusions recently, with last dose last week.  Managed by Nephrology.

## 2021-08-26 NOTE — ED PROVIDER NOTES
EMERGENCY DEPARTMENT HISTORY AND PHYSICAL EXAM      Date: 8/26/2021  Patient Name: Goyo Carolina    History of Presenting Illness     Chief Complaint   Patient presents with    Shortness of Breath    Diarrhea       History Provided By: Patient    HPI: Goyo Carolina, 63 y.o. female with a past medical history significant diabetes and hypertension presents to the ED with cc of shortness of breath not being able to tolerate CPAP at home and pain between her shoulder blades, patient recently discharged from the hospital yesterday, diarrhea she had 2 episodes yesterday while hospitalized and one episode at home, no diarrhea today and no abdominal pain; patient states she is anxious  There are no other complaints, changes, or physical findings at this time.     PCP: Yahaira Lorenzo PA-C    Current Facility-Administered Medications on File Prior to Encounter   Medication Dose Route Frequency Provider Last Rate Last Admin    [DISCONTINUED] hydrocortisone (ANUSOL-HC) suppository 25 mg  25 mg Rectal Q12H Teddy Bailey MD   25 mg at 08/25/21 0841    [DISCONTINUED] hydrALAZINE (APRESOLINE) tablet 100 mg  100 mg Oral TID Darol Dioni B, NP   100 mg at 08/25/21 0840    [DISCONTINUED] magnesium oxide (MAG-OX) tablet 400 mg  400 mg Oral TID Teddy Bailey MD   400 mg at 08/25/21 0840    [DISCONTINUED] metoprolol succinate (TOPROL-XL) XL tablet 50 mg  50 mg Oral DAILY Rakan Diamond MD   50 mg at 08/25/21 0840    [DISCONTINUED] sodium chloride (NS) flush 5-40 mL  5-40 mL IntraVENous Q8H Marianne Smith MD   10 mL at 08/25/21 0544    [DISCONTINUED] sodium chloride (NS) flush 5-40 mL  5-40 mL IntraVENous PRN MD Tom Jauregui [DISCONTINUED] acetaminophen (TYLENOL) tablet 650 mg  650 mg Oral Q6H PRN Marianne Smith MD   650 mg at 08/25/21 0355    [DISCONTINUED] acetaminophen (TYLENOL) suppository 650 mg  650 mg Rectal Q6H PRN Marianne Smith MD        [DISCONTINUED] polyethylene glycol (MIRALAX) packet 17 g  17 g Oral DAILY PRN Monisha Calderon MD        [DISCONTINUED] ondansetron (ZOFRAN ODT) tablet 4 mg  4 mg Oral Q8H PRN Monisha Calderon MD   4 mg at 08/25/21 0433    [DISCONTINUED] ondansetron (ZOFRAN) injection 4 mg  4 mg IntraVENous Q6H PRN Monisha Calderon MD   4 mg at 08/24/21 1836    [DISCONTINUED] apixaban (ELIQUIS) tablet 5 mg  5 mg Oral BID Monisha Calderon MD   5 mg at 08/25/21 0840    [DISCONTINUED] glucose chewable tablet 16 g  4 Tablet Oral PRN Monisha Calderon MD        [DISCONTINUED] dextrose (D50W) injection syrg 12.5-25 g  25-50 mL IntraVENous PRN Monisha Calderon MD        [DISCONTINUED] glucagon (GLUCAGEN) injection 1 mg  1 mg IntraMUSCular PRN MD Hannah Goodwin [DISCONTINUED] ALPRAZolam Aretta Herd) tablet 0.25 mg  0.25 mg Oral BID Monisha Calderon MD   0.25 mg at 08/25/21 0841    [DISCONTINUED] ARIPiprazole (ABILIFY) tablet 15 mg  15 mg Oral DAILY Monisha Calderon MD   15 mg at 08/25/21 0840    [DISCONTINUED] atorvastatin (LIPITOR) tablet 40 mg  40 mg Oral QHS Monisha Calderon MD   40 mg at 08/24/21 2202    [DISCONTINUED] citalopram (CELEXA) tablet 20 mg  20 mg Oral DAILY Monisha Calderon MD   20 mg at 08/24/21 0030    [DISCONTINUED] ferrous sulfate tablet 325 mg  1 Tablet Oral DAILY WITH Gilles Grimes MD   325 mg at 08/25/21 0751    [DISCONTINUED] pantoprazole (PROTONIX) tablet 40 mg  40 mg Oral ACB Monisha Calderon MD   40 mg at 08/25/21 0751    [DISCONTINUED] allopurinoL (ZYLOPRIM) tablet 100 mg  100 mg Oral DAILY Monisha Calderon MD   100 mg at 08/25/21 0840    [DISCONTINUED] traMADoL (ULTRAM) tablet 50 mg  50 mg Oral Q6H PRN Monisha Calderon MD   50 mg at 08/24/21 2255    [DISCONTINUED] insulin glargine (LANTUS) injection 25 Units  25 Units SubCUTAneous QHS Monisha Calderon MD   25 Units at 08/24/21 2203    [DISCONTINUED] insulin lispro (HUMALOG) injection   SubCUTAneous AC&HS Sandrine Reich MD   6 Units at 08/25/21 1138     Current Outpatient Medications on File Prior to Encounter   Medication Sig Dispense Refill    hydrALAZINE (APRESOLINE) 50 mg tablet Take 2 Tablets by mouth three (3) times daily. 90 Tablet 0    atorvastatin (LIPITOR) 40 mg tablet Take 1 Tablet by mouth nightly. 30 Tablet 0    ferrous sulfate 325 mg (65 mg iron) tablet Take 1 Tablet by mouth daily (with breakfast). 30 Tablet 0    hydrocortisone (ANUSOL-HC) 25 mg supp Insert 1 Suppository into rectum every twelve (12) hours for 10 days. 20 Suppository 0    ALPRAZolam (XANAX) 0.5 mg tablet Take 1 tablet by mouth twice daily as needed 60 Tablet 0    ARIPiprazole (ABILIFY) 20 mg tablet Take 1 Tablet by mouth daily for 90 days. 90 Tablet 0    insulin aspart protamine/insulin aspart (NOVOLOG MIX 70/30) 100 unit/mL (70-30) inpn Take 45 units in the morning and 30 units in evening (Patient taking differently: Take 45 units in the morning and 35 units in evening) 8 Adjustable Dose Pre-filled Pen Syringe 3    Insulin Needles, Disposable, 31 gauge x 5/16\" ndle Twice a day 1 Package 5    metoprolol succinate (TOPROL-XL) 50 mg XL tablet TAKE 1 TABLET BY MOUTH ONCE DAILY      lancets (One Touch Delica) 33 gauge misc Check glucose 3 times a day 100 Lancet 3    glucose blood VI test strips (OneTouch Verio test strips) strip Check glucose 3 times a day 100 Strip 3    allopurinoL (ZYLOPRIM) 300 mg tablet Take  by mouth daily.  gabapentin (NEURONTIN) 100 mg capsule Take  by mouth three (3) times daily.  magnesium oxide (MAG-OX) 400 mg tablet Take 400 mg by mouth three (3) times daily.  pantoprazole (PROTONIX) 40 mg tablet Take 40 mg by mouth daily.  sodium bicarbonate 650 mg tablet Take  by mouth two (2) times a day.          Past History     Past Medical History:  Past Medical History:   Diagnosis Date    Anxiety 6/22/2020    Bipolar 1 disorder ([facility] [address] 1/23/2018    Diabetes mellitus type 2, controlled ([facility] 6/22/2020    Hypertension 6/22/2020    Liver fibrosis 6/22/2020    Major depression 6/22/2020    Migraine 6/22/2020    Sleep apnea 1/23/2018    Suicide attempt ([facility] 6/22/2020 2013       Past Surgical History:  Past Surgical History:   Procedure Laterality Date    HX APPENDECTOMY      HX CHOLECYSTECTOMY         Family History:  Family History   Problem Relation Age of Onset    Heart Attack Mother     Heart Attack Father        Social History:  Social History     Tobacco Use    Smoking status: Never Smoker    Smokeless tobacco: Never Used   Vaping Use    Vaping Use: Never assessed   Substance Use Topics    Alcohol use: Not Currently    Drug use: Never       Allergies: Allergies   Allergen Reactions    Adhesive Tape-Silicones Unknown (comments)     Blisters    Septra [Sulfamethoprim] Unknown (comments)    Sulfa (Sulfonamide Antibiotics) Hives         Review of Systems     Review of Systems   Constitutional: Negative for chills and fever. HENT: Negative for rhinorrhea and sore throat. Eyes: Negative for pain and visual disturbance. Respiratory: Positive for shortness of breath. Negative for cough and chest tightness. Cardiovascular: Negative for chest pain, palpitations and leg swelling. Gastrointestinal: Positive for diarrhea. Negative for abdominal pain, nausea and vomiting. Endocrine: Negative for polydipsia and polyuria. Genitourinary: Negative for dysuria and urgency. Musculoskeletal: Positive for back pain. Negative for myalgias. Skin: Negative for color change and pallor. Neurological: Negative for weakness and numbness. Psychiatric/Behavioral: Negative. Physical Exam     Physical Exam  Vitals and nursing note reviewed. Constitutional:       General: She is not in acute distress. Appearance: She is obese. She is not ill-appearing, toxic-appearing or diaphoretic.    HENT:      Head: Normocephalic and atraumatic. Eyes:      Extraocular Movements: Extraocular movements intact. Pupils: Pupils are equal, round, and reactive to light. Cardiovascular:      Rate and Rhythm: Normal rate and regular rhythm. Pulses: Normal pulses. Heart sounds: Normal heart sounds. Pulmonary:      Effort: Pulmonary effort is normal.      Breath sounds: Normal breath sounds. No decreased breath sounds or wheezing. Chest:      Chest wall: No tenderness or crepitus. Abdominal:      General: Bowel sounds are normal.      Palpations: Abdomen is soft. Musculoskeletal:         General: Normal range of motion. Cervical back: Normal range of motion and neck supple. Right lower leg: No tenderness. No edema. Left lower leg: No tenderness. No edema. Skin:     General: Skin is warm and dry. Capillary Refill: Capillary refill takes less than 2 seconds. Neurological:      General: No focal deficit present. Mental Status: She is alert and oriented to person, place, and time. Psychiatric:         Mood and Affect: Mood normal.         Behavior: Behavior normal.         Lab and Diagnostic Study Results     Labs -   No results found for this or any previous visit (from the past 12 hour(s)). Radiologic Studies -   @lastxrresult@  CT Results  (Last 48 hours)    None        CXR Results  (Last 48 hours)    None            Medical Decision Making   - I am the first provider for this patient. - I reviewed the vital signs, available nursing notes, past medical history, past surgical history, family history and social history. - Initial assessment performed. The patients presenting problems have been discussed, and they are in agreement with the care plan formulated and outlined with them. I have encouraged them to ask questions as they arise throughout their visit. Vital Signs-Reviewed the patient's vital signs.   Patient Vitals for the past 12 hrs:   Temp Pulse Resp BP SpO2   08/26/21 1309 98.6 °F (37 °C)       08/26/21 1304  (!) 109 22 (!) 174/113 98 %       Records Reviewed: Nursing Notes    The patient presents with shortness of breath with a differential diagnosis of pneumonia, CHF, bronchitis      ED Course:     ED Course as of Aug 26 1502   Thu Aug 26, 2021   1453 IV fluids still infusing about 400 cc more    [SB]   1500 Shortness of breath when patient is supine or attempts to wear her CPAP machine    [SB]      ED Course User Index  [SB] Mick Liu MD       Provider Notes (Medical Decision Making): MDM       Procedures   Medical Decision Makingedical Decision Making  Performed by: Delgado Singh MD  PROCEDURES:  Procedures       Disposition   Disposition: Condition stable and improved  DC- Adult Discharges: All of the diagnostic tests were reviewed and questions answered. Diagnosis, care plan and treatment options were discussed. The patient understands the instructions and will follow up as directed. The patients results have been reviewed with them. They have been counseled regarding their diagnosis. The patient verbally convey understanding and agreement of the signs, symptoms, diagnosis, treatment and prognosis and additionally agrees to follow up as recommended with their PCP in 24 - 48 hours. They also agree with the care-plan and convey that all of their questions have been answered. I have also put together some discharge instructions for them that include: 1) educational information regarding their diagnosis, 2) how to care for their diagnosis at home, as well a 3) list of reasons why they would want to return to the ED prior to their follow-up appointment, should their condition change. DISCHARGE PLAN:  1. Current Discharge Medication List      CONTINUE these medications which have NOT CHANGED    Details   hydrALAZINE (APRESOLINE) 50 mg tablet Take 2 Tablets by mouth three (3) times daily.   Qty: 90 Tablet, Refills: 0      atorvastatin (LIPITOR) 40 mg tablet Take 1 Tablet by mouth nightly. Qty: 30 Tablet, Refills: 0      ferrous sulfate 325 mg (65 mg iron) tablet Take 1 Tablet by mouth daily (with breakfast). Qty: 30 Tablet, Refills: 0      hydrocortisone (ANUSOL-HC) 25 mg supp Insert 1 Suppository into rectum every twelve (12) hours for 10 days. Qty: 20 Suppository, Refills: 0      ALPRAZolam (XANAX) 0.5 mg tablet Take 1 tablet by mouth twice daily as needed  Qty: 60 Tablet, Refills: 0    Associated Diagnoses: Generalized anxiety disorder      ARIPiprazole (ABILIFY) 20 mg tablet Take 1 Tablet by mouth daily for 90 days. Qty: 90 Tablet, Refills: 0    Associated Diagnoses: Bipolar depression ([facility] [address]      insulin aspart protamine/insulin aspart (NOVOLOG MIX 70/30) 100 unit/mL (70-30) inpn Take 45 units in the morning and 30 units in evening  Qty: 8 Adjustable Dose Pre-filled Pen Syringe, Refills: 3    Associated Diagnoses: Type 2 diabetes mellitus with hyperglycemia, with long-term current use of insulin ([facility])      Insulin Needles, Disposable, 31 gauge x 5/16\" ndle Twice a day  Qty: 1 Package, Refills: 5    Associated Diagnoses: Type 2 diabetes mellitus with hyperglycemia, with long-term current use of insulin ([facility])      metoprolol succinate (TOPROL-XL) 50 mg XL tablet TAKE 1 TABLET BY MOUTH ONCE DAILY      lancets (One Touch Delica) 33 gauge misc Check glucose 3 times a day  Qty: 100 Lancet, Refills: 3    Associated Diagnoses: Type 2 diabetes mellitus with hyperglycemia, with long-term current use of insulin ([facility])      glucose blood VI test strips (OneTouch Verio test strips) strip Check glucose 3 times a day  Qty: 100 Strip, Refills: 3    Associated Diagnoses: Type 2 diabetes mellitus with hyperglycemia, with long-term current use of insulin ([facility])      allopurinoL (ZYLOPRIM) 300 mg tablet Take  by mouth daily. gabapentin (NEURONTIN) 100 mg capsule Take  by mouth three (3) times daily.       magnesium oxide (MAG-OX) 400 mg tablet Take 400 mg by mouth three (3) times daily. pantoprazole (PROTONIX) 40 mg tablet Take 40 mg by mouth daily. sodium bicarbonate 650 mg tablet Take  by mouth two (2) times a day. 2.   Follow-up Information    None       3. Return to ED if worse   4. Current Discharge Medication List            Diagnosis     Clinical Impression: No diagnosis found. Attestations:    Yinka Forman MD    Please note that this dictation was completed with [employer]canBox, the computer voice recognition software. Quite often unanticipated grammatical, syntax, homophones, and other interpretive errors are inadvertently transcribed by the computer software. Please disregard these errors. Please excuse any errors that have escaped final proofreading. Thank you.

## 2021-08-26 NOTE — ED TRIAGE NOTES
Pt reports Sob, \"pain in my ribs\" and diarrhea x3 days. Pt reports being recently admitted on ACU.

## 2021-08-26 NOTE — TELEPHONE ENCOUNTER
Patient is requesting to change Xanax to 3 times daily. Her sister intervened during phone interview (with patient's consent) and reports that she believes \"it's all in her mind. \" She reports that patient is noncompliant. Sister reports that patient was doing better when she was taking Wellbutrin but she stopped taking it because she wanted to sleep during the day. She reports patient does not want to get better because she does not want to go back to work. \"All she wants to do is eat and sleep. \"  Her sister reports that [place] is \"attention seeking and abusing her medication. \"  I recommend patient to have additional counseling and make a follow-up appointment. It is noted patient is currently being evaluated in the emergency department. Length of telephone call 11 minutes and 38 seconds.

## 2021-08-27 ENCOUNTER — TELEPHONE (OUTPATIENT)
Dept: PRIMARY CARE CLINIC | Age: 61
End: 2021-08-27

## 2021-08-27 NOTE — TELEPHONE ENCOUNTER
Viky Najera called and said pt is taking more medication than she is suppose to and laying in the bed and she is worried about fluid overload since pt is not being active. She said she just wanted to make you aware.

## 2021-08-27 NOTE — PROGRESS NOTES
Physician Progress Note      PATIENT:               Bhavna Finley  CSN #:                  [id number]  :                       1960  ADMIT DATE:       2021 7:20 AM  DISCH DATE:        2021 12:34 PM  RESPONDING  PROVIDER #:        Madison Gusman MD          QUERY TEXT:    Dear Dr. Magnus Mahan -    Pt admitted with CP with SOB and has anemia documented. If possible, please document in progress notes and discharge summary further specificity regarding the acuity and type of anemia:    The medical record reflects the following:  Risk Factors: 63 F presented with CP and SOB  Clinical Indicators: chronic anemia documented by Cardiology consultant \"Chronic Anemia - reports receiving iron transfusions recently, with last dose last week. Managed by Nephrology. \"; Hgb 8.7. ..8.9; CKD3  Treatment: labs, Ferrous Sulfate    Thank you,  BAMBI Klein, RN, [place]  Clinical   [phone]  Options provided:  -- Anemia due to chronic blood loss  -- Anemia due to iron deficiency  -- Anemia due to CKD  -- Other - I will add my own diagnosis  -- Disagree - Not applicable / Not valid  -- Disagree - Clinically unable to determine / Unknown  -- Refer to Clinical Documentation Reviewer    PROVIDER RESPONSE TEXT:    This patient has anemia due to CKD.     Query created by: Soraida Muñoz on 2021 6:40 AM      Electronically signed by:  Madison Gusman MD 2021 11:21 PM

## 2021-08-28 ENCOUNTER — APPOINTMENT (OUTPATIENT)
Dept: GENERAL RADIOLOGY | Age: 61
End: 2021-08-28
Attending: EMERGENCY MEDICINE
Payer: MEDICARE

## 2021-08-28 ENCOUNTER — APPOINTMENT (OUTPATIENT)
Dept: CT IMAGING | Age: 61
End: 2021-08-28
Attending: EMERGENCY MEDICINE
Payer: MEDICARE

## 2021-08-28 ENCOUNTER — HOSPITAL ENCOUNTER (EMERGENCY)
Age: 61
Discharge: HOME | End: 2021-08-28
Attending: EMERGENCY MEDICINE
Payer: MEDICARE

## 2021-08-28 VITALS
OXYGEN SATURATION: 98 % | BODY MASS INDEX: 33.27 KG/M2 | SYSTOLIC BLOOD PRESSURE: 130 MMHG | DIASTOLIC BLOOD PRESSURE: 76 MMHG | TEMPERATURE: 97.6 F | RESPIRATION RATE: 22 BRPM | HEIGHT: 67 IN | WEIGHT: 212 LBS | HEART RATE: 108 BPM

## 2021-08-28 DIAGNOSIS — R06.02 SOB (SHORTNESS OF BREATH): Primary | ICD-10-CM

## 2021-08-28 LAB
ALBUMIN SERPL-MCNC: 3.9 G/DL (ref 3.5–5)
ALBUMIN/GLOB SERPL: 0.7 {RATIO} (ref 1.1–2.2)
ALP SERPL-CCNC: 225 U/L (ref 45–117)
ALT SERPL-CCNC: 25 U/L (ref 12–78)
ANION GAP SERPL CALC-SCNC: 15 MMOL/L (ref 5–15)
AST SERPL W P-5'-P-CCNC: 40 U/L (ref 15–37)
BASOPHILS # BLD: 0.1 K/UL (ref 0–0.2)
BASOPHILS NFR BLD: 1 % (ref 0–2.5)
BILIRUB SERPL-MCNC: 0.5 MG/DL (ref 0.2–1)
BNP SERPL-MCNC: 67 PG/ML
BUN SERPL-MCNC: 43 MG/DL (ref 6–20)
BUN/CREAT SERPL: 14 (ref 12–20)
CA-I BLD-MCNC: 9.7 MG/DL (ref 8.5–10.1)
CHLORIDE SERPL-SCNC: 99 MMOL/L (ref 97–108)
CO2 SERPL-SCNC: 25 MMOL/L (ref 21–32)
CREAT SERPL-MCNC: 3.03 MG/DL (ref 0.55–1.02)
EOSINOPHIL # BLD: 0.1 K/UL (ref 0–0.7)
EOSINOPHIL NFR BLD: 2 % (ref 0.9–2.9)
ERYTHROCYTE [DISTWIDTH] IN BLOOD BY AUTOMATED COUNT: 19.7 % (ref 11.5–14.5)
GLOBULIN SER CALC-MCNC: 5.4 G/DL (ref 2–4)
GLUCOSE SERPL-MCNC: 212 MG/DL (ref 65–100)
HCT VFR BLD AUTO: 30.8 % (ref 36–46)
HGB BLD-MCNC: 9.9 G/DL (ref 13.5–17.5)
INR PPP: 1.2 (ref 0.9–1.1)
LACTATE SERPL-SCNC: 2 MMOL/L (ref 0.4–2)
LYMPHOCYTES # BLD: 1.7 K/UL (ref 1–4.8)
LYMPHOCYTES NFR BLD: 20 % (ref 20.5–51.1)
MAGNESIUM SERPL-MCNC: 2 MG/DL (ref 1.6–2.4)
MCH RBC QN AUTO: 24.8 PG (ref 31–34)
MCHC RBC AUTO-ENTMCNC: 32 G/DL (ref 31–36)
MCV RBC AUTO: 77.4 FL (ref 80–100)
MONOCYTES # BLD: 0.5 K/UL (ref 0.2–2.4)
MONOCYTES NFR BLD: 5 % (ref 1.7–9.3)
NEUTS SEG # BLD: 6.1 K/UL (ref 1.8–7.7)
NEUTS SEG NFR BLD: 72 % (ref 42–75)
NRBC # BLD: 0.01 K/UL
NRBC BLD-RTO: 0.2 PER 100 WBC
PLATELET # BLD AUTO: 213 K/UL (ref 150–400)
PMV BLD AUTO: 8.3 FL (ref 6.5–11.5)
POTASSIUM SERPL-SCNC: 4.4 MMOL/L (ref 3.5–5.1)
PROT SERPL-MCNC: 9.3 G/DL (ref 6.4–8.2)
PROTHROMBIN TIME: 11.5 SEC (ref 9–11.1)
RBC # BLD AUTO: 3.98 M/UL (ref 4.5–5.9)
SODIUM SERPL-SCNC: 139 MMOL/L (ref 136–145)
TROPONIN I SERPL-MCNC: <0.05 NG/ML
WBC # BLD AUTO: 8.5 K/UL (ref 4.4–11.3)

## 2021-08-28 PROCEDURE — 36415 COLL VENOUS BLD VENIPUNCTURE: CPT

## 2021-08-28 PROCEDURE — 85610 PROTHROMBIN TIME: CPT

## 2021-08-28 PROCEDURE — 94640 AIRWAY INHALATION TREATMENT: CPT

## 2021-08-28 PROCEDURE — 85025 COMPLETE CBC W/AUTO DIFF WBC: CPT

## 2021-08-28 PROCEDURE — 80053 COMPREHEN METABOLIC PANEL: CPT

## 2021-08-28 PROCEDURE — 74011000250 HC RX REV CODE- 250: Performed by: EMERGENCY MEDICINE

## 2021-08-28 PROCEDURE — 94760 N-INVAS EAR/PLS OXIMETRY 1: CPT

## 2021-08-28 PROCEDURE — 83605 ASSAY OF LACTIC ACID: CPT

## 2021-08-28 PROCEDURE — 84484 ASSAY OF TROPONIN QUANT: CPT

## 2021-08-28 PROCEDURE — 83735 ASSAY OF MAGNESIUM: CPT

## 2021-08-28 PROCEDURE — 96374 THER/PROPH/DIAG INJ IV PUSH: CPT

## 2021-08-28 PROCEDURE — 74011250636 HC RX REV CODE- 250/636: Performed by: EMERGENCY MEDICINE

## 2021-08-28 PROCEDURE — 99284 EMERGENCY DEPT VISIT MOD MDM: CPT

## 2021-08-28 PROCEDURE — 71045 X-RAY EXAM CHEST 1 VIEW: CPT

## 2021-08-28 PROCEDURE — 93005 ELECTROCARDIOGRAM TRACING: CPT

## 2021-08-28 PROCEDURE — 71250 CT THORAX DX C-: CPT

## 2021-08-28 PROCEDURE — 83880 ASSAY OF NATRIURETIC PEPTIDE: CPT

## 2021-08-28 RX ORDER — DEXAMETHASONE SODIUM PHOSPHATE 4 MG/ML
6 INJECTION, SOLUTION INTRA-ARTICULAR; INTRALESIONAL; INTRAMUSCULAR; INTRAVENOUS; SOFT TISSUE ONCE
Status: COMPLETED | OUTPATIENT
Start: 2021-08-28 | End: 2021-08-28

## 2021-08-28 RX ORDER — IPRATROPIUM BROMIDE AND ALBUTEROL SULFATE 2.5; .5 MG/3ML; MG/3ML
3 SOLUTION RESPIRATORY (INHALATION)
Status: COMPLETED | OUTPATIENT
Start: 2021-08-28 | End: 2021-08-28

## 2021-08-28 RX ADMIN — IPRATROPIUM BROMIDE AND ALBUTEROL SULFATE 3 ML: .5; 2.5 SOLUTION RESPIRATORY (INHALATION) at 14:03

## 2021-08-28 RX ADMIN — DEXAMETHASONE SODIUM PHOSPHATE 6 MG: 4 INJECTION INTRA-ARTICULAR; INTRALESIONAL; INTRAMUSCULAR; INTRAVENOUS; SOFT TISSUE at 14:58

## 2021-08-28 NOTE — ED PROVIDER NOTES
EMERGENCY DEPARTMENT HISTORY AND PHYSICAL EXAM      Date: 8/28/2021  Patient Name: Margarito Stuart    History of Presenting Illness     Chief Complaint   Patient presents with    Shortness of Breath       History Provided By: Patient    HPI: Margarito Stuart, 63 y.o. female with a past medical history significant diabetes, hypertension and obesity and stage 4th renal disease and liver. presents to the ED with cc of SOB. There are no other complaints, changes, or physical findings at this time. PCP: Bella Verma PA-C    No current facility-administered medications on file prior to encounter. Current Outpatient Medications on File Prior to Encounter   Medication Sig Dispense Refill    ALPRAZolam (XANAX) 0.5 mg tablet Take 1 tablet by mouth twice daily as needed 60 Tablet 0    albuterol (PROVENTIL HFA, VENTOLIN HFA, PROAIR HFA) 90 mcg/actuation inhaler Take 2 Puffs by inhalation every four (4) hours as needed for Wheezing. 1 Inhaler 0    hydrALAZINE (APRESOLINE) 50 mg tablet Take 2 Tablets by mouth three (3) times daily. 90 Tablet 0    atorvastatin (LIPITOR) 40 mg tablet Take 1 Tablet by mouth nightly. 30 Tablet 0    ferrous sulfate 325 mg (65 mg iron) tablet Take 1 Tablet by mouth daily (with breakfast). 30 Tablet 0    hydrocortisone (ANUSOL-HC) 25 mg supp Insert 1 Suppository into rectum every twelve (12) hours for 10 days. 20 Suppository 0    ARIPiprazole (ABILIFY) 20 mg tablet Take 1 Tablet by mouth daily for 90 days.  90 Tablet 0    insulin aspart protamine/insulin aspart (NOVOLOG MIX 70/30) 100 unit/mL (70-30) inpn Take 45 units in the morning and 30 units in evening (Patient taking differently: Take 45 units in the morning and 35 units in evening) 8 Adjustable Dose Pre-filled Pen Syringe 3    Insulin Needles, Disposable, 31 gauge x 5/16\" ndle Twice a day 1 Package 5    metoprolol succinate (TOPROL-XL) 50 mg XL tablet TAKE 1 TABLET BY MOUTH ONCE DAILY      lancets (One Touch Delica) 33 gauge misc Check glucose 3 times a day 100 Lancet 3    glucose blood VI test strips (OneTouch Verio test strips) strip Check glucose 3 times a day 100 Strip 3    allopurinoL (ZYLOPRIM) 300 mg tablet Take  by mouth daily.  gabapentin (NEURONTIN) 100 mg capsule Take  by mouth three (3) times daily.  magnesium oxide (MAG-OX) 400 mg tablet Take 400 mg by mouth three (3) times daily.  pantoprazole (PROTONIX) 40 mg tablet Take 40 mg by mouth daily.  sodium bicarbonate 650 mg tablet Take  by mouth two (2) times a day. Past History     Past Medical History:  Past Medical History:   Diagnosis Date    Anxiety 6/22/2020    Bipolar 1 disorder ([facility] 1/23/2018    Diabetes mellitus type 2, controlled ([facility] 6/22/2020    Hypertension 6/22/2020    Liver fibrosis 6/22/2020    Major depression 6/22/2020    Migraine 6/22/2020    Sleep apnea 1/23/2018    [facility] 6/22/2020 2013       Past Surgical History:  Past Surgical History:   Procedure Laterality Date    HX APPENDECTOMY      HX CHOLECYSTECTOMY         Family History:  Family History   Problem Relation Age of Onset    Heart Attack Mother     Heart Attack Father        Social History:  Social History     Tobacco Use    Smoking status: Never Smoker    Smokeless tobacco: Never Used   Vaping Use    Vaping Use: Never assessed   Substance Use Topics    Alcohol use: Not Currently    Drug use: Never       Allergies: Allergies   Allergen Reactions    Adhesive Tape-Silicones Unknown (comments)     Blisters    Septra [Sulfamethoprim] Unknown (comments)    Sulfa (Sulfonamide Antibiotics) Hives         Review of Systems     Review of Systems   Constitutional: Negative. HENT: Negative. Eyes: Negative. Respiratory: Positive for shortness of breath. Cardiovascular: Negative. Gastrointestinal: Positive for nausea. Endocrine: Negative. Genitourinary: Negative. Musculoskeletal: Negative. Skin: Negative. Allergic/Immunologic: Negative. Neurological: Negative. Hematological: Negative. Psychiatric/Behavioral: Negative. Physical Exam     Physical Exam  Vitals and nursing note reviewed. Constitutional:       Appearance: Normal appearance. She is obese. HENT:      Head: Normocephalic. Right Ear: Tympanic membrane normal.      Left Ear: Tympanic membrane normal.      Nose: Nose normal.      Mouth/Throat:      Mouth: Mucous membranes are moist.   Eyes:      Pupils: Pupils are equal, round, and reactive to light. Cardiovascular:      Rate and Rhythm: Tachycardia present. Pulses: Normal pulses. Pulmonary:      Effort: Pulmonary effort is normal. Tachypnea present. Breath sounds: No decreased breath sounds, wheezing, rhonchi or rales. Chest:      Chest wall: No mass, deformity, tenderness, crepitus or edema. There is no dullness to percussion. Abdominal:      General: Abdomen is flat. Palpations: Abdomen is soft. Musculoskeletal:         General: Normal range of motion. Cervical back: Normal range of motion and neck supple. Right lower leg: No tenderness. No edema. Left lower leg: No tenderness. No edema. Skin:     Capillary Refill: Capillary refill takes less than 2 seconds. Coloration: Skin is not cyanotic or pale. Findings: No ecchymosis, erythema or rash. Nails: There is no clubbing. Neurological:      General: No focal deficit present. Mental Status: She is alert and oriented to person, place, and time.    Psychiatric:         Mood and Affect: Mood normal.         Lab and Diagnostic Study Results     Labs -     Recent Results (from the past 12 hour(s))   EKG, 12 LEAD, INITIAL    Collection Time: 08/28/21 12:34 PM   Result Value Ref Range    Ventricular Rate 106 BPM    Atrial Rate 106 BPM    P-R Interval 160 ms    QRS Duration 98 ms    Q-T Interval 348 ms    QTC Calculation (Bezet) 463 ms    Calculated P Axis 43 degrees    Calculated R Axis 13 degrees    Calculated T Axis 44 degrees    Diagnosis Sinus tachycardia  Anterior infarct, old      CBC WITH AUTOMATED DIFF    Collection Time: 08/28/21 12:35 PM   Result Value Ref Range    WBC 8.5 4.4 - 11.3 K/uL    RBC 3.98 (L) 4.50 - 5.90 M/uL    HGB 9.9 (L) 13.5 - 17.5 g/dL    HCT 30.8 (L) 36 - 46 %    MCV 77.4 (L) 80 - 100 FL    MCH 24.8 (L) 31 - 34 PG    MCHC 32.0 31.0 - 36.0 g/dL    RDW 19.7 (H) 11.5 - 14.5 %    PLATELET 956 859 - 424 K/uL    MPV 8.3 6.5 - 11.5 FL    NRBC 0.2  WBC    ABSOLUTE NRBC 0.01 K/uL    NEUTROPHILS 72 42 - 75 %    LYMPHOCYTES 20 (L) 20.5 - 51.1 %    MONOCYTES 5 1.7 - 9.3 %    EOSINOPHILS 2 0.9 - 2.9 %    BASOPHILS 1 0.0 - 2.5 %    ABS. NEUTROPHILS 6.1 1.8 - 7.7 K/UL    ABS. LYMPHOCYTES 1.7 1.0 - 4.8 K/UL    ABS. MONOCYTES 0.5 0.2 - 2.4 K/UL    ABS. EOSINOPHILS 0.1 0.0 - 0.7 K/UL    ABS. BASOPHILS 0.1 0.0 - 0.2 K/UL       Radiologic Studies -   @lastxrresult@  CT Results  (Last 48 hours)    None        CXR Results  (Last 48 hours)               08/26/21 1349  XR CHEST PORT Final result    Impression:  1. There is an indeterminate 9 mm nodular density overlying the left upper lobe. This could be further evaluated with CT. 2. Mild enlargement of the cardiac silhouette. Narrative:  EXAMINATION: XR CHEST PORT       HISTORY: Shortness of breath       COMPARISON: 8/22/2021       FINDINGS: Single view. There is artifact related to something overlying the   patient's left neck. There is a 9 mm nodular density overlying the left upper   lobe. The right lung is clear. There is no pneumothorax. There is mild   enlargement of the cardiac silhouette. Thoracic aorta is atherosclerotic. Medical Decision Making   - I am the first provider for this patient. - I reviewed the vital signs, available nursing notes, past medical history, past surgical history, family history and social history. - Initial assessment performed.  The patients presenting problems have been discussed, and they are in agreement with the care plan formulated and outlined with them. I have encouraged them to ask questions as they arise throughout their visit. Vital Signs-Reviewed the patient's vital signs. Patient Vitals for the past 12 hrs:   Temp Pulse Resp BP SpO2   08/28/21 1227 97.6 °F (36.4 °C) (!) 108 22 130/76 97 %       Records Reviewed: Nursing Notes    The patient presents withsob with a differential diagnosis of  abnormal EKG, ACS, arrhythmia, acute MI, pulmonary edema/CHF, angina, aortic dissection, bronchitis, chest wall pain, costochondritis, dyspnea, GERD, pericarditis, pnuemothorax and pnuemonia      ED Course:          Provider Notes (Medical Decision Making): MDM       Procedures   Medical Decision Makingedical Decision Making  Performed by: Flora Reed MD  PROCEDURES:Procedures       Disposition   Disposition: Condition stable        DISCHARGE PLAN:  1. Current Discharge Medication List      CONTINUE these medications which have NOT CHANGED    Details   ALPRAZolam (XANAX) 0.5 mg tablet Take 1 tablet by mouth twice daily as needed  Qty: 60 Tablet, Refills: 0    Associated Diagnoses: Generalized anxiety disorder      albuterol (PROVENTIL HFA, VENTOLIN HFA, PROAIR HFA) 90 mcg/actuation inhaler Take 2 Puffs by inhalation every four (4) hours as needed for Wheezing. Qty: 1 Inhaler, Refills: 0      hydrALAZINE (APRESOLINE) 50 mg tablet Take 2 Tablets by mouth three (3) times daily. Qty: 90 Tablet, Refills: 0      atorvastatin (LIPITOR) 40 mg tablet Take 1 Tablet by mouth nightly. Qty: 30 Tablet, Refills: 0      ferrous sulfate 325 mg (65 mg iron) tablet Take 1 Tablet by mouth daily (with breakfast). Qty: 30 Tablet, Refills: 0      hydrocortisone (ANUSOL-HC) 25 mg supp Insert 1 Suppository into rectum every twelve (12) hours for 10 days. Qty: 20 Suppository, Refills: 0      ARIPiprazole (ABILIFY) 20 mg tablet Take 1 Tablet by mouth daily for 90 days.   Qty: 90 Tablet, Refills: 0    Associated Diagnoses: Bipolar depression ([facility])      insulin aspart protamine/insulin aspart (NOVOLOG MIX 70/30) 100 unit/mL (70-30) inpn Take 45 units in the morning and 30 units in evening  Qty: 8 Adjustable Dose Pre-filled Pen Syringe, Refills: 3    Associated Diagnoses: Type 2 diabetes mellitus with hyperglycemia, with long-term current use of insulin ([facility])      Insulin Needles, Disposable, 31 gauge x 5/16\" ndle Twice a day  Qty: 1 Package, Refills: 5    Associated Diagnoses: Type 2 diabetes mellitus with hyperglycemia, with long-term current use of insulin ([facility])      metoprolol succinate (TOPROL-XL) 50 mg XL tablet TAKE 1 TABLET BY MOUTH ONCE DAILY      lancets (One Touch Delica) 33 gauge misc Check glucose 3 times a day  Qty: 100 Lancet, Refills: 3    Associated Diagnoses: Type 2 diabetes mellitus with hyperglycemia, with long-term current use of insulin ([facility])      glucose blood VI test strips (OneTouch Verio test strips) strip Check glucose 3 times a day  Qty: 100 Strip, Refills: 3    Associated Diagnoses: Type 2 diabetes mellitus with hyperglycemia, with long-term current use of insulin ([facility])      allopurinoL (ZYLOPRIM) 300 mg tablet Take  by mouth daily. gabapentin (NEURONTIN) 100 mg capsule Take  by mouth three (3) times daily. magnesium oxide (MAG-OX) 400 mg tablet Take 400 mg by mouth three (3) times daily. pantoprazole (PROTONIX) 40 mg tablet Take 40 mg by mouth daily. sodium bicarbonate 650 mg tablet Take  by mouth two (2) times a day. 2.   Follow-up Information    None       3. Return to ED if worse   4. Current Discharge Medication List            Diagnosis     Clinical Impression:     ICD-10-CM ICD-9-CM    1. SOB (shortness of breath)  R06.02 786.05     Hx of recent PE exceration       Joshua Rogers MD    Please note that this dictation was completed with [employer], the [employer] voice recognition software.   Quite often unanticipated grammatical, syntax, homophones, and other interpretive errors are inadvertently transcribed by the computer software. Please disregard these errors. Please excuse any errors that have escaped final proofreading. Thank you.

## 2021-08-28 NOTE — ED TRIAGE NOTES
Pt was recently admitted due to CHF excacerbation/PE-- EMS was dispatched this am for SOB/nausea--Pt was given 80 mg Lasix/ 4 mg Zofran IV en route. Pt upon arrival is 85% on RA--placed on 2 LPM via NC for comfort.      Ems established 24G in R wrist.

## 2021-08-28 NOTE — PROGRESS NOTES
Physician Progress Note      PATIENT:               Irina Dixon  CSN #:                  [id number]  :                       1960  ADMIT DATE:       2021 7:20 AM  [address] DATE:        2021 12:34 PM  RESPONDING  PROVIDER #:        Marc Kincaid MD          QUERY TEXT:    Dear Dr. Adrian Payan -    Pt admitted with chest pain, SOB. Pt noted to have HTN, DM, RANDI on CKD, GERD, anemia, hyperkalemia. If possible, please document in progress notes and discharge summary if you are evaluating and/or treating any of the following: The medical record reflects the following:  Risk Factors: 63 F presented with CP and SOB; patient with HTN, DM, CKD, Bipolar, Anxiety  Clinical Indicators: per Cardiology PN  \"ACS ruled out per EKG and Troponin criteria. TTE negative for acute pathology. Given risk factors and sx would benefit from further ischemic evaluation, that can be completed in OP setting. Would recommend further risk factor modification and control of BP. \"  Treatment: labs, EKG, 2D echo, VQ scan, Cardiology consult, ASA, HTN meds, Protonix, Kayexalate, psych meds    Thank you,  EUFEMIA SnowN, RN, [place]  Clinical   [phone]  Options provided:  -- Chest pain due to GERD  -- Chest pain due to anxiety  -- Chest pain due to hyperkalemia  -- Chest pain due to HTN  -- Other - I will add my own diagnosis  -- Disagree - Not applicable / Not valid  -- Disagree - Clinically unable to determine / Unknown  -- Refer to Clinical Documentation Reviewer    PROVIDER RESPONSE TEXT:    This patient has chest pain due to anxiety.     Query created by: Joshua Talbert on 2021 9:06 AM      Electronically signed by:  Marc Kincaid MD 2021 2:15 PM

## 2021-08-30 LAB
ATRIAL RATE: 106 BPM
CALCULATED P AXIS, ECG09: 43 DEGREES
CALCULATED R AXIS, ECG10: 13 DEGREES
CALCULATED T AXIS, ECG11: 44 DEGREES
DIAGNOSIS, 93000: NORMAL
P-R INTERVAL, ECG05: 160 MS
Q-T INTERVAL, ECG07: 348 MS
QRS DURATION, ECG06: 98 MS
QTC CALCULATION (BEZET), ECG08: 463 MS
VENTRICULAR RATE, ECG03: 106 BPM

## 2021-09-09 ENCOUNTER — TELEPHONE (OUTPATIENT)
Dept: BEHAVIORAL/MENTAL HEALTH CLINIC | Age: 61
End: 2021-09-09

## 2021-09-09 NOTE — TELEPHONE ENCOUNTER
Spoke with patient-Patient reports the past couple of days she has been waking up feeling down. No suicidal/homicidal thinking. She stopped Celexa and was not sure if she was supposed to continue taking it. Patient is instructed to restart Celexa 20 mg tablet take half tablet daily for depression and anxiety.

## 2021-09-17 ENCOUNTER — TELEPHONE (OUTPATIENT)
Dept: PRIMARY CARE CLINIC | Age: 61
End: 2021-09-17

## 2021-09-17 ENCOUNTER — TELEPHONE (OUTPATIENT)
Dept: BEHAVIORAL/MENTAL HEALTH CLINIC | Age: 61
End: 2021-09-17

## 2021-09-17 DIAGNOSIS — F33.1 MODERATE EPISODE OF RECURRENT MAJOR DEPRESSIVE DISORDER (HCC): Primary | ICD-10-CM

## 2021-09-17 RX ORDER — BUPROPION HYDROCHLORIDE 150 MG/1
150 TABLET, EXTENDED RELEASE ORAL DAILY
Qty: 90 TABLET | Refills: 0 | Status: SHIPPED | OUTPATIENT
Start: 2021-09-17 | End: 2021-12-16

## 2021-09-17 NOTE — TELEPHONE ENCOUNTER
Patient reports feeling depressed and is requesting to get back on Wellbutrin for depression. She is requesting medication to be called in to [address] [place] [address] [place]

## 2021-09-17 NOTE — TELEPHONE ENCOUNTER
Pt has a medication question -she is unsure if she is suppose to be taking a certain medication. Pt would like a call back.

## 2021-09-21 ENCOUNTER — TELEPHONE (OUTPATIENT)
Dept: PRIMARY CARE CLINIC | Age: 61
End: 2021-09-21

## 2021-09-21 NOTE — TELEPHONE ENCOUNTER
Patient called and said she was going to be put on Celexa 20 mg. Patient would like it called into [facility].

## 2021-09-22 DIAGNOSIS — F33.9 RECURRENT MAJOR DEPRESSIVE DISORDER, REMISSION STATUS UNSPECIFIED (HCC): Primary | ICD-10-CM

## 2021-09-22 RX ORDER — CITALOPRAM 20 MG/1
20 TABLET, FILM COATED ORAL DAILY
Qty: 90 TABLET | Refills: 1 | Status: SHIPPED | OUTPATIENT
Start: 2021-09-22 | End: 2021-12-15

## 2021-09-24 ENCOUNTER — TELEPHONE (OUTPATIENT)
Dept: BEHAVIORAL/MENTAL HEALTH CLINIC | Age: 61
End: 2021-09-24

## 2021-09-24 ENCOUNTER — TELEPHONE (OUTPATIENT)
Dept: PRIMARY CARE CLINIC | Age: 61
End: 2021-09-24

## 2021-09-24 NOTE — TELEPHONE ENCOUNTER
Patient left message stated that she is having an issue with one of her medicatios. Feels that one of her meds dosage needs to be increased as she has been feeling nervous for the past couple of days. Please advise.

## 2021-09-26 DIAGNOSIS — F41.1 GENERALIZED ANXIETY DISORDER: ICD-10-CM

## 2021-09-27 RX ORDER — ALPRAZOLAM 0.5 MG/1
TABLET ORAL
Qty: 60 TABLET | Refills: 0 | Status: SHIPPED | OUTPATIENT
Start: 2021-09-27 | End: 2021-10-14 | Stop reason: SDUPTHER

## 2021-10-06 ENCOUNTER — OFFICE VISIT (OUTPATIENT)
Dept: ENDOCRINOLOGY | Age: 61
End: 2021-10-06
Payer: MEDICARE

## 2021-10-06 VITALS
OXYGEN SATURATION: 95 % | BODY MASS INDEX: 32.74 KG/M2 | HEART RATE: 80 BPM | DIASTOLIC BLOOD PRESSURE: 70 MMHG | HEIGHT: 67 IN | WEIGHT: 208.6 LBS | TEMPERATURE: 96.8 F | SYSTOLIC BLOOD PRESSURE: 132 MMHG

## 2021-10-06 DIAGNOSIS — E11.65 TYPE 2 DIABETES MELLITUS WITH HYPERGLYCEMIA, WITH LONG-TERM CURRENT USE OF INSULIN (HCC): Primary | ICD-10-CM

## 2021-10-06 DIAGNOSIS — Z79.4 TYPE 2 DIABETES MELLITUS WITH HYPERGLYCEMIA, WITH LONG-TERM CURRENT USE OF INSULIN (HCC): Primary | ICD-10-CM

## 2021-10-06 LAB
GLUCOSE POC: 272 MG/DL
HBA1C MFR BLD HPLC: 7.1 %

## 2021-10-06 PROCEDURE — G8417 CALC BMI ABV UP PARAM F/U: HCPCS | Performed by: INTERNAL MEDICINE

## 2021-10-06 PROCEDURE — 83036 HEMOGLOBIN GLYCOSYLATED A1C: CPT | Performed by: INTERNAL MEDICINE

## 2021-10-06 PROCEDURE — G8752 SYS BP LESS 140: HCPCS | Performed by: INTERNAL MEDICINE

## 2021-10-06 PROCEDURE — 3017F COLORECTAL CA SCREEN DOC REV: CPT | Performed by: INTERNAL MEDICINE

## 2021-10-06 PROCEDURE — 2022F DILAT RTA XM EVC RTNOPTHY: CPT | Performed by: INTERNAL MEDICINE

## 2021-10-06 PROCEDURE — G8754 DIAS BP LESS 90: HCPCS | Performed by: INTERNAL MEDICINE

## 2021-10-06 PROCEDURE — 82962 GLUCOSE BLOOD TEST: CPT | Performed by: INTERNAL MEDICINE

## 2021-10-06 PROCEDURE — G8427 DOCREV CUR MEDS BY ELIG CLIN: HCPCS | Performed by: INTERNAL MEDICINE

## 2021-10-06 PROCEDURE — 3051F HG A1C>EQUAL 7.0%<8.0%: CPT | Performed by: INTERNAL MEDICINE

## 2021-10-06 PROCEDURE — 99214 OFFICE O/P EST MOD 30 MIN: CPT | Performed by: INTERNAL MEDICINE

## 2021-10-06 PROCEDURE — G9717 DOC PT DX DEP/BP F/U NT REQ: HCPCS | Performed by: INTERNAL MEDICINE

## 2021-10-06 RX ORDER — INSULIN ASPART 100 [IU]/ML
INJECTION, SUSPENSION SUBCUTANEOUS
Qty: 8 ADJUSTABLE DOSE PRE-FILLED PEN SYRINGE | Refills: 3 | Status: SHIPPED | OUTPATIENT
Start: 2021-10-06 | End: 2021-10-11 | Stop reason: CLARIF

## 2021-10-06 RX ORDER — INSULIN LISPRO 100 [IU]/ML
INJECTION, SUSPENSION SUBCUTANEOUS
COMMUNITY
Start: 2021-07-19 | End: 2021-10-11 | Stop reason: SDUPTHER

## 2021-10-06 RX ORDER — ALLOPURINOL 100 MG/1
100 TABLET ORAL DAILY
COMMUNITY
Start: 2021-08-09

## 2021-10-06 RX ORDER — ONDANSETRON 4 MG/1
TABLET, ORALLY DISINTEGRATING ORAL
COMMUNITY
Start: 2021-09-13

## 2021-10-06 RX ORDER — PEN NEEDLE, DIABETIC 30 GX3/16"
NEEDLE, DISPOSABLE MISCELLANEOUS
Qty: 100 EACH | Refills: 5 | Status: SHIPPED | OUTPATIENT
Start: 2021-10-06 | End: 2022-06-15 | Stop reason: SDUPTHER

## 2021-10-06 RX ORDER — FAMOTIDINE 20 MG/1
TABLET, FILM COATED ORAL
COMMUNITY
Start: 2021-08-16 | End: 2021-10-06

## 2021-10-06 NOTE — LETTER
10/6/2021    Patient: Shanta Ahumada   YOB: 1960   Date of Visit: 10/6/2021     Fabiola Mclean PA-C  [address] [place] [address]  Via Fax: [phone]    Dear Fabiola Mclean PA-C,      Thank you for referring Ms. Amanda Ko to [address] [place] [address] ENDOCRINOLOGY for evaluation. My notes for this consultation are attached. If you have questions, please do not hesitate to call me. I look forward to following your patient along with you.       Sincerely,    Mahnaz Alves MD

## 2021-10-06 NOTE — PROGRESS NOTES
History and Physical    Patient: Daymon Aschoff MRN: [id number]  SSN: [id number]    YOB: 1960  Age: 63 y.o. Sex: female      Subjective:      Daymon Aschoff is a 63 y.o. female with past medical history of hypertension, hyperlipidemia, chronic kidney disease stage III, bipolar disorder, GERD is here for type 2 diabetes mellitus. She is in primary care of Gwendolyn Escoto np. She is also in care of nephrologist Dr. Carito Herzog. At the last visit I decrease NovoLog 70/30 to 30 units in the evening from 35 units and we continued 45 units in the morning. But patient continues to take 35 units in the evening because she was seeing some higher numbers in the morning. She has lost 4 pounds since last visit. She has had a couple episodes of low blood glucose in the middle of the night, lowest glucose she has seen and 67 mg/dL. She was admitted with shortness of breath, congestive heart failure and found to have pulmonary embolism in August 2021. She was started on anticoagulation but then she had rectal bleed and that was stopped. Last appointment with nephrologist was 2 weeks back. She is unable to give me any details. She is getting iron infusion at nephrologist office. Trulicity is too expensive for her. She has been very depressed and she has gone back to her psychiatrist.  No suicidal ideation. She is taking atorvastatin 40 mg regularly at bedtime. Regarding CKD stage III: Last appointment with nephrologist was earlier in March 2021. She was told everything is looking stable. Next appointment is next week. Glucometer reading: Patient is checking blood glucose 1-2 times per day.   Readings are ranging from  mg/dL    Updated diabetes history:  · Diagnosis: 8 years    · Current treatment: NovoLog 70/30, 45 units in the morning and 35 units before dinner    · Past treatment: Lantus, Humalog, Levemir, Novolin N, Novolin R, Trulicity (expensive)    · Glucose checks: once a day, fasting runs in 400s    · Hyperglycemia: yes    · Hypoglycemia: no    · Meals per day: 3, Breakfast: cereal, sandwich, boiled eggs, toast, lunch: skips, Dinner: fried chicken, pork, beans, hamburger, , snacks: banana, crackers, pop corn, sweet tea    · Exercise: no    · DM related hospitalizations: no        Complications of DM:    · CAD: no    · CVA: no    · PVD: no    · Amputations: no     · Retinopathy: no; last exam was 6-2021    · Gastropathy: no    · Nephropathy: yes ckd stage 3, Dr. Janis Acuna  · Neuropathy: no        Medications:    · Statin: atorvastatin 40    · ACE-I: no    · ASA: no        · Diabetes education: no    Past Medical History:   Diagnosis Date    Anxiety 6/22/2020    Bipolar 1 disorder ([facility] 1/23/2018    Congestive heart failure (CHF) ([facility]     Diabetes mellitus type 2, controlled ([facility] 6/22/2020    Hypertension 6/22/2020    Liver fibrosis 6/22/2020    Major depression 6/22/2020    Migraine 6/22/2020    Sleep apnea 1/23/2018    Suicide attempt ([facility] 6/22/2020 2013     Past Surgical History:   Procedure Laterality Date    HX APPENDECTOMY      HX CHOLECYSTECTOMY        Family History   Problem Relation Age of Onset    Heart Attack Mother     Heart Attack Father      Social History     Tobacco Use    Smoking status: Never Smoker    Smokeless tobacco: Never Used   Substance Use Topics    Alcohol use: Not Currently      Prior to Admission medications    Medication Sig Start Date End Date Taking? Authorizing Provider   allopurinoL (ZYLOPRIM) 100 mg tablet Take 100 mg by mouth daily.  8/9/21  Yes Provider, Historical   ondansetron (ZOFRAN ODT) 4 mg disintegrating tablet dissolve 1 tablet ON TONGUE every 8 hours if needed for nausea 9/13/21  Yes Provider, Historical   HumaLOG Mix 75-25 KwikPen flexpen INJECT 45 UNITS SUBCUTANEOUSLY IN THE MORNING AND 30 IN THE EVENING 7/19/21  Yes Provider, Historical   Insulin Needles, Disposable, 31 gauge x 5/16\" ndle Twice a day 10/6/21  Yes Shereen Almaraz MD   insulin aspart protamine/insulin aspart (NOVOLOG MIX 70/30) 100 unit/mL (70-30) inpn Take 45 units in the morning and 30 units in evening 10/6/21  Yes Shereen Almaraz MD   ALPRAZolam Unice Nigh) 0.5 mg tablet Take 1 tablet by mouth twice daily as needed 9/27/21  Yes Rogelio Hernandez NP   citalopram (CELEXA) 20 mg tablet Take 1 Tablet by mouth daily for 90 days. 9/22/21 12/21/21 Yes Rogelio Hernandez NP   buPROPion SR [facility]) 150 mg SR tablet Take 1 Tablet by mouth daily for 90 days. 9/17/21 12/16/21 Yes Rogelio Hernandez NP   albuterol (PROVENTIL HFA, VENTOLIN HFA, PROAIR HFA) 90 mcg/actuation inhaler Take 2 Puffs by inhalation every four (4) hours as needed for Wheezing. 8/26/21  Yes Simona Matthews MD   hydrALAZINE (APRESOLINE) 50 mg tablet Take 2 Tablets by mouth three (3) times daily. 8/25/21  Yes Danelle Solre MD   atorvastatin (LIPITOR) 40 mg tablet Take 1 Tablet by mouth nightly. 8/25/21  Yes Danelle Soler MD   ferrous sulfate 325 mg (65 mg iron) tablet Take 1 Tablet by mouth daily (with breakfast). 8/26/21  Yes Danelle Soler MD   ARIPiprazole (ABILIFY) 20 mg tablet Take 1 Tablet by mouth daily for 90 days. 7/19/21 10/17/21 Yes Rogelio Hernandez NP   metoprolol succinate (TOPROL-XL) 50 mg XL tablet TAKE 1 TABLET BY MOUTH ONCE DAILY 3/2/21  Yes Provider, Historical   lancets (One Touch Delica) 33 gauge misc Check glucose 3 times a day 12/16/20  Yes Shereen Almaraz MD   glucose blood VI test strips (OneTouch Verio test strips) strip Check glucose 3 times a day 12/16/20  Yes Shereen Almaraz MD   allopurinoL (ZYLOPRIM) 300 mg tablet Take  by mouth daily. Yes Provider, Historical   gabapentin (NEURONTIN) 100 mg capsule Take  by mouth three (3) times daily. Yes Provider, Historical   magnesium oxide (MAG-OX) 400 mg tablet Take 400 mg by mouth three (3) times daily.    Yes Provider, Historical   pantoprazole (PROTONIX) 40 mg tablet Take 40 mg by mouth daily. Yes Provider, Historical   sodium bicarbonate 650 mg tablet Take  by mouth two (2) times a day. Yes Provider, Historical        Allergies   Allergen Reactions    Adhesive Tape-Silicones Unknown (comments)     Blisters    Septra [Sulfamethoprim] Unknown (comments)    Sulfa (Sulfonamide Antibiotics) Hives       Review of Systems:  ROS    A comprehensive review of systems was preformed and it is negative except mentioned in HPI    Objective:     Vitals:    10/06/21 1015   BP: 132/70   Pulse: 80   Temp: 96.8 °F (36 °C)   TempSrc: Temporal   SpO2: 95%   Weight: 208 lb 9.6 oz (94.6 kg)   Height: 5' 7\" (1.702 m)        Physical Exam:    Physical Exam  Vitals and nursing note reviewed. Constitutional:       Appearance: Normal appearance. HENT:      Head: Normocephalic and atraumatic. Cardiovascular:      Rate and Rhythm: Normal rate and regular rhythm. Pulmonary:      Effort: Pulmonary effort is normal.      Breath sounds: Normal breath sounds. Neurological:      Mental Status: She is alert. 7-7-2021:  diabetic foot exam:  Bilateral diabetic foot exam was performed today. Dorsalis pedis pulses 2+ bilaterally. Monofilament sensation normal bilaterally. No ulcers or skin breakdown. Labs and Imaging:  Results for Red Linda (MRN [id number]) as of 3/17/2021 10:12   Ref.  Range 1/6/2021 16:20   Sodium Latest Ref Range: 136 - 145 mmol/L 141   Potassium Latest Ref Range: 3.5 - 5.1 mmol/L 3.9   Chloride Latest Ref Range: 97 - 108 mmol/L 103   CO2 Latest Ref Range: 21 - 32 mmol/L 25   Anion gap Latest Ref Range: 5 - 15 mmol/L 13   Glucose Latest Ref Range: 65 - 100 mg/dL 105 (H)   BUN Latest Ref Range: 6 - 20 mg/dL 16   Creatinine Latest Ref Range: 0.55 - 1.02 mg/dL 2.02 (H)   BUN/Creatinine ratio Latest Ref Range: 12 - 20   8 (L)   Calcium Latest Ref Range: 8.5 - 10.1 mg/dL 9.3   GFR est non-AA Latest Ref Range: >60 ml/min/1.73m2 25 (L)   GFR est AA Latest Ref Range: >60 ml/min/1.73m2 31 (L)   Bilirubin, total Latest Ref Range: 0.2 - 1.0 mg/dL 0.4   Protein, total Latest Ref Range: 6.4 - 8.2 g/dL 8.8 (H)   Albumin Latest Ref Range: 3.5 - 5.0 g/dL 3.6   Globulin Latest Ref Range: 2.0 - 4.0 g/dL 5.2 (H)   A-G Ratio Latest Ref Range: 1.1 - 2.2   0.7 (L)   ALT Latest Ref Range: 12 - 78 U/L 20   AST Latest Ref Range: 15 - 37 U/L 40 (H)   Alk. phosphatase Latest Ref Range: 45 - 117 U/L 164 (H)   Results for Red Linda (MRN [id number]) as of 3/17/2021 10:12   Ref. Range 12/16/2020 12:08   Cholesterol, total Latest Ref Range: 100 - 199 mg/dL 233 (H)   HDL Cholesterol Latest Ref Range: >39 mg/dL 26 (L)   VLDL, calculated Latest Ref Range: 5 - 40 mg/dL 68 (H)   LDL, calculated Latest Ref Range: 0 - 99 mg/dL 139 (H)   Results for Joanna ARGUETA (MRN [id number]) as of 3/17/2021 10:12   Ref.  Range 12/16/2020 12:08   TSH Latest Ref Range: 0.450 - 4.500 uIU/mL 1.310     Last 3 Recorded Weights in this Encounter    10/06/21 1015   Weight: 208 lb 9.6 oz (94.6 kg)        No results found for: HBA1C, PQH6WEDI, SRV4XWGK, TEV7UJRG     Assessment:     Patient Active Problem List   Diagnosis Code    Bipolar 1 disorder ([facility] F31.9    Sleep apnea G47.30    Major depression F32.9    Anxiety F41.9    Type 2 diabetes mellitus with hyperglycemia, with long-term current use of insulin (HCC) E11.65, Z79.4    Liver fibrosis K74.00    Hypertension I10    Migraine G43.909    Suicide attempt ([facility] T14.91XA    Severe obesity ([facility] E66.01    Type 2 diabetes mellitus with diabetic neuropathy ([facility] E11.40    CKD stage 4 due to type 2 diabetes mellitus ([facility] E11.22, N18.4    Mixed hyperlipidemia E78.2    Hypertensive renal disease I12.9    Chest pain R07.9    Acute kidney injury ([facility] N17.9    Anemia in chronic renal disease N18.9, D63.1    Volume depletion E86.9           Plan:     type II diabetes mellitus uncontrolled  Hemoglobin A1c is 11.6% on 11-6-2019, 11.2% on 2-5-2020, 8.9% on 12-, 8.1% on 3-, 8.2% on 7-7-2021, 7.1% today. Fingerstick blood glucose is 272 mg/dL today. Up to date with diabetes related annual labs: yes     Up to date with diabetic eye exam: yes 6/2021    plan:  Frequency of hypoglycemia has decreased but she is still having some fasting hypoglycemia. Decrease NovoLog 70/30 as follows: Continue 45 units in the morning, decreased to 30 units in the evening before dinner. Continue to check blood glucose twice a day and I will see her back in 3 months. Check TSH today. Not a good candidate for metformin because of CKD stage III, not to get good candidate for SGLT2 inhibitor because of frequent UTI and vaginal yeast infections. hypertensive disorder  Hydralazine was increased from 25 mg to 50 mg 3 times a day a few visits back. Blood pressure is looking good today. Continue the same. mixed hyperlipidemia  11-6-2019: Triglycerides elevated at 379, LDL normal at 61. Patient has not been taking atorvastatin regularly. 12-6-2020: Total cholesterol elevated at 233, triglycerides 370, . Advised patient to start taking atorvastatin 40 mg regularly. 3-: Total cholesterol 145, triglycerides 273, LDL 71. Atorvastatin 40 mg was continued. peripheral neuropathy with type 2 diabetes  on gabapentin    chronic kidney disease stage 3-4  GFR was 40 in October 2019. Following with nephrologist every 4 months. GFR was 31 in January 2021, 19 in August 2021 when she was hospitalized. Check BMP today.     Orders Placed This Encounter    METABOLIC PANEL, BASIC    TSH RFX ON ABNORMAL TO FREE T4    AMB POC GLUCOSE BLOOD, BY GLUCOSE MONITORING DEVICE    AMB POC HEMOGLOBIN A1C    Insulin Needles, Disposable, 31 gauge x 5/16\" ndle     Sig: Twice a day     Dispense:  100 Each     Refill:  5    insulin aspart protamine/insulin aspart (NOVOLOG MIX 70/30) 100 unit/mL (70-30) inpn     Sig: Take 45 units in the morning and 30 units in evening Dispense:  8 Adjustable Dose Pre-filled Pen Syringe     Refill:  3        Signed By: Wendy Graff MD     October 6, 2021      Return to clinic 3 months

## 2021-10-07 ENCOUNTER — TELEPHONE (OUTPATIENT)
Dept: ENDOCRINOLOGY | Age: 61
End: 2021-10-07

## 2021-10-07 LAB
BUN SERPL-MCNC: 33 MG/DL (ref 6–24)
BUN/CREAT SERPL: 12 (ref 9–23)
CALCIUM SERPL-MCNC: 9.8 MG/DL (ref 8.7–10.2)
CHLORIDE SERPL-SCNC: 100 MMOL/L (ref 96–106)
CO2 SERPL-SCNC: 21 MMOL/L (ref 20–29)
CREAT SERPL-MCNC: 2.7 MG/DL (ref 0.57–1)
GLUCOSE SERPL-MCNC: 197 MG/DL (ref 65–99)
POTASSIUM SERPL-SCNC: 5 MMOL/L (ref 3.5–5.2)
SODIUM SERPL-SCNC: 138 MMOL/L (ref 134–144)
TSH SERPL DL<=0.005 MIU/L-ACNC: 1.63 UIU/ML (ref 0.45–4.5)

## 2021-10-07 NOTE — TELEPHONE ENCOUNTER
----- Message from Yaw Espinoza MD sent at 10/7/2021  9:18 AM EDT -----  Please inform patient that her kidney function is low and similar to what it was in August 2021. Normal thyroid function test.    Please fax this lab result to nephrologist Dr. Masha Doran.

## 2021-10-07 NOTE — TELEPHONE ENCOUNTER
Brief Postoperative Note  ______________________________________________________________    Patient: Luna Junior  YOB: 1975  MRN: [id number]  Date of Procedure: 4/30/2019    Pre-Op Diagnosis: RIGHT INGUINAL HERNIA    Post-Op Diagnosis: Same       Procedure(s):  ROBOTIC LAPAROSCOPIC RIGHT INGUINAL HERNIA REPAIR WITH MESH    Anesthesia: General    Surgeon(s):  Lulu Duron MD    Assistant: Henry Michaud    Estimated Blood Loss (mL): less than 50     Complications: None    Specimens:   * No specimens in log *    Implants:  Implant Name Type Inv.  Item Serial No.  Lot No. LRB No. Used   MESH SURG DUNIA GROIN 3DMAX LG RT 4X6IN Mesh MESH SURG DUNIA GROIN 3DMAX LG RT 4X6IN  [employer] [id number] Right 1           Findings: Direct hernia defect - right     Henry Michaud MD  Date: 4/30/2019  Time: 9:53 AM LVM for pt to call office back

## 2021-10-07 NOTE — PROGRESS NOTES
Please inform patient that her kidney function is low and similar to what it was in August 2021. Normal thyroid function test.    Please fax this lab result to nephrologist Dr. Pam Beltran.

## 2021-10-11 DIAGNOSIS — E11.65 TYPE 2 DIABETES MELLITUS WITH HYPERGLYCEMIA, WITH LONG-TERM CURRENT USE OF INSULIN (HCC): Primary | ICD-10-CM

## 2021-10-11 DIAGNOSIS — Z79.4 TYPE 2 DIABETES MELLITUS WITH HYPERGLYCEMIA, WITH LONG-TERM CURRENT USE OF INSULIN (HCC): Primary | ICD-10-CM

## 2021-10-11 RX ORDER — INSULIN LISPRO 100 [IU]/ML
INJECTION, SUSPENSION SUBCUTANEOUS
Qty: 8 ADJUSTABLE DOSE PRE-FILLED PEN SYRINGE | Refills: 4 | Status: SHIPPED | OUTPATIENT
Start: 2021-10-11 | End: 2022-01-05

## 2021-10-14 ENCOUNTER — OFFICE VISIT (OUTPATIENT)
Dept: BEHAVIORAL/MENTAL HEALTH CLINIC | Age: 61
End: 2021-10-14
Payer: MEDICARE

## 2021-10-14 VITALS — BODY MASS INDEX: 32.95 KG/M2 | WEIGHT: 210.4 LBS

## 2021-10-14 DIAGNOSIS — F41.1 GENERALIZED ANXIETY DISORDER: ICD-10-CM

## 2021-10-14 PROCEDURE — 99214 OFFICE O/P EST MOD 30 MIN: CPT | Performed by: NURSE PRACTITIONER

## 2021-10-14 PROCEDURE — G8417 CALC BMI ABV UP PARAM F/U: HCPCS | Performed by: NURSE PRACTITIONER

## 2021-10-14 PROCEDURE — G8756 NO BP MEASURE DOC: HCPCS | Performed by: NURSE PRACTITIONER

## 2021-10-14 PROCEDURE — G9717 DOC PT DX DEP/BP F/U NT REQ: HCPCS | Performed by: NURSE PRACTITIONER

## 2021-10-14 PROCEDURE — G8427 DOCREV CUR MEDS BY ELIG CLIN: HCPCS | Performed by: NURSE PRACTITIONER

## 2021-10-14 PROCEDURE — 3017F COLORECTAL CA SCREEN DOC REV: CPT | Performed by: NURSE PRACTITIONER

## 2021-10-14 RX ORDER — ALPRAZOLAM 0.5 MG/1
0.5 TABLET ORAL
Qty: 90 TABLET | Refills: 1 | Status: SHIPPED | OUTPATIENT
Start: 2021-10-14 | End: 2022-01-12

## 2021-10-14 NOTE — PROGRESS NOTES
Carol Dexter is a 63 y.o. female who presents today for the following:  Chief Complaint   Patient presents with    Follow-up     \"I was having some jittery inside. It's better. \"     Depression    Anxiety    Bipolar       Allergies   Allergen Reactions    Adhesive Tape-Silicones Unknown (comments)     Blisters    Septra [Sulfamethoprim] Unknown (comments)    Sulfa (Sulfonamide Antibiotics) Hives       Current Outpatient Medications   Medication Sig    ALPRAZolam (XANAX) 0.5 mg tablet Take 1 Tablet by mouth three (3) times daily as needed for Anxiety for up to 30 days. Max Daily Amount: 1.5 mg.    HumaLOG Mix 75-25 KwikPen flexpen Inject 45 units in the morning and 30 units in evening    allopurinoL (ZYLOPRIM) 100 mg tablet Take 100 mg by mouth daily.  ondansetron (ZOFRAN ODT) 4 mg disintegrating tablet dissolve 1 tablet ON TONGUE every 8 hours if needed for nausea    Insulin Needles, Disposable, 31 gauge x 5/16\" ndle Twice a day    citalopram (CELEXA) 20 mg tablet Take 1 Tablet by mouth daily for 90 days.  buPROPion SR (WELLBUTRIN SR) 150 mg SR tablet Take 1 Tablet by mouth daily for 90 days.  albuterol (PROVENTIL HFA, VENTOLIN HFA, PROAIR HFA) 90 mcg/actuation inhaler Take 2 Puffs by inhalation every four (4) hours as needed for Wheezing.  hydrALAZINE (APRESOLINE) 50 mg tablet Take 2 Tablets by mouth three (3) times daily.  atorvastatin (LIPITOR) 40 mg tablet Take 1 Tablet by mouth nightly.  ferrous sulfate 325 mg (65 mg iron) tablet Take 1 Tablet by mouth daily (with breakfast).  ARIPiprazole (ABILIFY) 20 mg tablet Take 1 Tablet by mouth daily for 90 days.  metoprolol succinate (TOPROL-XL) 50 mg XL tablet TAKE 1 TABLET BY MOUTH ONCE DAILY    lancets (One Touch Delica) 33 gauge misc Check glucose 3 times a day    glucose blood VI test strips (OneTouch Verio test strips) strip Check glucose 3 times a day    allopurinoL (ZYLOPRIM) 300 mg tablet Take  by mouth daily.     gabapentin (NEURONTIN) 100 mg capsule Take  by mouth three (3) times daily.  magnesium oxide (MAG-OX) 400 mg tablet Take 400 mg by mouth three (3) times daily.  pantoprazole (PROTONIX) 40 mg tablet Take 40 mg by mouth daily.  sodium bicarbonate 650 mg tablet Take  by mouth two (2) times a day. No current facility-administered medications for this visit.        Past Medical History:   Diagnosis Date    Anxiety 6/22/2020    Bipolar 1 disorder ([facility] 1/23/2018    Congestive heart failure (CHF) ([facility]     Diabetes mellitus type 2, controlled ([facility] 6/22/2020    Hypertension 6/22/2020    Liver fibrosis 6/22/2020    Major depression 6/22/2020    Migraine 6/22/2020    Sleep apnea 1/23/2018    Suicide attempt ([facility] 6/22/2020 2013       Past Surgical History:   Procedure Laterality Date    HX APPENDECTOMY      HX CHOLECYSTECTOMY         Family History   Problem Relation Age of Onset    Heart Attack Mother     Heart Attack Father        Social History     Socioeconomic History    Marital status: SINGLE     Spouse name: Not on file    Number of children: 0    Years of education: Not on file    Highest education level: Associate degree: academic program   Occupational History    Not on file   Tobacco Use    Smoking status: Never Smoker    Smokeless tobacco: Never Used   Vaping Use    Vaping Use: Never assessed   Substance and Sexual Activity    Alcohol use: Not Currently    Drug use: Never    Sexual activity: Not Currently   Other Topics Concern     Service Not Asked    Blood Transfusions Not Asked    Caffeine Concern Not Asked    Occupational Exposure Not Asked    Hobby Hazards Not Asked    Sleep Concern Not Asked    Stress Concern Not Asked    Weight Concern Not Asked    Special Diet Not Asked    Back Care Not Asked    Exercise Not Asked    Bike Helmet Not Asked    Seat Belt Not Asked    Self-Exams Not Asked   Social History Narrative    Not on file     Social Determinants of Health     Financial Resource Strain:     Difficulty of Paying Living Expenses:    Food Insecurity:     Worried About Running Out of Food in the Last Year:     [address] in the Last Year:    Transportation Needs:     Lack of Transportation (Medical):  Lack of Transportation (Non-Medical):    Physical Activity:     Days of Exercise per Week:     Minutes of Exercise per Session:    Stress:     Feeling of Stress :    Social Connections:     Frequency of Communication with Friends and Family:     Frequency of Social Gatherings with Friends and Family:     Attends Voodoo Services:     Active Member of Clubs or Organizations:     Attends Club or Organization Meetings:     Marital Status:    Intimate Partner Violence:     Fear of Current or Ex-Partner:     Emotionally Abused:     Physically Abused:     Sexually Abused:          Ms. Jason Spencer follows up in the clinic for bipolar depression and anxiety disorder. Current psychotropic medications-Abilify 15 mg take 1 tablet daily, citalopram 20 mg take 1 1/2 tablet daily and Xanax 0.5 mg tablet take 1 tablet twice daily as needed for anxiety. It is noted patient was restarted on bupropion  mg take 1 tablet daily on 9/17/2021 and citalopram was increased on 9/24/2021. Patient presents to the clinic unaccompanied, fully alert and oriented. Gait is stable. Mood is euthymic on today's visit. She reports having some worry and anxiety especially about finances. She is requesting to change Xanax to 3 times daily as needed. Patient mentions that she was on Xanax 3 times a day in the past.  She mentions that her sister has started working full-time, so she is not at home during the day. Patient reports reduced depression and anxiety symptoms since her sister started working full-time because she has her space again. Patient's affect is brighter on today's visit. Sleep-\"good. \"  Appetite is fair.   No psychotic symptoms observed or reported. No reports of manic episodes. No suicidal/homicidal thinking, risk for suicide is low to moderate based on history but there is no acute concern at this time. No smoking, alcohol or drug use noted. Patient lives at home with her sister in a stable home environment. Review of Systems   Constitutional: Positive for malaise/fatigue. Gastrointestinal: Positive for abdominal pain. Psychiatric/Behavioral: Positive for memory loss (forgetful). All other systems reviewed and are negative. Visit Vitals  Wt 95.4 kg (210 lb 6.4 oz)   BMI 32.95 kg/m²     Physical Exam  Psychiatric:         Attention and Perception: Attention and perception normal.         Mood and Affect: Mood and affect normal.         Speech: Speech normal.         Behavior: Behavior normal.         Thought Content: Thought content normal.         Cognition and Memory: Cognition normal. Memory is impaired (forgetfulness). Judgment: Judgment normal.        Plan:    Continue citalopram and bupropion as prescribed. Change Xanax 0.5 mg to 1 tablet 3 times daily as needed for anxiety. For emergencies-call 911 or go to the emergency department. Patient may call the clinic for any issues. Follow-up with medical provider as appropriate.

## 2021-11-04 ENCOUNTER — TELEPHONE (OUTPATIENT)
Dept: BEHAVIORAL/MENTAL HEALTH CLINIC | Age: 61
End: 2021-11-04

## 2021-11-04 NOTE — TELEPHONE ENCOUNTER
Sister reports patient is taking too much medicine because she stays in bed which she has low iron and refused iron infusions. She plans to have patient call clinic to discuss symptoms.

## 2021-11-29 DIAGNOSIS — F31.9 BIPOLAR DEPRESSION (HCC): ICD-10-CM

## 2021-11-29 RX ORDER — ARIPIPRAZOLE 20 MG/1
TABLET ORAL
Qty: 90 TABLET | Refills: 0 | Status: SHIPPED | OUTPATIENT
Start: 2021-11-29 | End: 2022-02-15

## 2021-12-15 DIAGNOSIS — F33.9 RECURRENT MAJOR DEPRESSIVE DISORDER, REMISSION STATUS UNSPECIFIED (HCC): ICD-10-CM

## 2021-12-15 RX ORDER — CITALOPRAM 20 MG/1
TABLET, FILM COATED ORAL
Qty: 90 TABLET | Refills: 0 | Status: SHIPPED | OUTPATIENT
Start: 2021-12-15 | End: 2022-03-21

## 2022-01-03 ENCOUNTER — TELEPHONE (OUTPATIENT)
Dept: BEHAVIORAL/MENTAL HEALTH CLINIC | Age: 62
End: 2022-01-03

## 2022-01-03 DIAGNOSIS — F33.9 RECURRENT MAJOR DEPRESSIVE DISORDER, REMISSION STATUS UNSPECIFIED (HCC): Primary | ICD-10-CM

## 2022-01-03 RX ORDER — BUPROPION HYDROCHLORIDE 150 MG/1
150 TABLET, EXTENDED RELEASE ORAL 2 TIMES DAILY
Qty: 180 TABLET | Refills: 0 | Status: SHIPPED | OUTPATIENT
Start: 2022-01-03 | End: 2022-06-01

## 2022-01-05 ENCOUNTER — OFFICE VISIT (OUTPATIENT)
Dept: ENDOCRINOLOGY | Age: 62
End: 2022-01-05
Payer: MEDICARE

## 2022-01-05 VITALS
OXYGEN SATURATION: 98 % | TEMPERATURE: 97.1 F | WEIGHT: 219.8 LBS | DIASTOLIC BLOOD PRESSURE: 70 MMHG | HEART RATE: 86 BPM | SYSTOLIC BLOOD PRESSURE: 134 MMHG | BODY MASS INDEX: 34.5 KG/M2 | HEIGHT: 67 IN

## 2022-01-05 DIAGNOSIS — E11.65 TYPE 2 DIABETES MELLITUS WITH HYPERGLYCEMIA, WITH LONG-TERM CURRENT USE OF INSULIN (HCC): Primary | ICD-10-CM

## 2022-01-05 DIAGNOSIS — Z79.4 TYPE 2 DIABETES MELLITUS WITH HYPERGLYCEMIA, WITH LONG-TERM CURRENT USE OF INSULIN (HCC): Primary | ICD-10-CM

## 2022-01-05 LAB
GLUCOSE POC: 229 MG/DL
HBA1C MFR BLD HPLC: 7.4 %

## 2022-01-05 PROCEDURE — G8752 SYS BP LESS 140: HCPCS | Performed by: INTERNAL MEDICINE

## 2022-01-05 PROCEDURE — G9717 DOC PT DX DEP/BP F/U NT REQ: HCPCS | Performed by: INTERNAL MEDICINE

## 2022-01-05 PROCEDURE — 82962 GLUCOSE BLOOD TEST: CPT | Performed by: INTERNAL MEDICINE

## 2022-01-05 PROCEDURE — 2022F DILAT RTA XM EVC RTNOPTHY: CPT | Performed by: INTERNAL MEDICINE

## 2022-01-05 PROCEDURE — G8417 CALC BMI ABV UP PARAM F/U: HCPCS | Performed by: INTERNAL MEDICINE

## 2022-01-05 PROCEDURE — 83036 HEMOGLOBIN GLYCOSYLATED A1C: CPT | Performed by: INTERNAL MEDICINE

## 2022-01-05 PROCEDURE — 3051F HG A1C>EQUAL 7.0%<8.0%: CPT | Performed by: INTERNAL MEDICINE

## 2022-01-05 PROCEDURE — 99214 OFFICE O/P EST MOD 30 MIN: CPT | Performed by: INTERNAL MEDICINE

## 2022-01-05 PROCEDURE — 3017F COLORECTAL CA SCREEN DOC REV: CPT | Performed by: INTERNAL MEDICINE

## 2022-01-05 PROCEDURE — G8427 DOCREV CUR MEDS BY ELIG CLIN: HCPCS | Performed by: INTERNAL MEDICINE

## 2022-01-05 PROCEDURE — G8754 DIAS BP LESS 90: HCPCS | Performed by: INTERNAL MEDICINE

## 2022-01-05 RX ORDER — AMLODIPINE BESYLATE 2.5 MG/1
TABLET ORAL
COMMUNITY

## 2022-01-05 NOTE — PROGRESS NOTES
History and Physical    Patient: Lex Bear MRN: [id number]  SSN: [id number]    YOB: 1960  Age: 63 y.o. Sex: female      Subjective:      Lex Bear is a 63 y.o. female with past medical history of hypertension, hyperlipidemia, chronic kidney disease stage III, bipolar disorder, GERD is here for type 2 diabetes mellitus. She is in primary care of Tyra Dexter np. She is also in care of nephrologist Dr. Amelia Hayes. At the last visit I decrease NovoLog 70/30 to 30 units in the evening from 35 units because of nocturnal hypoglycemia and we continued 45 units in the morning. Patient has not been checking blood glucose at home just because she lacks motivation, but she plans to start checking blood glucose again. Still having some low blood glucose symptoms in the middle of the night and when she checks her blood glucose sometimes it has been in the 60s. She has gained 11 pounds since last visit 3 months back. In August 2021 she was admitted with shortness of breath, congestive heart failure and found to have pulmonary embolism in August 2021. She was started on anticoagulation but then she had rectal bleed and that was stopped. She was getting iron infusion at nephrologist office, but she got tired of going to [place] so she just stopped taking it. She is having a lot of fatigue. Trulicity is too expensive for her. She has been very depressed and she has gone back to her psychiatrist.  No suicidal ideation. Doing better in terms of depression now. She is taking atorvastatin 40 mg regularly at bedtime. Regarding CKD stage III: Last appointment with nephrologist was earlier in September 2021. Next appointment is next month.     Glucometer reading: Not available    Updated diabetes history:  · Diagnosis: 8 years    · Current treatment: NovoLog 70/30, 45 units in the morning and 30 units before dinner    · Past treatment: Lantus, Humalog, Levemir, Novolin N, Shonda R, Trulicity (expensive)    · Glucose checks: once a day, fasting runs in 400s    · Hyperglycemia: yes    · Hypoglycemia: no    · Meals per day: 3, Breakfast: cereal, sandwich, boiled eggs, toast, lunch: skips, Dinner: fried chicken, pork, beans, hamburger, , snacks: banana, crackers, pop corn, sweet tea    · Exercise: no    · DM related hospitalizations: no        Complications of DM:    · CAD: no    · CVA: no    · PVD: no    · Amputations: no     · Retinopathy: no; last exam was 6-2021    · Gastropathy: no    · Nephropathy: yes ckd stage 3, Dr. Newton Medrano  · Neuropathy: no        Medications:    · Statin: atorvastatin 40    · ACE-I: no    · ASA: no        · Diabetes education: no    Past Medical History:   Diagnosis Date    Anxiety 6/22/2020    Bipolar 1 disorder ([facility] 1/23/2018    Congestive heart failure (CHF) ([facility]     Diabetes mellitus type 2, controlled ([facility] 6/22/2020    Hypertension 6/22/2020    Kidney failure     Liver fibrosis 6/22/2020    Major depression 6/22/2020    Migraine 6/22/2020    Sleep apnea 1/23/2018    Suicide attempt ([facility] 6/22/2020 2013     Past Surgical History:   Procedure Laterality Date    HX APPENDECTOMY      HX CHOLECYSTECTOMY        Family History   Problem Relation Age of Onset    Heart Attack Mother     Heart Attack Father      Social History     Tobacco Use    Smoking status: Never Smoker    Smokeless tobacco: Never Used   Substance Use Topics    Alcohol use: Not Currently      Prior to Admission medications    Medication Sig Start Date End Date Taking?  Authorizing Provider   glucose blood VI test strips (OneTouch Verio test strips) strip USE  STRIP TO CHECK GLUCOSE THREE TIMES DAILY 1/5/22  Yes Nikki Lopez MD   amLODIPine (NORVASC) 2.5 mg tablet 1 tablet   Yes Provider, Historical   insulin NPH/insulin regular (NovoLIN 70/30 U-100 Insulin) 100 unit/mL (70-30) injection Inject 45 units in am and 25 units in pm 1/5/22  Yes Nikki Lopez MD buPROPion SR (WELLBUTRIN SR) 150 mg SR tablet Take 1 Tablet by mouth two (2) times a day for 90 days. 1/3/22 4/3/22 Yes Allen-Blaine, Gala Klinefelter, NP   citalopram (CELEXA) 20 mg tablet Take 1 tablet by mouth once daily for 90 days 12/15/21  Yes Allen-Blaine, Gala Klinefelter, NP   ARIPiprazole (ABILIFY) 20 mg tablet Take 1 tablet by mouth once daily for 90 days 11/29/21  Yes Allen-Blaine, Gala Klinefelter, NP   allopurinoL (ZYLOPRIM) 100 mg tablet Take 100 mg by mouth daily. 8/9/21  Yes Provider, Historical   ondansetron (ZOFRAN ODT) 4 mg disintegrating tablet dissolve 1 tablet ON TONGUE every 8 hours if needed for nausea 9/13/21  Yes Provider, Historical   Insulin Needles, Disposable, 31 gauge x 5/16\" ndle Twice a day 10/6/21  Yes Rosemary Manjarrez MD   albuterol (PROVENTIL HFA, VENTOLIN HFA, PROAIR HFA) 90 mcg/actuation inhaler Take 2 Puffs by inhalation every four (4) hours as needed for Wheezing. 8/26/21  Yes Simona Matthews MD   hydrALAZINE (APRESOLINE) 50 mg tablet Take 2 Tablets by mouth three (3) times daily. 8/25/21  Yes Betzy Ag MD   atorvastatin (LIPITOR) 40 mg tablet Take 1 Tablet by mouth nightly. 8/25/21  Yes Betzy Ag MD   ferrous sulfate 325 mg (65 mg iron) tablet Take 1 Tablet by mouth daily (with breakfast). 8/26/21  Yes Betzy Ag MD   metoprolol succinate (TOPROL-XL) 50 mg XL tablet TAKE 1 TABLET BY MOUTH ONCE DAILY 3/2/21  Yes Provider, Historical   lancets (One Touch Delica) 33 gauge misc Check glucose 3 times a day 12/16/20  Yes Rosemary Manjarrez MD   allopurinoL (ZYLOPRIM) 300 mg tablet Take  by mouth daily. Yes Provider, Historical   gabapentin (NEURONTIN) 100 mg capsule Take  by mouth three (3) times daily. Yes Provider, Historical   magnesium oxide (MAG-OX) 400 mg tablet Take 400 mg by mouth three (3) times daily. Yes Provider, Historical   pantoprazole (PROTONIX) 40 mg tablet Take 40 mg by mouth daily.    Yes Provider, Historical   sodium bicarbonate 650 mg tablet Take  by mouth two (2) times a day. Yes Provider, Historical        Allergies   Allergen Reactions    Adhesive Tape-Silicones Unknown (comments)     Blisters    Septra [Sulfamethoprim] Unknown (comments)    Sulfa (Sulfonamide Antibiotics) Hives       Review of Systems:  ROS    A comprehensive review of systems was preformed and it is negative except mentioned in HPI    Objective:     Vitals:    01/05/22 1029   BP: 134/70   Pulse: 86   Temp: 97.1 °F (36.2 °C)   TempSrc: Temporal   SpO2: 98%   Weight: 219 lb 12.8 oz (99.7 kg)   Height: 5' 7\" (1.702 m)        Physical Exam:    Physical Exam  Vitals and nursing note reviewed. Constitutional:       Appearance: Normal appearance. HENT:      Head: Normocephalic and atraumatic. Cardiovascular:      Rate and Rhythm: Normal rate and regular rhythm. Pulmonary:      Effort: Pulmonary effort is normal.      Breath sounds: Normal breath sounds. Neurological:      Mental Status: She is alert. 7-7-2021:  diabetic foot exam:  Bilateral diabetic foot exam was performed today. Dorsalis pedis pulses 2+ bilaterally. Monofilament sensation normal bilaterally. No ulcers or skin breakdown. Labs and Imaging:  Results for Aleksandr Orellana (MRN [id number]) as of 3/17/2021 10:12   Ref.  Range 1/6/2021 16:20   Sodium Latest Ref Range: 136 - 145 mmol/L 141   Potassium Latest Ref Range: 3.5 - 5.1 mmol/L 3.9   Chloride Latest Ref Range: 97 - 108 mmol/L 103   CO2 Latest Ref Range: 21 - 32 mmol/L 25   Anion gap Latest Ref Range: 5 - 15 mmol/L 13   Glucose Latest Ref Range: 65 - 100 mg/dL 105 (H)   BUN Latest Ref Range: 6 - 20 mg/dL 16   Creatinine Latest Ref Range: 0.55 - 1.02 mg/dL 2.02 (H)   BUN/Creatinine ratio Latest Ref Range: 12 - 20   8 (L)   Calcium Latest Ref Range: 8.5 - 10.1 mg/dL 9.3   GFR est non-AA Latest Ref Range: >60 ml/min/1.73m2 25 (L)   GFR est AA Latest Ref Range: >60 ml/min/1.73m2 31 (L)   Bilirubin, total Latest Ref Range: 0.2 - 1.0 mg/dL 0.4   Protein, total Latest Ref Range: 6.4 - 8.2 g/dL 8.8 (H)   Albumin Latest Ref Range: 3.5 - 5.0 g/dL 3.6   Globulin Latest Ref Range: 2.0 - 4.0 g/dL 5.2 (H)   A-G Ratio Latest Ref Range: 1.1 - 2.2   0.7 (L)   ALT Latest Ref Range: 12 - 78 U/L 20   AST Latest Ref Range: 15 - 37 U/L 40 (H)   Alk. phosphatase Latest Ref Range: 45 - 117 U/L 164 (H)   Results for Aleksandr Orellana (MRN [id number]) as of 3/17/2021 10:12   Ref. Range 12/16/2020 12:08   Cholesterol, total Latest Ref Range: 100 - 199 mg/dL 233 (H)   HDL Cholesterol Latest Ref Range: >39 mg/dL 26 (L)   VLDL, calculated Latest Ref Range: 5 - 40 mg/dL 68 (H)   LDL, calculated Latest Ref Range: 0 - 99 mg/dL 139 (H)   Results for Rosa ARGUETA (MRN [id number]) as of 3/17/2021 10:12   Ref. Range 12/16/2020 12:08   TSH Latest Ref Range: 0.450 - 4.500 uIU/mL 1.310     Last 3 Recorded Weights in this Encounter    01/05/22 1029   Weight: 219 lb 12.8 oz (99.7 kg)        No results found for: HBA1C, NOT9NTUH, JGY4AYIB, UBF4CLBP     Assessment:     Patient Active Problem List   Diagnosis Code    Bipolar 1 disorder ([facility] F31.9    Sleep apnea G47.30    Major depression F32.9    Anxiety F41.9    Type 2 diabetes mellitus with hyperglycemia, with long-term current use of insulin (HCC) E11.65, Z79.4    Liver fibrosis K74.00    Hypertension I10    Migraine G43.909    Suicide attempt ([address] T14.91XA    Severe obesity ([facility] E66.01    Type 2 diabetes mellitus with diabetic neuropathy ([facility] E11.40    CKD stage 4 due to type 2 diabetes mellitus ([facility] E11.22, N18.4    Mixed hyperlipidemia E78.2    Hypertensive renal disease I12.9    Chest pain R07.9    Acute kidney injury ([facility] N17.9    Anemia in chronic renal disease N18.9, D63.1    Volume depletion E86.9           Plan:     type II diabetes mellitus uncontrolled  Hemoglobin A1c is 11.6% on 11-6-2019, 11.2% on 2-5-2020, 8.9% on 12-, 8.1% on 3-, 8.2% on 7-7-2021, 7.1% on 10-6-2021, 7.4% today.     Fingerstick blood glucose is 229 mg/dL today. Up to date with diabetes related annual labs: yes 8-2021    Up to date with diabetic eye exam: yes 6/2021    plan:  Frequency of hypoglycemia has decreased but she is still having some fasting hypoglycemia. Decrease NovoLog 70/30 as follows: Continue 45 units in the morning, decreased to 25 units in the evening before dinner. Encourage patient to restart checking blood glucose 3 times a day every day. Watch for symptoms of hypoglycemia and let me know if she is having hypoglycemia in that case I will consider going down further on her insulin. hypertensive disorder  Blood pressure well controlled on current medications. mixed hyperlipidemia  11-6-2019: Triglycerides elevated at 379, LDL normal at 61. Patient has not been taking atorvastatin regularly. 12-6-2020: Total cholesterol elevated at 233, triglycerides 370, . Advised patient to start taking atorvastatin 40 mg regularly. 3-: Total cholesterol 145, triglycerides 273, LDL 71. Atorvastatin 40 mg was continued. peripheral neuropathy with type 2 diabetes  on gabapentin    chronic kidney disease stage 3-4  GFR was 40 in October 2019. Following with nephrologist every 4 months. GFR was 31 in January 2021, 19 in August 2021 when she was hospitalized, 21 in . Next appointment with nephrologist is next month. Encourage patient to go back to iron infusions because anemia could be making her so tired.     Orders Placed This Encounter    AMB POC GLUCOSE BLOOD, BY GLUCOSE MONITORING DEVICE    AMB POC HEMOGLOBIN A1C    insulin NPH/insulin regular (NovoLIN 70/30 U-100 Insulin) 100 unit/mL (70-30) injection     Sig: Inject 45 units in am and 25 units in pm     Dispense:  30 mL     Refill:  4        Signed By: Waqsa May MD     January 5, 2022      Return to clinic 3 months

## 2022-01-05 NOTE — LETTER
1/5/2022    Patient: John Barrett   YOB: 1960   Date of Visit: 1/5/2022     Mustapha Benson PA-C  [address] [place] [address]  Via Fax: [phone]    Dear Mustapha Benson PA-C,      Thank you for referring Ms. Mehran Martin to [address] [place] [address] ENDOCRINOLOGY for evaluation. My notes for this consultation are attached. If you have questions, please do not hesitate to call me. I look forward to following your patient along with you.       Sincerely,    Nilda Henry MD

## 2022-01-05 NOTE — PATIENT INSTRUCTIONS
Novolog 70/30 continue 45 units in morning and decrease to 25 units in evening    Check glucose 3 times a day

## 2022-01-11 DIAGNOSIS — F41.1 GENERALIZED ANXIETY DISORDER: ICD-10-CM

## 2022-01-12 RX ORDER — ALPRAZOLAM 0.5 MG/1
TABLET ORAL
Qty: 90 TABLET | Refills: 0 | Status: SHIPPED | OUTPATIENT
Start: 2022-01-12 | End: 2022-02-15

## 2022-02-15 DIAGNOSIS — F31.9 BIPOLAR DEPRESSION (HCC): ICD-10-CM

## 2022-02-15 DIAGNOSIS — F41.1 GENERALIZED ANXIETY DISORDER: ICD-10-CM

## 2022-02-15 RX ORDER — ARIPIPRAZOLE 20 MG/1
TABLET ORAL
Qty: 90 TABLET | Refills: 0 | Status: SHIPPED | OUTPATIENT
Start: 2022-02-15 | End: 2022-06-01

## 2022-02-15 RX ORDER — ALPRAZOLAM 0.5 MG/1
TABLET ORAL
Qty: 90 TABLET | Refills: 0 | Status: SHIPPED | OUTPATIENT
Start: 2022-02-15 | End: 2022-03-30

## 2022-02-18 ENCOUNTER — OFFICE VISIT (OUTPATIENT)
Dept: BEHAVIORAL/MENTAL HEALTH CLINIC | Age: 62
End: 2022-02-18
Payer: MEDICARE

## 2022-02-18 VITALS — WEIGHT: 219.8 LBS | BODY MASS INDEX: 34.43 KG/M2

## 2022-02-18 DIAGNOSIS — F31.70 BIPOLAR DISORDER IN REMISSION (HCC): Primary | ICD-10-CM

## 2022-02-18 DIAGNOSIS — F41.1 GENERALIZED ANXIETY DISORDER: ICD-10-CM

## 2022-02-18 PROCEDURE — 3017F COLORECTAL CA SCREEN DOC REV: CPT | Performed by: NURSE PRACTITIONER

## 2022-02-18 PROCEDURE — G8417 CALC BMI ABV UP PARAM F/U: HCPCS | Performed by: NURSE PRACTITIONER

## 2022-02-18 PROCEDURE — G9717 DOC PT DX DEP/BP F/U NT REQ: HCPCS | Performed by: NURSE PRACTITIONER

## 2022-02-18 PROCEDURE — G8756 NO BP MEASURE DOC: HCPCS | Performed by: NURSE PRACTITIONER

## 2022-02-18 PROCEDURE — 99213 OFFICE O/P EST LOW 20 MIN: CPT | Performed by: NURSE PRACTITIONER

## 2022-02-18 PROCEDURE — G8427 DOCREV CUR MEDS BY ELIG CLIN: HCPCS | Performed by: NURSE PRACTITIONER

## 2022-02-18 NOTE — PROGRESS NOTES
John Barrett is a 63 y.o. female who presents today for the following:  Chief Complaint   Patient presents with    Follow-up     \"I feel pretty good. \"    Anxiety    Bipolar       Allergies   Allergen Reactions    Adhesive Tape-Silicones Unknown (comments)     Blisters    Septra [Sulfamethoprim] Unknown (comments)    Sulfa (Sulfonamide Antibiotics) Hives       Current Outpatient Medications   Medication Sig    ALPRAZolam (XANAX) 0.5 mg tablet TAKE 1 TABLET BY MOUTH THREE TIMES DAILY AS NEEDED FOR  ANXIETY. MAX  DAILY  AMOUNT  OF  1.5MG    ARIPiprazole (ABILIFY) 20 mg tablet Take 1 tablet by mouth once daily    hydrALAZINE (APRESOLINE) 50 mg tablet TAKE 1 TABLET BY MOUTH THREE TIMES DAILY    glucose blood VI test strips (OneTouch Verio test strips) strip USE  STRIP TO CHECK GLUCOSE THREE TIMES DAILY    amLODIPine (NORVASC) 2.5 mg tablet 1 tablet    insulin NPH/insulin regular (NovoLIN 70/30 U-100 Insulin) 100 unit/mL (70-30) injection Inject 45 units in am and 25 units in pm    buPROPion SR (WELLBUTRIN SR) 150 mg SR tablet Take 1 Tablet by mouth two (2) times a day for 90 days.  citalopram (CELEXA) 20 mg tablet Take 1 tablet by mouth once daily for 90 days    allopurinoL (ZYLOPRIM) 100 mg tablet Take 100 mg by mouth daily.  ondansetron (ZOFRAN ODT) 4 mg disintegrating tablet dissolve 1 tablet ON TONGUE every 8 hours if needed for nausea    Insulin Needles, Disposable, 31 gauge x 5/16\" ndle Twice a day    albuterol (PROVENTIL HFA, VENTOLIN HFA, PROAIR HFA) 90 mcg/actuation inhaler Take 2 Puffs by inhalation every four (4) hours as needed for Wheezing.  atorvastatin (LIPITOR) 40 mg tablet Take 1 Tablet by mouth nightly.  ferrous sulfate 325 mg (65 mg iron) tablet Take 1 Tablet by mouth daily (with breakfast).     metoprolol succinate (TOPROL-XL) 50 mg XL tablet TAKE 1 TABLET BY MOUTH ONCE DAILY    lancets (One Touch Delica) 33 gauge misc Check glucose 3 times a day    allopurinoL (ZYLOPRIM) 300 mg tablet Take  by mouth daily.  gabapentin (NEURONTIN) 100 mg capsule Take  by mouth three (3) times daily.  magnesium oxide (MAG-OX) 400 mg tablet Take 400 mg by mouth three (3) times daily.  pantoprazole (PROTONIX) 40 mg tablet Take 40 mg by mouth daily.  sodium bicarbonate 650 mg tablet Take  by mouth two (2) times a day. No current facility-administered medications for this visit.        Past Medical History:   Diagnosis Date    Anxiety 6/22/2020    Bipolar 1 disorder ([facility] 1/23/2018    Congestive heart failure (CHF) ([facility]     Diabetes mellitus type 2, controlled ([facility] 6/22/2020    Hypertension 6/22/2020    Kidney failure     Liver fibrosis 6/22/2020    Major depression 6/22/2020    Migraine 6/22/2020    Sleep apnea 1/23/2018    Suicide attempt ([facility] 6/22/2020 2013       Past Surgical History:   Procedure Laterality Date    HX APPENDECTOMY      HX CHOLECYSTECTOMY         Family History   Problem Relation Age of Onset    Heart Attack Mother     Heart Attack Father        Social History     Socioeconomic History    Marital status: SINGLE     Spouse name: Not on file    Number of children: 0    Years of education: Not on file    Highest education level: Associate degree: academic program   Occupational History    Not on file   Tobacco Use    Smoking status: Never Smoker    Smokeless tobacco: Never Used   Vaping Use    Vaping Use: Not on file   Substance and Sexual Activity    Alcohol use: Not Currently    Drug use: Never    Sexual activity: Not Currently   Other Topics Concern     Service Not Asked    Blood Transfusions Not Asked    Caffeine Concern Not Asked    Occupational Exposure Not Asked    Hobby Hazards Not Asked    Sleep Concern Not Asked    Stress Concern Not Asked    Weight Concern Not Asked    Special Diet Not Asked    Back Care Not Asked    Exercise Not Asked    Bike Helmet Not Asked    Seat Belt Not Asked    Self-Exams Not Asked   Social History Narrative    Not on file     Social Determinants of Health     Financial Resource Strain:     Difficulty of Paying Living Expenses: Not on file   Food Insecurity:     Worried About Running Out of Food in the Last Year: Not on file    Jelena of Food in the Last Year: Not on file   Transportation Needs:     Lack of Transportation (Medical): Not on file    Lack of Transportation (Non-Medical): Not on file   Physical Activity:     Days of Exercise per Week: Not on file    Minutes of Exercise per Session: Not on file   Stress:     Feeling of Stress : Not on file   Social Connections:     Frequency of Communication with Friends and Family: Not on file    Frequency of Social Gatherings with Friends and Family: Not on file    Attends [employer] Services: Not on file    Active Member of [address] [place] [address] [employer] or Organizations: Not on file    Attends Club or Organization Meetings: Not on file    Marital Status: Not on file   Intimate Partner Violence:     Fear of Current or Ex-Partner: Not on file    Emotionally Abused: Not on file    Physically Abused: Not on file    Sexually Abused: Not on file   Housing Stability:     Unable to Pay for Housing in the Last Year: Not on file    Number of [place] in the Last Year: Not on file    Unstable Housing in the Last Year: Not on file         Ms. Jarrett Mayorga follows up in the clinic for bipolar depression and anxiety disorder. Current psychotropic medications-Abilify 20 mg take 1 tablet daily, bupropion  mg take 1 tablet twice daily, citalopram 20 mg take 1 tablet daily and Xanax 0.5 mg tablet take 1 tablet three times daily as needed for anxiety. Patient presents to the clinic unaccompanied, clean fully alert and oriented. Gait stable. Mood is euthymic on today's visit. She reports overall doing well. She mentions that her 42-year-old cousin passed away recently. She reports overall that she is coping well with the loss.   No issues with sleep or appetite. No manic episodes reported. Anxiety is under control with Xanax as needed. No suicidal/homicidal thinking noted, risk for suicide is low to moderate based on history but there is no acute concern at this time. She denies psychotic symptoms and none noted. She is tolerating medications well and is reluctant to trial dose reduction at this time. Patient lives at home with her sister in a stable and supportive home environment. No smoking, alcohol or drug use noted. Review of Systems   Musculoskeletal: Positive for falls (fell 4 weeks ago). Neurological: Positive for dizziness. All other systems reviewed and are negative. Visit Vitals  Wt 99.7 kg (219 lb 12.8 oz)   BMI 34.43 kg/m²     Physical Exam  Psychiatric:         Attention and Perception: Attention and perception normal.         Mood and Affect: Mood and affect normal.         Speech: Speech normal.         Behavior: Behavior normal. Behavior is cooperative. Thought Content: Thought content normal.         Cognition and Memory: Cognition and memory normal.         Judgment: Judgment normal.          Plan:    Continue the above medications as prescribed. Patient is aware of risks associated with benzodiazepine use such as physical dependence, addiction and falls. It is noted patient has history of dizziness and falls which she reports is unrelated to medications. According to patient, she was carrying a case of water too fast.  For emergencies call 911 or go to the emergency department. Follow-up with medical providers as appropriate. Advised patient to avoid smoking, alcohol and drug use.

## 2022-03-18 PROBLEM — E11.22 CKD STAGE 4 DUE TO TYPE 2 DIABETES MELLITUS (HCC): Status: ACTIVE | Noted: 2020-12-16

## 2022-03-18 PROBLEM — F32.9 MAJOR DEPRESSION: Status: ACTIVE | Noted: 2020-06-22

## 2022-03-18 PROBLEM — F31.9 BIPOLAR 1 DISORDER (HCC): Status: ACTIVE | Noted: 2018-01-23

## 2022-03-18 PROBLEM — Z79.4 TYPE 2 DIABETES MELLITUS WITH HYPERGLYCEMIA, WITH LONG-TERM CURRENT USE OF INSULIN (HCC): Status: ACTIVE | Noted: 2020-06-22

## 2022-03-18 PROBLEM — E11.65 TYPE 2 DIABETES MELLITUS WITH HYPERGLYCEMIA, WITH LONG-TERM CURRENT USE OF INSULIN (HCC): Status: ACTIVE | Noted: 2020-06-22

## 2022-03-18 PROBLEM — N18.4 CKD STAGE 4 DUE TO TYPE 2 DIABETES MELLITUS (HCC): Status: ACTIVE | Noted: 2020-12-16

## 2022-03-18 PROBLEM — K74.00 LIVER FIBROSIS: Status: ACTIVE | Noted: 2020-06-22

## 2022-03-19 PROBLEM — G47.30 SLEEP APNEA: Status: ACTIVE | Noted: 2018-01-23

## 2022-03-19 PROBLEM — E11.40 TYPE 2 DIABETES MELLITUS WITH DIABETIC NEUROPATHY (HCC): Status: ACTIVE | Noted: 2020-12-16

## 2022-03-19 PROBLEM — E78.2 MIXED HYPERLIPIDEMIA: Status: ACTIVE | Noted: 2020-12-16

## 2022-03-19 PROBLEM — N17.9 ACUTE KIDNEY INJURY (HCC): Status: ACTIVE | Noted: 2021-08-25

## 2022-03-19 PROBLEM — T14.91XA SUICIDE ATTEMPT (HCC): Status: ACTIVE | Noted: 2020-06-22

## 2022-03-19 PROBLEM — E86.9 VOLUME DEPLETION: Status: ACTIVE | Noted: 2021-08-25

## 2022-03-19 PROBLEM — F41.9 ANXIETY: Status: ACTIVE | Noted: 2020-06-22

## 2022-03-19 PROBLEM — R07.9 CHEST PAIN: Status: ACTIVE | Noted: 2021-08-22

## 2022-03-19 PROBLEM — E66.01 SEVERE OBESITY (HCC): Status: ACTIVE | Noted: 2020-12-16

## 2022-03-19 PROBLEM — I10 HYPERTENSION: Status: ACTIVE | Noted: 2020-06-22

## 2022-03-19 PROBLEM — G43.909 MIGRAINE: Status: ACTIVE | Noted: 2020-06-22

## 2022-03-20 DIAGNOSIS — F33.9 RECURRENT MAJOR DEPRESSIVE DISORDER, REMISSION STATUS UNSPECIFIED (HCC): ICD-10-CM

## 2022-03-20 PROBLEM — I12.9 HYPERTENSIVE RENAL DISEASE: Status: ACTIVE | Noted: 2021-03-17

## 2022-03-20 PROBLEM — N18.9 ANEMIA IN CHRONIC RENAL DISEASE: Status: ACTIVE | Noted: 2021-08-25

## 2022-03-20 PROBLEM — D63.1 ANEMIA IN CHRONIC RENAL DISEASE: Status: ACTIVE | Noted: 2021-08-25

## 2022-03-21 ENCOUNTER — TELEPHONE (OUTPATIENT)
Dept: BEHAVIORAL/MENTAL HEALTH CLINIC | Age: 62
End: 2022-03-21

## 2022-03-21 RX ORDER — CITALOPRAM 20 MG/1
TABLET, FILM COATED ORAL
Qty: 90 TABLET | Refills: 0 | Status: SHIPPED | OUTPATIENT
Start: 2022-03-21 | End: 2022-08-11

## 2022-03-21 NOTE — TELEPHONE ENCOUNTER
Patient reports that she had to put down her 13year-old cat about a week ago. She reports since the loss of her cat she has experienced crying episodes. We discussed stages of grief which patient is receptive to therapy recommendation. For now we will hold off on changing prescription medications patient will contact the clinic for any changes or worsening in condition. She is not a threat to herself or others. She denies suicidal/homicidal thinking.

## 2022-03-30 DIAGNOSIS — F41.1 GENERALIZED ANXIETY DISORDER: ICD-10-CM

## 2022-03-30 RX ORDER — ALPRAZOLAM 0.5 MG/1
TABLET ORAL
Qty: 90 TABLET | Refills: 0 | Status: SHIPPED | OUTPATIENT
Start: 2022-03-30 | End: 2022-05-11

## 2022-04-28 ENCOUNTER — TELEPHONE (OUTPATIENT)
Dept: BEHAVIORAL/MENTAL HEALTH CLINIC | Age: 62
End: 2022-04-28

## 2022-04-28 NOTE — TELEPHONE ENCOUNTER
Patient reports insurance will not cover compliance drug analysis.  She is requesting to contact insurance company at [phone]  Member ID [id number]

## 2022-05-11 DIAGNOSIS — F41.1 GENERALIZED ANXIETY DISORDER: ICD-10-CM

## 2022-05-11 RX ORDER — ALPRAZOLAM 0.5 MG/1
TABLET ORAL
Qty: 90 TABLET | Refills: 0 | Status: SHIPPED | OUTPATIENT
Start: 2022-05-11 | End: 2022-06-20

## 2022-06-01 DIAGNOSIS — F33.9 RECURRENT MAJOR DEPRESSIVE DISORDER, REMISSION STATUS UNSPECIFIED (HCC): ICD-10-CM

## 2022-06-01 DIAGNOSIS — F31.9 BIPOLAR DEPRESSION (HCC): ICD-10-CM

## 2022-06-01 RX ORDER — BUPROPION HYDROCHLORIDE 150 MG/1
TABLET, EXTENDED RELEASE ORAL
Qty: 180 TABLET | Refills: 0 | Status: SHIPPED | OUTPATIENT
Start: 2022-06-01

## 2022-06-01 RX ORDER — ARIPIPRAZOLE 20 MG/1
TABLET ORAL
Qty: 90 TABLET | Refills: 0 | Status: SHIPPED | OUTPATIENT
Start: 2022-06-01 | End: 2022-09-01

## 2022-06-15 ENCOUNTER — OFFICE VISIT (OUTPATIENT)
Dept: ENDOCRINOLOGY | Age: 62
End: 2022-06-15
Payer: MEDICARE

## 2022-06-15 VITALS
TEMPERATURE: 98.3 F | HEART RATE: 80 BPM | OXYGEN SATURATION: 99 % | WEIGHT: 223 LBS | SYSTOLIC BLOOD PRESSURE: 139 MMHG | BODY MASS INDEX: 35 KG/M2 | DIASTOLIC BLOOD PRESSURE: 70 MMHG | HEIGHT: 67 IN

## 2022-06-15 DIAGNOSIS — E78.2 MIXED HYPERLIPIDEMIA: ICD-10-CM

## 2022-06-15 DIAGNOSIS — I12.9 HYPERTENSIVE RENAL DISEASE: ICD-10-CM

## 2022-06-15 DIAGNOSIS — E11.22 CKD STAGE 4 DUE TO TYPE 2 DIABETES MELLITUS (HCC): ICD-10-CM

## 2022-06-15 DIAGNOSIS — N18.4 CKD STAGE 4 DUE TO TYPE 2 DIABETES MELLITUS (HCC): ICD-10-CM

## 2022-06-15 DIAGNOSIS — Z79.4 TYPE 2 DIABETES MELLITUS WITH HYPERGLYCEMIA, WITH LONG-TERM CURRENT USE OF INSULIN (HCC): Primary | ICD-10-CM

## 2022-06-15 DIAGNOSIS — E11.65 TYPE 2 DIABETES MELLITUS WITH HYPERGLYCEMIA, WITH LONG-TERM CURRENT USE OF INSULIN (HCC): Primary | ICD-10-CM

## 2022-06-15 LAB
GLUCOSE POC: 208 MG/DL
HBA1C MFR BLD HPLC: 8.7 %

## 2022-06-15 PROCEDURE — 2022F DILAT RTA XM EVC RTNOPTHY: CPT | Performed by: INTERNAL MEDICINE

## 2022-06-15 PROCEDURE — 83036 HEMOGLOBIN GLYCOSYLATED A1C: CPT | Performed by: INTERNAL MEDICINE

## 2022-06-15 PROCEDURE — G8427 DOCREV CUR MEDS BY ELIG CLIN: HCPCS | Performed by: INTERNAL MEDICINE

## 2022-06-15 PROCEDURE — 3017F COLORECTAL CA SCREEN DOC REV: CPT | Performed by: INTERNAL MEDICINE

## 2022-06-15 PROCEDURE — 3052F HG A1C>EQUAL 8.0%<EQUAL 9.0%: CPT | Performed by: INTERNAL MEDICINE

## 2022-06-15 PROCEDURE — G8417 CALC BMI ABV UP PARAM F/U: HCPCS | Performed by: INTERNAL MEDICINE

## 2022-06-15 PROCEDURE — G9717 DOC PT DX DEP/BP F/U NT REQ: HCPCS | Performed by: INTERNAL MEDICINE

## 2022-06-15 PROCEDURE — 99214 OFFICE O/P EST MOD 30 MIN: CPT | Performed by: INTERNAL MEDICINE

## 2022-06-15 PROCEDURE — G8752 SYS BP LESS 140: HCPCS | Performed by: INTERNAL MEDICINE

## 2022-06-15 PROCEDURE — G8754 DIAS BP LESS 90: HCPCS | Performed by: INTERNAL MEDICINE

## 2022-06-15 PROCEDURE — 82962 GLUCOSE BLOOD TEST: CPT | Performed by: INTERNAL MEDICINE

## 2022-06-15 RX ORDER — BLOOD SUGAR DIAGNOSTIC
STRIP MISCELLANEOUS
Qty: 100 STRIP | Refills: 5 | Status: SHIPPED | OUTPATIENT
Start: 2022-06-15

## 2022-06-15 RX ORDER — CHOLECALCIFEROL (VITAMIN D3) 125 MCG
2000 CAPSULE ORAL DAILY
COMMUNITY

## 2022-06-15 RX ORDER — ATORVASTATIN CALCIUM 40 MG/1
40 TABLET, FILM COATED ORAL
Qty: 90 TABLET | Refills: 2 | Status: SHIPPED | OUTPATIENT
Start: 2022-06-15 | End: 2022-09-13

## 2022-06-15 RX ORDER — FUROSEMIDE 40 MG/1
TABLET ORAL
COMMUNITY
Start: 2022-05-25

## 2022-06-15 RX ORDER — PEN NEEDLE, DIABETIC 30 GX3/16"
NEEDLE, DISPOSABLE MISCELLANEOUS
Qty: 100 EACH | Refills: 5 | Status: SHIPPED | OUTPATIENT
Start: 2022-06-15

## 2022-06-15 RX ORDER — METRONIDAZOLE 500 MG/1
TABLET ORAL
COMMUNITY
Start: 2022-06-06

## 2022-06-15 NOTE — LETTER
6/15/2022    Patient: Chacorta Arshad   YOB: 1960   Date of Visit: 6/15/2022     Ulysses Braden PA-C  [address] [place] [address]  Via Fax: [phone]    Dear Ulysses Braden PA-C,      Thank you for referring Ms. Adriana Heath to [address] [place] [address] ENDOCRINOLOGY for evaluation. My notes for this consultation are attached. If you have questions, please do not hesitate to call me. I look forward to following your patient along with you.       Sincerely,    Girish Villegas MD

## 2022-06-15 NOTE — PROGRESS NOTES
History and Physical    Patient: Ivana Griffith MRN: [id number]  SSN: [id number]    YOB: 1960  Age: 63 y.o. Sex: female      Subjective:      Ivana Griffith is a 63 y.o. female with past medical history of hypertension, hyperlipidemia, chronic kidney disease stage III, bipolar disorder, GERD is here for type 2 diabetes mellitus. She is in primary care of Stacia Galeas np. She is also in care of nephrologist Dr. Chad Saldivar. I am seeing this patient after 6 months. She is taking NovoLog 70/30, 45 units in the morning and 25 units in the evening. However she has not been taking it regularly. Since the last visit she had worsening of depression because her pet passed away. She had not been taking care of herself, not eating right, at 1 point she was not able bathing or brushing her teeth. She was not taking her insulin regularly. Now she is trying to get back on track. Psychiatrist has adjusted her depression medications and she is somewhat doing better. She has not been checking blood glucose regularly. No nocturnal hypoglycemia but sometimes she gets symptoms of low blood glucose during the daytime, however it is once or twice a month only, when she is physically more active than usual.  Her kidney function has worsened. Nephrologist is preparing her for dialysis. She has been referred to a class. She has not been taking atorvastatin regularly.     Glucometer reading: Not available    Updated diabetes history:  · Diagnosis: 8 years    · Current treatment: NovoLog 70/30, 45 units in the morning and 25 units before dinner    · Past treatment: Lantus, Humalog, Levemir, Novolin N, Novolin R, Trulicity (expensive)    · Glucose checks: once a day, fasting runs in 400s    · Hyperglycemia: yes    · Hypoglycemia: no    · Meals per day: 3, Breakfast: cereal, sandwich, boiled eggs, toast, lunch: skips, Dinner: fried chicken, pork, beans, hamburger, , snacks: banana, crackers, pop corn, sweet tea    · Exercise: no    · DM related hospitalizations: no        Complications of DM:    · CAD: no    · CVA: no    · PVD: no    · Amputations: no     · Retinopathy: no; last exam was 6-2021    · Gastropathy: no    · Nephropathy: yes ckd stage 3, Dr. Renetta Holder  · Neuropathy: no        Medications:    · Statin: atorvastatin 40    · ACE-I: no    · ASA: no        · Diabetes education: no    Past Medical History:   Diagnosis Date    Anxiety 6/22/2020    Bipolar 1 disorder ([facility] 1/23/2018    Congestive heart failure (CHF) ([facility]     Diabetes mellitus type 2, controlled ([facility] 6/22/2020    Diverticulosis     Hypertension 6/22/2020    Kidney failure     Liver fibrosis 6/22/2020    Major depression 6/22/2020    Migraine 6/22/2020    Sleep apnea 1/23/2018    Suicide attempt ([facility] 6/22/2020 2013    UTI (urinary tract infection)      Past Surgical History:   Procedure Laterality Date    HX APPENDECTOMY      HX CHOLECYSTECTOMY        Family History   Problem Relation Age of Onset    Heart Attack Mother     Heart Attack Father      Social History     Tobacco Use    Smoking status: Never Smoker    Smokeless tobacco: Never Used   Substance Use Topics    Alcohol use: Not Currently      Prior to Admission medications    Medication Sig Start Date End Date Taking? Authorizing Provider   furosemide (LASIX) 40 mg tablet TAKE 1 TABLET BY MOUTH IN THE MORNING AND 1 IN THE EVENING 5/25/22  Yes Provider, Historical   metroNIDAZOLE (FLAGYL) 500 mg tablet TAKE 1 TABLET BY MOUTH THREE TIMES DAILY FOR 7 DAYS 6/6/22  Yes Provider, Historical   cholecalciferol, vitamin D3, 50 mcg (2,000 unit) tab Take 2,000 Units by mouth daily.    Yes Provider, Historical   glucose blood VI test strips (OneTouch Verio test strips) strip USE  STRIP TO CHECK GLUCOSE THREE TIMES DAILY 6/15/22  Yes Camila Griffin MD   insulin NPH/insulin regular (NovoLIN 70/30 U-100 Insulin) 100 unit/mL (70-30) injection Inject 45 units in am and 25 units in pm 6/15/22  Yes Ana Laura Turner MD   Insulin Needles, Disposable, 31 gauge x 5/16\" ndle Twice a day 6/15/22  Yes Ana Laura Turner MD   atorvastatin (LIPITOR) 40 mg tablet Take 1 Tablet by mouth nightly for 90 days. 6/15/22 9/13/22 Yes Ana Luara Turner MD   ARIPiprazole (ABILIFY) 20 mg tablet Take 1 tablet by mouth once daily 6/1/22  Yes Jaswinder Hernandez NP   buPROPion SR [facility] SR) 150 mg SR tablet Take 1 tablet by mouth twice daily 6/1/22  Yes Jaswinder Hernandez NP   ALPRAZolam (XANAX) 0.5 mg tablet TAKE 1 TABLET BY MOUTH THREE TIMES DAILY AS NEEDED FOR ANXIETY MAX  DAILY  AMOUNT  OF  3  TABLETS 5/11/22  Yes Jaswinder Hernandez NP   citalopram (CELEXA) 20 mg tablet Take 1 tablet by mouth once daily for 90 days 3/21/22  Yes Jaswinder Hernandez NP   hydrALAZINE (APRESOLINE) 50 mg tablet TAKE 1 TABLET BY MOUTH THREE TIMES DAILY 1/31/22  Yes Ana Laura Turner MD   amLODIPine (NORVASC) 2.5 mg tablet 1 tablet   Yes Provider, Historical   allopurinoL (ZYLOPRIM) 100 mg tablet Take 100 mg by mouth daily. 8/9/21  Yes Provider, Historical   ondansetron (ZOFRAN ODT) 4 mg disintegrating tablet dissolve 1 tablet ON TONGUE every 8 hours if needed for nausea 9/13/21  Yes Provider, Historical   albuterol (PROVENTIL HFA, VENTOLIN HFA, PROAIR HFA) 90 mcg/actuation inhaler Take 2 Puffs by inhalation every four (4) hours as needed for Wheezing. 8/26/21  Yes Simona Matthews MD   ferrous sulfate 325 mg (65 mg iron) tablet Take 1 Tablet by mouth daily (with breakfast). 8/26/21  Yes Haresh Michael MD   metoprolol succinate (TOPROL-XL) 50 mg XL tablet TAKE 1 TABLET BY MOUTH ONCE DAILY 3/2/21  Yes Provider, Historical   lancets (One Touch Delica) 33 gauge misc Check glucose 3 times a day 12/16/20  Yes Ana Laura Turner MD   allopurinoL (ZYLOPRIM) 300 mg tablet Take  by mouth daily. Yes Provider, Historical   gabapentin (NEURONTIN) 100 mg capsule Take  by mouth three (3) times daily.    Yes Provider, Historical   magnesium oxide (MAG-OX) 400 mg tablet Take 400 mg by mouth three (3) times daily. Yes Provider, Historical   pantoprazole (PROTONIX) 40 mg tablet Take 40 mg by mouth daily. Yes Provider, Historical   sodium bicarbonate 650 mg tablet Take  by mouth two (2) times a day. Yes Provider, Historical        Allergies   Allergen Reactions    Adhesive Tape-Silicones Unknown (comments)     Blisters    Septra [Sulfamethoprim] Unknown (comments)    Sulfa (Sulfonamide Antibiotics) Hives, Other (comments) and Unknown (comments)       Review of Systems:  ROS    A comprehensive review of systems was preformed and it is negative except mentioned in HPI    Objective:     Vitals:    06/15/22 1229   BP: 139/70   Pulse: 80   Temp: 98.3 °F (36.8 °C)   TempSrc: Temporal   SpO2: 99%   Weight: 223 lb (101.2 kg)   Height: 5' 7\" (1.702 m)        Physical Exam:    Physical Exam  Vitals and nursing note reviewed. Constitutional:       Appearance: Normal appearance. HENT:      Head: Normocephalic and atraumatic. Cardiovascular:      Rate and Rhythm: Normal rate and regular rhythm. Pulmonary:      Effort: Pulmonary effort is normal.      Breath sounds: Normal breath sounds. Neurological:      Mental Status: She is alert. 7-7-2021:  diabetic foot exam:  Bilateral diabetic foot exam was performed today. Dorsalis pedis pulses 2+ bilaterally. Monofilament sensation normal bilaterally. No ulcers or skin breakdown. Labs and Imaging:  Results for Aleyda Guerrero (MRN [id number]) as of 3/17/2021 10:12   Ref.  Range 1/6/2021 16:20   Sodium Latest Ref Range: 136 - 145 mmol/L 141   Potassium Latest Ref Range: 3.5 - 5.1 mmol/L 3.9   Chloride Latest Ref Range: 97 - 108 mmol/L 103   CO2 Latest Ref Range: 21 - 32 mmol/L 25   Anion gap Latest Ref Range: 5 - 15 mmol/L 13   Glucose Latest Ref Range: 65 - 100 mg/dL 105 (H)   BUN Latest Ref Range: 6 - 20 mg/dL 16   Creatinine Latest Ref Range: 0.55 - 1.02 mg/dL 2.02 (H) BUN/Creatinine ratio Latest Ref Range: 12 - 20   8 (L)   Calcium Latest Ref Range: 8.5 - 10.1 mg/dL 9.3   GFR est non-AA Latest Ref Range: >60 ml/min/1.73m2 25 (L)   GFR est AA Latest Ref Range: >60 ml/min/1.73m2 31 (L)   Bilirubin, total Latest Ref Range: 0.2 - 1.0 mg/dL 0.4   Protein, total Latest Ref Range: 6.4 - 8.2 g/dL 8.8 (H)   Albumin Latest Ref Range: 3.5 - 5.0 g/dL 3.6   Globulin Latest Ref Range: 2.0 - 4.0 g/dL 5.2 (H)   A-G Ratio Latest Ref Range: 1.1 - 2.2   0.7 (L)   ALT Latest Ref Range: 12 - 78 U/L 20   AST Latest Ref Range: 15 - 37 U/L 40 (H)   Alk. phosphatase Latest Ref Range: 45 - 117 U/L 164 (H)   Results for Washington Coy (MRN [id number]) as of 3/17/2021 10:12   Ref. Range 12/16/2020 12:08   Cholesterol, total Latest Ref Range: 100 - 199 mg/dL 233 (H)   HDL Cholesterol Latest Ref Range: >39 mg/dL 26 (L)   VLDL, calculated Latest Ref Range: 5 - 40 mg/dL 68 (H)   LDL, calculated Latest Ref Range: 0 - 99 mg/dL 139 (H)   Results for Bhavna ARGUETA (MRN [id number]) as of 3/17/2021 10:12   Ref.  Range 12/16/2020 12:08   TSH Latest Ref Range: 0.450 - 4.500 uIU/mL 1.310     Last 3 Recorded Weights in this Encounter    06/15/22 1229   Weight: 223 lb (101.2 kg)        No results found for: HBA1C, DST5UFAP, DRO9EPJU, LZX1CPZP     Assessment:     Patient Active Problem List   Diagnosis Code    Bipolar 1 disorder ([facility] F31.9    Sleep apnea G47.30    Major depression F32.9    Anxiety F41.9    Type 2 diabetes mellitus with hyperglycemia, with long-term current use of insulin (HCC) E11.65, Z79.4    Liver fibrosis K74.00    Hypertension I10    Migraine G43.909    Suicide attempt ([address] T14.91XA    Severe obesity ([address] E66.01    Type 2 diabetes mellitus with diabetic neuropathy ([address] E11.40    CKD stage 4 due to type 2 diabetes mellitus ([facility] E11.22, N18.4    Mixed hyperlipidemia E78.2    Hypertensive renal disease I12.9    Chest pain R07.9    Acute kidney injury ([address] N17.9    Anemia in chronic renal disease N18.9, D63.1    Volume depletion E86.9           Plan:     type II diabetes mellitus uncontrolled  Hemoglobin A1c is 11.6% on 11-6-2019, 11.2% on 2-5-2020, 8.9% on 12-, 8.1% on 3-, 8.2% on 7-7-2021, 7.1% on 10-6-2021, 7.4% on 1-5-2022, 8.7% today. Fingerstick blood glucose is 208 mg/dL today. Up to date with diabetes related annual labs: yes 8-2021    Up to date with diabetic eye exam: yes 6/2021    plan:  Glucose control has worsened as patient has not been taking her medications and overall not taking care of herself. Discussed with patient, encouraged her to get back on track. Continue NovoLog 70/30 45 units in the morning, 25 units in the evening before dinner. Encourage patient to restart checking blood glucose 3 times a day every day. Watch for symptoms of hypoglycemia and let me know if she is having hypoglycemia in that case I will consider going down further on her insulin. Follow-up with PCP in 3 months. hypertensive disorder  Blood pressure well controlled on current medications. mixed hyperlipidemia  11-6-2019: Triglycerides elevated at 379, LDL normal at 61. Patient has not been taking atorvastatin regularly. 12-6-2020: Total cholesterol elevated at 233, triglycerides 370, . Advised patient to start taking atorvastatin 40 mg regularly. 3-: Total cholesterol 145, triglycerides 273, LDL 71. Atorvastatin 40 mg was continued. Patient has not been taking it regularly. I sent a refill.    peripheral neuropathy with type 2 diabetes  on gabapentin    chronic kidney disease stage 3-4  GFR was 40 in October 2019. Following with nephrologist every 4 months. GFR was 31 in January 2021, 19 in August 2021 when she was hospitalized, 21 in . Last appointment with nephrologist was end of May 2022. She is getting prepared for dialysis.     Orders Placed This Encounter    AMB POC GLUCOSE BLOOD, BY GLUCOSE MONITORING DEVICE    AMB POC HEMOGLOBIN A1C    glucose blood VI test strips (OneTouch Verio test strips) strip     Sig: USE  STRIP TO CHECK GLUCOSE THREE TIMES DAILY     Dispense:  100 Strip     Refill:  5    insulin NPH/insulin regular (NovoLIN 70/30 U-100 Insulin) 100 unit/mL (70-30) injection     Sig: Inject 45 units in am and 25 units in pm     Dispense:  30 mL     Refill:  5    Insulin Needles, Disposable, 31 gauge x 5/16\" ndle     Sig: Twice a day     Dispense:  100 Each     Refill:  5    atorvastatin (LIPITOR) 40 mg tablet     Sig: Take 1 Tablet by mouth nightly for 90 days.      Dispense:  90 Tablet     Refill:  2        Signed By: Cristhian Enriquez MD     Emelyn 15, 2022      Return to clinic

## 2022-06-19 DIAGNOSIS — F41.1 GENERALIZED ANXIETY DISORDER: ICD-10-CM

## 2022-06-20 RX ORDER — ALPRAZOLAM 0.5 MG/1
TABLET ORAL
Qty: 90 TABLET | Refills: 0 | Status: SHIPPED | OUTPATIENT
Start: 2022-06-20 | End: 2022-08-02

## 2022-06-23 ENCOUNTER — OFFICE VISIT (OUTPATIENT)
Dept: BEHAVIORAL/MENTAL HEALTH CLINIC | Age: 62
End: 2022-06-23
Payer: MEDICARE

## 2022-06-23 VITALS — WEIGHT: 224.4 LBS | BODY MASS INDEX: 35.15 KG/M2

## 2022-06-23 DIAGNOSIS — F41.1 GENERALIZED ANXIETY DISORDER: Primary | ICD-10-CM

## 2022-06-23 DIAGNOSIS — F31.70 BIPOLAR DISORDER IN REMISSION (HCC): ICD-10-CM

## 2022-06-23 PROCEDURE — 99213 OFFICE O/P EST LOW 20 MIN: CPT | Performed by: NURSE PRACTITIONER

## 2022-06-23 PROCEDURE — G8427 DOCREV CUR MEDS BY ELIG CLIN: HCPCS | Performed by: NURSE PRACTITIONER

## 2022-06-23 PROCEDURE — 3017F COLORECTAL CA SCREEN DOC REV: CPT | Performed by: NURSE PRACTITIONER

## 2022-06-23 PROCEDURE — G9717 DOC PT DX DEP/BP F/U NT REQ: HCPCS | Performed by: NURSE PRACTITIONER

## 2022-06-23 PROCEDURE — G8417 CALC BMI ABV UP PARAM F/U: HCPCS | Performed by: NURSE PRACTITIONER

## 2022-06-23 PROCEDURE — G8756 NO BP MEASURE DOC: HCPCS | Performed by: NURSE PRACTITIONER

## 2022-06-23 NOTE — PROGRESS NOTES
Mirlande Douglas is a 63 y.o. female who presents today for the following:  Chief Complaint   Patient presents with    Follow-up     \"I've been doing good psychologically. \"    Anxiety    Bipolar       Allergies   Allergen Reactions    Adhesive Tape-Silicones Unknown (comments)     Blisters    Septra [Sulfamethoprim] Unknown (comments)    Sulfa (Sulfonamide Antibiotics) Hives, Other (comments) and Unknown (comments)       Current Outpatient Medications   Medication Sig    ALPRAZolam (XANAX) 0.5 mg tablet TAKE 1 TABLET BY MOUTH THREE TIMES DAILY AS NEEDED FOR ANXIETY **MAX  OF  3  TABLETS  DAILY**    OneTouch Delica Plus Lancet 33 gauge misc USE   TO CHECK GLUCOSE THREE TIMES DAILY    furosemide (LASIX) 40 mg tablet TAKE 1 TABLET BY MOUTH IN THE MORNING AND 1 IN THE EVENING    metroNIDAZOLE (FLAGYL) 500 mg tablet TAKE 1 TABLET BY MOUTH THREE TIMES DAILY FOR 7 DAYS    cholecalciferol, vitamin D3, 50 mcg (2,000 unit) tab Take 2,000 Units by mouth daily.  glucose blood VI test strips (OneTouch Verio test strips) strip USE  STRIP TO CHECK GLUCOSE THREE TIMES DAILY    insulin NPH/insulin regular (NovoLIN 70/30 U-100 Insulin) 100 unit/mL (70-30) injection Inject 45 units in am and 25 units in pm    Insulin Needles, Disposable, 31 gauge x 5/16\" ndle Twice a day    atorvastatin (LIPITOR) 40 mg tablet Take 1 Tablet by mouth nightly for 90 days.  ARIPiprazole (ABILIFY) 20 mg tablet Take 1 tablet by mouth once daily    buPROPion SR (WELLBUTRIN SR) 150 mg SR tablet Take 1 tablet by mouth twice daily    citalopram (CELEXA) 20 mg tablet Take 1 tablet by mouth once daily for 90 days    hydrALAZINE (APRESOLINE) 50 mg tablet TAKE 1 TABLET BY MOUTH THREE TIMES DAILY    amLODIPine (NORVASC) 2.5 mg tablet 1 tablet    allopurinoL (ZYLOPRIM) 100 mg tablet Take 100 mg by mouth daily.     ondansetron (ZOFRAN ODT) 4 mg disintegrating tablet dissolve 1 tablet ON TONGUE every 8 hours if needed for nausea    albuterol (PROVENTIL HFA, VENTOLIN HFA, PROAIR HFA) 90 mcg/actuation inhaler Take 2 Puffs by inhalation every four (4) hours as needed for Wheezing.  ferrous sulfate 325 mg (65 mg iron) tablet Take 1 Tablet by mouth daily (with breakfast).  metoprolol succinate (TOPROL-XL) 50 mg XL tablet TAKE 1 TABLET BY MOUTH ONCE DAILY    allopurinoL (ZYLOPRIM) 300 mg tablet Take  by mouth daily.  gabapentin (NEURONTIN) 100 mg capsule Take  by mouth three (3) times daily.  magnesium oxide (MAG-OX) 400 mg tablet Take 400 mg by mouth three (3) times daily.  pantoprazole (PROTONIX) 40 mg tablet Take 40 mg by mouth daily.  sodium bicarbonate 650 mg tablet Take  by mouth two (2) times a day. No current facility-administered medications for this visit.        Past Medical History:   Diagnosis Date    Anxiety 6/22/2020    Bipolar 1 disorder ([facility] 1/23/2018    [facility]     Diabetes mellitus type 2, controlled ([facility] 6/22/2020    Diverticulosis     Hypertension 6/22/2020    Kidney failure     Liver fibrosis 6/22/2020    Major depression 6/22/2020    Migraine 6/22/2020    Sleep apnea 1/23/2018    Suicide attempt ([facility] 6/22/2020 2013    UTI (urinary tract infection)        Past Surgical History:   Procedure Laterality Date    HX APPENDECTOMY      HX CHOLECYSTECTOMY         Family History   Problem Relation Age of Onset    Heart Attack Mother     Heart Attack Father        Social History     Socioeconomic History    Marital status: SINGLE     Spouse name: Not on file    Number of children: 0    Years of education: Not on file    Highest education level: Associate degree: academic program   Occupational History    Not on file   Tobacco Use    Smoking status: Never Smoker    Smokeless tobacco: Never Used   Vaping Use    Vaping Use: Not on file   Substance and Sexual Activity    Alcohol use: Not Currently    Drug use: Never    Sexual activity: Not Currently   Other Topics Concern     Service Not Asked    Blood Transfusions Not Asked    Caffeine Concern Not Asked    Occupational Exposure Not Asked    Hobby Hazards Not Asked    Sleep Concern Not Asked    Stress Concern Not Asked    Weight Concern Not Asked    Special Diet Not Asked    Back Care Not Asked    Exercise Not Asked    Bike Helmet Not Asked   [address] Not Asked    Self-Exams Not Asked   Social History Narrative    Not on file     Social Determinants of Health     Financial Resource Strain:     Difficulty of Paying Living Expenses: Not on file   Food Insecurity:     Worried About Running Out of Food in the Last Year: Not on file    Jelena of Food in the Last Year: Not on file   Transportation Needs:     Lack of Transportation (Medical): Not on file    Lack of Transportation (Non-Medical): Not on file   Physical Activity:     Days of Exercise per Week: Not on file    Minutes of Exercise per Session: Not on file   Stress:     Feeling of Stress : Not on file   Social Connections:     Frequency of Communication with Friends and Family: Not on file    Frequency of Social Gatherings with Friends and Family: Not on file    Attends Taoism Services: Not on file    Active Member of [address] [place] [address] or Organizations: Not on file    Attends Club or Organization Meetings: Not on file    Marital Status: Not on file   Intimate Partner Violence:     Fear of Current or Ex-Partner: Not on file    Emotionally Abused: Not on file    Physically Abused: Not on file    Sexually Abused: Not on file   Housing Stability:     Unable to Pay for Housing in the Last Year: Not on file    Number of [place] in the Last Year: Not on file    Unstable Housing in the Last Year: Not on file         Ms. Ashley Fontenot follows up in the clinic for bipolar depression and anxiety disorder.   Current psychotropic medications-Abilify 20 mg take 1 tablet daily, bupropion  mg take 1 tablet twice daily, citalopram 20 mg take 1 tablet daily and Xanax 0.5 mg tablet take 1 tablet three times daily as needed for anxiety. Patient last visited the clinic February 18, 2022. It is noted patient was restarted on citalopram last year for severely depressed mood. Patient presents to the clinic unaccompanied, clean fully alert and oriented. Gait is stable. Mood is euthymic on today's visit. It is noted patient became tearful when discussing the loss of her cat Fluffy. Otherwise, mood is stable. She reports numerous medical conditions such as 3 UTIs, 3 abscesses, throat infection and she believes that she may have pyelonephritis. Sleep and appetite-stable. No psychotic symptoms noted. No suicidal/homicidal thinking, risk is low to moderate based on history but there is no acute concern at this time, protective factor-family. Patient's sister lives with her during the week and is gone on the weekends. No smoking, alcohol or drug use reported. Review of Systems   All other systems reviewed and are negative. Visit Vitals  Wt 101.8 kg (224 lb 6.4 oz)   BMI 35.15 kg/m²     Physical Exam  Psychiatric:         Attention and Perception: Attention and perception normal.         Mood and Affect: Mood normal. Affect is tearful (when discussing the loss of her cat). Speech: Speech normal.         Behavior: Behavior normal. Behavior is cooperative. Thought Content: Thought content normal.         Cognition and Memory: Cognition and memory normal.            Plan:    Patient may continue medications as prescribed. It is noted patient is on combination antidepressant therapy which is necessary to maintain stability. She may continue Xanax on an as-needed basis. No issues with misuse. Follow-up with medical provider as appropriate. For emergencies-call 911 or go to the emergency department. Obtain compliance drug analysis on next visit.

## 2022-06-24 DIAGNOSIS — Z79.4 TYPE 2 DIABETES MELLITUS WITH HYPERGLYCEMIA, WITH LONG-TERM CURRENT USE OF INSULIN (HCC): Primary | ICD-10-CM

## 2022-06-24 DIAGNOSIS — E11.65 TYPE 2 DIABETES MELLITUS WITH HYPERGLYCEMIA, WITH LONG-TERM CURRENT USE OF INSULIN (HCC): Primary | ICD-10-CM

## 2022-06-24 RX ORDER — INSULIN HUMAN 100 [IU]/ML
INJECTION, SUSPENSION SUBCUTANEOUS
Qty: 30 ML | Refills: 4 | Status: SHIPPED | OUTPATIENT
Start: 2022-06-24

## 2022-07-05 ENCOUNTER — HOSPITAL ENCOUNTER (OUTPATIENT)
Dept: CT IMAGING | Age: 62
Discharge: HOME | End: 2022-07-05
Payer: MEDICARE

## 2022-07-05 ENCOUNTER — TRANSCRIBE ORDER (OUTPATIENT)
Dept: SCHEDULING | Age: 62
End: 2022-07-05

## 2022-07-05 DIAGNOSIS — R10.30 ABDOMINAL PAIN, LOWER: ICD-10-CM

## 2022-07-05 DIAGNOSIS — R10.30 ABDOMINAL PAIN, LOWER: Primary | ICD-10-CM

## 2022-07-05 PROCEDURE — 74176 CT ABD & PELVIS W/O CONTRAST: CPT

## 2022-07-07 ENCOUNTER — TRANSCRIBE ORDER (OUTPATIENT)
Dept: SCHEDULING | Age: 62
End: 2022-07-07

## 2022-07-07 DIAGNOSIS — R10.30 LOWER ABDOMINAL PAIN: Primary | ICD-10-CM

## 2022-08-02 ENCOUNTER — TELEPHONE (OUTPATIENT)
Dept: BEHAVIORAL/MENTAL HEALTH CLINIC | Age: 62
End: 2022-08-02

## 2022-08-02 NOTE — TELEPHONE ENCOUNTER
Pt cld & wants to reschedule. Please provide a time and date that you want us to work her in.  Thanks

## 2022-08-10 DIAGNOSIS — F33.9 RECURRENT MAJOR DEPRESSIVE DISORDER, REMISSION STATUS UNSPECIFIED (HCC): ICD-10-CM

## 2022-08-11 RX ORDER — CITALOPRAM 20 MG/1
TABLET, FILM COATED ORAL
Qty: 90 TABLET | Refills: 0 | Status: SHIPPED | OUTPATIENT
Start: 2022-08-11

## 2022-09-01 DIAGNOSIS — F41.1 GENERALIZED ANXIETY DISORDER: ICD-10-CM

## 2022-09-01 DIAGNOSIS — F31.9 BIPOLAR DEPRESSION (HCC): ICD-10-CM

## 2022-09-01 RX ORDER — ARIPIPRAZOLE 20 MG/1
TABLET ORAL
Qty: 90 TABLET | Refills: 3 | Status: SHIPPED | OUTPATIENT
Start: 2022-09-01

## 2022-09-01 RX ORDER — ALPRAZOLAM 0.5 MG/1
TABLET ORAL
Qty: 90 TABLET | Refills: 5 | Status: SHIPPED | OUTPATIENT
Start: 2022-09-01

## 2022-10-31 ENCOUNTER — TRANSCRIBE ORDER (OUTPATIENT)
Dept: SCHEDULING | Age: 62
End: 2022-10-31

## 2022-10-31 DIAGNOSIS — Z12.31 ENCOUNTER FOR SCREENING MAMMOGRAM FOR BREAST CANCER: Primary | ICD-10-CM

## 2023-03-16 NOTE — DISCHARGE INSTRUCTIONS
Medication as directed. Persistent prediabetes.  Fasting glucose 103.  A1c 6.2%.  Lipids within goal.  Normal urine microalbumin.  I recommended that he eat healthier, lose a little weight and go for 30-minute daily walks.

## 2023-04-22 DIAGNOSIS — Z12.31 ENCOUNTER FOR SCREENING MAMMOGRAM FOR BREAST CANCER: Primary | ICD-10-CM

## 2023-05-14 ENCOUNTER — APPOINTMENT (OUTPATIENT)
Facility: HOSPITAL | Age: 63
End: 2023-05-14
Payer: MEDICARE

## 2023-05-14 ENCOUNTER — HOSPITAL ENCOUNTER (EMERGENCY)
Facility: HOSPITAL | Age: 63
Discharge: HOME | End: 2023-05-14
Attending: EMERGENCY MEDICINE
Payer: MEDICARE

## 2023-05-14 VITALS
SYSTOLIC BLOOD PRESSURE: 137 MMHG | RESPIRATION RATE: 16 BRPM | TEMPERATURE: 98.3 F | HEART RATE: 96 BPM | HEIGHT: 62 IN | DIASTOLIC BLOOD PRESSURE: 87 MMHG | BODY MASS INDEX: 38.64 KG/M2 | WEIGHT: 210 LBS | OXYGEN SATURATION: 98 %

## 2023-05-14 DIAGNOSIS — S69.92XA INJURY OF LEFT WRIST, INITIAL ENCOUNTER: Primary | ICD-10-CM

## 2023-05-14 PROCEDURE — 99283 EMERGENCY DEPT VISIT LOW MDM: CPT

## 2023-05-14 PROCEDURE — 73100 X-RAY EXAM OF WRIST: CPT

## 2023-05-14 PROCEDURE — 6370000000 HC RX 637 (ALT 250 FOR IP): Performed by: EMERGENCY MEDICINE

## 2023-05-14 RX ORDER — HYDROCODONE BITARTRATE AND ACETAMINOPHEN 5; 325 MG/1; MG/1
1 TABLET ORAL EVERY 6 HOURS PRN
Qty: 11 TABLET | Refills: 0 | Status: SHIPPED | OUTPATIENT
Start: 2023-05-14 | End: 2023-05-19

## 2023-05-14 RX ORDER — HYDROCODONE BITARTRATE AND ACETAMINOPHEN 5; 325 MG/1; MG/1
1 TABLET ORAL EVERY 6 HOURS PRN
Qty: 11 TABLET | Refills: 0 | Status: SHIPPED | OUTPATIENT
Start: 2023-05-14 | End: 2023-05-14 | Stop reason: SDUPTHER

## 2023-05-14 RX ORDER — HYDROCODONE BITARTRATE AND ACETAMINOPHEN 5; 325 MG/1; MG/1
1 TABLET ORAL
Status: COMPLETED | OUTPATIENT
Start: 2023-05-14 | End: 2023-05-14

## 2023-05-14 RX ADMIN — HYDROCODONE BITARTRATE AND ACETAMINOPHEN 1 TABLET: 5; 325 TABLET ORAL at 11:02

## 2023-05-14 ASSESSMENT — PAIN - FUNCTIONAL ASSESSMENT: PAIN_FUNCTIONAL_ASSESSMENT: 0-10

## 2023-05-14 ASSESSMENT — PAIN SCALES - GENERAL
PAINLEVEL_OUTOF10: 6
PAINLEVEL_OUTOF10: 6

## 2023-05-14 ASSESSMENT — PAIN DESCRIPTION - ORIENTATION: ORIENTATION: LEFT

## 2023-05-22 ENCOUNTER — APPOINTMENT (OUTPATIENT)
Facility: HOSPITAL | Age: 63
End: 2023-05-22
Payer: MEDICARE

## 2023-05-22 ENCOUNTER — HOSPITAL ENCOUNTER (EMERGENCY)
Facility: HOSPITAL | Age: 63
Discharge: HOME | End: 2023-05-22
Attending: EMERGENCY MEDICINE
Payer: MEDICARE

## 2023-05-22 VITALS
SYSTOLIC BLOOD PRESSURE: 169 MMHG | TEMPERATURE: 98.5 F | DIASTOLIC BLOOD PRESSURE: 99 MMHG | OXYGEN SATURATION: 97 % | RESPIRATION RATE: 18 BRPM | HEART RATE: 78 BPM

## 2023-05-22 DIAGNOSIS — R60.0 HAND EDEMA: ICD-10-CM

## 2023-05-22 DIAGNOSIS — M25.532 LEFT WRIST PAIN: Primary | ICD-10-CM

## 2023-05-22 DIAGNOSIS — M10.9 ACUTE GOUT OF LEFT HAND, UNSPECIFIED CAUSE: ICD-10-CM

## 2023-05-22 LAB
ABO + RH BLD: NORMAL
ALBUMIN SERPL-MCNC: 3.5 G/DL (ref 3.5–5)
ALBUMIN/GLOB SERPL: 0.7 (ref 1.1–2.2)
ALP SERPL-CCNC: 185 U/L (ref 45–117)
ALT SERPL-CCNC: 55 U/L (ref 12–78)
ANION GAP SERPL CALC-SCNC: 11 MMOL/L (ref 5–15)
AST SERPL W P-5'-P-CCNC: 59 U/L (ref 15–37)
BASOPHILS # BLD: 0 K/UL (ref 0–0.1)
BASOPHILS NFR BLD: 1 % (ref 0–1)
BILIRUB SERPL-MCNC: 0.3 MG/DL (ref 0.2–1)
BLOOD GROUP ANTIBODIES SERPL: NEGATIVE
BUN SERPL-MCNC: 51 MG/DL (ref 6–20)
BUN/CREAT SERPL: 16 (ref 12–20)
CA-I BLD-MCNC: 9.6 MG/DL (ref 8.5–10.1)
CHLORIDE SERPL-SCNC: 102 MMOL/L (ref 97–108)
CO2 SERPL-SCNC: 23 MMOL/L (ref 21–32)
CREAT SERPL-MCNC: 3.13 MG/DL (ref 0.55–1.02)
DIFFERENTIAL METHOD BLD: ABNORMAL
EOSINOPHIL # BLD: 0.1 K/UL (ref 0–0.4)
EOSINOPHIL NFR BLD: 2 % (ref 0–7)
ERYTHROCYTE [DISTWIDTH] IN BLOOD BY AUTOMATED COUNT: 14.4 % (ref 11.5–14.5)
GLOBULIN SER CALC-MCNC: 4.9 G/DL (ref 2–4)
GLUCOSE SERPL-MCNC: 190 MG/DL (ref 65–100)
HCT VFR BLD AUTO: 32.7 % (ref 35–47)
HGB BLD-MCNC: 10.3 G/DL (ref 11.5–16)
IMM GRANULOCYTES # BLD AUTO: 0.1 K/UL (ref 0–0.04)
IMM GRANULOCYTES NFR BLD AUTO: 1 % (ref 0–0.5)
LYMPHOCYTES # BLD: 1.9 K/UL (ref 0.8–3.5)
LYMPHOCYTES NFR BLD: 24 % (ref 12–49)
MCH RBC QN AUTO: 26.8 PG (ref 26–34)
MCHC RBC AUTO-ENTMCNC: 31.5 G/DL (ref 30–36.5)
MCV RBC AUTO: 85.2 FL (ref 80–99)
MONOCYTES # BLD: 0.7 K/UL (ref 0–1)
MONOCYTES NFR BLD: 8 % (ref 5–13)
NEUTS SEG # BLD: 5.1 K/UL (ref 1.8–8)
NEUTS SEG NFR BLD: 64 % (ref 32–75)
NRBC # BLD: 0 K/UL (ref 0–0.01)
NRBC BLD-RTO: 0 PER 100 WBC
PLATELET # BLD AUTO: 204 K/UL (ref 150–400)
PMV BLD AUTO: 10 FL (ref 8.9–12.9)
POTASSIUM SERPL-SCNC: 5.5 MMOL/L (ref 3.5–5.1)
PROT SERPL-MCNC: 8.4 G/DL (ref 6.4–8.2)
RBC # BLD AUTO: 3.84 M/UL (ref 3.8–5.2)
SODIUM SERPL-SCNC: 136 MMOL/L (ref 136–145)
SPECIMEN EXP DATE BLD: NORMAL
WBC # BLD AUTO: 7.9 K/UL (ref 3.6–11)

## 2023-05-22 PROCEDURE — 73070 X-RAY EXAM OF ELBOW: CPT

## 2023-05-22 PROCEDURE — 73100 X-RAY EXAM OF WRIST: CPT

## 2023-05-22 PROCEDURE — 99284 EMERGENCY DEPT VISIT MOD MDM: CPT

## 2023-05-22 PROCEDURE — 86900 BLOOD TYPING SEROLOGIC ABO: CPT

## 2023-05-22 PROCEDURE — 86901 BLOOD TYPING SEROLOGIC RH(D): CPT

## 2023-05-22 PROCEDURE — 80053 COMPREHEN METABOLIC PANEL: CPT

## 2023-05-22 PROCEDURE — 6370000000 HC RX 637 (ALT 250 FOR IP): Performed by: EMERGENCY MEDICINE

## 2023-05-22 PROCEDURE — 85025 COMPLETE CBC W/AUTO DIFF WBC: CPT

## 2023-05-22 PROCEDURE — 86850 RBC ANTIBODY SCREEN: CPT

## 2023-05-22 PROCEDURE — 36415 COLL VENOUS BLD VENIPUNCTURE: CPT

## 2023-05-22 RX ORDER — OXYCODONE HYDROCHLORIDE AND ACETAMINOPHEN 5; 325 MG/1; MG/1
1 TABLET ORAL EVERY 6 HOURS PRN
Qty: 7 TABLET | Refills: 0 | Status: SHIPPED | OUTPATIENT
Start: 2023-05-22 | End: 2023-05-25

## 2023-05-22 RX ORDER — OXYCODONE HYDROCHLORIDE AND ACETAMINOPHEN 5; 325 MG/1; MG/1
1 TABLET ORAL
Status: COMPLETED | OUTPATIENT
Start: 2023-05-22 | End: 2023-05-22

## 2023-05-22 RX ORDER — PREDNISONE 20 MG/1
60 TABLET ORAL ONCE
Status: COMPLETED | OUTPATIENT
Start: 2023-05-22 | End: 2023-05-22

## 2023-05-22 RX ORDER — PREDNISONE 20 MG/1
40 TABLET ORAL DAILY
Qty: 8 TABLET | Refills: 0 | Status: SHIPPED | OUTPATIENT
Start: 2023-05-23 | End: 2023-05-27

## 2023-05-22 RX ADMIN — OXYCODONE AND ACETAMINOPHEN 1 TABLET: 5; 325 TABLET ORAL at 11:28

## 2023-05-22 RX ADMIN — OXYCODONE AND ACETAMINOPHEN 1 TABLET: 5; 325 TABLET ORAL at 09:32

## 2023-05-22 RX ADMIN — PREDNISONE 60 MG: 20 TABLET ORAL at 11:28

## 2023-05-22 ASSESSMENT — PAIN - FUNCTIONAL ASSESSMENT: PAIN_FUNCTIONAL_ASSESSMENT: WONG-BAKER FACES

## 2023-05-22 ASSESSMENT — PAIN SCALES - WONG BAKER: WONGBAKER_NUMERICALRESPONSE: 6

## 2023-05-22 NOTE — ED PROVIDER NOTES
EMERGENCY DEPARTMENT HISTORY AND PHYSICAL EXAM      Date: 5/22/2023  Patient Name: Trudi Page      History of Presenting Illness     Chief Complaint   Patient presents with    Wrist Pain       History Provided By: patient    HPI: Trudi Page, 63 y.o. female with a past medical history significant for Anxiety, CHF, Bipolar, Gout presents to the ED with cc of hand pain and swelling. Pt seen by myself last week for same, onset after falling onto outstretched wrist but slightly delayed by 1-2d, negative Xrs. Given prednisone by BRAYDEN Johnson, today is last day, no improvement. States pain now radiating to L elbow. No F/C/N/V/D. There are no other complaints, changes, or physical findings at this time. PCP: Liberty Johnson PA-C    No current facility-administered medications for this encounter. Current Outpatient Medications   Medication Sig Dispense Refill    [START ON 5/23/2023] predniSONE (DELTASONE) 20 MG tablet Take 2 tablets by mouth daily for 4 days 8 tablet 0    oxyCODONE-acetaminophen (PERCOCET) 5-325 MG per tablet Take 1 tablet by mouth every 6 hours as needed for Pain for up to 3 days. Intended supply: 3 days.  Take lowest dose possible to manage pain Max Daily Amount: 4 tablets 7 tablet 0    albuterol sulfate HFA (PROVENTIL;VENTOLIN;PROAIR) 108 (90 Base) MCG/ACT inhaler Inhale 2 puffs into the lungs every 4 hours as needed      allopurinol (ZYLOPRIM) 100 MG tablet Take 100 mg by mouth daily      allopurinol (ZYLOPRIM) 300 MG tablet Take by mouth daily      ALPRAZolam (XANAX) 0.5 MG tablet TAKE 1 TABLET BY MOUTH THREE TIMES DAILY AS NEEDED FOR ANXIETY **MAX  OF  3  TABLETS  DAILY**      amLODIPine (NORVASC) 2.5 MG tablet 1 tablet      ARIPiprazole (ABILIFY) 20 MG tablet Take 1 tablet by mouth once daily      buPROPion (WELLBUTRIN SR) 150 MG extended release tablet Take 1 tablet by mouth twice daily      Cholecalciferol 50 MCG (2000 UT) TABS Take 2,000 Units by mouth daily

## 2023-05-22 NOTE — ED TRIAGE NOTES
Pt seen for same.  Pt reports fall and had xray that showed no fracture of left wrist. PT reports pain is now radiating to left elbow and shoulder from wrist, swelling noted to left wrist

## 2023-05-22 NOTE — DISCHARGE INSTRUCTIONS
Your x-rays were negative for a fracture. Your white count was not elevated, so this is not likely an infection. This is probably gout but your prednisone dose was very low, so we are increasing it to try to better control your symptoms. You can take the narcotic we gave you as needed for a few days as well. Follow up with your PCP to make sure this problem is getting better. Return to the ER for any fevers, worsening swelling, vomiting, or any other new or concerning symptoms. Thank you! Thank you for allowing me to care for you in the emergency department. It is my goal to provide you with excellent care. If you have not received excellent quality care, please ask to speak to the nurse manager. Please fill out the survey that will come to you by mail or email since we listen to your feedback! Below you will find a list of your tests from today's visit. Should you have any questions, please do not hesitate to call the emergency department.     Labs  Recent Results (from the past 12 hour(s))   CBC with Auto Differential    Collection Time: 05/22/23 10:55 AM   Result Value Ref Range    WBC 7.9 3.6 - 11.0 K/uL    RBC 3.84 3.80 - 5.20 M/uL    Hemoglobin 10.3 (L) 11.5 - 16.0 g/dL    Hematocrit 32.7 (L) 35.0 - 47.0 %    MCV 85.2 80.0 - 99.0 FL    MCH 26.8 26.0 - 34.0 PG    MCHC 31.5 30.0 - 36.5 g/dL    RDW 14.4 11.5 - 14.5 %    Platelets 824 976 - 879 K/uL    MPV 10.0 8.9 - 12.9 FL    Nucleated RBCs 0.0 0.0  WBC    nRBC 0.00 0.00 - 0.01 K/uL    Neutrophils % 64 32 - 75 %    Lymphocytes % 24 12 - 49 %    Monocytes % 8 5 - 13 %    Eosinophils % 2 0 - 7 %    Basophils % 1 0 - 1 %    Immature Granulocytes 1 (H) 0 - 0.5 %    Neutrophils Absolute 5.1 1.8 - 8.0 K/UL    Lymphocytes Absolute 1.9 0.8 - 3.5 K/UL    Monocytes Absolute 0.7 0.0 - 1.0 K/UL    Eosinophils Absolute 0.1 0.0 - 0.4 K/UL    Basophils Absolute 0.0 0.0 - 0.1 K/UL    Absolute Immature Granulocyte 0.1 (H) 0.00 - 0.04 K/UL    Differential

## 2023-07-29 NOTE — PROGRESS NOTES
Patients case reviewed during interdisciplinary team meeting in [address] [place], [place] [address]Acute Care Unit. Rev.  Yulia Scott, Vivien 70, [employer] Speech Language Pathology Evaluation  Bedside Swallow    Patient Name:  Radha Sandoval   MRN:  [id number]  Admitting Diagnosis: Multiple closed fractures of pelvis without disruption of pelvic ring    Recommendations:                 General Recommendations:  Dysphagia therapy  Diet recommendations:  Puree, Thin   Aspiration Precautions: 1 bite/sip at a time, Assistance with meals, Eliminate distractions, Feed only when awake/alert, Frequent oral care, HOB to 90 degrees, Meds crushed in puree, Monitor for s/s of aspiration, Remain upright 30 minutes post meal, Small bites/sips, and Standard aspiration precautions   General Precautions: Standard, aspiration, fall, pureed diet  Communication strategies:  provide increased time to answer and go to room if call light pushed    Assessment:     Radha Sandoval is a 89 y.o. female with an SLP diagnosis of Dysphagia.      History:     Past Medical History:   Diagnosis Date    Acute deep vein thrombosis (DVT) of femoral vein of right lower extremity 5/23/2023    Acute pulmonary embolism 5/22/2023    Acute pulmonary embolism without acute cor pulmonale 5/22/2023    Chronic bilateral pleural effusions 5/22/2023    Debility 4/25/2023    Hygroma 7/8/2023    Late onset Alzheimer's dementia with mood disturbance 5/22/2023    Lumbar spondylosis with myelopathy 4/25/2023    Multiple closed right sided fractures of pelvis without disruption of pelvic ring 7/9/2023    Primary hypertension 6/10/2022    Subdural hygroma 7/8/2023       Past Surgical History:   Procedure Laterality Date    REPAIR, HERNIA, INGUINAL, WITHOUT HISTORY OF PRIOR REPAIR, AGE 7 YEARS OR OLDER Right 5/6/2023    Procedure: REPAIR, HERNIA, INGUINAL, WITHOUT HISTORY OF PRIOR REPAIR, AGE 5 YEARS OR OLDER;  Surgeon: Maurice Piper MD;  Location: [facility] OR [address] [place] [address];  Service: General;  Laterality: Right;  possible laparotomy, possible bowel resection       Social History: Patient lives at [facility].     Chest X-Rays: 7/28-  Accentuation interstitial markings though similar.  Elevation of the right hemidiaphragm.    Prior diet: puree/thin with crushed meds at baseline per Caregiver; but minced and moist with thins recommended by ST on 7/14/23.        Subjective     Spoke with RN prior to entering pt's room . Pt seen bedside for session with private sitter present. Pt alert but very upset and anxious. Responded well to reassurance and was agreeable to ST.       Pain/Comfort:  Pain Rating 1: 0/10  Pain Rating Post-Intervention 1: 0/10    Respiratory Status: Nasal cannula    Objective:     Oral Musculature Evaluation  Oral Musculature: unable to assess due to poor participation/comprehension  Dentition: scattered dentition  Voice Prior to PO Intake: clear    Bedside Swallow Eval:   Consistencies Assessed:  Thin liquids via tsp, cup, straw  Puree applesauce  Soft solids crackers softened in puree      Oral Phase:   Excess chewing  Prolonged mastication  Lingual residue  Slow oral transit time with soft solids  Unremarkable for puree and thins    Pharyngeal Phase:   no overt clinical signs/symptoms of aspiration  no overt clinical signs/symptoms of pharyngeal dysphagia    Compensatory Strategies  None    Treatment: Provided education to pt and caregiver re: role of ST, POC, swallow precautions, recommendations for puree diet with thin liquids, and plan to f/u to ensure tolerance. They verbalized understanding. White board updated. RN and MD notified of results and recs.      Goals:   Multidisciplinary Problems       SLP Goals       Not on file                    Plan:     Patient to be seen:  3 x/week   Plan of Care expires:  08/27/23  Plan of Care reviewed with:  patient, caregiver   SLP Follow-Up:  Yes       Discharge recommendations:  other (see comments) (tbd)   Barriers to Discharge:  Level of Skilled Assistance Needed      Time Tracking:     SLP Treatment Date:   07/29/23  Speech Start Time:  1014  Speech Stop Time:  1025     Speech  Total Time (min):  11 min    Billable Minutes: Eval Swallow and Oral Function 11    07/29/2023

## 2023-11-15 ENCOUNTER — HOSPITAL ENCOUNTER (OUTPATIENT)
Facility: HOSPITAL | Age: 63
Discharge: HOME | End: 2023-11-18
Payer: MEDICARE

## 2023-11-15 DIAGNOSIS — Z12.31 SCREENING MAMMOGRAM FOR HIGH-RISK PATIENT: ICD-10-CM

## 2023-11-15 PROCEDURE — 77063 BREAST TOMOSYNTHESIS BI: CPT

## 2023-11-28 ENCOUNTER — TRANSCRIBE ORDERS (OUTPATIENT)
Facility: HOSPITAL | Age: 63
End: 2023-11-28

## 2023-11-28 DIAGNOSIS — R92.8 ABNORMAL MAMMOGRAM: Primary | ICD-10-CM

## 2023-12-12 ENCOUNTER — HOSPITAL ENCOUNTER (OUTPATIENT)
Facility: HOSPITAL | Age: 63
Discharge: HOME | End: 2023-12-15
Payer: MEDICARE

## 2023-12-12 VITALS — HEIGHT: 60 IN | WEIGHT: 210 LBS | BODY MASS INDEX: 41.23 KG/M2

## 2023-12-12 DIAGNOSIS — R92.8 ABNORMAL MAMMOGRAM: ICD-10-CM

## 2023-12-12 PROCEDURE — G0279 TOMOSYNTHESIS, MAMMO: HCPCS

## 2023-12-12 PROCEDURE — 76642 ULTRASOUND BREAST LIMITED: CPT

## 2024-08-22 ENCOUNTER — HOSPITAL ENCOUNTER (EMERGENCY)
Facility: HOSPITAL | Age: 64
Discharge: HOME | End: 2024-08-22
Attending: EMERGENCY MEDICINE
Payer: MEDICARE

## 2024-08-22 VITALS
HEART RATE: 97 BPM | BODY MASS INDEX: 41.82 KG/M2 | DIASTOLIC BLOOD PRESSURE: 96 MMHG | TEMPERATURE: 97.9 F | WEIGHT: 213 LBS | OXYGEN SATURATION: 99 % | SYSTOLIC BLOOD PRESSURE: 148 MMHG | RESPIRATION RATE: 19 BRPM | HEIGHT: 60 IN

## 2024-08-22 DIAGNOSIS — M10.021 ACUTE IDIOPATHIC GOUT OF RIGHT ELBOW: Primary | ICD-10-CM

## 2024-08-22 PROCEDURE — 6370000000 HC RX 637 (ALT 250 FOR IP): Performed by: EMERGENCY MEDICINE

## 2024-08-22 PROCEDURE — 6360000002 HC RX W HCPCS: Performed by: EMERGENCY MEDICINE

## 2024-08-22 PROCEDURE — 99284 EMERGENCY DEPT VISIT MOD MDM: CPT

## 2024-08-22 PROCEDURE — 96372 THER/PROPH/DIAG INJ SC/IM: CPT

## 2024-08-22 RX ORDER — HYDROCODONE BITARTRATE AND ACETAMINOPHEN 5; 325 MG/1; MG/1
1 TABLET ORAL
Status: COMPLETED | OUTPATIENT
Start: 2024-08-22 | End: 2024-08-22

## 2024-08-22 RX ORDER — DEXAMETHASONE SODIUM PHOSPHATE 10 MG/ML
10 INJECTION, SOLUTION INTRAMUSCULAR; INTRAVENOUS ONCE
Status: COMPLETED | OUTPATIENT
Start: 2024-08-22 | End: 2024-08-22

## 2024-08-22 RX ORDER — PREDNISONE 20 MG/1
20 TABLET ORAL 2 TIMES DAILY
Qty: 10 TABLET | Refills: 0 | Status: SHIPPED | OUTPATIENT
Start: 2024-08-22 | End: 2024-08-27

## 2024-08-22 RX ORDER — HYDROCODONE BITARTRATE AND ACETAMINOPHEN 5; 325 MG/1; MG/1
1 TABLET ORAL EVERY 6 HOURS PRN
Qty: 12 TABLET | Refills: 0 | Status: SHIPPED | OUTPATIENT
Start: 2024-08-22 | End: 2024-08-25

## 2024-08-22 RX ADMIN — DEXAMETHASONE SODIUM PHOSPHATE 10 MG: 10 INJECTION, SOLUTION INTRAMUSCULAR; INTRAVENOUS at 07:29

## 2024-08-22 RX ADMIN — HYDROCODONE BITARTRATE AND ACETAMINOPHEN 1 TABLET: 5; 325 TABLET ORAL at 07:29

## 2024-08-22 ASSESSMENT — PAIN DESCRIPTION - ORIENTATION: ORIENTATION: RIGHT

## 2024-08-22 ASSESSMENT — PAIN SCALES - GENERAL: PAINLEVEL_OUTOF10: 10

## 2024-08-22 ASSESSMENT — PAIN - FUNCTIONAL ASSESSMENT: PAIN_FUNCTIONAL_ASSESSMENT: 0-10

## 2024-08-22 ASSESSMENT — PAIN DESCRIPTION - LOCATION: LOCATION: ARM

## 2024-08-22 NOTE — ED TRIAGE NOTES
Pt presents with right arm pain and swelling to right upper arm that extends to hand. Pt reports she may have hit arm on side of closet. Pain is currently 8/10. Pt states she had tylenol #3 at home did not relieve pain. Pt guarding arm at this time.

## 2024-08-22 NOTE — ED NOTES
Pt given discharge and follow up instructions. Education on pain management and prescriptions given.

## 2024-08-25 NOTE — ED NOTES
[facility] EMERGENCY DEPT  EMERGENCY DEPARTMENT ENCOUNTER      Pt Name: Johanna Dunaway  MRN: [id number]  Birthdate 1960  Date of evaluation: 8/22/2024  Provider: Lb Fletcher MD  8:38 AM    CHIEF COMPLAINT       Chief Complaint   Patient presents with    Arm Pain         HISTORY OF PRESENT ILLNESS    Johanna Dunaway is a 64 y.o. female with hx of diabetes, CRF,HTN, gout, who presents to the emergency department with complaint of right elbow pain for 2 days. Pain is rated 8/10.  Pain is worsened by movement and palpation.  She denies trauma.    HPI    Nursing Notes were reviewed.    REVIEW OF SYSTEMS       Review of Systems   Constitutional: Negative.    HENT: Negative.     Musculoskeletal:         Right elbow pain   Skin: Negative.    Hematological: Negative.        Except as noted above the remainder of the review of systems was reviewed and negative.       PAST MEDICAL HISTORY     Past Medical History:   Diagnosis Date    Anxiety 6/22/2020    Bipolar 1 disorder (HCC) 1/23/2018    Congestive heart failure (CHF) ([facility])     Diabetes mellitus type 2, controlled ([facility]) 6/22/2020    Diverticulosis     Hypertension 6/22/2020    Kidney failure     Liver fibrosis 6/22/2020    Major depression 6/22/2020    Migraine 6/22/2020    Sleep apnea 1/23/2018    Suicide attempt ([facility]) 6/22/2020    2013    UTI (urinary tract infection)          SURGICAL HISTORY       Past Surgical History:   Procedure Laterality Date    APPENDECTOMY      CHOLECYSTECTOMY           CURRENT MEDICATIONS       Discharge Medication List as of 8/22/2024  7:32 AM        CONTINUE these medications which have NOT CHANGED    Details   albuterol sulfate HFA (PROVENTIL;VENTOLIN;PROAIR) 108 (90 Base) MCG/ACT inhaler Inhale 2 puffs into the lungs every 4 hours as neededHistorical Med      !! allopurinol (ZYLOPRIM) 100 MG tablet Take 100 mg by mouth dailyHistorical Med      !! allopurinol (ZYLOPRIM) 300 MG tablet Take by mouth dailyHistorical Med      ALPRAZolam (XANAX)  insufficiency, treatment is limited to steroids.      Risk  Prescription drug management.            REASSESSMENT          CRITICAL CARE TIME   Total Critical Care time was  minutes, excluding separately reportable procedures.  There was a high probability of clinically significant/life threatening deterioration in the patient's condition which required my urgent intervention.      CONSULTS:  None    PROCEDURES:  Unless otherwise noted below, none     Procedures        FINAL IMPRESSION      1. Acute idiopathic gout of right elbow          DISPOSITION/PLAN   DISPOSITION Decision To Discharge 08/22/2024 07:23:34 AM  Condition at Disposition: Stable      PATIENT REFERRED TO:  Kely Alvarado PA-C  [address] [place] [address] [phone]      As needed      DISCHARGE MEDICATIONS:  Discharge Medication List as of 8/22/2024  7:32 AM        START taking these medications    Details   predniSONE (DELTASONE) 20 MG tablet Take 1 tablet by mouth 2 times daily for 5 days, Disp-10 tablet, R-0Normal      HYDROcodone-acetaminophen (NORCO) 5-325 MG per tablet Take 1 tablet by mouth every 6 hours as needed for Pain for up to 3 days. Intended supply: 3 days. Take lowest dose possible to manage pain Max Daily Amount: 4 tablets, Disp-12 tablet, R-0Normal           Controlled Substances Monitoring:          No data to display                (Please note that portions of this note were completed with a voice recognition program.  Efforts were made to edit the dictations but occasionally words are mis-transcribed.)    Lb Fletcher MD (electronically signed)  Attending Emergency Physician           Lb Fletcher MD  08/25/24 0588

## 2024-09-30 NOTE — PERIOP NOTE
SPOKE WITH PATIENT AND SHE STATED THAT SHE LIVES 2 HOURS AWAY AND WANTED TO GET HER LABS AND EKG DONE AT HER PCP OFFICE . I TOLD HER TO CALL THE SURGEON'S OFFICE TO GET THEM TO SEND ORDERS TO HER PCP AND CALL ME BACK LATER TO LET ME KNOW WHAT THEY SAY

## 2024-10-02 NOTE — PERIOP NOTE
PATIENT CALLED TO SAY THAT DIANA AT DR. HALL'S OFFICE APPROVED HER PREOPS TO BE DONE AT PCP OFFICE WHERE SHE LIVES (DR. MARIAN DAVID) EITHER TOMORROW OR FRIDAY.  I SPOKE TO DIANA AND SHE CONFIRMED THAT SHE HAS ALREADY FAXED ORDERS TO PCP OFFICE.  CALLED PATIENT BACK AND CONFIRMED OK FOR PCP TO GET PREOPS.

## 2024-10-09 NOTE — PERIOP NOTE
[address] [address]   MAIN OR                                  [phone]    MAIN PRE OP             [phone]                                                                                AMBULATORY PRE OP          [phone]  PRE-ADMISSION TESTING    [phone]     Surgery Date:  10/11/24       Is surgery arrival time given by surgeon? NO    If “NO”, [facility] staff will call you between 4 and 7pm the day before your surgery with your arrival time. (If your surgery is on a Monday, we will call you the Friday before.)    Call [phone] after 7pm Monday-Friday if you did not receive this call.    INSTRUCTIONS BEFORE YOUR SURGERY   When You  Arrive Arrive at [facility] Patient Access on 1st floor the day of your surgery.  Have your insurance card, photo ID, living will/advanced directive/POA (if applicable),  and any copayment (if needed)   Food   and   Drink NO food or drink after midnight the night before surgery    This means NO water, gum, mints, coffee, juice, etc.  No alcohol (beer, wine, liquor) or marijuana (smoking) 24 hours prior to surgery, or Marijuana edibles for 3 days prior to surgery.  Stop smoking cigarettes 14 days before surgery (helps w/healing and breathing).   Medications to   TAKE   Morning of Surgery MEDICATIONS TO TAKE THE MORNING OF SURGERY WITH A SIP OF WATER:                                 HYDRALAZINE, BUPROPION    You may take these medications, IF NEEDED, the morning of surgery: GABAPENTIN, XANAX. ZOFRAN    Ask your surgeon/prescribing doctor for instructions on taking or stopping these medications prior to surgery: NONE     Medications to STOP  before surgery Non-Steroidal anti-inflammatory Drugs (NSAID's): for example, Diclofenac (Voltaren), Ibuprofen (Advil, Motrin), Naproxen (Aleve) 7 days  STOP all herbal supplements and vitamins(unless prescribed by your doctor), and fish oil for 7 days  Other:

## 2024-10-09 NOTE — PERIOP NOTE
PAT PHONE CALL COMPLETED.  PT VERBALIZED UNDERSTANDING OF PAT INSTRUCTIONS.  PT TO PURCHASE ANTIBACTERIAL SOAP TO SHOWER WITH THE NIGHT BEFORE AND MORNING OF SURGERY.      PT STATES SHE HAD LABS AND EKG DONE AT PCP OFFICE/DR. MARIAN DAVID/[phone] BUT STATES PCP DID NOT DRAW T&S THAT WAS ALSO ORDERED BY DR. HALL.  CALLED TO PCP/[phone] AND REQ THAT PRE OP LABS AND EKG BE FAXED TO PAT. ML ON VM FOR NURSE.  CXR WAS NOT DONE EITHER SO THIS WAS ORDERED FOR STAT D.O.S    CALLED TO SURGEON'S OFFICE/DR. HALL TO ADVISE THAT PCP DID NOT DRAW T&S AND CXR WAS NOT DONE AND THAT STAT T&S AND CXR ORDERED FOR D.O.S.  SPOKE TO VICENTE WHO WILL LET NURSE KNOW.

## 2024-10-10 ENCOUNTER — ANESTHESIA EVENT (OUTPATIENT)
Facility: HOSPITAL | Age: 64
End: 2024-10-10
Payer: MEDICARE

## 2024-10-10 RX ORDER — SODIUM CHLORIDE 9 MG/ML
INJECTION, SOLUTION INTRAVENOUS PRN
Status: CANCELLED | OUTPATIENT
Start: 2024-10-10

## 2024-10-10 RX ORDER — DIPHENHYDRAMINE HYDROCHLORIDE 50 MG/ML
12.5 INJECTION INTRAMUSCULAR; INTRAVENOUS
Status: CANCELLED | OUTPATIENT
Start: 2024-10-10 | End: 2024-10-11

## 2024-10-10 RX ORDER — OXYCODONE HYDROCHLORIDE 5 MG/1
5 TABLET ORAL
Status: CANCELLED | OUTPATIENT
Start: 2024-10-10 | End: 2024-10-11

## 2024-10-10 RX ORDER — HYDROMORPHONE HYDROCHLORIDE 1 MG/ML
0.5 INJECTION, SOLUTION INTRAMUSCULAR; INTRAVENOUS; SUBCUTANEOUS EVERY 5 MIN PRN
Status: CANCELLED | OUTPATIENT
Start: 2024-10-10

## 2024-10-10 RX ORDER — FENTANYL CITRATE 50 UG/ML
25 INJECTION, SOLUTION INTRAMUSCULAR; INTRAVENOUS EVERY 5 MIN PRN
Status: CANCELLED | OUTPATIENT
Start: 2024-10-10

## 2024-10-10 RX ORDER — NALOXONE HYDROCHLORIDE 0.4 MG/ML
INJECTION, SOLUTION INTRAMUSCULAR; INTRAVENOUS; SUBCUTANEOUS PRN
Status: CANCELLED | OUTPATIENT
Start: 2024-10-10

## 2024-10-10 RX ORDER — HYDRALAZINE HYDROCHLORIDE 20 MG/ML
10 INJECTION INTRAMUSCULAR; INTRAVENOUS
Status: CANCELLED | OUTPATIENT
Start: 2024-10-10

## 2024-10-10 RX ORDER — SODIUM CHLORIDE 0.9 % (FLUSH) 0.9 %
5-40 SYRINGE (ML) INJECTION PRN
Status: CANCELLED | OUTPATIENT
Start: 2024-10-10

## 2024-10-10 RX ORDER — PROCHLORPERAZINE EDISYLATE 5 MG/ML
5 INJECTION INTRAMUSCULAR; INTRAVENOUS
Status: CANCELLED | OUTPATIENT
Start: 2024-10-10 | End: 2024-10-11

## 2024-10-10 RX ORDER — LORAZEPAM 2 MG/ML
0.5 INJECTION INTRAMUSCULAR
Status: CANCELLED | OUTPATIENT
Start: 2024-10-10 | End: 2024-10-11

## 2024-10-10 RX ORDER — IPRATROPIUM BROMIDE AND ALBUTEROL SULFATE 2.5; .5 MG/3ML; MG/3ML
1 SOLUTION RESPIRATORY (INHALATION)
Status: CANCELLED | OUTPATIENT
Start: 2024-10-10 | End: 2024-10-11

## 2024-10-10 RX ORDER — SODIUM CHLORIDE 0.9 % (FLUSH) 0.9 %
5-40 SYRINGE (ML) INJECTION EVERY 12 HOURS SCHEDULED
Status: CANCELLED | OUTPATIENT
Start: 2024-10-10

## 2024-10-10 RX ORDER — ONDANSETRON 2 MG/ML
4 INJECTION INTRAMUSCULAR; INTRAVENOUS
Status: CANCELLED | OUTPATIENT
Start: 2024-10-10 | End: 2024-10-11

## 2024-10-11 ENCOUNTER — HOSPITAL ENCOUNTER (OUTPATIENT)
Facility: HOSPITAL | Age: 64
Setting detail: OUTPATIENT SURGERY
Discharge: HOME | End: 2024-10-11
Attending: SURGERY | Admitting: SURGERY
Payer: MEDICARE

## 2024-10-11 ENCOUNTER — ANESTHESIA (OUTPATIENT)
Facility: HOSPITAL | Age: 64
End: 2024-10-11
Payer: MEDICARE

## 2024-10-11 VITALS
BODY MASS INDEX: 39.01 KG/M2 | SYSTOLIC BLOOD PRESSURE: 107 MMHG | TEMPERATURE: 98.1 F | WEIGHT: 212 LBS | DIASTOLIC BLOOD PRESSURE: 64 MMHG | HEIGHT: 62 IN | RESPIRATION RATE: 13 BRPM | OXYGEN SATURATION: 92 % | HEART RATE: 80 BPM

## 2024-10-11 DIAGNOSIS — N18.9 ANEMIA IN CHRONIC KIDNEY DISEASE, UNSPECIFIED CKD STAGE: Primary | ICD-10-CM

## 2024-10-11 DIAGNOSIS — D63.1 ANEMIA IN CHRONIC KIDNEY DISEASE, UNSPECIFIED CKD STAGE: Primary | ICD-10-CM

## 2024-10-11 LAB
GLUCOSE BLD STRIP.AUTO-MCNC: 168 MG/DL (ref 65–117)
GLUCOSE BLD STRIP.AUTO-MCNC: 209 MG/DL (ref 65–117)
SERVICE CMNT-IMP: ABNORMAL
SERVICE CMNT-IMP: ABNORMAL

## 2024-10-11 PROCEDURE — 3600000012 HC SURGERY LEVEL 2 ADDTL 15MIN: Performed by: SURGERY

## 2024-10-11 PROCEDURE — 6360000002 HC RX W HCPCS: Performed by: ANESTHESIOLOGY

## 2024-10-11 PROCEDURE — 2500000003 HC RX 250 WO HCPCS: Performed by: NURSE ANESTHETIST, CERTIFIED REGISTERED

## 2024-10-11 PROCEDURE — 6370000000 HC RX 637 (ALT 250 FOR IP): Performed by: ANESTHESIOLOGY

## 2024-10-11 PROCEDURE — 6360000002 HC RX W HCPCS

## 2024-10-11 PROCEDURE — 6360000002 HC RX W HCPCS: Performed by: SURGERY

## 2024-10-11 PROCEDURE — C1768 GRAFT, VASCULAR: HCPCS | Performed by: SURGERY

## 2024-10-11 PROCEDURE — 7100000011 HC PHASE II RECOVERY - ADDTL 15 MIN: Performed by: SURGERY

## 2024-10-11 PROCEDURE — 6360000002 HC RX W HCPCS: Performed by: NURSE ANESTHETIST, CERTIFIED REGISTERED

## 2024-10-11 PROCEDURE — 3700000001 HC ADD 15 MINUTES (ANESTHESIA): Performed by: SURGERY

## 2024-10-11 PROCEDURE — 7100000001 HC PACU RECOVERY - ADDTL 15 MIN: Performed by: SURGERY

## 2024-10-11 PROCEDURE — 82962 GLUCOSE BLOOD TEST: CPT

## 2024-10-11 PROCEDURE — 7100000010 HC PHASE II RECOVERY - FIRST 15 MIN: Performed by: SURGERY

## 2024-10-11 PROCEDURE — 2709999900 HC NON-CHARGEABLE SUPPLY: Performed by: SURGERY

## 2024-10-11 PROCEDURE — 3700000000 HC ANESTHESIA ATTENDED CARE: Performed by: SURGERY

## 2024-10-11 PROCEDURE — 7100000000 HC PACU RECOVERY - FIRST 15 MIN: Performed by: SURGERY

## 2024-10-11 PROCEDURE — 2580000003 HC RX 258: Performed by: SURGERY

## 2024-10-11 PROCEDURE — 2580000003 HC RX 258: Performed by: ANESTHESIOLOGY

## 2024-10-11 PROCEDURE — 3600000002 HC SURGERY LEVEL 2 BASE: Performed by: SURGERY

## 2024-10-11 PROCEDURE — 64415 NJX AA&/STRD BRCH PLXS IMG: CPT | Performed by: ANESTHESIOLOGY

## 2024-10-11 DEVICE — PROPATEN VASCULAR GRAFT SW 4-7MMX45CM TAPERED HEPARIN
Type: IMPLANTABLE DEVICE | Site: ARM | Status: FUNCTIONAL
Brand: GORE PROPATEN VASCULAR GRAFT

## 2024-10-11 RX ORDER — SODIUM CHLORIDE 9 MG/ML
INJECTION, SOLUTION INTRAVENOUS PRN
Status: DISCONTINUED | OUTPATIENT
Start: 2024-10-11 | End: 2024-10-11 | Stop reason: HOSPADM

## 2024-10-11 RX ORDER — ONDANSETRON 2 MG/ML
INJECTION INTRAMUSCULAR; INTRAVENOUS
Status: COMPLETED
Start: 2024-10-11 | End: 2024-10-11

## 2024-10-11 RX ORDER — FENTANYL CITRATE 50 UG/ML
100 INJECTION, SOLUTION INTRAMUSCULAR; INTRAVENOUS
Status: COMPLETED | OUTPATIENT
Start: 2024-10-11 | End: 2024-10-11

## 2024-10-11 RX ORDER — MIDAZOLAM HYDROCHLORIDE 2 MG/2ML
2 INJECTION, SOLUTION INTRAMUSCULAR; INTRAVENOUS AS NEEDED
Status: DISCONTINUED | OUTPATIENT
Start: 2024-10-11 | End: 2024-10-11 | Stop reason: HOSPADM

## 2024-10-11 RX ORDER — CEFAZOLIN SODIUM 1 G/3ML
INJECTION, POWDER, FOR SOLUTION INTRAMUSCULAR; INTRAVENOUS
Status: DISCONTINUED | OUTPATIENT
Start: 2024-10-11 | End: 2024-10-11 | Stop reason: SDUPTHER

## 2024-10-11 RX ORDER — DEXMEDETOMIDINE HYDROCHLORIDE 100 UG/ML
INJECTION, SOLUTION INTRAVENOUS
Status: DISCONTINUED | OUTPATIENT
Start: 2024-10-11 | End: 2024-10-11 | Stop reason: SDUPTHER

## 2024-10-11 RX ORDER — SODIUM CHLORIDE 0.9 % (FLUSH) 0.9 %
5-40 SYRINGE (ML) INJECTION EVERY 12 HOURS SCHEDULED
Status: DISCONTINUED | OUTPATIENT
Start: 2024-10-11 | End: 2024-10-11 | Stop reason: HOSPADM

## 2024-10-11 RX ORDER — LIDOCAINE HYDROCHLORIDE 10 MG/ML
1 INJECTION, SOLUTION EPIDURAL; INFILTRATION; INTRACAUDAL; PERINEURAL
Status: DISCONTINUED | OUTPATIENT
Start: 2024-10-11 | End: 2024-10-11 | Stop reason: HOSPADM

## 2024-10-11 RX ORDER — SODIUM CHLORIDE 0.9 % (FLUSH) 0.9 %
5-40 SYRINGE (ML) INJECTION PRN
Status: DISCONTINUED | OUTPATIENT
Start: 2024-10-11 | End: 2024-10-11 | Stop reason: HOSPADM

## 2024-10-11 RX ORDER — TRAMADOL HYDROCHLORIDE 50 MG/1
50 TABLET ORAL EVERY 6 HOURS PRN
Qty: 20 TABLET | Refills: 0 | Status: SHIPPED | OUTPATIENT
Start: 2024-10-11 | End: 2024-10-16

## 2024-10-11 RX ORDER — SODIUM CHLORIDE, SODIUM LACTATE, POTASSIUM CHLORIDE, CALCIUM CHLORIDE 600; 310; 30; 20 MG/100ML; MG/100ML; MG/100ML; MG/100ML
INJECTION, SOLUTION INTRAVENOUS CONTINUOUS
Status: DISCONTINUED | OUTPATIENT
Start: 2024-10-11 | End: 2024-10-11 | Stop reason: HOSPADM

## 2024-10-11 RX ORDER — PROPOFOL 10 MG/ML
INJECTION, EMULSION INTRAVENOUS
Status: DISCONTINUED | OUTPATIENT
Start: 2024-10-11 | End: 2024-10-11 | Stop reason: SDUPTHER

## 2024-10-11 RX ORDER — ACETAMINOPHEN 500 MG
1000 TABLET ORAL ONCE
Status: COMPLETED | OUTPATIENT
Start: 2024-10-11 | End: 2024-10-11

## 2024-10-11 RX ADMIN — PROPOFOL 50 MCG/KG/MIN: 10 INJECTION, EMULSION INTRAVENOUS at 07:35

## 2024-10-11 RX ADMIN — DEXMEDETOMIDINE 10 MCG: 100 INJECTION, SOLUTION, CONCENTRATE INTRAVENOUS at 07:38

## 2024-10-11 RX ADMIN — CEFAZOLIN 2 G: 1 INJECTION, POWDER, FOR SOLUTION INTRAMUSCULAR; INTRAVENOUS at 07:45

## 2024-10-11 RX ADMIN — ONDANSETRON 4 MG: 2 INJECTION INTRAMUSCULAR; INTRAVENOUS at 10:17

## 2024-10-11 RX ADMIN — MIDAZOLAM 2 MG: 1 INJECTION INTRAMUSCULAR; INTRAVENOUS at 07:18

## 2024-10-11 RX ADMIN — MEPIVACAINE HYDROCHLORIDE 30 ML: 15 INJECTION, SOLUTION EPIDURAL; INFILTRATION at 07:22

## 2024-10-11 RX ADMIN — FENTANYL CITRATE 100 MCG: 50 INJECTION INTRAMUSCULAR; INTRAVENOUS at 07:19

## 2024-10-11 RX ADMIN — ACETAMINOPHEN 1000 MG: 500 TABLET ORAL at 06:29

## 2024-10-11 RX ADMIN — SODIUM CHLORIDE: 9 INJECTION, SOLUTION INTRAVENOUS at 07:15

## 2024-10-11 RX ADMIN — DEXMEDETOMIDINE 10 MCG: 100 INJECTION, SOLUTION, CONCENTRATE INTRAVENOUS at 07:48

## 2024-10-11 ASSESSMENT — PAIN SCALES - GENERAL: PAINLEVEL_OUTOF10: 1

## 2024-10-11 NOTE — ANESTHESIA PROCEDURE NOTES
Peripheral Block    Patient location during procedure: pre-op  Reason for block: procedure for pain, post-op pain management and at surgeon's request  Start time: 10/11/2024 7:15 AM  End time: 10/11/2024 7:20 AM  Staffing  Performed: anesthesiologist   Anesthesiologist: Grzegorz Kellogg MD  Performed by: Grzegorz Kellogg MD  Authorized by: Grzegorz Kellogg MD    Preanesthetic Checklist  Completed: patient identified, IV checked, site marked, risks and benefits discussed, surgical/procedural consents, equipment checked, pre-op evaluation, timeout performed, anesthesia consent given, oxygen available, monitors applied/VS acknowledged and fire risk safety assessment completed and verbalized  Peripheral Block   Patient position: supine  Prep: ChloraPrep  Provider prep: mask and sterile gloves  Patient monitoring: cardiac monitor, continuous pulse ox, frequent blood pressure checks, IV access and oxygen  Block type: Brachial plexus  Interscalene  Laterality: left  Injection technique: single-shot  Guidance: nerve stimulator and ultrasound guided  Local infiltration: ropivacaine  Infiltration strength: 0.5 %  Local infiltration: ropivacaine  Dose: 30 mL    Needle   Needle type: insulated echogenic nerve stimulator needle   Needle gauge: 21 G  Needle localization: anatomical landmarks and ultrasound guidance  Needle length: 5 cm  Assessment   Injection assessment: negative aspiration for heme, no paresthesia on injection, local visualized surrounding nerve on ultrasound and no intravascular symptoms  Paresthesia pain: none  Slow fractionated injection: yes  Hemodynamics: stable  Outcomes: uncomplicated and patient tolerated procedure well    Medications Administered  mepivacaine (CARBOCAINE) injection 1.5% - Perineural   30 mL - 10/11/2024 7:22:00 AM

## 2024-10-11 NOTE — ANESTHESIA POSTPROCEDURE EVALUATION
Post-Anesthesia Evaluation and Assessment    Patient: Johanna Dunaway MRN: [id number]  SSN: [id number]    YOB: 1960  Age: 64 y.o.  Sex: female      I have evaluated the patient and they are stable and ready for discharge from the PACU.     Cardiovascular Function/Vital Signs  Visit Vitals  /64   Pulse 80   Temp 98.1 °F (36.7 °C) (Oral)   Resp 13   Ht 1.575 m (5' 2.01\")   Wt 96.2 kg (212 lb)   SpO2 92%   BMI 38.77 kg/m²       Patient is status post Monitor Anesthesia Care anesthesia for Procedure(s) with comments:  LEFT UPPER ARM DIALYSIS GRAFT PLACEMENT - (MAC/REGIONAL BLOCK).    Nausea/Vomiting: None    Postoperative hydration reviewed and adequate.    Pain:      Managed    Neurological Status:       At baseline    Mental Status, Level of Consciousness: Alert and  oriented to person, place, and time    Pulmonary Status:       Adequate oxygenation and airway patent    Complications related to anesthesia: None    Post-anesthesia assessment completed. No concerns    Signed By: Grzegorz Kellogg MD     October 11, 2024            Department of Anesthesiology  Postprocedure Note    Patient: Johanna Dunaway  MRN: [id number]  YOB: 1960  Date of evaluation: 10/11/2024    Procedure Summary       Date: 10/11/24 Room / Location: [facility] MAIN OR  / [facility] MAIN OR    Anesthesia Start: 0727 Anesthesia Stop: 0902    Procedure: LEFT UPPER ARM DIALYSIS GRAFT PLACEMENT (Left: Arm Upper) Diagnosis:       End stage renal disease (HCC)      (End stage renal disease (HCC) [N18.6])    Providers: David Monk MD Responsible Provider: Grzegorz Kellogg MD    Anesthesia Type: Regional, MAC ASA Status: 3            Anesthesia Type: Regional, MAC    James Phase I: James Score: 10    James Phase II:      Anesthesia Post Evaluation    No notable events documented.

## 2024-10-11 NOTE — ANESTHESIA PRE PROCEDURE
depression F32.9    Mixed hyperlipidemia E78.2    Type 2 diabetes mellitus with diabetic neuropathy (HCC) E11.40    Anxiety F41.9    Chest pain R07.9    Volume depletion E86.9    Sleep apnea G47.30    Suicide attempt (formerly [facility]) T14.91XA    Migraine G43.909    Severe obesity E66.01    Hypertension I10    Acute kidney injury (HCC) N17.9    Hypertensive renal disease I12.9    Anemia in chronic renal disease N18.9, D63.1       Past Medical History:        Diagnosis Date    Anxiety 6/22/2020    Arthritis     Bipolar 1 disorder (formerly [facility]) 1/23/2018    Congestive heart failure (CHF) (formerly [facility])     PT DENIES THIS DX, STATES AN ER DR TOLD HER THIS WHEN SHE HAD COVID AND SAYS HER PCP HAS NEVER TOLD HER THIS AND WHEN SHE SAW CARDIOLOGY 5YRS AGO, ALL HER TESTS CAME BACK FINE PER PT    Diabetes mellitus type 2, controlled (formerly [facility]) 6/22/2020    Diverticulosis     Hx of blood clots 2016    SMALL PE    Hypertension 6/22/2020    Kidney failure     Liver fibrosis 6/22/2020    Major depression 6/22/2020    Migraine 6/22/2020    Sleep apnea 1/23/2018    USES CPAP    Suicide attempt (formerly [facility]) 6/22/2020    2013    UTI (urinary tract infection)        Past Surgical History:        Procedure Laterality Date    APPENDECTOMY      CHOLECYSTECTOMY      DILATION AND CURETTAGE OF UTERUS      WISDOM TOOTH EXTRACTION         Social History:    Social History     Tobacco Use    Smoking status: Never    Smokeless tobacco: Never   Substance Use Topics    Alcohol use: Not Currently                                Counseling given: Not Answered      Vital Signs (Current):   Vitals:    10/09/24 0859 10/11/24 0602 10/11/24 0717   BP:  131/70 109/79   Pulse:  80 76   Resp:  14 14   Temp:  98.5 °F (36.9 °C)    TempSrc:  Oral    SpO2:  96% 93%   Weight: 96.2 kg (212 lb) 96.2 kg (212 lb)    Height: 1.575 m (5' 2\") 1.575 m (5' 2.01\")                                               BP Readings from Last 3 Encounters:   10/11/24 109/79   08/22/24 (!) 148/96   05/22/23 (!) 169/99

## 2024-10-11 NOTE — OP NOTE
Operative Note      Patient: Johanna Dunaway  YOB: 1960  MRN: [id number]    Date of Procedure: 10/11/2024    Pre-Op Diagnosis Codes:      * End stage renal disease (HCC) [N18.6]    Post-Op Diagnosis: Same       Procedure(s):  LEFT UPPER ARM DIALYSIS GRAFT PLACEMENT    Surgeon(s):  David Monk MD    Assistant:   Surgical Assistant: Valencia Soares    Anesthesia: Monitor Anesthesia Care    Estimated Blood Loss (mL): Minimal    Complications: None    Specimens:   * No specimens in log *    Implants:  Implant Name Type Inv. Item Serial No.  Lot No. LRB No. Used Action   GRAFT VASC L45CM DIA4-7MM PTFE CBAS HEP SURF STD WALLED - [id number] Vascular grafts GRAFT VASC L45CM DIA4-7MM PTFE CBAS HEP SURF STD WALLED [id number] [employer]WD NA Left 1 Implanted         Drains: * No LDAs found *    Findings:  Infection Present At Time Of Surgery (PATOS) (choose all levels that have infection present):  No infection present  Other Findings: 63yo female multiple prior access attempts presents for AV access.  Inflow: L brachial  Outflow: L brachial  Conduit: 4-7mm Propaten graft  Arterial lateral    Detailed Description of Procedure:   The patient was transported to the operating room. The patient was placed supine on the operating room table. A regional block had previously been performed in preop. Sedation via monitored anesthesia care. The patient was prepped and draped in a standard fashion. An appropriate surgical timeout was performed with all members of the team present. The patient was administered Ancef for the antibiotic prophylaxis. An incision was made. The artery and vein were exposed and mobilized over an appropriate distance, dissected free of surrounding structures and encircled with a silastic loop. A graft was selected and tunneled in a subcutaneous plane. Next the arterial anastomosis was performed using a running 6-0 Prolene suture. The anastomosis was

## 2024-10-11 NOTE — H&P
History and Physical    Patient: Johanna Dunaway MRN: [id number]  SSN: [id number]    YOB: 1960  Age: 64 y.o.  Sex: female      Subjective:      Johanna Dunaway is a 64 y.o. female who presents for AV access creation.     Past Medical History:   Diagnosis Date    Anxiety 6/22/2020    Arthritis     Bipolar 1 disorder (HCC) 1/23/2018    Congestive heart failure (CHF) ([facility])     PT DENIES THIS DX, STATES AN ER DR TOLD HER THIS WHEN SHE HAD COVID AND SAYS HER PCP HAS NEVER TOLD HER THIS AND WHEN SHE SAW CARDIOLOGY 5YRS AGO, ALL HER TESTS CAME BACK FINE PER PT    Diabetes mellitus type 2, controlled ([facility]) 6/22/2020    Diverticulosis     Hx of blood clots 2016    SMALL PE    Hypertension 6/22/2020    Kidney failure     Liver fibrosis 6/22/2020    Major depression 6/22/2020    Migraine 6/22/2020    Sleep apnea 1/23/2018    USES CPAP    Suicide attempt ([facility]) 6/22/2020 2013    UTI (urinary tract infection)      Past Surgical History:   Procedure Laterality Date    APPENDECTOMY      CHOLECYSTECTOMY      DILATION AND CURETTAGE OF UTERUS      WISDOM TOOTH EXTRACTION        Family History   Problem Relation Age of Onset    Heart Attack Mother     Heart Disease Mother     Gout Mother     Heart Attack Father     Asthma Father     Heart Disease Father     Anesth Problems Neg Hx      Social History     Tobacco Use    Smoking status: Never    Smokeless tobacco: Never   Substance Use Topics    Alcohol use: Not Currently      Prior to Admission medications    Medication Sig Start Date End Date Taking? Authorizing Provider   ALPRAZolam (XANAX) 0.5 MG tablet Take 1 tablet by mouth 3 times daily as needed. 9/1/22  Yes Automatic Reconciliation, Ar   ARIPiprazole (ABILIFY) 20 MG tablet Take 1 tablet by mouth nightly 9/1/22  Yes Automatic Reconciliation, Ar   buPROPion (WELLBUTRIN SR) 150 MG extended release tablet Take 1 tablet by mouth 2 times daily 6/1/22  Yes Automatic Reconciliation, Ar   Cholecalciferol 50 MCG  (2000 UT) TABS Take 1 tablet by mouth daily   Yes Automatic Reconciliation, Ar   citalopram (CELEXA) 20 MG tablet Take 1 tablet by mouth nightly 8/11/22  Yes Automatic Reconciliation, Ar   furosemide (LASIX) 40 MG tablet Take 1 tablet by mouth 2 times daily 5/25/22  Yes Automatic Reconciliation, Ar   gabapentin (NEURONTIN) 100 MG capsule Take 1 capsule by mouth as needed.   Yes Automatic Reconciliation, Ar   hydrALAZINE (APRESOLINE) 50 MG tablet Take 1 tablet by mouth 3 times daily 1/31/22  Yes Automatic Reconciliation, Ar   Insulin NPH Isophane & Regular (HUMULIN 70/30 KWIKPEN) (70-30) 100 UNIT per ML injection pen Inject into the skin 45 UNITS QAM  35 UNITS QHS 6/24/22  Yes Automatic Reconciliation, Ar   magnesium oxide (MAG-OX) 400 MG tablet Take 1 tablet by mouth 3 times daily   Yes Automatic Reconciliation, Ar   metoprolol succinate (TOPROL XL) 50 MG extended release tablet Take 1 tablet by mouth nightly 3/2/21  Yes Automatic Reconciliation, Ar   pantoprazole (PROTONIX) 40 MG tablet Take 1 tablet by mouth nightly   Yes Automatic Reconciliation, Ar   sodium bicarbonate 650 MG tablet Take 1 tablet by mouth 2 times daily   Yes Automatic Reconciliation, Ar   allopurinol (ZYLOPRIM) 100 MG tablet Take 1 tablet by mouth nightly 8/9/21   Automatic Reconciliation, Ar   ondansetron (ZOFRAN-ODT) 4 MG disintegrating tablet Take 1 tablet by mouth every 8 hours as needed for Nausea or Vomiting 9/13/21   Automatic Reconciliation, Ar        Allergies   Allergen Reactions    Sulfa Antibiotics Hives and Other (See Comments)     Other reaction(s): Unknown (comments)    Wound Dressing Adhesive Other (See Comments)     Blisters FROM SILK TAPE  Blisters FROM SILK TAPE      Adhesive Tape      Other reaction(s): Unknown (comments)  Blisters    Sulfamethoxazole-Trimethoprim      Other reaction(s): Unknown (comments)    Nsaids Other (See Comments)     Other reaction(s): Unknown  RENAL FAILURE  RENAL FAILURE         Review of

## 2025-03-18 ENCOUNTER — HOSPITAL ENCOUNTER (EMERGENCY)
Facility: HOSPITAL | Age: 65
Discharge: HOME | End: 2025-03-18
Attending: FAMILY MEDICINE
Payer: MEDICARE

## 2025-03-18 VITALS
RESPIRATION RATE: 13 BRPM | SYSTOLIC BLOOD PRESSURE: 131 MMHG | BODY MASS INDEX: 41.4 KG/M2 | HEIGHT: 60 IN | TEMPERATURE: 97.9 F | HEART RATE: 75 BPM | OXYGEN SATURATION: 94 % | DIASTOLIC BLOOD PRESSURE: 103 MMHG

## 2025-03-18 DIAGNOSIS — R73.9 HYPERGLYCEMIA: Primary | ICD-10-CM

## 2025-03-18 LAB
ALBUMIN SERPL-MCNC: 3.2 G/DL (ref 3.5–5)
ALBUMIN/GLOB SERPL: 0.8 (ref 1.1–2.2)
ALP SERPL-CCNC: 147 U/L (ref 45–117)
ALT SERPL-CCNC: 50 U/L (ref 12–78)
ANION GAP SERPL CALC-SCNC: 11 MMOL/L (ref 2–12)
AST SERPL W P-5'-P-CCNC: 38 U/L (ref 15–37)
BASOPHILS # BLD: 0.01 K/UL (ref 0–0.1)
BASOPHILS NFR BLD: 0.1 % (ref 0–1)
BILIRUB SERPL-MCNC: 0.3 MG/DL (ref 0.2–1)
BUN SERPL-MCNC: 44 MG/DL (ref 6–20)
BUN/CREAT SERPL: 13 (ref 12–20)
CA-I BLD-MCNC: 8.9 MG/DL (ref 8.5–10.1)
CHLORIDE SERPL-SCNC: 97 MMOL/L (ref 97–108)
CO2 SERPL-SCNC: 23 MMOL/L (ref 21–32)
CREAT SERPL-MCNC: 3.42 MG/DL (ref 0.55–1.02)
DIFFERENTIAL METHOD BLD: ABNORMAL
EOSINOPHIL # BLD: 0.01 K/UL (ref 0–0.4)
EOSINOPHIL NFR BLD: 0.1 % (ref 0–7)
ERYTHROCYTE [DISTWIDTH] IN BLOOD BY AUTOMATED COUNT: 15.4 % (ref 11.5–14.5)
FLUAV RNA SPEC QL NAA+PROBE: NOT DETECTED
FLUBV RNA SPEC QL NAA+PROBE: NOT DETECTED
GLOBULIN SER CALC-MCNC: 3.9 G/DL (ref 2–4)
GLUCOSE BLD STRIP.AUTO-MCNC: 299 MG/DL (ref 65–100)
GLUCOSE BLD STRIP.AUTO-MCNC: 435 MG/DL (ref 65–100)
GLUCOSE BLD STRIP.AUTO-MCNC: 593 MG/DL (ref 65–100)
GLUCOSE SERPL-MCNC: 581 MG/DL (ref 65–100)
HCT VFR BLD AUTO: 28.1 % (ref 35–47)
HGB BLD-MCNC: 9.2 G/DL (ref 11.5–16)
IMM GRANULOCYTES # BLD AUTO: 0.08 K/UL (ref 0–0.04)
IMM GRANULOCYTES NFR BLD AUTO: 0.8 % (ref 0–0.5)
LYMPHOCYTES # BLD: 0.62 K/UL (ref 0.8–3.5)
LYMPHOCYTES NFR BLD: 6.6 % (ref 12–49)
MCH RBC QN AUTO: 28.1 PG (ref 26–34)
MCHC RBC AUTO-ENTMCNC: 32.7 G/DL (ref 30–36.5)
MCV RBC AUTO: 85.9 FL (ref 80–99)
MONOCYTES # BLD: 0.51 K/UL (ref 0–1)
MONOCYTES NFR BLD: 5.4 % (ref 5–13)
NEUTS SEG # BLD: 8.17 K/UL (ref 1.8–8)
NEUTS SEG NFR BLD: 87 % (ref 32–75)
NRBC # BLD: 0 K/UL (ref 0–0.01)
NRBC BLD-RTO: 0 PER 100 WBC
PERFORMED BY:: ABNORMAL
PLATELET # BLD AUTO: 120 K/UL (ref 150–400)
PMV BLD AUTO: 11.2 FL (ref 8.9–12.9)
POTASSIUM SERPL-SCNC: 4.4 MMOL/L (ref 3.5–5.1)
PROT SERPL-MCNC: 7.1 G/DL (ref 6.4–8.2)
RBC # BLD AUTO: 3.27 M/UL (ref 3.8–5.2)
RBC MORPH BLD: ABNORMAL
SARS-COV-2 RNA RESP QL NAA+PROBE: NOT DETECTED
SODIUM SERPL-SCNC: 131 MMOL/L (ref 136–145)
TROPONIN I SERPL HS-MCNC: 5 NG/L (ref 0–51)
WBC # BLD AUTO: 9.4 K/UL (ref 3.6–11)

## 2025-03-18 PROCEDURE — 6370000000 HC RX 637 (ALT 250 FOR IP): Performed by: FAMILY MEDICINE

## 2025-03-18 PROCEDURE — 96374 THER/PROPH/DIAG INJ IV PUSH: CPT

## 2025-03-18 PROCEDURE — 2580000003 HC RX 258: Performed by: FAMILY MEDICINE

## 2025-03-18 PROCEDURE — 84484 ASSAY OF TROPONIN QUANT: CPT

## 2025-03-18 PROCEDURE — 82962 GLUCOSE BLOOD TEST: CPT

## 2025-03-18 PROCEDURE — 6360000002 HC RX W HCPCS: Performed by: FAMILY MEDICINE

## 2025-03-18 PROCEDURE — 96375 TX/PRO/DX INJ NEW DRUG ADDON: CPT

## 2025-03-18 PROCEDURE — 99284 EMERGENCY DEPT VISIT MOD MDM: CPT

## 2025-03-18 PROCEDURE — 85025 COMPLETE CBC W/AUTO DIFF WBC: CPT

## 2025-03-18 PROCEDURE — 80053 COMPREHEN METABOLIC PANEL: CPT

## 2025-03-18 PROCEDURE — 87636 SARSCOV2 & INF A&B AMP PRB: CPT

## 2025-03-18 PROCEDURE — 96361 HYDRATE IV INFUSION ADD-ON: CPT

## 2025-03-18 RX ORDER — SODIUM CHLORIDE 9 MG/ML
INJECTION, SOLUTION INTRAVENOUS CONTINUOUS
Status: DISCONTINUED | OUTPATIENT
Start: 2025-03-18 | End: 2025-03-18 | Stop reason: HOSPADM

## 2025-03-18 RX ORDER — ONDANSETRON 2 MG/ML
4 INJECTION INTRAMUSCULAR; INTRAVENOUS EVERY 6 HOURS PRN
Status: DISCONTINUED | OUTPATIENT
Start: 2025-03-18 | End: 2025-03-18 | Stop reason: HOSPADM

## 2025-03-18 RX ADMIN — SODIUM CHLORIDE: 9 INJECTION, SOLUTION INTRAVENOUS at 16:12

## 2025-03-18 RX ADMIN — INSULIN HUMAN 8 UNITS: 100 INJECTION, SOLUTION PARENTERAL at 16:11

## 2025-03-18 RX ADMIN — ONDANSETRON 4 MG: 2 INJECTION, SOLUTION INTRAMUSCULAR; INTRAVENOUS at 16:12

## 2025-03-18 ASSESSMENT — PAIN SCALES - GENERAL
PAINLEVEL_OUTOF10: 0
PAINLEVEL_OUTOF10: 8

## 2025-03-18 ASSESSMENT — LIFESTYLE VARIABLES
HOW MANY STANDARD DRINKS CONTAINING ALCOHOL DO YOU HAVE ON A TYPICAL DAY: PATIENT DOES NOT DRINK
HOW OFTEN DO YOU HAVE A DRINK CONTAINING ALCOHOL: NEVER

## 2025-03-18 ASSESSMENT — PAIN DESCRIPTION - LOCATION: LOCATION: FLANK

## 2025-03-18 ASSESSMENT — PAIN DESCRIPTION - ORIENTATION: ORIENTATION: RIGHT

## 2025-03-18 ASSESSMENT — PAIN - FUNCTIONAL ASSESSMENT: PAIN_FUNCTIONAL_ASSESSMENT: 0-10

## 2025-03-18 NOTE — ED PROVIDER NOTES
Heart Disease Mother     Gout Mother     Heart Attack Father     Asthma Father     Heart Disease Father     Anesth Problems Neg Hx        Social History:  Social History     Tobacco Use    Smoking status: Never    Smokeless tobacco: Never   Vaping Use    Vaping status: Never Used   Substance Use Topics    Alcohol use: Not Currently    Drug use: Never       Allergies:  Allergies   Allergen Reactions    Sulfa Antibiotics Hives and Other (See Comments)     Other reaction(s): Unknown (comments)    Wound Dressing Adhesive Other (See Comments)     Blisters FROM SILK TAPE  Blisters FROM SILK TAPE      Adhesive Tape      Other reaction(s): Unknown (comments)  Blisters    Sulfamethoxazole-Trimethoprim      Other reaction(s): Unknown (comments)    Nsaids Other (See Comments)     Other reaction(s): Unknown  RENAL FAILURE  RENAL FAILURE         PCP: Kely Alvarado PA-C    Current Meds:   No current facility-administered medications for this encounter.     Current Outpatient Medications   Medication Sig Dispense Refill    allopurinol (ZYLOPRIM) 100 MG tablet Take 1 tablet by mouth nightly      ALPRAZolam (XANAX) 0.5 MG tablet Take 1 tablet by mouth 3 times daily as needed.      ARIPiprazole (ABILIFY) 20 MG tablet Take 1 tablet by mouth nightly      buPROPion (WELLBUTRIN SR) 150 MG extended release tablet Take 1 tablet by mouth 2 times daily      Cholecalciferol 50 MCG (2000 UT) TABS Take 1 tablet by mouth daily      citalopram (CELEXA) 20 MG tablet Take 1 tablet by mouth nightly      furosemide (LASIX) 40 MG tablet Take 1 tablet by mouth 2 times daily      gabapentin (NEURONTIN) 100 MG capsule Take 1 capsule by mouth as needed.      hydrALAZINE (APRESOLINE) 50 MG tablet Take 1 tablet by mouth 3 times daily      Insulin NPH Isophane & Regular (HUMULIN 70/30 KWIKPEN) (70-30) 100 UNIT per ML injection pen Inject into the skin 45 UNITS QAM  35 UNITS QHS      magnesium oxide (MAG-OX) 400 MG tablet Take 1 tablet by mouth 3 times

## 2025-03-18 NOTE — ED TRIAGE NOTES
Pt c/o nausea- not feeling well. States she has been on prednisone for sciatica- her blood sugar prior to arriving was over 600- so she took 50 units of her long-acting insulin- States she has no short-acting insulin

## 2025-03-26 ENCOUNTER — HOSPITAL ENCOUNTER (EMERGENCY)
Facility: HOSPITAL | Age: 65
Discharge: HOME | End: 2025-03-26
Attending: EMERGENCY MEDICINE
Payer: MEDICARE

## 2025-03-26 VITALS
BODY MASS INDEX: 41.23 KG/M2 | SYSTOLIC BLOOD PRESSURE: 177 MMHG | RESPIRATION RATE: 16 BRPM | TEMPERATURE: 98 F | HEART RATE: 87 BPM | HEIGHT: 60 IN | OXYGEN SATURATION: 95 % | DIASTOLIC BLOOD PRESSURE: 75 MMHG | WEIGHT: 210 LBS

## 2025-03-26 DIAGNOSIS — M54.31 BILATERAL SCIATICA: Primary | ICD-10-CM

## 2025-03-26 DIAGNOSIS — M54.32 BILATERAL SCIATICA: Primary | ICD-10-CM

## 2025-03-26 PROCEDURE — 96372 THER/PROPH/DIAG INJ SC/IM: CPT

## 2025-03-26 PROCEDURE — 99284 EMERGENCY DEPT VISIT MOD MDM: CPT

## 2025-03-26 PROCEDURE — 6360000002 HC RX W HCPCS: Performed by: EMERGENCY MEDICINE

## 2025-03-26 RX ORDER — DEXAMETHASONE SODIUM PHOSPHATE 10 MG/ML
10 INJECTION, SOLUTION INTRAMUSCULAR; INTRAVENOUS ONCE
Status: COMPLETED | OUTPATIENT
Start: 2025-03-26 | End: 2025-03-26

## 2025-03-26 RX ORDER — PREDNISONE 20 MG/1
20 TABLET ORAL 2 TIMES DAILY
Qty: 10 TABLET | Refills: 0 | Status: SHIPPED | OUTPATIENT
Start: 2025-03-26 | End: 2025-03-31

## 2025-03-26 RX ORDER — ONDANSETRON 2 MG/ML
4 INJECTION INTRAMUSCULAR; INTRAVENOUS ONCE
Status: COMPLETED | OUTPATIENT
Start: 2025-03-26 | End: 2025-03-26

## 2025-03-26 RX ORDER — MORPHINE SULFATE 4 MG/ML
4 INJECTION, SOLUTION INTRAMUSCULAR; INTRAVENOUS
Status: COMPLETED | OUTPATIENT
Start: 2025-03-26 | End: 2025-03-26

## 2025-03-26 RX ADMIN — MORPHINE SULFATE 4 MG: 4 INJECTION, SOLUTION INTRAMUSCULAR; INTRAVENOUS at 07:51

## 2025-03-26 RX ADMIN — DEXAMETHASONE SODIUM PHOSPHATE 10 MG: 10 INJECTION, SOLUTION INTRAMUSCULAR; INTRAVENOUS at 07:18

## 2025-03-26 RX ADMIN — ONDANSETRON 4 MG: 2 INJECTION, SOLUTION INTRAMUSCULAR; INTRAVENOUS at 07:51

## 2025-03-26 ASSESSMENT — LIFESTYLE VARIABLES
HOW OFTEN DO YOU HAVE A DRINK CONTAINING ALCOHOL: NEVER
HOW MANY STANDARD DRINKS CONTAINING ALCOHOL DO YOU HAVE ON A TYPICAL DAY: PATIENT DOES NOT DRINK

## 2025-03-26 ASSESSMENT — PAIN DESCRIPTION - LOCATION: LOCATION: BACK

## 2025-03-26 ASSESSMENT — PAIN SCALES - GENERAL: PAINLEVEL_OUTOF10: 7

## 2025-03-26 ASSESSMENT — ENCOUNTER SYMPTOMS
GASTROINTESTINAL NEGATIVE: 1
BACK PAIN: 1

## 2025-03-26 ASSESSMENT — PAIN - FUNCTIONAL ASSESSMENT: PAIN_FUNCTIONAL_ASSESSMENT: 0-10

## 2025-03-26 NOTE — ED TRIAGE NOTES
Patient arrived via EMS from home   Patient stated she is experiencing right back, right flank and right hip pain that radiating to the right leg. Pain is a 7 out of 10.   Patient also stated she had started to hallucinate after taking percocet for sciatica nerve pain

## 2025-03-26 NOTE — ED NOTES
Pt given discharge and follow up instructions. Pt has no further questions at this time. Pt given education on prescriptions and pain management. Pt leaving with family at this time. Ambulatory at d/c.

## 2025-03-26 NOTE — ED NOTES
Pt given decadron shot as ordered. Pt states she is not ready for d/c. Advised for 15 min reassessment requirement.

## 2025-03-26 NOTE — ED NOTES
[facility] EMERGENCY DEPT  EMERGENCY DEPARTMENT HISTORY AND PHYSICAL EXAM      Date: 3/26/2025  Patient Name: Johanna Dunaway  MRN: [id number]  YOB: 1960  Date of evaluation: 3/26/2025  Provider: Lb Fletcher MD   Note Started: 7:03 AM EDT 3/26/25    HISTORY OF PRESENT ILLNESS     Chief Complaint   Patient presents with   • Back Pain       History Provided By: Patient    HPI: Johanna Dunaway is a 64 y.o. female presents to the emergency department complaint of bilateral low back pain that radiates down both legs, started 3 days ago.  Right side is worse than the left side.  She has history of sciatica.  Pain is rated 7/10 severity pain is exacerbated by movement and ambulation.  She denies urinary or bowel incontinence.  She reports taking Percocet for pain, which resulted in hallucinations.    PAST MEDICAL HISTORY   Past Medical History:  Past Medical History:   Diagnosis Date   • Anxiety 6/22/2020   • Arthritis    • Bipolar 1 disorder ([facility]) 1/23/2018   • Congestive heart failure (CHF) ([facility])     PT DENIES THIS DX, STATES AN ER DR TOLD HER THIS WHEN SHE HAD COVID AND SAYS HER PCP HAS NEVER TOLD HER THIS AND WHEN SHE SAW CARDIOLOGY 5YRS AGO, ALL HER TESTS CAME BACK FINE PER PT   • Diabetes mellitus type 2, controlled ([facility]) 6/22/2020   • Diverticulosis    • Hx of blood clots 2016    SMALL PE   • Hypertension 6/22/2020   • Kidney failure    • Liver fibrosis 6/22/2020   • Major depression 6/22/2020   • Migraine 6/22/2020   • Sleep apnea 1/23/2018    USES CPAP   • Suicide attempt ([facility]) 6/22/2020    2013   • UTI (urinary tract infection)        Past Surgical History:  Past Surgical History:   Procedure Laterality Date   • APPENDECTOMY     • CHOLECYSTECTOMY     • DIALYSIS FISTULA CREATION Left 10/11/2024    LEFT UPPER ARM DIALYSIS GRAFT PLACEMENT performed by David Monk MD at [facility] MAIN OR   • DILATION AND CURETTAGE OF UTERUS     • WISDOM TOOTH EXTRACTION         Family History:  Family History   Problem Relation

## 2025-04-03 ENCOUNTER — HOSPITAL ENCOUNTER (EMERGENCY)
Facility: HOSPITAL | Age: 65
Discharge: ANOTHER HOSPITAL | End: 2025-04-03
Attending: EMERGENCY MEDICINE
Payer: MEDICARE

## 2025-04-03 ENCOUNTER — HOSPITAL ENCOUNTER (INPATIENT)
Facility: HOSPITAL | Age: 65
LOS: 6 days | Discharge: HOME HEALTH | DRG: 515 | End: 2025-04-09
Payer: MEDICARE

## 2025-04-03 ENCOUNTER — APPOINTMENT (OUTPATIENT)
Facility: HOSPITAL | Age: 65
End: 2025-04-03
Payer: MEDICARE

## 2025-04-03 VITALS
SYSTOLIC BLOOD PRESSURE: 183 MMHG | HEIGHT: 60 IN | OXYGEN SATURATION: 97 % | HEART RATE: 71 BPM | WEIGHT: 223 LBS | TEMPERATURE: 98 F | BODY MASS INDEX: 43.78 KG/M2 | DIASTOLIC BLOOD PRESSURE: 77 MMHG | RESPIRATION RATE: 20 BRPM

## 2025-04-03 DIAGNOSIS — F41.9 ANXIETY: Primary | ICD-10-CM

## 2025-04-03 DIAGNOSIS — M48.061 SPINAL STENOSIS OF LUMBAR REGION, UNSPECIFIED WHETHER NEUROGENIC CLAUDICATION PRESENT: ICD-10-CM

## 2025-04-03 DIAGNOSIS — T88.7XXA MEDICATION SIDE EFFECT: ICD-10-CM

## 2025-04-03 DIAGNOSIS — R44.3 HALLUCINATIONS: ICD-10-CM

## 2025-04-03 DIAGNOSIS — S32.030A CLOSED WEDGE COMPRESSION FRACTURE OF L3 VERTEBRA, INITIAL ENCOUNTER (HCC): Primary | ICD-10-CM

## 2025-04-03 LAB
ALBUMIN SERPL-MCNC: 3.1 G/DL (ref 3.5–5)
ALBUMIN/GLOB SERPL: 0.9 (ref 1.1–2.2)
ALP SERPL-CCNC: 147 U/L (ref 45–117)
ALT SERPL-CCNC: 49 U/L (ref 12–78)
AMPHET UR QL SCN: NEGATIVE
ANION GAP SERPL CALC-SCNC: 12 MMOL/L (ref 2–12)
APPEARANCE UR: CLEAR
AST SERPL W P-5'-P-CCNC: 31 U/L (ref 15–37)
BACTERIA URNS QL MICRO: ABNORMAL /HPF
BARBITURATES UR QL SCN: NEGATIVE
BASOPHILS # BLD: 0.02 K/UL (ref 0–0.1)
BASOPHILS NFR BLD: 0.2 % (ref 0–1)
BENZODIAZ UR QL: POSITIVE
BILIRUB SERPL-MCNC: 0.6 MG/DL (ref 0.2–1)
BILIRUB UR QL: NEGATIVE
BNP SERPL-MCNC: 1175 PG/ML
BUN SERPL-MCNC: 65 MG/DL (ref 6–20)
BUN/CREAT SERPL: 22 (ref 12–20)
CA-I BLD-MCNC: 9 MG/DL (ref 8.5–10.1)
CANNABINOIDS UR QL SCN: NEGATIVE
CHLORIDE SERPL-SCNC: 110 MMOL/L (ref 97–108)
CO2 SERPL-SCNC: 23 MMOL/L (ref 21–32)
COCAINE UR QL SCN: NEGATIVE
COLOR UR: ABNORMAL
CREAT SERPL-MCNC: 2.96 MG/DL (ref 0.55–1.02)
DIFFERENTIAL METHOD BLD: ABNORMAL
EOSINOPHIL # BLD: 0.11 K/UL (ref 0–0.4)
EOSINOPHIL NFR BLD: 0.9 % (ref 0–7)
ERYTHROCYTE [DISTWIDTH] IN BLOOD BY AUTOMATED COUNT: 15.8 % (ref 11.5–14.5)
GLOBULIN SER CALC-MCNC: 3.6 G/DL (ref 2–4)
GLUCOSE SERPL-MCNC: 110 MG/DL (ref 65–100)
GLUCOSE UR STRIP.AUTO-MCNC: NEGATIVE MG/DL
HCT VFR BLD AUTO: 30.6 % (ref 35–47)
HGB BLD-MCNC: 9.7 G/DL (ref 11.5–16)
HGB UR QL STRIP: NEGATIVE
IMM GRANULOCYTES # BLD AUTO: 0.32 K/UL (ref 0–0.04)
IMM GRANULOCYTES NFR BLD AUTO: 2.6 % (ref 0–0.5)
KETONES UR QL STRIP.AUTO: NEGATIVE MG/DL
LEUKOCYTE ESTERASE UR QL STRIP.AUTO: ABNORMAL
LYMPHOCYTES # BLD: 2.09 K/UL (ref 0.8–3.5)
LYMPHOCYTES NFR BLD: 17.1 % (ref 12–49)
Lab: ABNORMAL
MCH RBC QN AUTO: 28.6 PG (ref 26–34)
MCHC RBC AUTO-ENTMCNC: 31.7 G/DL (ref 30–36.5)
MCV RBC AUTO: 90.3 FL (ref 80–99)
MDMA, URINE: POSITIVE
METHADONE UR QL: NEGATIVE
MONOCYTES # BLD: 0.89 K/UL (ref 0–1)
MONOCYTES NFR BLD: 7.3 % (ref 5–13)
NEUTS SEG # BLD: 8.77 K/UL (ref 1.8–8)
NEUTS SEG NFR BLD: 71.9 % (ref 32–75)
NITRITE UR QL STRIP.AUTO: NEGATIVE
NRBC # BLD: 0 K/UL (ref 0–0.01)
NRBC BLD-RTO: 0 PER 100 WBC
OPIATES UR QL: NEGATIVE
PCP UR QL: NEGATIVE
PH UR STRIP: 8.5 (ref 5–8)
PHOSPHATE SERPL-MCNC: 3.6 MG/DL (ref 2.6–4.7)
PLATELET # BLD AUTO: 167 K/UL (ref 150–400)
PMV BLD AUTO: 10.1 FL (ref 8.9–12.9)
POTASSIUM SERPL-SCNC: 5 MMOL/L (ref 3.5–5.1)
PROT SERPL-MCNC: 6.7 G/DL (ref 6.4–8.2)
PROT UR STRIP-MCNC: 100 MG/DL
RBC # BLD AUTO: 3.39 M/UL (ref 3.8–5.2)
RBC #/AREA URNS HPF: ABNORMAL /HPF (ref 0–3)
RBC MORPH BLD: ABNORMAL
SODIUM SERPL-SCNC: 145 MMOL/L (ref 136–145)
SP GR UR REFRACTOMETRY: 1.01 (ref 1–1.03)
URINE CULTURE IF INDICATED: ABNORMAL
UROBILINOGEN UR QL STRIP.AUTO: 0.2 EU/DL (ref 0.2–1)
WBC # BLD AUTO: 12.2 K/UL (ref 3.6–11)
WBC URNS QL MICRO: ABNORMAL /HPF (ref 0–5)

## 2025-04-03 PROCEDURE — 83880 ASSAY OF NATRIURETIC PEPTIDE: CPT

## 2025-04-03 PROCEDURE — 72131 CT LUMBAR SPINE W/O DYE: CPT

## 2025-04-03 PROCEDURE — 36415 COLL VENOUS BLD VENIPUNCTURE: CPT

## 2025-04-03 PROCEDURE — 6370000000 HC RX 637 (ALT 250 FOR IP): Performed by: EMERGENCY MEDICINE

## 2025-04-03 PROCEDURE — 1100000000 HC RM PRIVATE

## 2025-04-03 PROCEDURE — 99285 EMERGENCY DEPT VISIT HI MDM: CPT

## 2025-04-03 PROCEDURE — 80307 DRUG TEST PRSMV CHEM ANLYZR: CPT

## 2025-04-03 PROCEDURE — 80053 COMPREHEN METABOLIC PANEL: CPT

## 2025-04-03 PROCEDURE — 84100 ASSAY OF PHOSPHORUS: CPT

## 2025-04-03 PROCEDURE — 85025 COMPLETE CBC W/AUTO DIFF WBC: CPT

## 2025-04-03 PROCEDURE — 70450 CT HEAD/BRAIN W/O DYE: CPT

## 2025-04-03 PROCEDURE — 81001 URINALYSIS AUTO W/SCOPE: CPT

## 2025-04-03 RX ORDER — ACETAMINOPHEN 500 MG
1000 TABLET ORAL
Status: COMPLETED | OUTPATIENT
Start: 2025-04-03 | End: 2025-04-03

## 2025-04-03 RX ADMIN — ACETAMINOPHEN 1000 MG: 500 TABLET ORAL at 22:33

## 2025-04-03 ASSESSMENT — PAIN - FUNCTIONAL ASSESSMENT: PAIN_FUNCTIONAL_ASSESSMENT: 0-10

## 2025-04-03 ASSESSMENT — PAIN DESCRIPTION - LOCATION: LOCATION: BACK

## 2025-04-03 ASSESSMENT — PAIN SCALES - GENERAL
PAINLEVEL_OUTOF10: 0
PAINLEVEL_OUTOF10: 10

## 2025-04-03 NOTE — ED TRIAGE NOTES
Pt arrives via ems for hallucinations following med use at home- oxycodone, gabapentin, and trazodone. Pt seen at this facility recently for same. Pt Aox4. Pain 10/10 to lower back. Pt states she was seeing people and animals that weren't there.

## 2025-04-03 NOTE — ED PROVIDER NOTES
[facility] EMERGENCY DEPT  EMERGENCY DEPARTMENT HISTORY AND PHYSICAL EXAM      Date: 4/3/2025  Patient Name: Johanna Dunaway  MRN: [id number]  Birthdate 1960  Date of evaluation: 4/3/2025  Provider: Soumya Coyne MD   Note Started: 2:52 PM EDT 4/3/25    HISTORY OF PRESENT ILLNESS     Chief Complaint   Patient presents with    Hallucinations    Back Pain       History Provided By: patient, EMS    HPI: Johanna Dunaway is a 64 y.o. female with PMH Bipolar 1, CKD, DM2, TAHIRA, Morbid Obesity, chronic back pain presenting with medication overdose, back pain. Pt took oxycodone, tramadol and gabapentin as prescribed for her back pain. Is almost out of prescriptions. Started seeing people and animals that aren't there. Had this same presentation 1 week ago getting hallucinations from oxycodone. Denies change in back pain. Denies trauma to back, saddle anesthesia, b/b incontinence, new LE weakness or numbness, hx of CA or IVDU or chronic steroid use. Does have pain radiate down both legs.    PAST MEDICAL HISTORY   Past Medical History:  Past Medical History:   Diagnosis Date    Anxiety 6/22/2020    Arthritis     Bipolar 1 disorder (HCC) 1/23/2018    Congestive heart failure (CHF) ([facility])     PT DENIES THIS DX, STATES AN ER DR TOLD HER THIS WHEN SHE HAD COVID AND SAYS HER PCP HAS NEVER TOLD HER THIS AND WHEN SHE SAW CARDIOLOGY 5YRS AGO, ALL HER TESTS CAME BACK FINE PER PT    Diabetes mellitus type 2, controlled (HCC) 6/22/2020    Diverticulosis     Hx of blood clots 2016    SMALL PE    Hypertension 6/22/2020    Kidney failure     Liver fibrosis 6/22/2020    Major depression 6/22/2020    Migraine 6/22/2020    Sleep apnea 1/23/2018    USES CPAP    Suicide attempt (HCC) 6/22/2020    2013    UTI (urinary tract infection)        Past Surgical History:  Past Surgical History:   Procedure Laterality Date    APPENDECTOMY      CHOLECYSTECTOMY      DIALYSIS FISTULA CREATION Left 10/11/2024    LEFT UPPER ARM DIALYSIS GRAFT PLACEMENT performed      Unable to Pay for Housing in the Last Year: No     Number of Times Moved in the Last Year: 0     Homeless in the Last Year: No   Interpersonal Safety: Not At Risk (4/3/2025)    Interpersonal Safety Domain Source: IP Abuse Screening     Physical abuse: Denies     Verbal abuse: Denies     Emotional abuse: Denies     Financial abuse: Denies     Sexual abuse: Denies   Utilities: Not At Risk (4/3/2025)    [facility] Utilities     Threatened with loss of utilities: No       PHYSICAL EXAM   Constitutional: Awake and alert, interactive, NAD  Eyes: PERRL, no injection or scleral icterus, no discharge  HEENT: NCAT, neck supple, MMM, no oropharyngeal exudates  CV: RRR, no m/r/g  Respiratory: CTAB, no r/r/w  GI: Abd soft, nondistended, b/l low back ttp  : Deferred  MSK: FROM, no joint effusions or edema  Skin: No rashes  Neuro: CN2-12 intact, symmetric facies, fluent speech.  Psych: Well-groomed, normal speech, behavior, appropriate mood, no SI/HI/AVH.   Lymph: No LAD        SCREENINGS                   LAB, EKG AND DIAGNOSTIC RESULTS   Labs:  Recent Results (from the past 12 hours)   POCT Glucose    Collection Time: 04/08/25  7:27 AM   Result Value Ref Range    POC Glucose 148 (H) 65 - 100 mg/dL    Performed by: Donald Salinas    POCT Glucose    Collection Time: 04/08/25 10:31 AM   Result Value Ref Range    POC Glucose 355 (H) 65 - 100 mg/dL    Performed by: Eugene Bai    POCT Glucose    Collection Time: 04/08/25 11:01 AM   Result Value Ref Range    POC Glucose 365 (H) 65 - 100 mg/dL    Performed by: Eugene Bai    POCT Glucose    Collection Time: 04/08/25  2:51 PM   Result Value Ref Range    POC Glucose 368 (H) 65 - 100 mg/dL    Performed by: Tom Palmer ([place])          EKG interpretation by me:       Radiologic Studies:  Non-plain film images such as CT, Ultrasound and MRI are read by the radiologist. Plain radiographic images are visualized and preliminarily interpreted by the ED Provider with the following

## 2025-04-03 NOTE — ED NOTES
Went in to check on pt- call bell ringing. Pt had pulled out call bell and was talking into it- asked who she was talking to - states her sister- but she couldn't hear ur. Re-orented the p[t again. MD made aware. RR even and nonlabored. . Resting on stretcher

## 2025-04-03 NOTE — PROGRESS NOTES
Reviewed with ED provider Dr Coyne about patient's presentation with hallucinations possibly secondary to increased use of oxycodone, tramadol and gabapentin for what appears to be worsening acute on chronic back pain.  Has past medical history of bipolar, CKD, diabetes, obstructive sleep apnea, morbid obesity and chronic back pain.  She presented from home after family noted patient starting to see people and animals that were not there and it was noted that she had similar presentation about 1 week ago and it was felt to be secondary to the oxycodone.  It is also noted she has acute on chronic back pain for which she is taking increased pain medication and gabapentin and new lower extremity weakness/numbness and pain radiating down both legs.  Patient appears to have CKD close to baseline with current creatinine 2.96.  With increasing pain CT of the lumbar spine without contrast was obtained and shows acute L3 compression fracture if no patient has not had any noted recent trauma was also noted retropulsion and facet osteoarthritis causing moderate to severe canal stenosis which is concerning secondary to patient's increased lower extremity weakness and pain radiating down both her legs.  ED provider discussed the case with IR who noted that patient can have intervention on outpatient basis needs MRI prior to at this time our next MRI availability is next Tuesday.  Also for safety unable to get PT to evaluate patient unless can have documentation clearing the patient for therapy.   With patients worsening low back pains requiring increasing pain medications leading to her current hallucation symptoms I recommended that patient be transferred to higher level care where an MRI which was recommended to be done prior to any intervention be done and have IR or neurosurgery evaluate on inpatient basis as doesn't appear patient could get done on outpatient basis safely

## 2025-04-03 NOTE — ED NOTES
Pt awake- appropriate- talking with sister at bedside. Pt wants to go home with sister. Sister states she is taking the meds- and giving them  out to the pt as  ordered. Pt will not have access.

## 2025-04-04 PROBLEM — M54.9 INTRACTABLE BACK PAIN: Status: ACTIVE | Noted: 2025-04-04

## 2025-04-04 LAB
ALBUMIN SERPL-MCNC: 3 G/DL (ref 3.5–5)
ALBUMIN/GLOB SERPL: 0.8 (ref 1.1–2.2)
ALP SERPL-CCNC: 162 U/L (ref 45–117)
ALT SERPL-CCNC: 42 U/L (ref 12–78)
ANION GAP SERPL CALC-SCNC: 5 MMOL/L (ref 2–12)
AST SERPL W P-5'-P-CCNC: 31 U/L (ref 15–37)
BASOPHILS # BLD: 0.01 K/UL (ref 0–0.1)
BASOPHILS NFR BLD: 0.1 % (ref 0–1)
BILIRUB SERPL-MCNC: 0.8 MG/DL (ref 0.2–1)
BUN SERPL-MCNC: 60 MG/DL (ref 6–20)
BUN/CREAT SERPL: 22 (ref 12–20)
CA-I BLD-MCNC: 9.4 MG/DL (ref 8.5–10.1)
CHLORIDE SERPL-SCNC: 114 MMOL/L (ref 97–108)
CO2 SERPL-SCNC: 22 MMOL/L (ref 21–32)
CREAT SERPL-MCNC: 2.73 MG/DL (ref 0.55–1.02)
DIFFERENTIAL METHOD BLD: ABNORMAL
EOSINOPHIL # BLD: 0.03 K/UL (ref 0–0.4)
EOSINOPHIL NFR BLD: 0.3 % (ref 0–7)
ERYTHROCYTE [DISTWIDTH] IN BLOOD BY AUTOMATED COUNT: 15.8 % (ref 11.5–14.5)
GLOBULIN SER CALC-MCNC: 3.9 G/DL (ref 2–4)
GLUCOSE BLD STRIP.AUTO-MCNC: 139 MG/DL (ref 65–100)
GLUCOSE BLD STRIP.AUTO-MCNC: 204 MG/DL (ref 65–100)
GLUCOSE BLD STRIP.AUTO-MCNC: 288 MG/DL (ref 65–100)
GLUCOSE BLD STRIP.AUTO-MCNC: 77 MG/DL (ref 65–100)
GLUCOSE BLD STRIP.AUTO-MCNC: 94 MG/DL (ref 65–100)
GLUCOSE SERPL-MCNC: 105 MG/DL (ref 65–100)
HCT VFR BLD AUTO: 32.9 % (ref 35–47)
HGB BLD-MCNC: 10.1 G/DL (ref 11.5–16)
IMM GRANULOCYTES # BLD AUTO: 0.17 K/UL (ref 0–0.04)
IMM GRANULOCYTES NFR BLD AUTO: 1.8 % (ref 0–0.5)
LYMPHOCYTES # BLD: 0.92 K/UL (ref 0.8–3.5)
LYMPHOCYTES NFR BLD: 10 % (ref 12–49)
MCH RBC QN AUTO: 28.4 PG (ref 26–34)
MCHC RBC AUTO-ENTMCNC: 30.7 G/DL (ref 30–36.5)
MCV RBC AUTO: 92.4 FL (ref 80–99)
MONOCYTES # BLD: 0.26 K/UL (ref 0–1)
MONOCYTES NFR BLD: 2.8 % (ref 5–13)
MRSA DNA SPEC QL NAA+PROBE: NOT DETECTED
NEUTS SEG # BLD: 7.84 K/UL (ref 1.8–8)
NEUTS SEG NFR BLD: 85 % (ref 32–75)
NRBC # BLD: 0 K/UL (ref 0–0.01)
NRBC BLD-RTO: 0 PER 100 WBC
PERFORMED BY:: ABNORMAL
PERFORMED BY:: NORMAL
PERFORMED BY:: NORMAL
PLATELET # BLD AUTO: 144 K/UL (ref 150–400)
PMV BLD AUTO: 10.9 FL (ref 8.9–12.9)
POTASSIUM SERPL-SCNC: 4.9 MMOL/L (ref 3.5–5.1)
PROT SERPL-MCNC: 6.9 G/DL (ref 6.4–8.2)
RBC # BLD AUTO: 3.56 M/UL (ref 3.8–5.2)
SODIUM SERPL-SCNC: 141 MMOL/L (ref 136–145)
WBC # BLD AUTO: 9.2 K/UL (ref 3.6–11)

## 2025-04-04 PROCEDURE — 6360000002 HC RX W HCPCS: Performed by: PHYSICIAN ASSISTANT

## 2025-04-04 PROCEDURE — 2580000003 HC RX 258: Performed by: PHYSICIAN ASSISTANT

## 2025-04-04 PROCEDURE — 2500000003 HC RX 250 WO HCPCS: Performed by: PHYSICIAN ASSISTANT

## 2025-04-04 PROCEDURE — 1100000000 HC RM PRIVATE

## 2025-04-04 PROCEDURE — 87641 MR-STAPH DNA AMP PROBE: CPT

## 2025-04-04 PROCEDURE — 6360000002 HC RX W HCPCS

## 2025-04-04 PROCEDURE — 82962 GLUCOSE BLOOD TEST: CPT

## 2025-04-04 PROCEDURE — 85025 COMPLETE CBC W/AUTO DIFF WBC: CPT

## 2025-04-04 PROCEDURE — 6370000000 HC RX 637 (ALT 250 FOR IP): Performed by: PHYSICIAN ASSISTANT

## 2025-04-04 PROCEDURE — 80053 COMPREHEN METABOLIC PANEL: CPT

## 2025-04-04 RX ORDER — GABAPENTIN 100 MG/1
100 CAPSULE ORAL 3 TIMES DAILY
Status: DISCONTINUED | OUTPATIENT
Start: 2025-04-04 | End: 2025-04-09 | Stop reason: HOSPADM

## 2025-04-04 RX ORDER — ARIPIPRAZOLE 5 MG/1
20 TABLET ORAL NIGHTLY
Status: DISCONTINUED | OUTPATIENT
Start: 2025-04-05 | End: 2025-04-09 | Stop reason: HOSPADM

## 2025-04-04 RX ORDER — ONDANSETRON 2 MG/ML
4 INJECTION INTRAMUSCULAR; INTRAVENOUS EVERY 6 HOURS PRN
Status: DISCONTINUED | OUTPATIENT
Start: 2025-04-04 | End: 2025-04-09 | Stop reason: HOSPADM

## 2025-04-04 RX ORDER — METOPROLOL SUCCINATE 50 MG/1
50 TABLET, EXTENDED RELEASE ORAL NIGHTLY
Status: DISCONTINUED | OUTPATIENT
Start: 2025-04-05 | End: 2025-04-09 | Stop reason: HOSPADM

## 2025-04-04 RX ORDER — SODIUM CHLORIDE 0.9 % (FLUSH) 0.9 %
5-40 SYRINGE (ML) INJECTION EVERY 12 HOURS SCHEDULED
Status: DISCONTINUED | OUTPATIENT
Start: 2025-04-04 | End: 2025-04-09 | Stop reason: HOSPADM

## 2025-04-04 RX ORDER — SODIUM CHLORIDE 0.9 % (FLUSH) 0.9 %
5-40 SYRINGE (ML) INJECTION PRN
Status: DISCONTINUED | OUTPATIENT
Start: 2025-04-04 | End: 2025-04-09 | Stop reason: HOSPADM

## 2025-04-04 RX ORDER — FUROSEMIDE 10 MG/ML
20 INJECTION INTRAMUSCULAR; INTRAVENOUS 2 TIMES DAILY
Status: DISCONTINUED | OUTPATIENT
Start: 2025-04-04 | End: 2025-04-08

## 2025-04-04 RX ORDER — HYDROMORPHONE HYDROCHLORIDE 1 MG/ML
0.5 INJECTION, SOLUTION INTRAMUSCULAR; INTRAVENOUS; SUBCUTANEOUS EVERY 4 HOURS PRN
Status: ACTIVE | OUTPATIENT
Start: 2025-04-04 | End: 2025-04-06

## 2025-04-04 RX ORDER — CITALOPRAM HYDROBROMIDE 20 MG/1
20 TABLET ORAL NIGHTLY
Status: DISCONTINUED | OUTPATIENT
Start: 2025-04-05 | End: 2025-04-09 | Stop reason: HOSPADM

## 2025-04-04 RX ORDER — ALPRAZOLAM 0.5 MG
0.5 TABLET ORAL 3 TIMES DAILY PRN
Status: DISCONTINUED | OUTPATIENT
Start: 2025-04-04 | End: 2025-04-09 | Stop reason: HOSPADM

## 2025-04-04 RX ORDER — HYDRALAZINE HYDROCHLORIDE 50 MG/1
50 TABLET, FILM COATED ORAL 3 TIMES DAILY
Status: DISCONTINUED | OUTPATIENT
Start: 2025-04-04 | End: 2025-04-06

## 2025-04-04 RX ORDER — SODIUM CHLORIDE 9 MG/ML
INJECTION, SOLUTION INTRAVENOUS PRN
Status: DISCONTINUED | OUTPATIENT
Start: 2025-04-04 | End: 2025-04-09 | Stop reason: HOSPADM

## 2025-04-04 RX ORDER — POLYETHYLENE GLYCOL 3350 17 G/17G
17 POWDER, FOR SOLUTION ORAL DAILY PRN
Status: DISCONTINUED | OUTPATIENT
Start: 2025-04-04 | End: 2025-04-09 | Stop reason: HOSPADM

## 2025-04-04 RX ORDER — GLUCAGON 1 MG/ML
1 KIT INJECTION PRN
Status: DISCONTINUED | OUTPATIENT
Start: 2025-04-04 | End: 2025-04-09 | Stop reason: HOSPADM

## 2025-04-04 RX ORDER — DEXTROSE MONOHYDRATE 100 MG/ML
INJECTION, SOLUTION INTRAVENOUS CONTINUOUS PRN
Status: DISCONTINUED | OUTPATIENT
Start: 2025-04-04 | End: 2025-04-09 | Stop reason: HOSPADM

## 2025-04-04 RX ORDER — HYDRALAZINE HYDROCHLORIDE 20 MG/ML
10 INJECTION INTRAMUSCULAR; INTRAVENOUS EVERY 8 HOURS
Status: DISCONTINUED | OUTPATIENT
Start: 2025-04-04 | End: 2025-04-05

## 2025-04-04 RX ORDER — ONDANSETRON 4 MG/1
4 TABLET, ORALLY DISINTEGRATING ORAL EVERY 8 HOURS PRN
Status: DISCONTINUED | OUTPATIENT
Start: 2025-04-04 | End: 2025-04-09 | Stop reason: HOSPADM

## 2025-04-04 RX ORDER — ACETAMINOPHEN 650 MG/1
650 SUPPOSITORY RECTAL EVERY 6 HOURS PRN
Status: DISCONTINUED | OUTPATIENT
Start: 2025-04-04 | End: 2025-04-09 | Stop reason: HOSPADM

## 2025-04-04 RX ORDER — BUPROPION HYDROCHLORIDE 150 MG/1
150 TABLET, EXTENDED RELEASE ORAL 2 TIMES DAILY
Status: DISCONTINUED | OUTPATIENT
Start: 2025-04-04 | End: 2025-04-09 | Stop reason: HOSPADM

## 2025-04-04 RX ORDER — FUROSEMIDE 40 MG/1
40 TABLET ORAL 2 TIMES DAILY
Status: DISCONTINUED | OUTPATIENT
Start: 2025-04-04 | End: 2025-04-09 | Stop reason: HOSPADM

## 2025-04-04 RX ORDER — INSULIN LISPRO 100 [IU]/ML
0-16 INJECTION, SOLUTION INTRAVENOUS; SUBCUTANEOUS
Status: DISCONTINUED | OUTPATIENT
Start: 2025-04-04 | End: 2025-04-09 | Stop reason: HOSPADM

## 2025-04-04 RX ORDER — SODIUM CHLORIDE, SODIUM LACTATE, POTASSIUM CHLORIDE, CALCIUM CHLORIDE 600; 310; 30; 20 MG/100ML; MG/100ML; MG/100ML; MG/100ML
INJECTION, SOLUTION INTRAVENOUS CONTINUOUS
Status: DISPENSED | OUTPATIENT
Start: 2025-04-04 | End: 2025-04-05

## 2025-04-04 RX ORDER — ACETAMINOPHEN 325 MG/1
650 TABLET ORAL EVERY 6 HOURS PRN
Status: DISCONTINUED | OUTPATIENT
Start: 2025-04-04 | End: 2025-04-09 | Stop reason: HOSPADM

## 2025-04-04 RX ADMIN — GABAPENTIN 100 MG: 100 CAPSULE ORAL at 22:02

## 2025-04-04 RX ADMIN — SODIUM CHLORIDE, POTASSIUM CHLORIDE, SODIUM LACTATE AND CALCIUM CHLORIDE: 600; 310; 30; 20 INJECTION, SOLUTION INTRAVENOUS at 03:07

## 2025-04-04 RX ADMIN — ONDANSETRON 4 MG: 2 INJECTION, SOLUTION INTRAMUSCULAR; INTRAVENOUS at 15:44

## 2025-04-04 RX ADMIN — FUROSEMIDE 20 MG: 10 INJECTION, SOLUTION INTRAMUSCULAR; INTRAVENOUS at 18:25

## 2025-04-04 RX ADMIN — BUPROPION HYDROCHLORIDE 150 MG: 150 TABLET, FILM COATED, EXTENDED RELEASE ORAL at 22:02

## 2025-04-04 RX ADMIN — HYDRALAZINE HYDROCHLORIDE 10 MG: 20 INJECTION INTRAMUSCULAR; INTRAVENOUS at 18:25

## 2025-04-04 RX ADMIN — METHYLPREDNISOLONE SODIUM SUCCINATE 60 MG: 125 INJECTION INTRAMUSCULAR; INTRAVENOUS at 15:43

## 2025-04-04 RX ADMIN — HYDRALAZINE HYDROCHLORIDE 10 MG: 20 INJECTION INTRAMUSCULAR; INTRAVENOUS at 10:11

## 2025-04-04 RX ADMIN — FUROSEMIDE 40 MG: 40 TABLET ORAL at 09:10

## 2025-04-04 RX ADMIN — CEFTRIAXONE SODIUM 1000 MG: 1 INJECTION, POWDER, FOR SOLUTION INTRAMUSCULAR; INTRAVENOUS at 02:58

## 2025-04-04 RX ADMIN — INSULIN LISPRO 4 UNITS: 100 INJECTION, SOLUTION INTRAVENOUS; SUBCUTANEOUS at 18:25

## 2025-04-04 RX ADMIN — Medication 3 MG: at 22:02

## 2025-04-04 RX ADMIN — INSULIN LISPRO 8 UNITS: 100 INJECTION, SOLUTION INTRAVENOUS; SUBCUTANEOUS at 22:02

## 2025-04-04 RX ADMIN — GABAPENTIN 100 MG: 100 CAPSULE ORAL at 09:10

## 2025-04-04 RX ADMIN — GABAPENTIN 100 MG: 100 CAPSULE ORAL at 15:44

## 2025-04-04 RX ADMIN — SODIUM CHLORIDE, PRESERVATIVE FREE 10 ML: 5 INJECTION INTRAVENOUS at 10:16

## 2025-04-04 RX ADMIN — METHYLPREDNISOLONE SODIUM SUCCINATE 60 MG: 125 INJECTION INTRAMUSCULAR; INTRAVENOUS at 02:58

## 2025-04-04 ASSESSMENT — PAIN DESCRIPTION - DESCRIPTORS: DESCRIPTORS: ACHING;DISCOMFORT

## 2025-04-04 ASSESSMENT — PAIN SCALES - GENERAL: PAINLEVEL_OUTOF10: 8

## 2025-04-04 ASSESSMENT — PAIN DESCRIPTION - ORIENTATION: ORIENTATION: LOWER

## 2025-04-04 ASSESSMENT — PAIN DESCRIPTION - LOCATION: LOCATION: BACK

## 2025-04-04 NOTE — ED NOTES
[facility] at bedside, report given at this time. Patient transported with copy of chart, EMTALA, and belongings.

## 2025-04-04 NOTE — ED NOTES
While starting IV on Pt, she stated \"why is there an audience in here? That kwan looks like he is going to collapse.\" This RN reassured Pt that no one else was in the room with us and reoriented Pt at this time.

## 2025-04-04 NOTE — ED NOTES
RN attempted to stick patient after informing patient she needed an IV for transfer. Patient agreed to be stuck for IV. While RN was attempting to start IV, patient states \"I wonder who gave you consent for this procedure\". RN reminded patient that she informed her of the need for an IV and patient agreed. Patient states \"well why would you listen to me if I am hallucinating?\" RN inquired if patient was hallucinating to which she responds \"No, but that's what they're saying about me.\" RN asked patient orientation questions in which she correctly answered all questions. Patient continues to talk under her breath, when RN asks if something is wrong she replies \"don't worry about it, God heard me\".

## 2025-04-04 NOTE — ED PROVIDER NOTES
Brief Patient Assessment:    I conducted a brief primary assessment of the patient's presenting complaints, vital signs, and clinical condition prior to the shift change to ensure patient safety, condition stability, and timely initiation of the necessary workup. Any clinically indicated orders were placed to facilitate medical management. The incoming provider will assume full responsibility for the ongoing care of the patient upon their arrival for their emergency department shift and will conduct a detailed and comprehensive assessment which will be documented in the main ED encounter note.    Patient is hemodynamically stable for transfer. She is resting comfortably in the bed.  [facility] and [facility]. She [facility] the patient to [facility]..  Discussed the recommendation with patient is in agreement with being transferred for higher level of care.  Altagracia Rainey MD  4/3/2025       Altagracia Rainey MD  04/03/25 2027

## 2025-04-04 NOTE — ED NOTES
Patient changed into gown and linen replaced, new purwick in place. Patient repositioned in bed at this time.

## 2025-04-04 NOTE — CARE COORDINATION
04/04/25 1205   Service Assessment   Patient Orientation Other (see comment)  (AMS)   Cognition Other (see comment)  (AMS)   History Provided By Child/Family  (Sister)   Primary Caregiver Self   Support Systems Family Members   Patient's Healthcare Decision Maker is: Legal Next of Kin   PCP Verified by CM Yes   Last Visit to PCP Within last 3 months  (March 21, 2025)   Prior Functional Level Independent in ADLs/IADLs   Current Functional Level Independent in ADLs/IADLs   Can patient return to prior living arrangement Yes   Ability to make needs known: Fair   Family able to assist with home care needs: Yes   Would you like for me to discuss the discharge plan with any other family members/significant others, and if so, who? Yes   Financial Resources Medicare   Community Resources None   Social/Functional History   Lives With Family   Type of Home House   Home Equipment   (CPAP)   Receives Help From Family   Prior Level of Assist for ADLs Independent   Prior Level of Assist for Homemaking Independent   Ambulation Assistance Independent   Prior Level of Assist for Transfers Independent   Active  Yes   Discharge Planning   Type of Residence House   Living Arrangements Family Members   Current Services Prior To Admission C-pap     IDR 72HRS.   IR consullt.  MRI of the brain.  AMS.  Sister Lucía at the bedside.  Demographics confirmed.  Patient resides w/her sister in a single level home.  Last seen at listed PCP on 21 Mar 2025.  Utilizes [employer] ([place], [place]).  Self reports independent w/all ADL's & IADL's and drives.  Patient reports two falls within the last 90 days, went to see listed PCP.  No home O2.  Home DME consists of a CPAP machine.  Patient doesn't follow w/any pulmonologist as PCP is managing CPAP.  No hx of HH, SNF, and or IPR.  At time of discharge family to transport home.     Advance Care Planning     General Advance Care Planning (ACP) Conversation    Date of Conversation:

## 2025-04-05 ENCOUNTER — APPOINTMENT (OUTPATIENT)
Facility: HOSPITAL | Age: 65
DRG: 515 | End: 2025-04-05
Payer: MEDICARE

## 2025-04-05 LAB
ALBUMIN SERPL-MCNC: 3 G/DL (ref 3.5–5)
ALBUMIN SERPL-MCNC: 3 G/DL (ref 3.5–5)
ALBUMIN/GLOB SERPL: 0.8 (ref 1.1–2.2)
ALP SERPL-CCNC: 165 U/L (ref 45–117)
ALT SERPL-CCNC: 37 U/L (ref 12–78)
ANION GAP SERPL CALC-SCNC: 7 MMOL/L (ref 2–12)
ANION GAP SERPL CALC-SCNC: 8 MMOL/L (ref 2–12)
AST SERPL W P-5'-P-CCNC: 30 U/L (ref 15–37)
BASOPHILS # BLD: 0.01 K/UL (ref 0–0.1)
BASOPHILS NFR BLD: 0.1 % (ref 0–1)
BILIRUB SERPL-MCNC: 0.8 MG/DL (ref 0.2–1)
BUN SERPL-MCNC: 79 MG/DL (ref 6–20)
BUN SERPL-MCNC: 81 MG/DL (ref 6–20)
BUN/CREAT SERPL: 25 (ref 12–20)
BUN/CREAT SERPL: 26 (ref 12–20)
CA-I BLD-MCNC: 9.4 MG/DL (ref 8.5–10.1)
CA-I BLD-MCNC: 9.5 MG/DL (ref 8.5–10.1)
CHLORIDE SERPL-SCNC: 107 MMOL/L (ref 97–108)
CHLORIDE SERPL-SCNC: 108 MMOL/L (ref 97–108)
CO2 SERPL-SCNC: 21 MMOL/L (ref 21–32)
CO2 SERPL-SCNC: 21 MMOL/L (ref 21–32)
CREAT SERPL-MCNC: 3.11 MG/DL (ref 0.55–1.02)
CREAT SERPL-MCNC: 3.15 MG/DL (ref 0.55–1.02)
DIFFERENTIAL METHOD BLD: ABNORMAL
EOSINOPHIL # BLD: 0 K/UL (ref 0–0.4)
EOSINOPHIL NFR BLD: 0 % (ref 0–7)
ERYTHROCYTE [DISTWIDTH] IN BLOOD BY AUTOMATED COUNT: 15.6 % (ref 11.5–14.5)
EST. AVERAGE GLUCOSE BLD GHB EST-MCNC: 183 MG/DL
GLOBULIN SER CALC-MCNC: 4 G/DL (ref 2–4)
GLUCOSE BLD STRIP.AUTO-MCNC: 179 MG/DL (ref 65–100)
GLUCOSE BLD STRIP.AUTO-MCNC: 319 MG/DL (ref 65–100)
GLUCOSE BLD STRIP.AUTO-MCNC: 332 MG/DL (ref 65–100)
GLUCOSE BLD STRIP.AUTO-MCNC: 81 MG/DL (ref 65–100)
GLUCOSE SERPL-MCNC: 309 MG/DL (ref 65–100)
GLUCOSE SERPL-MCNC: 312 MG/DL (ref 65–100)
HBA1C MFR BLD: 8 % (ref 4–5.6)
HCT VFR BLD AUTO: 32.7 % (ref 35–47)
HGB BLD-MCNC: 10.5 G/DL (ref 11.5–16)
IMM GRANULOCYTES # BLD AUTO: 0.14 K/UL (ref 0–0.04)
IMM GRANULOCYTES NFR BLD AUTO: 1.2 % (ref 0–0.5)
LYMPHOCYTES # BLD: 0.68 K/UL (ref 0.8–3.5)
LYMPHOCYTES NFR BLD: 6.1 % (ref 12–49)
MCH RBC QN AUTO: 28.8 PG (ref 26–34)
MCHC RBC AUTO-ENTMCNC: 32.1 G/DL (ref 30–36.5)
MCV RBC AUTO: 89.8 FL (ref 80–99)
MONOCYTES # BLD: 0.24 K/UL (ref 0–1)
MONOCYTES NFR BLD: 2.1 % (ref 5–13)
NEUTS SEG # BLD: 10.15 K/UL (ref 1.8–8)
NEUTS SEG NFR BLD: 90.5 % (ref 32–75)
NRBC # BLD: 0 K/UL (ref 0–0.01)
NRBC BLD-RTO: 0 PER 100 WBC
PERFORMED BY:: ABNORMAL
PERFORMED BY:: NORMAL
PHOSPHATE SERPL-MCNC: 4.9 MG/DL (ref 2.6–4.7)
PLATELET # BLD AUTO: 179 K/UL (ref 150–400)
PMV BLD AUTO: 11.7 FL (ref 8.9–12.9)
POTASSIUM SERPL-SCNC: 5 MMOL/L (ref 3.5–5.1)
POTASSIUM SERPL-SCNC: 5.1 MMOL/L (ref 3.5–5.1)
PROT SERPL-MCNC: 7 G/DL (ref 6.4–8.2)
RBC # BLD AUTO: 3.64 M/UL (ref 3.8–5.2)
SODIUM SERPL-SCNC: 136 MMOL/L (ref 136–145)
SODIUM SERPL-SCNC: 136 MMOL/L (ref 136–145)
WBC # BLD AUTO: 11.2 K/UL (ref 3.6–11)

## 2025-04-05 PROCEDURE — 80053 COMPREHEN METABOLIC PANEL: CPT

## 2025-04-05 PROCEDURE — 85025 COMPLETE CBC W/AUTO DIFF WBC: CPT

## 2025-04-05 PROCEDURE — 6360000002 HC RX W HCPCS: Performed by: PHYSICIAN ASSISTANT

## 2025-04-05 PROCEDURE — 82962 GLUCOSE BLOOD TEST: CPT

## 2025-04-05 PROCEDURE — 83036 HEMOGLOBIN GLYCOSYLATED A1C: CPT

## 2025-04-05 PROCEDURE — 6370000000 HC RX 637 (ALT 250 FOR IP): Performed by: PHYSICIAN ASSISTANT

## 2025-04-05 PROCEDURE — 6370000000 HC RX 637 (ALT 250 FOR IP): Performed by: INTERNAL MEDICINE

## 2025-04-05 PROCEDURE — 36415 COLL VENOUS BLD VENIPUNCTURE: CPT

## 2025-04-05 PROCEDURE — 82728 ASSAY OF FERRITIN: CPT

## 2025-04-05 PROCEDURE — 80069 RENAL FUNCTION PANEL: CPT

## 2025-04-05 PROCEDURE — 99223 1ST HOSP IP/OBS HIGH 75: CPT | Performed by: PSYCHIATRY & NEUROLOGY

## 2025-04-05 PROCEDURE — 6360000002 HC RX W HCPCS

## 2025-04-05 PROCEDURE — 6370000000 HC RX 637 (ALT 250 FOR IP): Performed by: PSYCHIATRY & NEUROLOGY

## 2025-04-05 PROCEDURE — 2580000003 HC RX 258: Performed by: INTERNAL MEDICINE

## 2025-04-05 PROCEDURE — 6370000000 HC RX 637 (ALT 250 FOR IP)

## 2025-04-05 PROCEDURE — 1100000000 HC RM PRIVATE

## 2025-04-05 PROCEDURE — 2500000003 HC RX 250 WO HCPCS: Performed by: PHYSICIAN ASSISTANT

## 2025-04-05 PROCEDURE — 83540 ASSAY OF IRON: CPT

## 2025-04-05 RX ORDER — LORAZEPAM 0.5 MG/1
0.5 TABLET ORAL ONCE
Status: COMPLETED | OUTPATIENT
Start: 2025-04-05 | End: 2025-04-05

## 2025-04-05 RX ORDER — HALOPERIDOL 2 MG/ML
5 SOLUTION ORAL 2 TIMES DAILY
Status: DISCONTINUED | OUTPATIENT
Start: 2025-04-05 | End: 2025-04-09 | Stop reason: HOSPADM

## 2025-04-05 RX ORDER — SODIUM CHLORIDE 9 MG/ML
INJECTION, SOLUTION INTRAVENOUS CONTINUOUS
Status: DISCONTINUED | OUTPATIENT
Start: 2025-04-05 | End: 2025-04-07

## 2025-04-05 RX ADMIN — SODIUM CHLORIDE, PRESERVATIVE FREE 10 ML: 5 INJECTION INTRAVENOUS at 20:31

## 2025-04-05 RX ADMIN — ACETAMINOPHEN 650 MG: 325 TABLET ORAL at 12:35

## 2025-04-05 RX ADMIN — HYDRALAZINE HYDROCHLORIDE 50 MG: 50 TABLET ORAL at 16:20

## 2025-04-05 RX ADMIN — Medication 3 MG: at 20:30

## 2025-04-05 RX ADMIN — CITALOPRAM HYDROBROMIDE 20 MG: 20 TABLET ORAL at 20:30

## 2025-04-05 RX ADMIN — HYDRALAZINE HYDROCHLORIDE 10 MG: 20 INJECTION INTRAMUSCULAR; INTRAVENOUS at 10:15

## 2025-04-05 RX ADMIN — FUROSEMIDE 20 MG: 10 INJECTION, SOLUTION INTRAMUSCULAR; INTRAVENOUS at 10:15

## 2025-04-05 RX ADMIN — LORAZEPAM 0.5 MG: 0.5 TABLET ORAL at 12:58

## 2025-04-05 RX ADMIN — ARIPIPRAZOLE 20 MG: 5 TABLET ORAL at 20:30

## 2025-04-05 RX ADMIN — SODIUM CHLORIDE, PRESERVATIVE FREE 10 ML: 5 INJECTION INTRAVENOUS at 10:16

## 2025-04-05 RX ADMIN — BUPROPION HYDROCHLORIDE 150 MG: 150 TABLET, FILM COATED, EXTENDED RELEASE ORAL at 10:22

## 2025-04-05 RX ADMIN — METHYLPREDNISOLONE SODIUM SUCCINATE 60 MG: 125 INJECTION INTRAMUSCULAR; INTRAVENOUS at 16:20

## 2025-04-05 RX ADMIN — HYDRALAZINE HYDROCHLORIDE 10 MG: 20 INJECTION INTRAMUSCULAR; INTRAVENOUS at 02:30

## 2025-04-05 RX ADMIN — SODIUM CHLORIDE: 0.9 INJECTION, SOLUTION INTRAVENOUS at 16:20

## 2025-04-05 RX ADMIN — FUROSEMIDE 20 MG: 10 INJECTION, SOLUTION INTRAMUSCULAR; INTRAVENOUS at 20:31

## 2025-04-05 RX ADMIN — METHYLPREDNISOLONE SODIUM SUCCINATE 60 MG: 125 INJECTION INTRAMUSCULAR; INTRAVENOUS at 02:20

## 2025-04-05 RX ADMIN — GABAPENTIN 100 MG: 100 CAPSULE ORAL at 20:30

## 2025-04-05 RX ADMIN — METOPROLOL SUCCINATE 50 MG: 50 TABLET, EXTENDED RELEASE ORAL at 20:30

## 2025-04-05 RX ADMIN — ACETAMINOPHEN 650 MG: 325 TABLET ORAL at 05:33

## 2025-04-05 RX ADMIN — INSULIN LISPRO 12 UNITS: 100 INJECTION, SOLUTION INTRAVENOUS; SUBCUTANEOUS at 10:14

## 2025-04-05 RX ADMIN — INSULIN LISPRO 12 UNITS: 100 INJECTION, SOLUTION INTRAVENOUS; SUBCUTANEOUS at 12:25

## 2025-04-05 RX ADMIN — CEFTRIAXONE SODIUM 1000 MG: 1 INJECTION, POWDER, FOR SOLUTION INTRAMUSCULAR; INTRAVENOUS at 02:20

## 2025-04-05 RX ADMIN — HALOPERIDOL 5 MG: 2 SOLUTION ORAL at 20:31

## 2025-04-05 RX ADMIN — GABAPENTIN 100 MG: 100 CAPSULE ORAL at 10:14

## 2025-04-05 RX ADMIN — BUPROPION HYDROCHLORIDE 150 MG: 150 TABLET, FILM COATED, EXTENDED RELEASE ORAL at 20:30

## 2025-04-05 RX ADMIN — HYDRALAZINE HYDROCHLORIDE 50 MG: 50 TABLET ORAL at 20:30

## 2025-04-05 RX ADMIN — GABAPENTIN 100 MG: 100 CAPSULE ORAL at 16:30

## 2025-04-05 ASSESSMENT — PAIN DESCRIPTION - LOCATION
LOCATION: BACK
LOCATION: BACK
LOCATION: HEAD

## 2025-04-05 ASSESSMENT — PAIN DESCRIPTION - DESCRIPTORS
DESCRIPTORS: ACHING
DESCRIPTORS: ACHING;SHARP
DESCRIPTORS: ACHING;DISCOMFORT

## 2025-04-05 ASSESSMENT — PAIN SCALES - GENERAL
PAINLEVEL_OUTOF10: 3
PAINLEVEL_OUTOF10: 5
PAINLEVEL_OUTOF10: 3
PAINLEVEL_OUTOF10: 2

## 2025-04-05 ASSESSMENT — PAIN DESCRIPTION - ORIENTATION
ORIENTATION: LOWER
ORIENTATION: MID;LOWER

## 2025-04-05 ASSESSMENT — PAIN - FUNCTIONAL ASSESSMENT: PAIN_FUNCTIONAL_ASSESSMENT: PREVENTS OR INTERFERES SOME ACTIVE ACTIVITIES AND ADLS

## 2025-04-06 ENCOUNTER — HOSPITAL ENCOUNTER (OUTPATIENT)
Facility: HOSPITAL | Age: 65
Discharge: HOME | End: 2025-04-09
Payer: MEDICARE

## 2025-04-06 ENCOUNTER — APPOINTMENT (OUTPATIENT)
Facility: HOSPITAL | Age: 65
DRG: 515 | End: 2025-04-06
Payer: MEDICARE

## 2025-04-06 LAB
ALBUMIN SERPL-MCNC: 3 G/DL (ref 3.5–5)
ALBUMIN/GLOB SERPL: 0.8 (ref 1.1–2.2)
ALP SERPL-CCNC: 160 U/L (ref 45–117)
ALT SERPL-CCNC: 35 U/L (ref 12–78)
ANION GAP SERPL CALC-SCNC: 10 MMOL/L (ref 2–12)
AST SERPL W P-5'-P-CCNC: 20 U/L (ref 15–37)
BASOPHILS # BLD: 0.01 K/UL (ref 0–0.1)
BASOPHILS NFR BLD: 0.1 % (ref 0–1)
BILIRUB SERPL-MCNC: 0.8 MG/DL (ref 0.2–1)
BUN SERPL-MCNC: 88 MG/DL (ref 6–20)
BUN/CREAT SERPL: 25 (ref 12–20)
CA-I BLD-MCNC: 9.1 MG/DL (ref 8.5–10.1)
CHLORIDE SERPL-SCNC: 109 MMOL/L (ref 97–108)
CO2 SERPL-SCNC: 20 MMOL/L (ref 21–32)
CREAT SERPL-MCNC: 3.5 MG/DL (ref 0.55–1.02)
DIFFERENTIAL METHOD BLD: ABNORMAL
EOSINOPHIL # BLD: 0.01 K/UL (ref 0–0.4)
EOSINOPHIL NFR BLD: 0.1 % (ref 0–7)
ERYTHROCYTE [DISTWIDTH] IN BLOOD BY AUTOMATED COUNT: 15.8 % (ref 11.5–14.5)
FERRITIN SERPL-MCNC: 162 NG/ML (ref 8–252)
GLOBULIN SER CALC-MCNC: 4 G/DL (ref 2–4)
GLUCOSE BLD STRIP.AUTO-MCNC: 194 MG/DL (ref 65–100)
GLUCOSE BLD STRIP.AUTO-MCNC: 197 MG/DL (ref 65–100)
GLUCOSE BLD STRIP.AUTO-MCNC: 209 MG/DL (ref 65–100)
GLUCOSE BLD STRIP.AUTO-MCNC: 289 MG/DL (ref 65–100)
GLUCOSE BLD STRIP.AUTO-MCNC: 331 MG/DL (ref 65–100)
GLUCOSE SERPL-MCNC: 303 MG/DL (ref 65–100)
HCT VFR BLD AUTO: 30.3 % (ref 35–47)
HGB BLD-MCNC: 9.9 G/DL (ref 11.5–16)
IMM GRANULOCYTES # BLD AUTO: 0.12 K/UL (ref 0–0.04)
IMM GRANULOCYTES NFR BLD AUTO: 0.9 % (ref 0–0.5)
IRON SATN MFR SERPL: 25 % (ref 20–50)
IRON SERPL-MCNC: 97 UG/DL (ref 35–150)
LYMPHOCYTES # BLD: 0.58 K/UL (ref 0.8–3.5)
LYMPHOCYTES NFR BLD: 4.6 % (ref 12–49)
MCH RBC QN AUTO: 28.9 PG (ref 26–34)
MCHC RBC AUTO-ENTMCNC: 32.7 G/DL (ref 30–36.5)
MCV RBC AUTO: 88.3 FL (ref 80–99)
MONOCYTES # BLD: 0.12 K/UL (ref 0–1)
MONOCYTES NFR BLD: 0.9 % (ref 5–13)
NEUTS SEG # BLD: 11.86 K/UL (ref 1.8–8)
NEUTS SEG NFR BLD: 93.4 % (ref 32–75)
NRBC # BLD: 0 K/UL (ref 0–0.01)
NRBC BLD-RTO: 0 PER 100 WBC
PERFORMED BY:: ABNORMAL
PHOSPHATE SERPL-MCNC: 5.8 MG/DL (ref 2.6–4.7)
PLATELET # BLD AUTO: 166 K/UL (ref 150–400)
PMV BLD AUTO: 10.6 FL (ref 8.9–12.9)
POTASSIUM SERPL-SCNC: 4.6 MMOL/L (ref 3.5–5.1)
PROT SERPL-MCNC: 7 G/DL (ref 6.4–8.2)
RBC # BLD AUTO: 3.43 M/UL (ref 3.8–5.2)
SODIUM SERPL-SCNC: 139 MMOL/L (ref 136–145)
TIBC SERPL-MCNC: 389 UG/DL (ref 250–450)
WBC # BLD AUTO: 12.7 K/UL (ref 3.6–11)

## 2025-04-06 PROCEDURE — 80053 COMPREHEN METABOLIC PANEL: CPT

## 2025-04-06 PROCEDURE — 1100000000 HC RM PRIVATE

## 2025-04-06 PROCEDURE — 6370000000 HC RX 637 (ALT 250 FOR IP): Performed by: PSYCHIATRY & NEUROLOGY

## 2025-04-06 PROCEDURE — 73562 X-RAY EXAM OF KNEE 3: CPT

## 2025-04-06 PROCEDURE — 6360000002 HC RX W HCPCS

## 2025-04-06 PROCEDURE — 6370000000 HC RX 637 (ALT 250 FOR IP)

## 2025-04-06 PROCEDURE — 72192 CT PELVIS W/O DYE: CPT

## 2025-04-06 PROCEDURE — 2500000003 HC RX 250 WO HCPCS: Performed by: PHYSICIAN ASSISTANT

## 2025-04-06 PROCEDURE — 36415 COLL VENOUS BLD VENIPUNCTURE: CPT

## 2025-04-06 PROCEDURE — 84100 ASSAY OF PHOSPHORUS: CPT

## 2025-04-06 PROCEDURE — 2580000003 HC RX 258: Performed by: INTERNAL MEDICINE

## 2025-04-06 PROCEDURE — 6370000000 HC RX 637 (ALT 250 FOR IP): Performed by: PHYSICIAN ASSISTANT

## 2025-04-06 PROCEDURE — 85025 COMPLETE CBC W/AUTO DIFF WBC: CPT

## 2025-04-06 PROCEDURE — 6360000002 HC RX W HCPCS: Performed by: PHYSICIAN ASSISTANT

## 2025-04-06 PROCEDURE — 72148 MRI LUMBAR SPINE W/O DYE: CPT

## 2025-04-06 PROCEDURE — 82962 GLUCOSE BLOOD TEST: CPT

## 2025-04-06 PROCEDURE — 73100 X-RAY EXAM OF WRIST: CPT

## 2025-04-06 PROCEDURE — 6370000000 HC RX 637 (ALT 250 FOR IP): Performed by: INTERNAL MEDICINE

## 2025-04-06 RX ORDER — INSULIN GLARGINE 100 [IU]/ML
6 INJECTION, SOLUTION SUBCUTANEOUS NIGHTLY
Status: DISCONTINUED | OUTPATIENT
Start: 2025-04-06 | End: 2025-04-08

## 2025-04-06 RX ORDER — LORAZEPAM 0.5 MG/1
0.5 TABLET ORAL ONCE
Status: DISCONTINUED | OUTPATIENT
Start: 2025-04-06 | End: 2025-04-09 | Stop reason: HOSPADM

## 2025-04-06 RX ADMIN — GABAPENTIN 100 MG: 100 CAPSULE ORAL at 21:10

## 2025-04-06 RX ADMIN — METHYLPREDNISOLONE SODIUM SUCCINATE 60 MG: 125 INJECTION INTRAMUSCULAR; INTRAVENOUS at 14:08

## 2025-04-06 RX ADMIN — BUPROPION HYDROCHLORIDE 150 MG: 150 TABLET, FILM COATED, EXTENDED RELEASE ORAL at 21:10

## 2025-04-06 RX ADMIN — INSULIN GLARGINE 6 UNITS: 100 INJECTION, SOLUTION SUBCUTANEOUS at 21:09

## 2025-04-06 RX ADMIN — INSULIN LISPRO 4 UNITS: 100 INJECTION, SOLUTION INTRAVENOUS; SUBCUTANEOUS at 21:09

## 2025-04-06 RX ADMIN — INSULIN LISPRO 4 UNITS: 100 INJECTION, SOLUTION INTRAVENOUS; SUBCUTANEOUS at 17:00

## 2025-04-06 RX ADMIN — CEFTRIAXONE SODIUM 1000 MG: 1 INJECTION, POWDER, FOR SOLUTION INTRAMUSCULAR; INTRAVENOUS at 02:37

## 2025-04-06 RX ADMIN — METHYLPREDNISOLONE SODIUM SUCCINATE 60 MG: 125 INJECTION INTRAMUSCULAR; INTRAVENOUS at 02:38

## 2025-04-06 RX ADMIN — HYDRALAZINE HYDROCHLORIDE 50 MG: 50 TABLET ORAL at 08:49

## 2025-04-06 RX ADMIN — ARIPIPRAZOLE 20 MG: 5 TABLET ORAL at 21:11

## 2025-04-06 RX ADMIN — INSULIN LISPRO 12 UNITS: 100 INJECTION, SOLUTION INTRAVENOUS; SUBCUTANEOUS at 11:23

## 2025-04-06 RX ADMIN — Medication 3 MG: at 21:10

## 2025-04-06 RX ADMIN — HALOPERIDOL 5 MG: 2 SOLUTION ORAL at 21:10

## 2025-04-06 RX ADMIN — HALOPERIDOL 5 MG: 2 SOLUTION ORAL at 08:49

## 2025-04-06 RX ADMIN — METOPROLOL SUCCINATE 50 MG: 50 TABLET, EXTENDED RELEASE ORAL at 21:10

## 2025-04-06 RX ADMIN — ALPRAZOLAM 0.5 MG: 0.5 TABLET ORAL at 16:56

## 2025-04-06 RX ADMIN — GABAPENTIN 100 MG: 100 CAPSULE ORAL at 14:08

## 2025-04-06 RX ADMIN — INSULIN LISPRO 8 UNITS: 100 INJECTION, SOLUTION INTRAVENOUS; SUBCUTANEOUS at 08:50

## 2025-04-06 RX ADMIN — GABAPENTIN 100 MG: 100 CAPSULE ORAL at 08:50

## 2025-04-06 RX ADMIN — SODIUM CHLORIDE: 0.9 INJECTION, SOLUTION INTRAVENOUS at 23:35

## 2025-04-06 RX ADMIN — FUROSEMIDE 20 MG: 10 INJECTION, SOLUTION INTRAMUSCULAR; INTRAVENOUS at 08:58

## 2025-04-06 RX ADMIN — HYDRALAZINE HYDROCHLORIDE 50 MG: 50 TABLET ORAL at 14:08

## 2025-04-06 RX ADMIN — BUPROPION HYDROCHLORIDE 150 MG: 150 TABLET, FILM COATED, EXTENDED RELEASE ORAL at 08:50

## 2025-04-06 RX ADMIN — SODIUM CHLORIDE: 0.9 INJECTION, SOLUTION INTRAVENOUS at 05:59

## 2025-04-06 RX ADMIN — SODIUM CHLORIDE, PRESERVATIVE FREE 10 ML: 5 INJECTION INTRAVENOUS at 08:49

## 2025-04-06 RX ADMIN — HYDRALAZINE HYDROCHLORIDE 75 MG: 25 TABLET ORAL at 21:10

## 2025-04-06 RX ADMIN — SODIUM CHLORIDE, PRESERVATIVE FREE 10 ML: 5 INJECTION INTRAVENOUS at 21:11

## 2025-04-06 RX ADMIN — CITALOPRAM HYDROBROMIDE 20 MG: 20 TABLET ORAL at 21:10

## 2025-04-06 ASSESSMENT — PAIN DESCRIPTION - LOCATION: LOCATION: KNEE

## 2025-04-06 ASSESSMENT — PAIN DESCRIPTION - ORIENTATION: ORIENTATION: RIGHT;LEFT

## 2025-04-06 ASSESSMENT — PAIN DESCRIPTION - DESCRIPTORS: DESCRIPTORS: ACHING;DISCOMFORT

## 2025-04-06 ASSESSMENT — PAIN SCALES - GENERAL: PAINLEVEL_OUTOF10: 9

## 2025-04-06 NOTE — SIGNIFICANT EVENT
Overnight coverage:    Code fall which patient landed on her right side right hip right wrist and bilateral knee pain.  CT of the pelvis to rule out hip fracture has been ordered, right wrist with extensive swelling at the wrist joint with tenderness to palpation.  Right wrist x-ray pending sensation is present distally.  Bilateral knee pain without swelling x-rays pending.

## 2025-04-07 ENCOUNTER — HOSPITAL ENCOUNTER (INPATIENT)
Facility: HOSPITAL | Age: 65
Discharge: HOME | DRG: 515 | End: 2025-04-10
Payer: MEDICARE

## 2025-04-07 ENCOUNTER — APPOINTMENT (OUTPATIENT)
Facility: HOSPITAL | Age: 65
DRG: 515 | End: 2025-04-07
Payer: MEDICARE

## 2025-04-07 LAB
ALBUMIN SERPL-MCNC: 2.8 G/DL (ref 3.5–5)
ALBUMIN SERPL-MCNC: 2.9 G/DL (ref 3.5–5)
ALBUMIN/GLOB SERPL: 0.7 (ref 1.1–2.2)
ALP SERPL-CCNC: 144 U/L (ref 45–117)
ALT SERPL-CCNC: 29 U/L (ref 12–78)
ANION GAP SERPL CALC-SCNC: 7 MMOL/L (ref 2–12)
ANION GAP SERPL CALC-SCNC: 8 MMOL/L (ref 2–12)
AST SERPL W P-5'-P-CCNC: 11 U/L (ref 15–37)
BASOPHILS # BLD: 0.01 K/UL (ref 0–0.1)
BASOPHILS NFR BLD: 0.1 % (ref 0–1)
BILIRUB SERPL-MCNC: 0.5 MG/DL (ref 0.2–1)
BUN SERPL-MCNC: 100 MG/DL (ref 6–20)
BUN SERPL-MCNC: 101 MG/DL (ref 6–20)
BUN/CREAT SERPL: 27 (ref 12–20)
BUN/CREAT SERPL: 28 (ref 12–20)
CA-I BLD-MCNC: 9.1 MG/DL (ref 8.5–10.1)
CA-I BLD-MCNC: 9.2 MG/DL (ref 8.5–10.1)
CHLORIDE SERPL-SCNC: 114 MMOL/L (ref 97–108)
CHLORIDE SERPL-SCNC: 114 MMOL/L (ref 97–108)
CO2 SERPL-SCNC: 19 MMOL/L (ref 21–32)
CO2 SERPL-SCNC: 20 MMOL/L (ref 21–32)
CREAT SERPL-MCNC: 3.66 MG/DL (ref 0.55–1.02)
CREAT SERPL-MCNC: 3.68 MG/DL (ref 0.55–1.02)
DIFFERENTIAL METHOD BLD: ABNORMAL
EOSINOPHIL # BLD: 0 K/UL (ref 0–0.4)
EOSINOPHIL NFR BLD: 0 % (ref 0–7)
ERYTHROCYTE [DISTWIDTH] IN BLOOD BY AUTOMATED COUNT: 15.9 % (ref 11.5–14.5)
GLOBULIN SER CALC-MCNC: 3.8 G/DL (ref 2–4)
GLUCOSE BLD STRIP.AUTO-MCNC: 292 MG/DL (ref 65–100)
GLUCOSE BLD STRIP.AUTO-MCNC: 365 MG/DL (ref 65–100)
GLUCOSE BLD STRIP.AUTO-MCNC: 441 MG/DL (ref 65–100)
GLUCOSE BLD STRIP.AUTO-MCNC: 497 MG/DL (ref 65–100)
GLUCOSE SERPL-MCNC: 193 MG/DL (ref 65–100)
GLUCOSE SERPL-MCNC: 196 MG/DL (ref 65–100)
HCT VFR BLD AUTO: 30.2 % (ref 35–47)
HGB BLD-MCNC: 9.5 G/DL (ref 11.5–16)
IMM GRANULOCYTES # BLD AUTO: 0.1 K/UL (ref 0–0.04)
IMM GRANULOCYTES NFR BLD AUTO: 1.3 % (ref 0–0.5)
LYMPHOCYTES # BLD: 0.42 K/UL (ref 0.8–3.5)
LYMPHOCYTES NFR BLD: 5.3 % (ref 12–49)
MCH RBC QN AUTO: 28.7 PG (ref 26–34)
MCHC RBC AUTO-ENTMCNC: 31.5 G/DL (ref 30–36.5)
MCV RBC AUTO: 91.2 FL (ref 80–99)
MONOCYTES # BLD: 0.28 K/UL (ref 0–1)
MONOCYTES NFR BLD: 3.5 % (ref 5–13)
NEUTS SEG # BLD: 7.17 K/UL (ref 1.8–8)
NEUTS SEG NFR BLD: 89.8 % (ref 32–75)
NRBC # BLD: 0 K/UL (ref 0–0.01)
NRBC BLD-RTO: 0 PER 100 WBC
PERFORMED BY:: ABNORMAL
PHOSPHATE SERPL-MCNC: 6.8 MG/DL (ref 2.6–4.7)
PLATELET # BLD AUTO: 134 K/UL (ref 150–400)
PMV BLD AUTO: 10.8 FL (ref 8.9–12.9)
POTASSIUM SERPL-SCNC: 4.7 MMOL/L (ref 3.5–5.1)
POTASSIUM SERPL-SCNC: 4.7 MMOL/L (ref 3.5–5.1)
PROT SERPL-MCNC: 6.6 G/DL (ref 6.4–8.2)
RBC # BLD AUTO: 3.31 M/UL (ref 3.8–5.2)
SODIUM SERPL-SCNC: 140 MMOL/L (ref 136–145)
SODIUM SERPL-SCNC: 142 MMOL/L (ref 136–145)
WBC # BLD AUTO: 8 K/UL (ref 3.6–11)

## 2025-04-07 PROCEDURE — 6360000002 HC RX W HCPCS: Performed by: STUDENT IN AN ORGANIZED HEALTH CARE EDUCATION/TRAINING PROGRAM

## 2025-04-07 PROCEDURE — 99156 MOD SED OTH PHYS/QHP 5/>YRS: CPT

## 2025-04-07 PROCEDURE — 6370000000 HC RX 637 (ALT 250 FOR IP): Performed by: PSYCHIATRY & NEUROLOGY

## 2025-04-07 PROCEDURE — C1713 ANCHOR/SCREW BN/BN,TIS/BN: HCPCS

## 2025-04-07 PROCEDURE — 80053 COMPREHEN METABOLIC PANEL: CPT

## 2025-04-07 PROCEDURE — 76770 US EXAM ABDO BACK WALL COMP: CPT

## 2025-04-07 PROCEDURE — 0QS03ZZ REPOSITION LUMBAR VERTEBRA, PERCUTANEOUS APPROACH: ICD-10-PCS | Performed by: STUDENT IN AN ORGANIZED HEALTH CARE EDUCATION/TRAINING PROGRAM

## 2025-04-07 PROCEDURE — 6370000000 HC RX 637 (ALT 250 FOR IP): Performed by: INTERNAL MEDICINE

## 2025-04-07 PROCEDURE — 36415 COLL VENOUS BLD VENIPUNCTURE: CPT

## 2025-04-07 PROCEDURE — 0QU03JZ SUPPLEMENT LUMBAR VERTEBRA WITH SYNTHETIC SUBSTITUTE, PERCUTANEOUS APPROACH: ICD-10-PCS | Performed by: STUDENT IN AN ORGANIZED HEALTH CARE EDUCATION/TRAINING PROGRAM

## 2025-04-07 PROCEDURE — 6360000002 HC RX W HCPCS: Performed by: PHYSICIAN ASSISTANT

## 2025-04-07 PROCEDURE — 6370000000 HC RX 637 (ALT 250 FOR IP)

## 2025-04-07 PROCEDURE — 85025 COMPLETE CBC W/AUTO DIFF WBC: CPT

## 2025-04-07 PROCEDURE — 2500000003 HC RX 250 WO HCPCS: Performed by: PHYSICIAN ASSISTANT

## 2025-04-07 PROCEDURE — 2580000003 HC RX 258: Performed by: INTERNAL MEDICINE

## 2025-04-07 PROCEDURE — 82962 GLUCOSE BLOOD TEST: CPT

## 2025-04-07 PROCEDURE — 80069 RENAL FUNCTION PANEL: CPT

## 2025-04-07 PROCEDURE — 6370000000 HC RX 637 (ALT 250 FOR IP): Performed by: PHYSICIAN ASSISTANT

## 2025-04-07 PROCEDURE — 1100000000 HC RM PRIVATE

## 2025-04-07 PROCEDURE — 2500000003 HC RX 250 WO HCPCS: Performed by: STUDENT IN AN ORGANIZED HEALTH CARE EDUCATION/TRAINING PROGRAM

## 2025-04-07 RX ORDER — SODIUM CHLORIDE 450 MG/100ML
INJECTION, SOLUTION INTRAVENOUS CONTINUOUS
Status: DISCONTINUED | OUTPATIENT
Start: 2025-04-07 | End: 2025-04-08

## 2025-04-07 RX ORDER — HALOPERIDOL 2 MG/ML
5 SOLUTION ORAL
Status: CANCELLED | OUTPATIENT
Start: 2025-04-07

## 2025-04-07 RX ORDER — HYDRALAZINE HYDROCHLORIDE 50 MG/1
100 TABLET, FILM COATED ORAL 3 TIMES DAILY
Status: DISCONTINUED | OUTPATIENT
Start: 2025-04-07 | End: 2025-04-09 | Stop reason: HOSPADM

## 2025-04-07 RX ORDER — FENTANYL CITRATE 50 UG/ML
INJECTION, SOLUTION INTRAMUSCULAR; INTRAVENOUS PRN
Status: COMPLETED | OUTPATIENT
Start: 2025-04-07 | End: 2025-04-07

## 2025-04-07 RX ADMIN — METHYLPREDNISOLONE SODIUM SUCCINATE 60 MG: 125 INJECTION INTRAMUSCULAR; INTRAVENOUS at 03:01

## 2025-04-07 RX ADMIN — CEFTRIAXONE SODIUM 1000 MG: 1 INJECTION, POWDER, FOR SOLUTION INTRAMUSCULAR; INTRAVENOUS at 03:01

## 2025-04-07 RX ADMIN — CEFAZOLIN 2000 MG: 1 INJECTION, POWDER, FOR SOLUTION INTRAMUSCULAR; INTRAVENOUS at 11:59

## 2025-04-07 RX ADMIN — BUPROPION HYDROCHLORIDE 150 MG: 150 TABLET, FILM COATED, EXTENDED RELEASE ORAL at 21:47

## 2025-04-07 RX ADMIN — GABAPENTIN 100 MG: 100 CAPSULE ORAL at 14:26

## 2025-04-07 RX ADMIN — CITALOPRAM HYDROBROMIDE 20 MG: 20 TABLET ORAL at 21:47

## 2025-04-07 RX ADMIN — METHYLPREDNISOLONE SODIUM SUCCINATE 60 MG: 125 INJECTION INTRAMUSCULAR; INTRAVENOUS at 14:26

## 2025-04-07 RX ADMIN — HALOPERIDOL 5 MG: 2 SOLUTION ORAL at 21:46

## 2025-04-07 RX ADMIN — INSULIN LISPRO 16 UNITS: 100 INJECTION, SOLUTION INTRAVENOUS; SUBCUTANEOUS at 12:47

## 2025-04-07 RX ADMIN — ARIPIPRAZOLE 20 MG: 5 TABLET ORAL at 21:47

## 2025-04-07 RX ADMIN — INSULIN LISPRO 16 UNITS: 100 INJECTION, SOLUTION INTRAVENOUS; SUBCUTANEOUS at 21:48

## 2025-04-07 RX ADMIN — FENTANYL CITRATE 25 MCG: 50 INJECTION, SOLUTION INTRAMUSCULAR; INTRAVENOUS at 12:00

## 2025-04-07 RX ADMIN — GABAPENTIN 100 MG: 100 CAPSULE ORAL at 21:47

## 2025-04-07 RX ADMIN — HYDRALAZINE HYDROCHLORIDE 75 MG: 25 TABLET ORAL at 09:14

## 2025-04-07 RX ADMIN — METOPROLOL SUCCINATE 50 MG: 50 TABLET, EXTENDED RELEASE ORAL at 21:47

## 2025-04-07 RX ADMIN — HYDRALAZINE HYDROCHLORIDE 100 MG: 50 TABLET ORAL at 12:49

## 2025-04-07 RX ADMIN — HYDRALAZINE HYDROCHLORIDE 100 MG: 50 TABLET ORAL at 21:47

## 2025-04-07 RX ADMIN — HALOPERIDOL 5 MG: 2 SOLUTION ORAL at 09:13

## 2025-04-07 RX ADMIN — GABAPENTIN 100 MG: 100 CAPSULE ORAL at 09:14

## 2025-04-07 RX ADMIN — SODIUM CHLORIDE, PRESERVATIVE FREE 10 ML: 5 INJECTION INTRAVENOUS at 21:48

## 2025-04-07 RX ADMIN — Medication 3 MG: at 21:47

## 2025-04-07 RX ADMIN — BUPROPION HYDROCHLORIDE 150 MG: 150 TABLET, FILM COATED, EXTENDED RELEASE ORAL at 09:13

## 2025-04-07 RX ADMIN — SODIUM CHLORIDE: 4.5 INJECTION, SOLUTION INTRAVENOUS at 17:45

## 2025-04-07 RX ADMIN — INSULIN LISPRO 16 UNITS: 100 INJECTION, SOLUTION INTRAVENOUS; SUBCUTANEOUS at 17:17

## 2025-04-07 RX ADMIN — INSULIN GLARGINE 6 UNITS: 100 INJECTION, SOLUTION SUBCUTANEOUS at 21:48

## 2025-04-07 ASSESSMENT — ENCOUNTER SYMPTOMS
NAUSEA: 0
ABDOMINAL PAIN: 0
CHEST TIGHTNESS: 0
SHORTNESS OF BREATH: 0
RESPIRATORY NEGATIVE: 1
VOMITING: 0
GASTROINTESTINAL NEGATIVE: 1

## 2025-04-07 NOTE — BSMART NOTE
Initial BSMART Liaison Assessment Form     Section I - Integrated Summary    Reason for consult is: change in mental status     LOS:  4     Presenting problem/Summary:    Pt was seen face to face on the medical floor at [facility]. Pt was admitted to due hallucinations following medication use at home. Pt was taking  oxycodone, gabapentin, trazodone. Pt was laying in bed with eyes closed, 1:1 sitter was in room and pt was about to go for an ultrasound. Pt appeared a little confused at first, she didn't seem to know where she was at first or why she was in the hospital. After a few minutes of waking up the pt was able to state that she was in the hospital and the month but she was unable to state why she was there. Ast first she recalled falling but stated it was at home not in the hospital.   Pt reported that she has a psychiatric admission over 20 years ago at [facility]. She could not recall the reason for admission. It's unclear how accurate this information is given the pt's confusion. Pt mumbled to herself a few time as if she was trying to recall information.   Pt currently denies SI/HI/AH/VH.     Precipitant Factors are medical conditions .    The information is given by the patient.  Current Psychiatrist and/or  is none reported .  Previous Hospitalizations/Treatment: none reported     Plan: Liaison will monitor and support, Defer to the most recent psychiatric consult note for recommendations and disposition     Lethality Assessment:  The potential for suicide is not noted.    The potential for homicide is not noted.  The patient has not been a perpetrator of sexual or physical abuse.    There are not pending charges.  The patient is not felt to be at risk for self-harm or harm to others.      Section II - Psychosocial  The patient's overall mood and attitude is euthymic .  Feelings of helplessness and hopelessness are not observed.  Generalized anxiety is not observed.  Panic is not observed.

## 2025-04-07 NOTE — H&P
rhythm    ASA 3  Mallampati 3    Alerts:      Laboratory:      Recent Labs     04/07/25  0503 04/07/25  0504   HGB 9.5*  --    HCT 30.2*  --    WBC 8.0  --    *  --    * 100*   K 4.7 4.7         Plan of Care/Planned Procedure:  Risks, benefits, and alternatives reviewed with patient and she agrees to proceed with the procedure.     L3 kyphoplasty    Deemed appropriate for moderate sedation with versed and fentanyl.    Adiel Brunner MD  Interventional Radiology  [facility]  2:56 PM, 4/7/2025    
11/20/2023    Narrative  INDICATION:  Severe right knee pain, no injury.    TECHNIQUE:  Four view(s) of the right knee.    COMPARISON:  None Available.    FINDINGS:  There is no displaced fracture.  The alignment is anatomic.  No soft tissue  abnormality is seen. Small joint effusion.    Impression  Mild diffuse degenerative changes and small joint effusion.  No acute osseous abnormality.      Electronically Signed by Bonilla Rivera MD 11/20/2023 3:10 PM        _______________________________________________________________________    TOTAL TIME:  70 Minutes    Critical Care Provided     Minutes non procedure based    Signed: Lb Molina PA-C    Procedures: see electronic medical records for all procedures/Xrays and details which were not copied into this note but were reviewed prior to creation of Plan.

## 2025-04-07 NOTE — CARE COORDINATION
CM reviewed chart. Plans for Kyphoplasty procedure with IR. Will need PT/OT when appropriate for safe discharge planning.    Current discharge plan is home with family.

## 2025-04-07 NOTE — PROCEDURES
Consulted for Midline. Upon arrival, matias fuller notified me that pt. Already went to IR for line placement.

## 2025-04-08 LAB
ALBUMIN SERPL-MCNC: 2.8 G/DL (ref 3.5–5)
ALBUMIN SERPL-MCNC: 2.9 G/DL (ref 3.5–5)
ALBUMIN/GLOB SERPL: 0.8 (ref 1.1–2.2)
ALP SERPL-CCNC: 154 U/L (ref 45–117)
ALT SERPL-CCNC: 31 U/L (ref 12–78)
ANION GAP SERPL CALC-SCNC: 10 MMOL/L (ref 2–12)
ANION GAP SERPL CALC-SCNC: 11 MMOL/L (ref 2–12)
AST SERPL W P-5'-P-CCNC: 19 U/L (ref 15–37)
BASOPHILS # BLD: 0 K/UL (ref 0–0.1)
BASOPHILS NFR BLD: 0 % (ref 0–1)
BILIRUB SERPL-MCNC: 0.4 MG/DL (ref 0.2–1)
BUN SERPL-MCNC: 96 MG/DL (ref 6–20)
BUN SERPL-MCNC: 97 MG/DL (ref 6–20)
BUN/CREAT SERPL: 30 (ref 12–20)
BUN/CREAT SERPL: 30 (ref 12–20)
CA-I BLD-MCNC: 9.1 MG/DL (ref 8.5–10.1)
CA-I BLD-MCNC: 9.2 MG/DL (ref 8.5–10.1)
CHLORIDE SERPL-SCNC: 111 MMOL/L (ref 97–108)
CHLORIDE SERPL-SCNC: 111 MMOL/L (ref 97–108)
CO2 SERPL-SCNC: 17 MMOL/L (ref 21–32)
CO2 SERPL-SCNC: 18 MMOL/L (ref 21–32)
CREAT SERPL-MCNC: 3.19 MG/DL (ref 0.55–1.02)
CREAT SERPL-MCNC: 3.26 MG/DL (ref 0.55–1.02)
DIFFERENTIAL METHOD BLD: ABNORMAL
EOSINOPHIL # BLD: 0 K/UL (ref 0–0.4)
EOSINOPHIL NFR BLD: 0 % (ref 0–7)
ERYTHROCYTE [DISTWIDTH] IN BLOOD BY AUTOMATED COUNT: 15.1 % (ref 11.5–14.5)
GLOBULIN SER CALC-MCNC: 3.6 G/DL (ref 2–4)
GLUCOSE BLD STRIP.AUTO-MCNC: 114 MG/DL (ref 65–100)
GLUCOSE BLD STRIP.AUTO-MCNC: 148 MG/DL (ref 65–100)
GLUCOSE BLD STRIP.AUTO-MCNC: 233 MG/DL (ref 65–100)
GLUCOSE BLD STRIP.AUTO-MCNC: 355 MG/DL (ref 65–100)
GLUCOSE BLD STRIP.AUTO-MCNC: 365 MG/DL (ref 65–100)
GLUCOSE BLD STRIP.AUTO-MCNC: 368 MG/DL (ref 65–100)
GLUCOSE SERPL-MCNC: 171 MG/DL (ref 65–100)
GLUCOSE SERPL-MCNC: 173 MG/DL (ref 65–100)
HCT VFR BLD AUTO: 30.7 % (ref 35–47)
HGB BLD-MCNC: 10 G/DL (ref 11.5–16)
IMM GRANULOCYTES # BLD AUTO: 0.11 K/UL (ref 0–0.04)
IMM GRANULOCYTES NFR BLD AUTO: 0.9 % (ref 0–0.5)
LYMPHOCYTES # BLD: 0.37 K/UL (ref 0.8–3.5)
LYMPHOCYTES NFR BLD: 2.9 % (ref 12–49)
MCH RBC QN AUTO: 28.4 PG (ref 26–34)
MCHC RBC AUTO-ENTMCNC: 32.6 G/DL (ref 30–36.5)
MCV RBC AUTO: 87.2 FL (ref 80–99)
MONOCYTES # BLD: 0.31 K/UL (ref 0–1)
MONOCYTES NFR BLD: 2.4 % (ref 5–13)
NEUTS SEG # BLD: 12.14 K/UL (ref 1.8–8)
NEUTS SEG NFR BLD: 93.8 % (ref 32–75)
NRBC # BLD: 0 K/UL (ref 0–0.01)
NRBC BLD-RTO: 0 PER 100 WBC
PERFORMED BY:: ABNORMAL
PHOSPHATE SERPL-MCNC: 5.5 MG/DL (ref 2.6–4.7)
PLATELET # BLD AUTO: 158 K/UL (ref 150–400)
PMV BLD AUTO: 11 FL (ref 8.9–12.9)
POTASSIUM SERPL-SCNC: 4.1 MMOL/L (ref 3.5–5.1)
POTASSIUM SERPL-SCNC: 4.1 MMOL/L (ref 3.5–5.1)
PROT SERPL-MCNC: 6.4 G/DL (ref 6.4–8.2)
RBC # BLD AUTO: 3.52 M/UL (ref 3.8–5.2)
SODIUM SERPL-SCNC: 138 MMOL/L (ref 136–145)
SODIUM SERPL-SCNC: 140 MMOL/L (ref 136–145)
WBC # BLD AUTO: 12.9 K/UL (ref 3.6–11)

## 2025-04-08 PROCEDURE — 6370000000 HC RX 637 (ALT 250 FOR IP): Performed by: INTERNAL MEDICINE

## 2025-04-08 PROCEDURE — 97530 THERAPEUTIC ACTIVITIES: CPT

## 2025-04-08 PROCEDURE — 6370000000 HC RX 637 (ALT 250 FOR IP): Performed by: PHYSICIAN ASSISTANT

## 2025-04-08 PROCEDURE — 2580000003 HC RX 258: Performed by: INTERNAL MEDICINE

## 2025-04-08 PROCEDURE — 80053 COMPREHEN METABOLIC PANEL: CPT

## 2025-04-08 PROCEDURE — 2500000003 HC RX 250 WO HCPCS: Performed by: PHYSICIAN ASSISTANT

## 2025-04-08 PROCEDURE — 82962 GLUCOSE BLOOD TEST: CPT

## 2025-04-08 PROCEDURE — 1100000000 HC RM PRIVATE

## 2025-04-08 PROCEDURE — 85025 COMPLETE CBC W/AUTO DIFF WBC: CPT

## 2025-04-08 PROCEDURE — 6370000000 HC RX 637 (ALT 250 FOR IP): Performed by: NURSE PRACTITIONER

## 2025-04-08 PROCEDURE — 80069 RENAL FUNCTION PANEL: CPT

## 2025-04-08 PROCEDURE — 6360000002 HC RX W HCPCS: Performed by: PHYSICIAN ASSISTANT

## 2025-04-08 PROCEDURE — 36415 COLL VENOUS BLD VENIPUNCTURE: CPT

## 2025-04-08 PROCEDURE — 6370000000 HC RX 637 (ALT 250 FOR IP): Performed by: PSYCHIATRY & NEUROLOGY

## 2025-04-08 PROCEDURE — 97165 OT EVAL LOW COMPLEX 30 MIN: CPT

## 2025-04-08 PROCEDURE — 97161 PT EVAL LOW COMPLEX 20 MIN: CPT

## 2025-04-08 RX ORDER — INSULIN GLARGINE 100 [IU]/ML
25 INJECTION, SOLUTION SUBCUTANEOUS ONCE
Status: COMPLETED | OUTPATIENT
Start: 2025-04-08 | End: 2025-04-08

## 2025-04-08 RX ORDER — INSULIN GLARGINE 100 [IU]/ML
45 INJECTION, SOLUTION SUBCUTANEOUS DAILY
Status: DISCONTINUED | OUTPATIENT
Start: 2025-04-09 | End: 2025-04-09

## 2025-04-08 RX ORDER — INSULIN GLARGINE 100 [IU]/ML
27 INJECTION, SOLUTION SUBCUTANEOUS NIGHTLY
Status: DISCONTINUED | OUTPATIENT
Start: 2025-04-08 | End: 2025-04-08

## 2025-04-08 RX ADMIN — BUPROPION HYDROCHLORIDE 150 MG: 150 TABLET, FILM COATED, EXTENDED RELEASE ORAL at 21:53

## 2025-04-08 RX ADMIN — CITALOPRAM HYDROBROMIDE 20 MG: 20 TABLET ORAL at 21:53

## 2025-04-08 RX ADMIN — FUROSEMIDE 40 MG: 40 TABLET ORAL at 09:01

## 2025-04-08 RX ADMIN — METHYLPREDNISOLONE SODIUM SUCCINATE 60 MG: 125 INJECTION INTRAMUSCULAR; INTRAVENOUS at 02:20

## 2025-04-08 RX ADMIN — INSULIN LISPRO 16 UNITS: 100 INJECTION, SOLUTION INTRAVENOUS; SUBCUTANEOUS at 13:00

## 2025-04-08 RX ADMIN — METHYLPREDNISOLONE SODIUM SUCCINATE 60 MG: 125 INJECTION INTRAMUSCULAR; INTRAVENOUS at 14:49

## 2025-04-08 RX ADMIN — INSULIN GLARGINE 25 UNITS: 100 INJECTION, SOLUTION SUBCUTANEOUS at 16:01

## 2025-04-08 RX ADMIN — GABAPENTIN 100 MG: 100 CAPSULE ORAL at 09:01

## 2025-04-08 RX ADMIN — HYDRALAZINE HYDROCHLORIDE 100 MG: 50 TABLET ORAL at 09:01

## 2025-04-08 RX ADMIN — INSULIN LISPRO 16 UNITS: 100 INJECTION, SOLUTION INTRAVENOUS; SUBCUTANEOUS at 17:23

## 2025-04-08 RX ADMIN — GABAPENTIN 100 MG: 100 CAPSULE ORAL at 14:49

## 2025-04-08 RX ADMIN — SODIUM CHLORIDE: 4.5 INJECTION, SOLUTION INTRAVENOUS at 03:51

## 2025-04-08 RX ADMIN — Medication 3 MG: at 21:53

## 2025-04-08 RX ADMIN — HALOPERIDOL 5 MG: 2 SOLUTION ORAL at 21:53

## 2025-04-08 RX ADMIN — METOPROLOL SUCCINATE 50 MG: 50 TABLET, EXTENDED RELEASE ORAL at 21:53

## 2025-04-08 RX ADMIN — HYDRALAZINE HYDROCHLORIDE 100 MG: 50 TABLET ORAL at 21:53

## 2025-04-08 RX ADMIN — HYDRALAZINE HYDROCHLORIDE 100 MG: 50 TABLET ORAL at 14:49

## 2025-04-08 RX ADMIN — FUROSEMIDE 40 MG: 40 TABLET ORAL at 21:53

## 2025-04-08 RX ADMIN — CEFTRIAXONE SODIUM 1000 MG: 1 INJECTION, POWDER, FOR SOLUTION INTRAMUSCULAR; INTRAVENOUS at 02:20

## 2025-04-08 RX ADMIN — HALOPERIDOL 5 MG: 2 SOLUTION ORAL at 09:01

## 2025-04-08 RX ADMIN — SODIUM CHLORIDE, PRESERVATIVE FREE 10 ML: 5 INJECTION INTRAVENOUS at 21:54

## 2025-04-08 RX ADMIN — ARIPIPRAZOLE 20 MG: 5 TABLET ORAL at 21:53

## 2025-04-08 RX ADMIN — BUPROPION HYDROCHLORIDE 150 MG: 150 TABLET, FILM COATED, EXTENDED RELEASE ORAL at 09:01

## 2025-04-08 RX ADMIN — GABAPENTIN 100 MG: 100 CAPSULE ORAL at 21:53

## 2025-04-08 ASSESSMENT — PAIN SCALES - GENERAL: PAINLEVEL_OUTOF10: 2

## 2025-04-08 ASSESSMENT — ENCOUNTER SYMPTOMS
RESPIRATORY NEGATIVE: 1
GASTROINTESTINAL NEGATIVE: 1
NAUSEA: 0
VOMITING: 0
CHEST TIGHTNESS: 0
SHORTNESS OF BREATH: 0
ABDOMINAL PAIN: 0

## 2025-04-08 ASSESSMENT — PAIN DESCRIPTION - DESCRIPTORS: DESCRIPTORS: ACHING;DISCOMFORT

## 2025-04-08 ASSESSMENT — PAIN DESCRIPTION - LOCATION: LOCATION: BACK

## 2025-04-08 ASSESSMENT — PAIN DESCRIPTION - ORIENTATION: ORIENTATION: LOWER

## 2025-04-08 NOTE — CARE COORDINATION
Met with patient at bedside to discuss discharge planning. Patient worked with therapy this morning with recommendations for home health. Discussed with patient. She would like to see if we are able to setup home health for her. Referrals sent via [employer], no preference.     1300: accepted with [facility] health. SOC 04/10/2025    1425: family requesting follow up with psychiatric care in [place]. [facility] has scheduled appointment for Monday April 14 at 0900. Informed patient and family.

## 2025-04-09 VITALS
HEART RATE: 74 BPM | TEMPERATURE: 97.7 F | WEIGHT: 224.43 LBS | DIASTOLIC BLOOD PRESSURE: 76 MMHG | RESPIRATION RATE: 18 BRPM | OXYGEN SATURATION: 96 % | BODY MASS INDEX: 44.06 KG/M2 | HEIGHT: 60 IN | SYSTOLIC BLOOD PRESSURE: 160 MMHG

## 2025-04-09 PROBLEM — E11.65 HYPERGLYCEMIA DUE TO TYPE 2 DIABETES MELLITUS (HCC): Status: ACTIVE | Noted: 2020-06-22

## 2025-04-09 PROBLEM — E87.20 METABOLIC ACIDOSIS: Status: ACTIVE | Noted: 2025-04-09

## 2025-04-09 PROBLEM — N18.5 CHRONIC KIDNEY DISEASE, STAGE V (HCC): Status: ACTIVE | Noted: 2025-04-09

## 2025-04-09 PROBLEM — G93.41 ACUTE METABOLIC ENCEPHALOPATHY: Status: RESOLVED | Noted: 2025-04-09 | Resolved: 2025-04-09
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
## 2025-04-09 PROBLEM — E11.65 HYPERGLYCEMIA DUE TO TYPE 2 DIABETES MELLITUS (HCC): Status: RESOLVED | Noted: 2020-06-22 | Resolved: 2025-04-09
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
## 2025-04-09 PROBLEM — N39.0 UTI (URINARY TRACT INFECTION): Status: RESOLVED | Noted: 2025-04-09 | Resolved: 2025-04-09

## 2025-04-09 PROBLEM — S32.030A COMPRESSION FRACTURE OF L3 LUMBAR VERTEBRA, CLOSED, INITIAL ENCOUNTER (HCC): Status: ACTIVE | Noted: 2025-04-09

## 2025-04-09 PROBLEM — N17.9 ACUTE KIDNEY INJURY: Status: RESOLVED | Noted: 2021-08-25 | Resolved: 2025-04-09

## 2025-04-09 PROBLEM — D63.8 ANEMIA OF CHRONIC DISEASE: Status: ACTIVE | Noted: 2021-08-25

## 2025-04-09 PROBLEM — G93.41 ACUTE METABOLIC ENCEPHALOPATHY: Status: ACTIVE | Noted: 2025-04-09

## 2025-04-09 PROBLEM — M54.9 INTRACTABLE BACK PAIN: Status: RESOLVED | Noted: 2025-04-04 | Resolved: 2025-04-09

## 2025-04-09 PROBLEM — F16.10 MDMA ABUSE (HCC): Status: ACTIVE | Noted: 2025-04-09

## 2025-04-09 PROBLEM — S32.030A COMPRESSION FRACTURE OF L3 LUMBAR VERTEBRA, CLOSED, INITIAL ENCOUNTER (HCC): Status: RESOLVED | Noted: 2025-04-09 | Resolved: 2025-04-09

## 2025-04-09 PROBLEM — N39.0 UTI (URINARY TRACT INFECTION): Status: ACTIVE | Noted: 2025-04-09

## 2025-04-09 PROBLEM — E87.20 METABOLIC ACIDOSIS: Status: RESOLVED | Noted: 2025-04-09 | Resolved: 2025-04-09

## 2025-04-09 LAB
GLUCOSE BLD STRIP.AUTO-MCNC: 191 MG/DL (ref 65–100)
GLUCOSE BLD STRIP.AUTO-MCNC: 243 MG/DL (ref 65–100)
PERFORMED BY:: ABNORMAL
PERFORMED BY:: ABNORMAL

## 2025-04-09 PROCEDURE — 6370000000 HC RX 637 (ALT 250 FOR IP): Performed by: INTERNAL MEDICINE

## 2025-04-09 PROCEDURE — 6370000000 HC RX 637 (ALT 250 FOR IP): Performed by: PHYSICIAN ASSISTANT

## 2025-04-09 PROCEDURE — 97530 THERAPEUTIC ACTIVITIES: CPT

## 2025-04-09 PROCEDURE — 6360000002 HC RX W HCPCS: Performed by: PHYSICIAN ASSISTANT

## 2025-04-09 PROCEDURE — 2500000003 HC RX 250 WO HCPCS: Performed by: PHYSICIAN ASSISTANT

## 2025-04-09 PROCEDURE — 6370000000 HC RX 637 (ALT 250 FOR IP): Performed by: NURSE PRACTITIONER

## 2025-04-09 PROCEDURE — 82962 GLUCOSE BLOOD TEST: CPT

## 2025-04-09 PROCEDURE — 6370000000 HC RX 637 (ALT 250 FOR IP): Performed by: PSYCHIATRY & NEUROLOGY

## 2025-04-09 RX ORDER — HALOPERIDOL 2 MG/ML
5 SOLUTION ORAL NIGHTLY
Qty: 120 ML | Refills: 1 | Status: SHIPPED | OUTPATIENT
Start: 2025-04-09

## 2025-04-09 RX ORDER — INSULIN GLARGINE 100 [IU]/ML
45 INJECTION, SOLUTION SUBCUTANEOUS DAILY
Qty: 5 ADJUSTABLE DOSE PRE-FILLED PEN SYRINGE | Refills: 3 | Status: SHIPPED | OUTPATIENT
Start: 2025-04-09

## 2025-04-09 RX ORDER — SODIUM BICARBONATE 650 MG/1
650 TABLET ORAL
Status: DISCONTINUED | OUTPATIENT
Start: 2025-04-09 | End: 2025-04-09 | Stop reason: HOSPADM

## 2025-04-09 RX ORDER — SODIUM BICARBONATE 650 MG/1
650 TABLET ORAL
Qty: 90 TABLET | Refills: 1 | Status: SHIPPED | OUTPATIENT
Start: 2025-04-09

## 2025-04-09 RX ORDER — HYDRALAZINE HYDROCHLORIDE 100 MG/1
100 TABLET, FILM COATED ORAL 3 TIMES DAILY
Qty: 90 TABLET | Refills: 3 | Status: SHIPPED | OUTPATIENT
Start: 2025-04-09

## 2025-04-09 RX ORDER — INSULIN GLARGINE 100 [IU]/ML
25 INJECTION, SOLUTION SUBCUTANEOUS DAILY
Status: DISCONTINUED | OUTPATIENT
Start: 2025-04-09 | End: 2025-04-09 | Stop reason: HOSPADM

## 2025-04-09 RX ORDER — ARIPIPRAZOLE 20 MG/1
20 TABLET ORAL NIGHTLY
Qty: 30 TABLET | Refills: 1 | Status: SHIPPED | OUTPATIENT
Start: 2025-04-09

## 2025-04-09 RX ORDER — BUPROPION HYDROCHLORIDE 150 MG/1
150 TABLET, EXTENDED RELEASE ORAL 2 TIMES DAILY
Qty: 60 TABLET | Refills: 1 | Status: SHIPPED | OUTPATIENT
Start: 2025-04-09

## 2025-04-09 RX ORDER — INSULIN GLARGINE 100 [IU]/ML
35 INJECTION, SOLUTION SUBCUTANEOUS NIGHTLY
Qty: 15 EACH | Refills: 3 | Status: SHIPPED | OUTPATIENT
Start: 2025-04-09

## 2025-04-09 RX ORDER — AMLODIPINE BESYLATE 5 MG/1
5 TABLET ORAL DAILY
Qty: 30 TABLET | Refills: 3 | Status: SHIPPED | OUTPATIENT
Start: 2025-04-10

## 2025-04-09 RX ORDER — PREDNISONE 20 MG/1
TABLET ORAL
Qty: 12 TABLET | Refills: 0 | Status: SHIPPED | OUTPATIENT
Start: 2025-04-09 | End: 2025-04-15

## 2025-04-09 RX ORDER — GABAPENTIN 100 MG/1
100 CAPSULE ORAL 3 TIMES DAILY
Qty: 90 CAPSULE | Refills: 0 | Status: SHIPPED | OUTPATIENT
Start: 2025-04-09 | End: 2025-05-09

## 2025-04-09 RX ORDER — AMLODIPINE BESYLATE 5 MG/1
5 TABLET ORAL DAILY
Status: DISCONTINUED | OUTPATIENT
Start: 2025-04-09 | End: 2025-04-09 | Stop reason: HOSPADM

## 2025-04-09 RX ORDER — METOPROLOL SUCCINATE 50 MG/1
50 TABLET, EXTENDED RELEASE ORAL NIGHTLY
Qty: 30 TABLET | Refills: 3 | Status: SHIPPED | OUTPATIENT
Start: 2025-04-09

## 2025-04-09 RX ORDER — CITALOPRAM HYDROBROMIDE 20 MG/1
20 TABLET ORAL NIGHTLY
Qty: 30 TABLET | Refills: 1 | Status: SHIPPED | OUTPATIENT
Start: 2025-04-09

## 2025-04-09 RX ORDER — ALPRAZOLAM 0.5 MG
0.5 TABLET ORAL 3 TIMES DAILY PRN
Qty: 90 TABLET | Refills: 1 | Status: SHIPPED | OUTPATIENT
Start: 2025-04-09 | End: 2025-06-08

## 2025-04-09 RX ADMIN — ONDANSETRON 4 MG: 2 INJECTION, SOLUTION INTRAMUSCULAR; INTRAVENOUS at 10:03

## 2025-04-09 RX ADMIN — HALOPERIDOL 5 MG: 2 SOLUTION ORAL at 10:02

## 2025-04-09 RX ADMIN — HYDRALAZINE HYDROCHLORIDE 100 MG: 50 TABLET ORAL at 10:03

## 2025-04-09 RX ADMIN — POLYETHYLENE GLYCOL 3350 17 G: 17 POWDER, FOR SOLUTION ORAL at 14:41

## 2025-04-09 RX ADMIN — INSULIN LISPRO 4 UNITS: 100 INJECTION, SOLUTION INTRAVENOUS; SUBCUTANEOUS at 10:12

## 2025-04-09 RX ADMIN — SODIUM CHLORIDE, PRESERVATIVE FREE 10 ML: 5 INJECTION INTRAVENOUS at 10:11

## 2025-04-09 RX ADMIN — HYDRALAZINE HYDROCHLORIDE 100 MG: 50 TABLET ORAL at 12:18

## 2025-04-09 RX ADMIN — GABAPENTIN 100 MG: 100 CAPSULE ORAL at 12:18

## 2025-04-09 RX ADMIN — INSULIN GLARGINE 25 UNITS: 100 INJECTION, SOLUTION SUBCUTANEOUS at 10:11

## 2025-04-09 RX ADMIN — BUPROPION HYDROCHLORIDE 150 MG: 150 TABLET, FILM COATED, EXTENDED RELEASE ORAL at 10:03

## 2025-04-09 RX ADMIN — SODIUM BICARBONATE 650 MG: 650 TABLET ORAL at 12:18

## 2025-04-09 RX ADMIN — FUROSEMIDE 40 MG: 40 TABLET ORAL at 10:03

## 2025-04-09 RX ADMIN — AMLODIPINE BESYLATE 5 MG: 5 TABLET ORAL at 10:03

## 2025-04-09 RX ADMIN — METHYLPREDNISOLONE SODIUM SUCCINATE 60 MG: 125 INJECTION INTRAMUSCULAR; INTRAVENOUS at 02:56

## 2025-04-09 RX ADMIN — GABAPENTIN 100 MG: 100 CAPSULE ORAL at 10:03

## 2025-04-09 RX ADMIN — SODIUM BICARBONATE 650 MG: 650 TABLET ORAL at 10:12

## 2025-04-09 RX ADMIN — INSULIN LISPRO 4 UNITS: 100 INJECTION, SOLUTION INTRAVENOUS; SUBCUTANEOUS at 12:18

## 2025-04-09 ASSESSMENT — PAIN SCALES - GENERAL: PAINLEVEL_OUTOF10: 0

## 2025-04-09 NOTE — CARE COORDINATION
Patient discharging today, going home with home health. Home health through [facility] health. Orders sent via [email or web address]. 3 in 1 bedside commode ordered for patient to be delivered to [employer]. Patient and family aware.    Transition of Care Plan:    RUR: 20%  Prior Level of Functioning: ind  Disposition: home health  DEANNE: today  If SNF or IPR: Date FOC offered: na  Date FOC received: na  Accepting facility: na  Date authorization started with reference number: na  Date authorization received and expires: na  Follow up appointments:   DME needed: 3 in 1 bedside commode  Transportation at discharge: family  IM/IMM Medicare/ letter given: 4/9  Is patient a  and connected with [facility]? na   If yes, was [place] transfer form completed and [facility] notified? na  Caregiver Contact: sister  Discharge Caregiver contacted prior to discharge? sister  Care Conference needed? na  Barriers to discharge: na

## 2025-04-09 NOTE — DISCHARGE INSTRUCTIONS
No heavy lifting, nothing over 10 pounds or roughly a gallon of milk, avoid bending and twisting of your torso for 6 weeks.

## 2025-04-09 NOTE — PLAN OF CARE
PHYSICAL THERAPY TREATMENT     Patient: Johanna Dunaway (64 y.o. female)  Date: 4/9/2025  Diagnosis: Metabolic encephalopathy [G93.41]  Intractable back pain [M54.9] Intractable back pain      Precautions: Fall Risk, General Precautions             Spinal Precautions: No Bending, No Lifting, No Twisting        Recommendations for nursing mobility: Out of bed to chair for meals, Encourage HEP in prep for ADLs/mobility; see handout for details, AD and gt belt for bed to chair , Amb to bathroom with AD and gait belt, Amb in hallway, Assist x1, and Spinal precautions; no bending, lifting, or twisting    In place during session: External Catheter  Chart, physical therapy assessment, plan of care and goals were reviewed.  ASSESSMENT  Patient continues with skilled PT services and is progressing towards goals. Pt in bed upon PT arrival, agreeable to session. Pt A&O x 4. (See below for objective details and assist levels).     Overall pt tolerated session well today. Patient and family had several questions regarding mobility/therapy and PTA answered all concerns. Educated on appropriate DME as well as spinal precautions. Patient able to complete bed mobility with bed flat and without railings to practice from home. Patient completed all bed mobility and transfers supervision and patient ambulated steadily with RW and increased distance. Notified OT for questions family had regarding toileting/ADL tasks. Patient educated on LE therex. Will continue to benefit from skilled PT services, and will continue to progress as tolerated. Current PT DC recommendation Intermittent physical therapy up to 2-3x/week in previous living setting once medically appropriate.     Start of Session End of Session   SPO2 (%)  97   Heart Rate (BPM)  99     GOALS:    Problem: Physical Therapy - Adult  Goal: By Discharge: Performs mobility at highest level of function for planned discharge setting.  See evaluation for individualized 
  Problem: Chronic Conditions and Co-morbidities  Goal: Patient's chronic conditions and co-morbidity symptoms are monitored and maintained or improved  Outcome: Progressing     Problem: Skin/Tissue Integrity  Goal: Skin integrity remains intact  Description: 1.  Monitor for areas of redness and/or skin breakdown  2.  Assess vascular access sites hourly  3.  Every 4-6 hours minimum:  Change oxygen saturation probe site  4.  Every 4-6 hours:  If on nasal continuous positive airway pressure, respiratory therapy assess nares and determine need for appliance change or resting period  Outcome: Progressing     Problem: Safety - Adult  Goal: Free from fall injury  Outcome: Progressing     
  Problem: Safety - Adult  Goal: Free from fall injury  4/4/2025 0810 by Whitney Wolfe LPN  Outcome: Progressing  4/4/2025 0146 by NEIL Elliott RN  Outcome: Progressing     Problem: Pain  Goal: Verbalizes/displays adequate comfort level or baseline comfort level  Outcome: Progressing     
  Problem: Safety - Adult  Goal: Free from fall injury  4/6/2025 1007 by Di Monterroso, RN  Outcome: Progressing  Flowsheets (Taken 4/6/2025 1007)  Free From Fall Injury: Instruct family/caregiver on patient safety     Problem: Pain  Goal: Verbalizes/displays adequate comfort level or baseline comfort level  4/6/2025 1007 by Di Monterroso RN  Flowsheets (Taken 4/6/2025 1007)  Verbalizes/displays adequate comfort level or baseline comfort level:   Encourage patient to monitor pain and request assistance   Assess pain using appropriate pain scale   Administer analgesics based on type and severity of pain and evaluate response     
  Problem: Skin/Tissue Integrity  Goal: Skin integrity remains intact  Description: 1.  Monitor for areas of redness and/or skin breakdown  2.  Assess vascular access sites hourly  3.  Every 4-6 hours minimum:  Change oxygen saturation probe site  4.  Every 4-6 hours:  If on nasal continuous positive airway pressure, respiratory therapy assess nares and determine need for appliance change or resting period  Outcome: Progressing     Problem: Safety - Adult  Goal: Free from fall injury  Outcome: Progressing     Problem: Pain  Goal: Verbalizes/displays adequate comfort level or baseline comfort level  Outcome: Progressing     Problem: Physical Therapy - Adult  Goal: By Discharge: Performs mobility at highest level of function for planned discharge setting.  See evaluation for individualized goals.  Description: FUNCTIONAL STATUS PRIOR TO ADMISSION: Patient was modified independent using a rolling walker and single point cane for functional mobility., The patient  was modified independent using adaptive equipment for basic and instrumental ADLs., and The patient and/or family endorse 1 falls within the last 3 months.    HOME SUPPORT PRIOR TO ADMISSION: The patient lived with family but did not require assistance.    Physical Therapy Goals  Initiated 4/8/2025  Pt stated goal: to go home and get stronger  Pt will be I with LE HEP in 7 days.  Pt will perform bed mobility with Modified [place] in 7 days.  Pt will perform transfers with Modified [place] in 7 days.   Pt will amb  feet with LRAD safely with Modified [place] in 7 days.  Pt will ascend/descend 2 steps with bilateral handrail(s) and Stand by Assist in 7 days to safely enter/navigate home.   Pt will verbalize and demonstrate compliance with spinal precautions to include log roll to enter/exit bed and no bending/lifting/twisting in 7 days.   Pt will demonstrate improvement in standing balance from Contact Guard Assist to Modified [place] 
Care plan reviewed; pt progressing as documented.  
OCCUPATIONAL THERAPY TREATMENT  Patient: Johanna Dunaway (64 y.o. female)  Date: 4/9/2025  Primary Diagnosis: Metabolic encephalopathy [G93.41]  Intractable back pain [M54.9]       Precautions: Fall Risk, General Precautions         Spinal Precautions: No Bending, No Lifting, No Twisting      Recommendations for nursing mobility: Out of bed to chair for meals, Encourage HEP in prep for ADLs/mobility; see handout for details, AD and gt belt for bed to chair , Amb to bathroom with AD and gait belt, and Assist x1    In place during session: None  Chart, occupational therapy assessment, plan of care, and goals were reviewed.  ASSESSMENT  Patient continues with skilled OT services and is progressing towards goals. Pt seated eob at end of PT session upon BAXTER arrival, agreeable to session. Pt A&O x 4. Pt given toilet helper to be able to complete marie care IND.  Pt able to return demonstrate proper use. Reviewed LB dressing w/ reacher and sock aid.  All questions answered. Pt stated that toilet at home is very low and also she will be home alone at times.  Recommended 3-in-1 for use at bedside to decrease risks of fall when home alone and could also be used to increase height of toilet. Spoke W CM about BSC post session. Pt completed UE therex, see grid below for details, to maintain/ increase strength and endurance to aid in adl performance. Education provided on AE, dressing techniques, energy conservation, safety awareness and HEP w/ pt verbalizing good understanding. Pt left seated EOB with all known needs met. (See below for objective details and assist levels).     Overall pt tolerated session well today with rest breaks. Pt pleasant and appears very motivated to increase functional level to return to PLOF.  Current OT recommendations for discharge Intermittent occupational therapy up to 2-3x/week in previous living setting. Will continue to benefit from skilled OT services, and will continue to progress as 
Adult  Goal: Maintains or returns to baseline bowel function  4/7/2025 2053 by Chelita Mullen RN  Outcome: Progressing  4/7/2025 1029 by Cora Dawn RN  Outcome: Progressing  Goal: Maintains adequate nutritional intake  4/7/2025 2053 by Chelita Mullen RN  Outcome: Progressing  4/7/2025 1029 by Cora Dawn RN  Outcome: Progressing     Problem: Genitourinary - Adult  Goal: Absence of urinary retention  4/7/2025 2053 by Chelita Mullen RN  Outcome: Progressing  4/7/2025 1029 by Cora Dawn RN  Outcome: Progressing  Goal: Urinary catheter remains patent  4/7/2025 2053 by Chelita Mullen RN  Outcome: Progressing  4/7/2025 1029 by Cora Dawn RN  Outcome: Progressing     Problem: Anxiety  Goal: Will report anxiety at manageable levels  Description: INTERVENTIONS:  1. Administer medication as ordered  2. Teach and rehearse alternative coping skills  3. Provide emotional support with 1:1 interaction with staff  4/7/2025 2053 by Chelita Mullen RN  Outcome: Progressing  4/7/2025 1029 by Cora Dawn RN  Outcome: Progressing     Problem: Confusion  Goal: Confusion, delirium, dementia, or psychosis is improved or at baseline  Description: INTERVENTIONS:  1. Assess for possible contributors to thought disturbance, including medications, impaired vision or hearing, underlying metabolic abnormalities, dehydration, psychiatric diagnoses, and notify attending LIP  2. [place] high risk fall precautions, as indicated  3. Provide frequent short contacts to provide reality reorientation, refocusing and direction  4. Decrease environmental stimuli, including noise as appropriate  5. Monitor and intervene to maintain adequate nutrition, hydration, elimination, sleep and activity  6. If unable to ensure safety without constant attention obtain sitter and review sitter guidelines with assigned personnel  7. Initiate Psychosocial 
concerns in a socially appropriate manner  4. Utilize positive, consistent limit setting strategies supporting safety of patient, staff and others  5. Encourage participation in the decision making process about the behavioral management agreement  6. If a visitor's behavior poses a threat to safety call refer to organization policy.  7. Initiate consult with , Psychosocial CNS, Spiritual Care as appropriate  Outcome: Progressing     Problem: Depression/Self Harm  Goal: Effect of psychiatric condition will be minimized and patient will be protected from self harm  Description: INTERVENTIONS:  1. Assess impact of patient's symptoms on level of functioning, self care needs and offer support as indicated  2. Assess patient/family knowledge of depression, impact on illness and need for teaching  3. Provide emotional support, presence and reassurance  4. Assess for possible suicidal thoughts or ideation. If patient expresses suicidal thoughts or statements do not leave alone, initiate Suicide Precautions, move to a room close to the nursing station and obtain sitter  5. Initiate consults as appropriate with Mental Health Professional, Spiritual Care, Psychosocial CNS, and consider a recommendation to the LIP for a Psychiatric Consultation  Outcome: Progressing     
consistent limit setting strategies supporting safety of patient, staff and others  5. Encourage participation in the decision making process about the behavioral management agreement  6. If a visitor's behavior poses a threat to safety call refer to organization policy.  7. Initiate consult with , Psychosocial CNS, Spiritual Care as appropriate  4/7/2025 1029 by Cora Dawn RN  Outcome: Progressing  4/6/2025 2218 by Chelita Mullen RN  Outcome: Progressing     Problem: Depression/Self Harm  Goal: Effect of psychiatric condition will be minimized and patient will be protected from self harm  Description: INTERVENTIONS:  1. Assess impact of patient's symptoms on level of functioning, self care needs and offer support as indicated  2. Assess patient/family knowledge of depression, impact on illness and need for teaching  3. Provide emotional support, presence and reassurance  4. Assess for possible suicidal thoughts or ideation. If patient expresses suicidal thoughts or statements do not leave alone, initiate Suicide Precautions, move to a room close to the nursing station and obtain sitter  5. Initiate consults as appropriate with Mental Health Professional, Spiritual Care, Psychosocial CNS, and consider a recommendation to the LIP for a Psychiatric Consultation  4/7/2025 1029 by Cora Dawn RN  Outcome: Progressing  4/6/2025 2218 by Chelita Mullen RN  Outcome: Progressing     
consistent limit setting strategies supporting safety of patient, staff and others  5. Encourage participation in the decision making process about the behavioral management agreement  6. If a visitor's behavior poses a threat to safety call refer to organization policy.  7. Initiate consult with , Psychosocial CNS, Spiritual Care as appropriate  4/7/2025 1029 by Cora Dawn RN  Outcome: Progressing  4/6/2025 2218 by Chelita Mullen RN  Outcome: Progressing     Problem: Depression/Self Harm  Goal: Effect of psychiatric condition will be minimized and patient will be protected from self harm  Description: INTERVENTIONS:  1. Assess impact of patient's symptoms on level of functioning, self care needs and offer support as indicated  2. Assess patient/family knowledge of depression, impact on illness and need for teaching  3. Provide emotional support, presence and reassurance  4. Assess for possible suicidal thoughts or ideation. If patient expresses suicidal thoughts or statements do not leave alone, initiate Suicide Precautions, move to a room close to the nursing station and obtain sitter  5. Initiate consults as appropriate with Mental Health Professional, Spiritual Care, Psychosocial CNS, and consider a recommendation to the LIP for a Psychiatric Consultation  4/7/2025 1029 by Cora Dawn RN  Outcome: Progressing  4/6/2025 2218 by Chelita Mullen RN  Outcome: Progressing     
impact of patient's symptoms on level of functioning, self care needs and offer support as indicated  2. Assess patient/family knowledge of depression, impact on illness and need for teaching  3. Provide emotional support, presence and reassurance  4. Assess for possible suicidal thoughts or ideation. If patient expresses suicidal thoughts or statements do not leave alone, initiate Suicide Precautions, move to a room close to the nursing station and obtain sitter  5. Initiate consults as appropriate with Mental Health Professional, Spiritual Care, Psychosocial CNS, and consider a recommendation to the LIP for a Psychiatric Consultation  4/5/2025 2120 by Chelita Mullen, RN  Outcome: Progressing  4/5/2025 1812 by Maxine Reid, RN  Outcome: Progressing     
participate in upper extremity therapeutic exercise/activities with [place] within 7 day(s).        4/8/2025 1343 by Matilda Castillo, RE  Outcome: Progressing     
care.    This patient’s plan of care is appropriate for delegation to [facility].       Thank you for this referral,  Matilda Castillo OT

## 2025-04-09 NOTE — DISCHARGE INSTR - COC
Treatments: none  Oxygen Therapy:  is not on home oxygen therapy.  Ventilator:    - No ventilator support    Rehab Therapies: none  Weight Bearing Status/Restrictions: No weight bearing restrictions  Other Medical Equipment (for information only, NOT a DME order):  walker, bedside commode  Other Treatments: none    Patient's personal belongings (please select all that are sent with patient):  Belongings with patient at time of discharge    RN SIGNATURE:  Electronically signed by Lisa Barber RN on 4/9/25 at 2:55 PM EDT    CASE MANAGEMENT/SOCIAL WORK SECTION    Inpatient Status Date: ***    Readmission Risk Assessment Score:  [facility] RISK OF UNPLANNED READMISSION 2.0             20 Total Score        Discharging to Facility/ Agency   Name:   Address:  Phone:  Fax:    Dialysis Facility (if applicable)   Name:  Address:  Dialysis Schedule:  Phone:  Fax:    / signature: {Esignature:[id number]}    PHYSICIAN SECTION    Prognosis: {Prognosis:9329147840}    Condition at Discharge: { Patient Condition:662803053}    Rehab Potential (if transferring to Rehab): {Prognosis:2897432910}    Recommended Labs or Other Treatments After Discharge: ***    Physician Certification: I certify the above information and transfer of Johanna Dunaway  is necessary for the continuing treatment of the diagnosis listed and that she requires {Admit to Appropriate Level of Care:31411} for {GREATER/LESS:008613177} 30 days.     Update Admission H&P: {CHP DME Changes in HandP:930698241}    PHYSICIAN SIGNATURE:  {Esignature:[id number]}

## 2025-04-09 NOTE — DISCHARGE SUMMARY
tablet  buPROPion 150 MG extended release tablet  citalopram 20 MG tablet  haloperidol 2 MG/ML oral solution  melatonin 3 MG Tabs tablet             DISPOSITION:    Home with Family:       Home with HH/PT/OT/RN: x   SNF/LTC:    ELSA:    OTHER:            Code status: Full code  Recommended diet: diabetic diet  Recommended activity: No heavy lifting, nothing over 10 pounds or roughly a gallon of milk, avoid bending and twisting of your torso for 6 weeks  Wound care: None      Follow up with:   PCP : Gabrielle Garibay APRN - NP [place], Clare M, APRN  [address] [place] [id number] [phone]    Go to  Monday April 14 at 0900    Gabrielle Garibay APRN - NP  [address] [facility] [address] [phone]    Call in 1 week(s)  Call to schedule follow-up appointment with your PCP in 1 to 2 weeks to update them on your recent hospitalization and for any medication refills and/or adjustments as needed.    Tenzin Rivera MD  [address] [place] [id number] [phone]    Call in 2 week(s)  Call to schedule a follow-up appointment in nephrology clinic ([facility]) for continued care of your chronic kidney disease.    Naldo [facility]  [address] [facility] [address] [phone]  Call in 2 week(s)  Call to schedule a follow-up appointment with your endocrinologist for continued management of your type 2 diabetes after you leave the hospital.    Adiel Brunner MD  [address] [facility] [address] [phone]    Call in 4 week(s)  Call schedule follow-up appointment in the interventional radiology clinic for post procedure follow-up.          Total time in minutes spent coordinating this discharge (includes going over instructions, follow-up, prescriptions, and preparing report for sign off to her PCP) :  35 minutes

## 2025-04-09 NOTE — PROGRESS NOTES
Nephrology follow-up        Patient: Johanna Dunaway MRN: [id number]  SSN: [id number]    YOB: 1960  Age: 64 y.o.  Sex: female      Subjective:   The patient is seen at the bedside  She looks confused today  Blood pressures are elevated  Worsening JIMBO  Labs are pending today      Past Medical History:   Diagnosis Date    Anxiety 6/22/2020    Arthritis     Bipolar 1 disorder (HCC) 1/23/2018    Congestive heart failure (CHF) (Formerly [facility])     PT DENIES THIS DX, STATES AN ER DR TOLD HER THIS WHEN SHE HAD COVID AND SAYS HER PCP HAS NEVER TOLD HER THIS AND WHEN SHE SAW CARDIOLOGY 5YRS AGO, ALL HER TESTS CAME BACK FINE PER PT    Diabetes mellitus type 2, controlled (Formerly [facility]) 6/22/2020    Diverticulosis     Hx of blood clots 2016    SMALL PE    Hypertension 6/22/2020    Kidney failure     Liver fibrosis 6/22/2020    Major depression 6/22/2020    Migraine 6/22/2020    Sleep apnea 1/23/2018    USES CPAP    Suicide attempt (Formerly [facility]) 6/22/2020    2013    UTI (urinary tract infection)      Past Surgical History:   Procedure Laterality Date    APPENDECTOMY      CHOLECYSTECTOMY      DIALYSIS FISTULA CREATION Left 10/11/2024    LEFT UPPER ARM DIALYSIS GRAFT PLACEMENT performed by David Monk MD at [facility] MAIN OR    DILATION AND CURETTAGE OF UTERUS      WISDOM TOOTH EXTRACTION        Family History   Problem Relation Age of Onset    Heart Attack Mother     Heart Disease Mother     Gout Mother     Heart Attack Father     Asthma Father     Heart Disease Father     Anesth Problems Neg Hx      Social History     Tobacco Use    Smoking status: Never    Smokeless tobacco: Never   Substance Use Topics    Alcohol use: Not Currently      Current Facility-Administered Medications   Medication Dose Route Frequency Provider Last Rate Last Admin    [START ON 4/9/2025] insulin glargine (LANTUS) injection vial 45 Units  45 Units SubCUTAneous Daily Terry Caro ACNP        hydrALAZINE (APRESOLINE) tablet 100 mg  100 mg Oral TID Miguel 
         Nephrology follow-up        Patient: Johanna Dunaway MRN: [id number]  SSN: [id number]    YOB: 1960  Age: 64 y.o.  Sex: female      Subjective:   The patient is seen at the bedside  She looks confused today  Blood pressures are elevated  Resolving JIMBO      Past Medical History:   Diagnosis Date    Anxiety 6/22/2020    Arthritis     Bipolar 1 disorder (HCC) 1/23/2018    Congestive heart failure (CHF) (Formerly [facility])     PT DENIES THIS DX, STATES AN ER DR TOLD HER THIS WHEN SHE HAD COVID AND SAYS HER PCP HAS NEVER TOLD HER THIS AND WHEN SHE SAW CARDIOLOGY 5YRS AGO, ALL HER TESTS CAME BACK FINE PER PT    Diabetes mellitus type 2, controlled (Formerly [facility]) 6/22/2020    Diverticulosis     Hx of blood clots 2016    SMALL PE    Hypertension 6/22/2020    Kidney failure     Liver fibrosis 6/22/2020    Major depression 6/22/2020    Migraine 6/22/2020    Sleep apnea 1/23/2018    USES CPAP    Suicide attempt (Formerly [facility]) 6/22/2020 2013    UTI (urinary tract infection)      Past Surgical History:   Procedure Laterality Date    APPENDECTOMY      CHOLECYSTECTOMY      DIALYSIS FISTULA CREATION Left 10/11/2024    LEFT UPPER ARM DIALYSIS GRAFT PLACEMENT performed by David Monk MD at [facility] MAIN OR    DILATION AND CURETTAGE OF UTERUS      IR KYPHOPLASTY LUMBAR 1 VERTEBRAL BODY  4/7/2025    IR KYPHOPLASTY LUMBAR 1 VERTEBRAL BODY 4/7/2025 Adiel Brunner MD [facility] RAD ANGIO IR    WISDOM TOOTH EXTRACTION        Family History   Problem Relation Age of Onset    Heart Attack Mother     Heart Disease Mother     Gout Mother     Heart Attack Father     Asthma Father     Heart Disease Father     Anesth Problems Neg Hx      Social History     Tobacco Use    Smoking status: Never    Smokeless tobacco: Never   Substance Use Topics    Alcohol use: Not Currently      Current Facility-Administered Medications   Medication Dose Route Frequency Provider Last Rate Last Admin    amLODIPine (NORVASC) tablet 5 mg  5 mg Oral Daily Tenzin Rivera MD   5 mg at 
      Hospitalist Progress Note    NAME:   Johanna Dunaway   : 1960   MRN: [id number]     Date/Time: 2025 10:31 AM  Patient PCP: Gabrielle Garibay APRN - NP    Estimated discharge date: 48 hours  Barriers: MRI lumbar spine, IR for kyphoplasty, clinical improvement    Hospital Course:  64-year-old female with PMH significant Elaine, bipolar, depression, T2DM, PE, essential hypertension, CKD stage IV presented status post ground-level fall in 2025 at that time patient reported severe back pain, underwent conservative management.  Over last week patient developed radicular symptoms to her buttocks bilaterally with unrelenting pain.  Patient underwent CT lumbar spine which revealed an acute L3 compression fracture with retropulsion of facet osteoarthritis causing moderate to severe canal stenosis.  Patient was scheduled to undergo MRI lumbar spine which has not yet happened and IR to perform kyphoplasty post scans.  Patient became acutely confused yesterday, CT head unremarkable, teleneurology consulted, advised against MRI and EEG believed likely in setting of UTI versus polypharmacy.  UDS positive for benzodiazepine and MDMA, family concerned, psychiatry consulted.  Nephrology on board, patient has AV fistula in place, not on hemodialysis yet    Assessment / Plan:    Acute metabolic encephalopathy likely in setting of polypharmacy and UTI  Patient was recently initiated on tramadol, gabapentin and Percocet.  Gabapentin dose to be renally adjusted per nephrology  4/3 CT head unremarkable  UDS positive for MDMA and benzodiazepine, family concerned  Teleneurology on board signed off, didnot recommend MRI and EEG.  Psychiatry consulted    Acute L3 vertebral body compression fracture status post ground-level fall in 2025  Moderate to severe L2-L3 spinal cord stenosis  No reproducible pain in L3  No neurological symptoms including fecal incontinence urinary retention or urinary incontinence  CT 
      Hospitalist Progress Note    NAME:   Johanna Dunaway   : 1960   MRN: [id number]     Date/Time: 2025 4:40 PM  Patient PCP: Gabrielle Garibay APRN - NP    Estimated discharge date: 48 hours  Barriers: MRI lumbar spine at [facility], IR for kyphoplasty, clinical improvement    Hospital Course:  64-year-old female with PMH significant Elaine, bipolar, depression, T2DM, PE, essential hypertension, CKD stage IV presented status post ground-level fall in 2025 at that time patient reported severe back pain, underwent conservative management.  Over last week patient developed radicular symptoms to her buttocks bilaterally with unrelenting pain.  Patient underwent CT lumbar spine which revealed an acute L3 compression fracture with retropulsion of facet osteoarthritis causing moderate to severe canal stenosis.  Patient was scheduled to undergo MRI lumbar spine which has not yet happened and IR to perform kyphoplasty post scans.  Patient became acutely confused/4, CT head unremarkable, teleneurology consulted, advised against MRI and EEG believed likely in setting of UTI versus polypharmacy.  UDS positive for benzodiazepine and MDMA, family concerned, psychiatry consulted.  Nephrology on board, patient has AV fistula in place, not on hemodialysis yet.  Overnight  patient had ground-level fall, landing on right hip, right wrist, bilateral knees.  Imaging including x-ray of right wrist, right and left knee negative for acute bony abnormality, however consider 3 view examination of right wrist if clinical suspicion persists.  CT pelvis completed, pending read.    MRI lumbar spine completed at [facility],  consulted and patient is now status post L3 kyphoplasty on .  PT OT ordered, discharge recs pending.    Assessment / Plan:  Ground-level fall  XR bilateral knees, right wrist with no acute bony abnormality, however clinical suspicion persist for right wrist consider 3 view examination  Did 
      Hospitalist Progress Note    NAME:   Johanna Dunaway   : 1960   MRN: [id number]     Date/Time: 2025 11:29 AM  Patient PCP: Gabrielle Garibay APRN - NP    Estimated discharge date: 48 hours  Barriers: MRI lumbar spine at [facility], IR for kyphoplasty, clinical improvement    Hospital Course:  64-year-old female with PMH significant Elaine, bipolar, depression, T2DM, PE, essential hypertension, CKD stage IV presented status post ground-level fall in 2025 at that time patient reported severe back pain, underwent conservative management.  Over last week patient developed radicular symptoms to her buttocks bilaterally with unrelenting pain.  Patient underwent CT lumbar spine which revealed an acute L3 compression fracture with retropulsion of facet osteoarthritis causing moderate to severe canal stenosis.  Patient was scheduled to undergo MRI lumbar spine which has not yet happened and IR to perform kyphoplasty post scans.  Patient became acutely confused/4, CT head unremarkable, teleneurology consulted, advised against MRI and EEG believed likely in setting of UTI versus polypharmacy.  UDS positive for benzodiazepine and MDMA, family concerned, psychiatry consulted.  Nephrology on board, patient has AV fistula in place, not on hemodialysis yet.  Overnight  patient had ground-level fall, landing on right hip, right wrist, bilateral knees.  Imaging including x-ray of right wrist, right and left knee negative for acute bony abnormality, however consider 3 view examination of right wrist if clinical suspicion persists.  CT pelvis completed, pending read.    MRI lumbar spine ordered however canceled as patient does not fit into [facility]'s MRI machine.  Discussed with transfer center and will attempt to set up open MRI at [facility] if able to accept to complete MRI today.    Assessment / Plan:  Ground-level fall  XR bilateral knees, right wrist with no acute bony abnormality, 
11:30 pt refused vitals from student, pt agitated. RN notified    
4 Eyes Skin Assessment     NAME:  Johanna Dunaway  YOB: 1960  MEDICAL RECORD NUMBER:  [id number]    The patient is being assessed for  Admission    I agree that at least one RN has performed a thorough Head to Toe Skin Assessment on the patient. ALL assessment sites listed below have been assessed.      Areas assessed by both nurses:    Head, Face, Ears, Shoulders, Back, Chest, Arms, Elbows, Hands, Sacrum. Buttock, Coccyx, Ischium, Legs. Feet and Heels, and Under Medical Devices         Does the Patient have a Wound? No noted wound(s) moisture lesion under BL breast, abdominal pannus and groin       Leon Prevention initiated by RN: Yes  Wound Care Orders initiated by RN: No    Pressure Injury (Stage 3,4, Unstageable, DTI, NWPT, and Complex wounds) if present, place Wound referral order by RN under : No    New Ostomies, if present place, Ostomy referral order under : No     Nurse 1 eSignature: Electronically signed by Claudia Jonas RN on 4/3/25 at 11:42 PM EDT    **SHARE this note so that the co-signing nurse can place an eSignature**    Nurse 2 eSignature: Electronically signed by NEIL Elliott RN on 4/4/25 at 2:09 AM EDT   
Agree with assessment and plan, for detailed note please refer to H&P from earlier today.  Patient is currently n.p.o., pending interventional radiology consult.  Initiated on IV Dilaudid 0.5 mg every 4 hour as needed for severe pain, IV hydralazine scheduled for blood pressure management likely elevated in setting of uncontrolled pain.  IV Lasix 20 mg twice daily, switch to p.o. once able to tolerate oral diet.  On assessment patient is extremely confused, MRI head ordered, teleneurology consulted and psychiatry consulted.  As per patient's sister Lucía she was initiated on tramadol on 3/21, Percocet on 3/23 and heavy dose of gabapentin and started to become altered yesterday.  Patient's UDS is positive for benzodiazepine and MDMA, family was a bit concerned.  Explained in great detail in presence of bedside RN Alannah, regarding workup and further management.  Setting of agitation did not administer any medication and explained to family as this could impair patient's assessment and further management per psychiatry and teleneurology.  Patient has an AV fistula, not currently on hemodialysis, follows outpatient with Dr. Rivera(previously ) nephrologist consulted.  
Dr. Garg in to see patient.     Notified [facility] regarding tele neuro consult. State they will see patient tomorrow.   
IR consult received. MRI order noted and awaiting completion and results. Unable to approve and schedule kyphoplasty until MRI results       
Jae NP at bedside with writer and Dr. Clayton to see patient to discuss plan of care and discharge plan.   
Notified BRAYDEN Abarca of 1244 BG result of 365, orders to follow sliding scale received. Then, notified of 1635 BG result of 497. Orders to follow sliding scale received and states he will look insulin orders.   
PHYSICAL THERAPY EVALUATION  Patient: Johanna Dunaway (64 y.o. female)  Date: 4/8/2025  Primary Diagnosis: Metabolic encephalopathy [G93.41]  Intractable back pain [M54.9]       Precautions: Restrictions/Precautions  Restrictions/Precautions: General Precautions  Required Braces or Orthoses?: No  Position Activity Restriction  Spinal Precautions: No Bending, No Lifting, No Twisting  Spinal Precautions Comments: s/p L3 kyphoplasty on 4/7/25  Recommendations for nursing mobility: Out of bed to chair for meals, AD and gt belt for bed to chair , Amb to bathroom with AD and gait belt, Amb in hallway, Assist x1, and Spinal precautions; no bending, lifting, or twisting    In place during session: Peripheral IV, External Catheter, and EKG/telemetry     ASSESSMENT  Pt is a 64 y.o. female admitted on 4/3/2025 for worsening back pain x 1 week; pt currently being treated for acute L3 compression fracture s/p GLF 1/2025, moderate to severe L2-L3 spinal cord stenosis, uncomplicated UTI, DMII, anxiety, depression, bipolar disorder, HTN, CKD IV. Pt s/p kyphoplasty L3 completed by IR on 4/7/25. Pt semi-supine in bed upon PT arrival, agreeable to evaluation. Pt A&O x 4.  1:1 present in room.    Based on the objective data described below, the patient currently presents with impaired functional mobility, decreased independence in ADLs, impaired strength, decreased activity tolerance, impaired balance, and impaired posture. (See below for objective details and assist levels).     Overall pt tolerated session fair today with no c/o pain throughout session, noted SOB with amb, however, SPO2 stable. Pt education regarding spinal precautions given including no bending, lifting, twisting as well as instructions to complete log roll to enter/exit bed, pt verbalized understanding via teach back and demonstrated compliance throughout session. Pt required SBA to CGA with verbal cues for bed mobility and SBA to CGA transfers. Pt amb 20 feet x2 
Patient discharge from facility to home. Discharge instruction given to and discussed with patient and family member at bedside. No LDAs present at present time. No concerns voiced r/t education of discharge instructions. No c/o pain or discomfort voiced. Patient escorted by hospital staff to personal vehicle via wheelchair.   
Patient discharge from facility to home. Discharge instruction given to and discussed with patient and family member at bedside. No LDAs present at present time. No concerns voiced r/t education of discharge instructions. No c/o pain or discomfort voiced. Patient escorted by hospital staff to personal vehicle via wheelchair.   Patient received bedside commode for home.   
Patient found on the floor on her knees stating she jumped out of bed with her knees,Hallucinating only alert to self on Direct video monitoring that did not go off,Vitals as chatted complaining of pain on her knees and back but denies hitting her head on anything.  
Per chito Pretty to insert Midline.   
Received Order for Telemetry     Johanna Danielskunal   1960   [id number]   Metabolic encephalopathy [G93.41]  Intractable back pain [M54.9]   Veena Boudreaux MD     Tele Box # [id number] placed on patient at  0314 am  [facility]  Admitting to Room 507  Transferring Nurse N/A  Verified with Primary Nurse DARON at  0314 am   
Spoke with BRAYDEN Miranda regarding MRI.  No MRI today, would like to check to see if contrast is needed for procedure with IR.     
tablet 150 mg  150 mg Oral BID Lb Molina PA-C   150 mg at 04/05/25 1022    citalopram (CELEXA) tablet 20 mg  20 mg Oral Nightly Lb Molina PA-C        [Held by provider] furosemide (LASIX) tablet 40 mg  40 mg Oral BID Lb Molina PA-C   40 mg at 04/04/25 0910    gabapentin (NEURONTIN) capsule 100 mg  100 mg Oral TID Lb Molina PA-C   100 mg at 04/05/25 1014    [Held by provider] hydrALAZINE (APRESOLINE) tablet 50 mg  50 mg Oral TID Lb Molina PA-C        [Held by provider] Insulin NPH Isophane & Regular (HUMULIN;NOVOLIN) (70-30) 100 UNIT per ML injection pen 45 Units  45 Units SubCUTAneous QAM Lb Molina PA-C        [Held by provider] Insulin NPH Isophane & Regular (HUMULIN;NOVOLIN) (70-30) 100 UNIT per ML injection pen 35 Units  35 Units SubCUTAneous Nightly Lb Molina PA-C        metoprolol succinate (TOPROL XL) extended release tablet 50 mg  50 mg Oral Nightly ReasonLb rose PA-C        sodium chloride flush 0.9 % injection 5-40 mL  5-40 mL IntraVENous 2 times per day Lb Molina PA-C   10 mL at 04/05/25 1016    sodium chloride flush 0.9 % injection 5-40 mL  5-40 mL IntraVENous PRN Lb Molina PA-C        0.9 % sodium chloride infusion   IntraVENous PRN Lb Molina PA-C        ondansetron (ZOFRAN-ODT) disintegrating tablet 4 mg  4 mg Oral Q8H PRN Lb Molina PA-C        Or    ondansetron (ZOFRAN) injection 4 mg  4 mg IntraVENous Q6H PRN Lb Molina PA-C   4 mg at 04/04/25 1544    polyethylene glycol (GLYCOLAX) packet 17 g  17 g Oral Daily PRN Lb Molina PA-C        acetaminophen (TYLENOL) tablet 650 mg  650 mg Oral Q6H PRN Lb Molina PA-C   650 mg at 04/05/25 1235    Or    acetaminophen (TYLENOL) suppository 650 mg  650 mg Rectal Q6H PRN Lb Molina PA-C        melatonin tablet 3 mg  3 mg Oral Nightly Lb Molina PA-C   3 mg at 04/04/25 2202    methylPREDNISolone sodium 
metoprolol  Continue hydralazine     Chronic kidney disease stage IV  With fistula in place, not currently on HD  Follows outpatient with Dr. Rivera (previously )  baseline creatinine approximately 2.96, current 3.5  Monitor BMP     Anemia of CKD  Hemoglobin 9.9, stable  Monitor and transfuse for hemoglobin less than 7    Metabolic acidosis  CO2 20  Monitor and consider repletion if downtrending tomorrow    Medication reconciliation could not be completed due to the patient not knowing all of her medications and not having a list provided      Medical Decision Making     [x] High (any 2)     A. Problems (any 1)  [x] Acute/Chronic Illness/injury posing threat to life or bodily function:    [] Severe exacerbation of chronic illness:    ---------------------------------------------------------------------  B. Risk of Treatment (any 1)   [x] Drugs/treatments that require intensive monitoring for toxicity include:    [x] IV ABX requiring serial renal monitoring for nephrotoxicity:     [] IV Narcotic analgesia for adverse drug reaction  [] Aggressive IV diuresis requiring serial monitoring for renal impairment and electrolyte derangements  [] Critical electrolyte abnormalities requiring IV replacement and close serial monitoring  [x] Insulin - monitoring serial FSBS for Hypoglycemic adverse drug reaction  [] Other -   [] Change in code status:    [] Decision to escalate care:    [x] Major surgery/procedure with associated risk factors: L3 kyphoplasty  ----------------------------------------------------------------------  C. Data (any 2)  [x] Discussed current management and discharge planning options with Case Management.  [x] Discussed management of the case with: Patient, patient's family, nursing  [] Telemetry personally reviewed and interpreted as documented above    [x] Imaging personally reviewed and interpreted, includes: MRI L-spine  [x] Data Review (any 3)  [x] All available Consultant notes from 
Unknown  RENAL FAILURE  RENAL FAILURE         Review of Systems:  A comprehensive review of systems was negative except for that written in the History of Present Illness.    Objective:     Vitals:    04/06/25 0005 04/06/25 0007 04/06/25 0255 04/06/25 0743   BP:  (!) 159/55 (!) 166/75 (!) 153/63   Pulse: (!) 115 99 (!) 101 89   Resp:  24 20 16   Temp:  98.8 °F (37.1 °C) 99.9 °F (37.7 °C) 97.7 °F (36.5 °C)   TempSrc:  Oral Oral    SpO2:  99% 94% 91%   Weight:       Height:            Physical Exam:  General: NAD  Eyes: sclera anicteric  Oral Cavity: No thrush or ulcers  Neck: no JVD  Chest: Fair bilateral air entry  Heart: normal sounds  Abdomen: soft and non tender   : no aguilar  Lower Extremities: no edema  Skin: no rash  Neuro: intact  Psychiatric: non-depressed          Assessment/Plan:   Acute kidney injury on chronic kidney stage IV  Secondary prerenal azotemia  On admission BUN/creatinine 65/2.9  Worsening JIMBO, BUN/creatinine 88/3.5 today  Clinically euvolemic  Will continue to hold Lasix  Urinalysis possible UTI  Renal ultrasound  Gentle IV hydration    Hypertension  Blood pressure is elevated  Will increase hydralazine dose 75 mg 3 times daily  Continue metoprolol 50 mg daily.    Anemia  Anemia of chronic kidney disease  Hemoglobin 9.7-10.1  Pending iron studies and ferritin level    Acute UTI  UA positive for UTI  On ceftriaxone    L3 vertebral fracture  Acute L3 compression fracture status post ground-level fall in January 2025  Moderate to severe canal stenosis  Pain control  IR consultation for kyphoplasty        Plan:       Signed By: Tenzin Rivera MD     April 6, 2025

## 2025-04-10 NOTE — CONSULTS
[place] [id number]                              CONSULTATION      PATIENT NAME: BLAS JONES              : 1960  MED REC NO: [id number]                       ROOM: 507  ACCOUNT NO: [id number]                       ADMIT DATE: 2025  PROVIDER: Fam Gomez MD    DATE OF SERVICE:  2025    ATTENDING PHYSICIAN:  ALONDRA CASIANO    The patient seen for followup.  Spoke with the nursing staff, spoke with the patient's family, namely her sister. The patient is calm, polite, pleasant, however, delusional, hallucinating.  She states that she is having sex with a man in the bed and the man is she can see, not perturbed, no yelling.  No cursing.  The patient is already on aripiprazole 20 mg, not adequately responding.  She is also on Xanax for anxiety, add Haldol 5 mg twice a day.  The patient and her sister had lot of questions about the underlying reason for her psychosis, how long it will take, what medications, what we are doing, all the questions were answered.    VITAL SIGNS:  Today, pulse 100, blood pressure 149/76, temperature 98.6, respirations 18, O2 saturation 95%, WBC  11.2 thousand, RBC 3.64, hemoglobin 10.5, hematocrit 32.7, and neutrophils 90.5.      Continued inpatient level of care indicated. No side effects noted so far.  Not suicidal, not homicidal.  No agitation.  No aggression.        FAM GOMEZ MD      RK/AQS  D:  2025 00:02:13  T:  2025 00:26:37  JOB #:  [id number]     
                    [place] [id number]                              CONSULTATION      PATIENT NAME: BLAS JONES              : 1960  MED REC NO: [id number]                       ROOM: 507  ACCOUNT NO: [id number]                       ADMIT DATE: 2025  PROVIDER: Fam Garg MD    DATE OF SERVICE:  2025    ATTENDING PHYSICIAN:  ALONDRA CASIANO    The patient is seen for followup.  She is alert, verbal.  She denied any hallucinations or experience, however, staff under sister tells that she was having hallucinations.  Apparently last night, she got up thinking that she was outside her house and got out of the bed and fell, hurt her right hip, right wrist, bilateral knee pain.  CT of the pelvis to rule out the hip fracture has been ordered.  The patient has no dizziness.  Her blood pressure is good.  CBC; WBC 12.7 thousand, hemoglobin 9.9, RBC 3.43.  Spoke with the patient's sister, reduced Haldol to once a day.        MD LAURYN KINCAID/AQS  D:  2025 00:29:22  T:  2025 03:58:51  JOB #:  [id number]     
                    [place] [address] [place]  [address]                              CONSULTATION      PATIENT NAME: BLAS JONES              : 1960  MED REC NO: [id number]                       ROOM: 507  ACCOUNT NO: [id number]                       ADMIT DATE: 2025  PROVIDER: Fam Garg MD    DATE OF SERVICE:  2025    ATTENDING PHYSICIAN:  JENNY ADAMS    The patient is seen for followup.  Chart reviewed.  Spoke with the nursing staff.  Spoke with the patient.  Spoke with the patient's sister.  Spoke with the .  The patient is doing better.  Bright affect.  Good energy.  Good motivation.  No hallucination.  No delusions.  In good contact with the surroundings.  Feeling better.  No side effects.  The patient and her sister, both felt that she is doing well and back to normal self.  Spoke with the , Malachi Diaz.  He had arranged with [facility] followup coming Monday, .  I sent the printed prescription for her all psychiatric medications, namely alprazolam 0.5 mg 3 times a day, month with 1 refill; Abilify 20 mg daily; bupropion  mg twice daily; citalopram 20 mg daily; haloperidol 5 mg at night; melatonin 3 mg; all psychiatric medications 30 days with 1 refill.    Follow up with [facility].  Not suicidal.  Not homicidal.  Not psychotic.  No mood instability.  No side effects.        MD LAURYN KINCAID/MEMES  D:  2025 22:50:50  T:  04/10/2025 00:32:14  JOB #:  [id number]     
Went to assess patient and she was sleeping and sedated from a procedure. Pt was unable to engage in the assessment process, but family member reports that pt had a good morning and was resting without issue. She remains alert and verbal with mild confusion per family report. Will make an attempt to reassess patient in the morning.   
a day.  No history of psychological trauma and no history of alcohol, drug abuse; maybe there is some prescription misuse.    ALLERGIES TO MEDICATIONS:  Five.  Sulfa, wound dressing adhesive, adhesive tape, NSAIDs.      LABORATORY DATA:  Blood chemistry, chloride 114.  CBC:  Elevated WBC 12.2 thousands, RBC 3.39, hemoglobin 9.7, hematocrit 30.6, neutrophils 8.77.  Blood chemistry; glucose 110, creatinine 2.96, BUN 65, BUN to creatinine ratio 22, estimated GFR 17.  Liver functions:  Alkaline phosphatase 147, albumin 3.1, A/G ratio 0.9, phosphorus 3.6.  BNP 1175.  Urinalysis:  pH urine 8.5, protein 100, leukocyte esterase moderate, wbc UA 5-10.  Culture not indicated by UA results.  Urine drug screen, positive for benzodiazepines, MDMA.    VITAL SIGNS:  Today, 101.8 kg, pulse 86, blood pressure 174/68, temperature 98.4, height 5 feet, respirations 18, O2 saturation 96%.    CURRENT MEDICATIONS:  Sodium chloride, Tylenol, alprazolam 0.5 mg 3 times daily, aripiprazole 20 mg nightly, bupropion 150 mg twice a day, Rocephin, citalopram 20 mg daily, dextrose, Lasix 20 mg daily, gabapentin 100 mg 3 times a day, hydralazine 50 mg 3 times a day, hydromorphone, Dilaudid 0.5 mg IV every 4 hours p.r.n., insulin Humalog, insulin NPH, melatonin 3 mg nightly, methylprednisolone 60 mg IV every 12 hours, metoprolol 50 mg XL daily, p.r.n. Zofran, polyethylene glycol.    MENTAL STATUS:  Average height overweight female patient resting in bed, pleasant.  Alert and oriented in all the 3 spheres, polite, cooperative.  Acknowledged hallucinations earlier, but none when I saw the patient, denied it.  Not perturbed, not bothered.  Not suicidal, not homicidal.  No agitation.  No aggression.  Compliant with medication, polite, cooperative.    DIAGNOSES:  Bipolar disorder with psychosis.  Some of it may be brought on by oxycodone, tramadol, further contributed by urinary tract infection, steroids, may be some benzodiazepine withdrawal, but she is 
Lb MIDDLETON PA-C        Or    ondansetron (ZOFRAN) injection 4 mg  4 mg IntraVENous Q6H PRN Lb Molina PA-C   4 mg at 04/04/25 1544    polyethylene glycol (GLYCOLAX) packet 17 g  17 g Oral Daily PRN Lb Molina PA-C        acetaminophen (TYLENOL) tablet 650 mg  650 mg Oral Q6H PRN Lb Molina PA-C        Or    acetaminophen (TYLENOL) suppository 650 mg  650 mg Rectal Q6H PRN Lb Molina PA-C        lactated ringers infusion   IntraVENous Continuous Lb Molina PA-C 75 mL/hr at 04/04/25 0724 Rate Verify at 04/04/25 0724    melatonin tablet 3 mg  3 mg Oral Nightly Lb Molina PA-C        methylPREDNISolone sodium succ (SOLU-MEDROL) 60 mg in sterile water 0.96 mL injection  60 mg IntraVENous Q12H Lb Molina PA-C   60 mg at 04/04/25 1543    cefTRIAXone (ROCEPHIN) 1,000 mg in sterile water 10 mL IV syringe  1,000 mg IntraVENous Q24H Lb Molina PA-C   1,000 mg at 04/04/25 0258    glucose chewable tablet 16 g  4 tablet Oral PRN Lb Molina PA-C        dextrose bolus 10% 125 mL  125 mL IntraVENous PRN Lb Molina PA-C        Or    dextrose bolus 10% 250 mL  250 mL IntraVENous PRN Lb Molina PA-C        glucagon injection 1 mg  1 mg SubCUTAneous PRN Lb Molina PA-C        dextrose 10 % infusion   IntraVENous Continuous PRN Lb Molina PA-C        insulin lispro (HUMALOG,ADMELOG) injection vial 0-16 Units  0-16 Units SubCUTAneous 4x Daily AC & HS Lb Molina PA-C        hydrALAZINE (APRESOLINE) injection 10 mg  10 mg IntraVENous Q8H Carmen Nicole MD   10 mg at 04/04/25 1011    HYDROmorphone HCl PF (DILAUDID) injection 0.5 mg  0.5 mg IntraVENous Q4H PRN Carmen Nicole MD        furosemide (LASIX) injection 20 mg  20 mg IntraVENous BID Carmen Nicole MD            Allergies   Allergen Reactions    Sulfa Antibiotics Hives and Other (See Comments)     Other reaction(s): Unknown (comments)    Wound Dressing Adhesive 
(70-30) 100 UNIT per ML injection pen 45 Units  45 Units SubCUTAneous QAM Lb Molina PA-C        [Held by provider] Insulin NPH Isophane & Regular (HUMULIN;NOVOLIN) (70-30) 100 UNIT per ML injection pen 35 Units  35 Units SubCUTAneous Nightly Lb Molina PA-C        metoprolol succinate (TOPROL XL) extended release tablet 50 mg  50 mg Oral Nightly Lb Molina PA-C        sodium chloride flush 0.9 % injection 5-40 mL  5-40 mL IntraVENous 2 times per day Lb Molina PA-C   10 mL at 04/04/25 1016    sodium chloride flush 0.9 % injection 5-40 mL  5-40 mL IntraVENous PRN Lb Molina PA-C        0.9 % sodium chloride infusion   IntraVENous PRN Lb Molina PA-C        ondansetron (ZOFRAN-ODT) disintegrating tablet 4 mg  4 mg Oral Q8H PRN Lb Molina PA-C        Or    ondansetron (ZOFRAN) injection 4 mg  4 mg IntraVENous Q6H PRN Lb Molina PA-C   4 mg at 04/04/25 1544    polyethylene glycol (GLYCOLAX) packet 17 g  17 g Oral Daily PRN Lb Molina PA-C        acetaminophen (TYLENOL) tablet 650 mg  650 mg Oral Q6H PRN Lb Molina PA-C   650 mg at 04/05/25 0533    Or    acetaminophen (TYLENOL) suppository 650 mg  650 mg Rectal Q6H PRN Lb Molina PA-C        melatonin tablet 3 mg  3 mg Oral Nightly Lb Molina PA-C   3 mg at 04/04/25 2202    methylPREDNISolone sodium succ (SOLU-MEDROL) 60 mg in sterile water 0.96 mL injection  60 mg IntraVENous Q12H bL Molina PA-C   60 mg at 04/05/25 0220    cefTRIAXone (ROCEPHIN) 1,000 mg in sterile water 10 mL IV syringe  1,000 mg IntraVENous Q24H Lb Molina PA-C   1,000 mg at 04/05/25 0220    glucose chewable tablet 16 g  4 tablet Oral PRN Lb Molina PA-C        dextrose bolus 10% 125 mL  125 mL IntraVENous PRN Lb Molina PA-C        Or    dextrose bolus 10% 250 mL  250 mL IntraVENous PRN Lb Molina PA-C        glucagon injection 1 mg  1 mg

## 2025-04-10 NOTE — PROGRESS NOTES
Initial Psychiatric Assessment  Last saw Meredith Santo 6/23/2022    ID: Johanna Dunaway is a 64 y.o. yo Single  female referred by Dr Garg [facility] for treatment of Anxiety & Bipolar. She presents to clinic for initial visit/ evaluation.  Patient appears to be a reliable historian. Patient is accompanied by her sister and has provided verbal consent for this person to participate in this visit.     Chief Complaint:   Chief Complaint   Patient presents with    Mental Health Problem    New Patient        HPI: Johanna Dunaway presents with symptoms of depression, anxiety, and follow-up for hallucinations/delusions. 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 Notably, she also has stage V CKD and type 2 diabetes impacting her care. Overall mental health symptoms began approximately 2016. Most recently she is depressed, with symptoms of low motivation, low energy, difficulty getting out of bed. The symptoms occur daily. Most recently she was admitted to [facility] for Acute metabolic encephalopathy, discharged 4/3/2025. She has tried multiple medications with some improvement. Since being discharged from the hospital Johanna has experienced no hallucinations/delusions.  Sister & patient report no previous hallucinations or problems prior to taking the excess pain medications which resulted in hospitalization for metabolic encephalopathy (symptoms of hallucinations/delusions).   Currently Bipolar-depression is of high severity. The symptoms occur daily, no HI/SI, but reports constant feeling of depression and low energy. Patient is also experiencing pain related to spinal surgery for a broken bone    Currently Anxiety is of mild severity. The symptoms occur daily.  Patient is currently taking the xanax on a schedule, not PRN as prescribed.   Patient is currently prescribed these mental health medications by Dr. Zuleta

## 2025-04-14 ENCOUNTER — OFFICE VISIT (OUTPATIENT)
Age: 65
End: 2025-04-14
Payer: MEDICARE

## 2025-04-14 ENCOUNTER — APPOINTMENT (OUTPATIENT)
Facility: HOSPITAL | Age: 65
DRG: 683 | End: 2025-04-14
Payer: MEDICARE

## 2025-04-14 ENCOUNTER — HOSPITAL ENCOUNTER (INPATIENT)
Facility: HOSPITAL | Age: 65
LOS: 9 days | Discharge: NURSING, REHAB OR LONG-TERM CARE | DRG: 683 | End: 2025-04-23
Attending: EMERGENCY MEDICINE | Admitting: INTERNAL MEDICINE
Payer: MEDICARE

## 2025-04-14 VITALS
HEART RATE: 75 BPM | TEMPERATURE: 98.1 F | DIASTOLIC BLOOD PRESSURE: 68 MMHG | OXYGEN SATURATION: 96 % | RESPIRATION RATE: 18 BRPM | SYSTOLIC BLOOD PRESSURE: 108 MMHG | BODY MASS INDEX: 43.83 KG/M2 | HEIGHT: 60 IN

## 2025-04-14 DIAGNOSIS — K85.90 ACUTE PANCREATITIS, UNSPECIFIED COMPLICATION STATUS, UNSPECIFIED PANCREATITIS TYPE: Primary | ICD-10-CM

## 2025-04-14 DIAGNOSIS — F31.4 BIPOLAR 1 DISORDER, DEPRESSED, SEVERE (HCC): Primary | Chronic | ICD-10-CM

## 2025-04-14 DIAGNOSIS — G47.00 INSOMNIA DISORDER WITH NON-SLEEP DISORDER MENTAL COMORBIDITY: ICD-10-CM

## 2025-04-14 DIAGNOSIS — M54.50 ACUTE MIDLINE LOW BACK PAIN WITHOUT SCIATICA: ICD-10-CM

## 2025-04-14 DIAGNOSIS — N17.9 AKI (ACUTE KIDNEY INJURY): ICD-10-CM

## 2025-04-14 DIAGNOSIS — M54.50 MIDLINE LOW BACK PAIN, UNSPECIFIED CHRONICITY, UNSPECIFIED WHETHER SCIATICA PRESENT: ICD-10-CM

## 2025-04-14 DIAGNOSIS — M54.50 LOW BACK PAIN, UNSPECIFIED BACK PAIN LATERALITY, UNSPECIFIED CHRONICITY, UNSPECIFIED WHETHER SCIATICA PRESENT: ICD-10-CM

## 2025-04-14 DIAGNOSIS — F41.9 ANXIETY: Chronic | ICD-10-CM

## 2025-04-14 PROBLEM — F31.9 BIPOLAR 1 DISORDER (HCC): Chronic | Status: ACTIVE | Noted: 2018-01-23

## 2025-04-14 LAB
ALBUMIN SERPL-MCNC: 2.9 G/DL (ref 3.5–5)
ALBUMIN/GLOB SERPL: 0.9 (ref 1.1–2.2)
ALP SERPL-CCNC: 149 U/L (ref 45–117)
ALT SERPL-CCNC: 48 U/L (ref 12–78)
ANION GAP SERPL CALC-SCNC: 7 MMOL/L (ref 2–12)
AST SERPL W P-5'-P-CCNC: 22 U/L (ref 15–37)
BASOPHILS # BLD: 0.01 K/UL (ref 0–0.1)
BASOPHILS NFR BLD: 0.1 % (ref 0–1)
BILIRUB SERPL-MCNC: 0.4 MG/DL (ref 0.2–1)
BNP SERPL-MCNC: 428 PG/ML
BUN SERPL-MCNC: 121 MG/DL (ref 6–20)
BUN/CREAT SERPL: 28 (ref 12–20)
CA-I BLD-MCNC: 8.9 MG/DL (ref 8.5–10.1)
CHLORIDE SERPL-SCNC: 102 MMOL/L (ref 97–108)
CO2 SERPL-SCNC: 27 MMOL/L (ref 21–32)
CREAT SERPL-MCNC: 4.29 MG/DL (ref 0.55–1.02)
DIFFERENTIAL METHOD BLD: ABNORMAL
EOSINOPHIL # BLD: 0.02 K/UL (ref 0–0.4)
EOSINOPHIL NFR BLD: 0.2 % (ref 0–7)
ERYTHROCYTE [DISTWIDTH] IN BLOOD BY AUTOMATED COUNT: 14.6 % (ref 11.5–14.5)
GLOBULIN SER CALC-MCNC: 3.1 G/DL (ref 2–4)
GLUCOSE SERPL-MCNC: 236 MG/DL (ref 65–100)
HCT VFR BLD AUTO: 26 % (ref 35–47)
HGB BLD-MCNC: 8.6 G/DL (ref 11.5–16)
IMM GRANULOCYTES # BLD AUTO: 0.14 K/UL (ref 0–0.04)
IMM GRANULOCYTES NFR BLD AUTO: 1.4 % (ref 0–0.5)
LIPASE SERPL-CCNC: 324 U/L (ref 13–75)
LYMPHOCYTES # BLD: 0.98 K/UL (ref 0.8–3.5)
LYMPHOCYTES NFR BLD: 9.5 % (ref 12–49)
MAGNESIUM SERPL-MCNC: 1.8 MG/DL (ref 1.6–2.4)
MCH RBC QN AUTO: 29.1 PG (ref 26–34)
MCHC RBC AUTO-ENTMCNC: 33.1 G/DL (ref 30–36.5)
MCV RBC AUTO: 87.8 FL (ref 80–99)
MONOCYTES # BLD: 0.52 K/UL (ref 0–1)
MONOCYTES NFR BLD: 5 % (ref 5–13)
NEUTS SEG # BLD: 8.68 K/UL (ref 1.8–8)
NEUTS SEG NFR BLD: 83.8 % (ref 32–75)
NRBC # BLD: 0 K/UL (ref 0–0.01)
NRBC BLD-RTO: 0 PER 100 WBC
PLATELET # BLD AUTO: 147 K/UL (ref 150–400)
PMV BLD AUTO: 11.3 FL (ref 8.9–12.9)
POTASSIUM SERPL-SCNC: 3.4 MMOL/L (ref 3.5–5.1)
PROT SERPL-MCNC: 6 G/DL (ref 6.4–8.2)
RBC # BLD AUTO: 2.96 M/UL (ref 3.8–5.2)
SODIUM SERPL-SCNC: 136 MMOL/L (ref 136–145)
WBC # BLD AUTO: 10.4 K/UL (ref 3.6–11)

## 2025-04-14 PROCEDURE — 90792 PSYCH DIAG EVAL W/MED SRVCS: CPT

## 2025-04-14 PROCEDURE — 80053 COMPREHEN METABOLIC PANEL: CPT

## 2025-04-14 PROCEDURE — 5A1D70Z PERFORMANCE OF URINARY FILTRATION, INTERMITTENT, LESS THAN 6 HOURS PER DAY: ICD-10-PCS | Performed by: INTERNAL MEDICINE

## 2025-04-14 PROCEDURE — 1100000000 HC RM PRIVATE

## 2025-04-14 PROCEDURE — 85025 COMPLETE CBC W/AUTO DIFF WBC: CPT

## 2025-04-14 PROCEDURE — 83880 ASSAY OF NATRIURETIC PEPTIDE: CPT

## 2025-04-14 PROCEDURE — 6370000000 HC RX 637 (ALT 250 FOR IP): Performed by: EMERGENCY MEDICINE

## 2025-04-14 PROCEDURE — 83735 ASSAY OF MAGNESIUM: CPT

## 2025-04-14 PROCEDURE — 2580000003 HC RX 258: Performed by: INTERNAL MEDICINE

## 2025-04-14 PROCEDURE — 99285 EMERGENCY DEPT VISIT HI MDM: CPT

## 2025-04-14 PROCEDURE — 36410 VNPNXR 3YR/> PHY/QHP DX/THER: CPT

## 2025-04-14 PROCEDURE — 05HY33Z INSERTION OF INFUSION DEVICE INTO UPPER VEIN, PERCUTANEOUS APPROACH: ICD-10-PCS | Performed by: INTERNAL MEDICINE

## 2025-04-14 PROCEDURE — 74176 CT ABD & PELVIS W/O CONTRAST: CPT

## 2025-04-14 PROCEDURE — 36415 COLL VENOUS BLD VENIPUNCTURE: CPT

## 2025-04-14 PROCEDURE — 83690 ASSAY OF LIPASE: CPT

## 2025-04-14 RX ORDER — PREDNISONE 5 MG/1
10 TABLET ORAL DAILY
Status: DISPENSED | OUTPATIENT
Start: 2025-04-15 | End: 2025-04-16

## 2025-04-14 RX ORDER — AMLODIPINE BESYLATE 5 MG/1
5 TABLET ORAL DAILY
Status: DISCONTINUED | OUTPATIENT
Start: 2025-04-15 | End: 2025-04-23 | Stop reason: HOSPADM

## 2025-04-14 RX ORDER — ACETAMINOPHEN 325 MG/1
650 TABLET ORAL EVERY 6 HOURS PRN
Status: DISCONTINUED | OUTPATIENT
Start: 2025-04-14 | End: 2025-04-23 | Stop reason: HOSPADM

## 2025-04-14 RX ORDER — OXYCODONE AND ACETAMINOPHEN 5; 325 MG/1; MG/1
1 TABLET ORAL
Refills: 0 | Status: COMPLETED | OUTPATIENT
Start: 2025-04-14 | End: 2025-04-14

## 2025-04-14 RX ORDER — CITALOPRAM HYDROBROMIDE 20 MG/1
20 TABLET ORAL DAILY
Qty: 30 TABLET | Refills: 0 | Status: ON HOLD | OUTPATIENT
Start: 2025-04-14

## 2025-04-14 RX ORDER — 0.9 % SODIUM CHLORIDE 0.9 %
1000 INTRAVENOUS SOLUTION INTRAVENOUS ONCE
Status: COMPLETED | OUTPATIENT
Start: 2025-04-14 | End: 2025-04-15

## 2025-04-14 RX ORDER — POLYETHYLENE GLYCOL 3350 17 G/17G
17 POWDER, FOR SOLUTION ORAL DAILY PRN
Status: DISCONTINUED | OUTPATIENT
Start: 2025-04-14 | End: 2025-04-23 | Stop reason: HOSPADM

## 2025-04-14 RX ORDER — ALPRAZOLAM 0.25 MG
0.5 TABLET ORAL 3 TIMES DAILY PRN
Status: DISCONTINUED | OUTPATIENT
Start: 2025-04-14 | End: 2025-04-23 | Stop reason: HOSPADM

## 2025-04-14 RX ORDER — ARIPIPRAZOLE 20 MG/1
20 TABLET ORAL NIGHTLY
Qty: 90 TABLET | Refills: 0 | Status: ON HOLD | OUTPATIENT
Start: 2025-04-14

## 2025-04-14 RX ORDER — SODIUM CHLORIDE 9 MG/ML
INJECTION, SOLUTION INTRAVENOUS PRN
Status: DISCONTINUED | OUTPATIENT
Start: 2025-04-14 | End: 2025-04-23 | Stop reason: HOSPADM

## 2025-04-14 RX ORDER — MAGNESIUM SULFATE IN WATER 40 MG/ML
2000 INJECTION, SOLUTION INTRAVENOUS PRN
Status: DISCONTINUED | OUTPATIENT
Start: 2025-04-14 | End: 2025-04-15

## 2025-04-14 RX ORDER — MAGNESIUM HYDROXIDE/ALUMINUM HYDROXICE/SIMETHICONE 120; 1200; 1200 MG/30ML; MG/30ML; MG/30ML
30 SUSPENSION ORAL
Status: DISCONTINUED | OUTPATIENT
Start: 2025-04-15 | End: 2025-04-23 | Stop reason: HOSPADM

## 2025-04-14 RX ORDER — LIDOCAINE 4 G/G
1 PATCH TOPICAL DAILY
Status: DISCONTINUED | OUTPATIENT
Start: 2025-04-15 | End: 2025-04-16 | Stop reason: SDUPTHER

## 2025-04-14 RX ORDER — CITALOPRAM HYDROBROMIDE 20 MG/1
20 TABLET ORAL DAILY
Status: DISCONTINUED | OUTPATIENT
Start: 2025-04-15 | End: 2025-04-23 | Stop reason: HOSPADM

## 2025-04-14 RX ORDER — BUPROPION HYDROCHLORIDE 150 MG/1
150 TABLET, EXTENDED RELEASE ORAL
Qty: 60 TABLET | Refills: 0 | Status: ON HOLD | OUTPATIENT
Start: 2025-04-14

## 2025-04-14 RX ORDER — INSULIN LISPRO 100 [IU]/ML
0-8 INJECTION, SOLUTION INTRAVENOUS; SUBCUTANEOUS
Status: DISCONTINUED | OUTPATIENT
Start: 2025-04-14 | End: 2025-04-23 | Stop reason: HOSPADM

## 2025-04-14 RX ORDER — BUPROPION HYDROCHLORIDE 150 MG/1
150 TABLET, EXTENDED RELEASE ORAL
Status: DISCONTINUED | OUTPATIENT
Start: 2025-04-15 | End: 2025-04-23 | Stop reason: HOSPADM

## 2025-04-14 RX ORDER — SODIUM CHLORIDE 0.9 % (FLUSH) 0.9 %
5-40 SYRINGE (ML) INJECTION PRN
Status: DISCONTINUED | OUTPATIENT
Start: 2025-04-14 | End: 2025-04-23 | Stop reason: HOSPADM

## 2025-04-14 RX ORDER — METHOCARBAMOL 500 MG/1
750 TABLET, FILM COATED ORAL 3 TIMES DAILY
Status: DISCONTINUED | OUTPATIENT
Start: 2025-04-14 | End: 2025-04-23 | Stop reason: HOSPADM

## 2025-04-14 RX ORDER — BUPROPION HYDROCHLORIDE 150 MG/1
150 TABLET, EXTENDED RELEASE ORAL
Qty: 60 TABLET | Refills: 0 | Status: SHIPPED | OUTPATIENT
Start: 2025-04-14 | End: 2025-04-14

## 2025-04-14 RX ORDER — INSULIN GLARGINE 100 [IU]/ML
35 INJECTION, SOLUTION SUBCUTANEOUS NIGHTLY
Status: DISCONTINUED | OUTPATIENT
Start: 2025-04-14 | End: 2025-04-15

## 2025-04-14 RX ORDER — ONDANSETRON 2 MG/ML
4 INJECTION INTRAMUSCULAR; INTRAVENOUS EVERY 6 HOURS PRN
Status: DISCONTINUED | OUTPATIENT
Start: 2025-04-14 | End: 2025-04-23 | Stop reason: HOSPADM

## 2025-04-14 RX ORDER — PANTOPRAZOLE SODIUM 40 MG/1
40 TABLET, DELAYED RELEASE ORAL
Status: DISCONTINUED | OUTPATIENT
Start: 2025-04-14 | End: 2025-04-23 | Stop reason: HOSPADM

## 2025-04-14 RX ORDER — HEPARIN SODIUM 5000 [USP'U]/ML
5000 INJECTION, SOLUTION INTRAVENOUS; SUBCUTANEOUS EVERY 8 HOURS SCHEDULED
Status: DISCONTINUED | OUTPATIENT
Start: 2025-04-15 | End: 2025-04-23 | Stop reason: HOSPADM

## 2025-04-14 RX ORDER — ARIPIPRAZOLE 5 MG/1
20 TABLET ORAL NIGHTLY
Status: DISCONTINUED | OUTPATIENT
Start: 2025-04-14 | End: 2025-04-23 | Stop reason: HOSPADM

## 2025-04-14 RX ORDER — POTASSIUM CHLORIDE 1500 MG/1
40 TABLET, EXTENDED RELEASE ORAL PRN
Status: DISCONTINUED | OUTPATIENT
Start: 2025-04-14 | End: 2025-04-15

## 2025-04-14 RX ORDER — GABAPENTIN 100 MG/1
100 CAPSULE ORAL 3 TIMES DAILY
Status: DISCONTINUED | OUTPATIENT
Start: 2025-04-14 | End: 2025-04-16

## 2025-04-14 RX ORDER — POTASSIUM CHLORIDE 7.45 MG/ML
10 INJECTION INTRAVENOUS PRN
Status: DISCONTINUED | OUTPATIENT
Start: 2025-04-14 | End: 2025-04-15

## 2025-04-14 RX ORDER — METOPROLOL SUCCINATE 50 MG/1
50 TABLET, EXTENDED RELEASE ORAL NIGHTLY
Status: DISCONTINUED | OUTPATIENT
Start: 2025-04-14 | End: 2025-04-23 | Stop reason: HOSPADM

## 2025-04-14 RX ORDER — INSULIN GLARGINE 100 [IU]/ML
45 INJECTION, SOLUTION SUBCUTANEOUS DAILY
Status: DISCONTINUED | OUTPATIENT
Start: 2025-04-15 | End: 2025-04-16

## 2025-04-14 RX ORDER — SODIUM BICARBONATE 650 MG/1
650 TABLET ORAL
Status: DISCONTINUED | OUTPATIENT
Start: 2025-04-15 | End: 2025-04-15

## 2025-04-14 RX ORDER — SODIUM CHLORIDE 0.9 % (FLUSH) 0.9 %
5-40 SYRINGE (ML) INJECTION EVERY 12 HOURS SCHEDULED
Status: DISCONTINUED | OUTPATIENT
Start: 2025-04-14 | End: 2025-04-23 | Stop reason: HOSPADM

## 2025-04-14 RX ORDER — ONDANSETRON 4 MG/1
4 TABLET, ORALLY DISINTEGRATING ORAL EVERY 8 HOURS PRN
Status: DISCONTINUED | OUTPATIENT
Start: 2025-04-14 | End: 2025-04-23 | Stop reason: HOSPADM

## 2025-04-14 RX ORDER — ALPRAZOLAM 0.5 MG
0.5 TABLET ORAL 3 TIMES DAILY PRN
Qty: 90 TABLET | Refills: 0 | Status: ON HOLD | OUTPATIENT
Start: 2025-04-14 | End: 2025-06-13

## 2025-04-14 RX ORDER — ACETAMINOPHEN 650 MG/1
650 SUPPOSITORY RECTAL EVERY 6 HOURS PRN
Status: DISCONTINUED | OUTPATIENT
Start: 2025-04-14 | End: 2025-04-23 | Stop reason: HOSPADM

## 2025-04-14 RX ORDER — HYDRALAZINE HYDROCHLORIDE 50 MG/1
100 TABLET, FILM COATED ORAL 3 TIMES DAILY
Status: DISCONTINUED | OUTPATIENT
Start: 2025-04-14 | End: 2025-04-15

## 2025-04-14 RX ORDER — MORPHINE SULFATE 4 MG/ML
4 INJECTION, SOLUTION INTRAMUSCULAR; INTRAVENOUS
Refills: 0 | Status: DISCONTINUED | OUTPATIENT
Start: 2025-04-14 | End: 2025-04-14

## 2025-04-14 RX ADMIN — OXYCODONE HYDROCHLORIDE AND ACETAMINOPHEN 1 TABLET: 5; 325 TABLET ORAL at 17:48

## 2025-04-14 RX ADMIN — SODIUM CHLORIDE 1000 ML: 0.9 INJECTION, SOLUTION INTRAVENOUS at 23:34

## 2025-04-14 ASSESSMENT — PATIENT HEALTH QUESTIONNAIRE - PHQ9
SUM OF ALL RESPONSES TO PHQ QUESTIONS 1-9: 22
1. LITTLE INTEREST OR PLEASURE IN DOING THINGS: NEARLY EVERY DAY
3. TROUBLE FALLING OR STAYING ASLEEP: NEARLY EVERY DAY
6. FEELING BAD ABOUT YOURSELF - OR THAT YOU ARE A FAILURE OR HAVE LET YOURSELF OR YOUR FAMILY DOWN: NEARLY EVERY DAY
4. FEELING TIRED OR HAVING LITTLE ENERGY: NEARLY EVERY DAY
8. MOVING OR SPEAKING SO SLOWLY THAT OTHER PEOPLE COULD HAVE NOTICED. OR THE OPPOSITE, BEING SO FIGETY OR RESTLESS THAT YOU HAVE BEEN MOVING AROUND A LOT MORE THAN USUAL: NEARLY EVERY DAY
SUM OF ALL RESPONSES TO PHQ QUESTIONS 1-9: 23
SUM OF ALL RESPONSES TO PHQ QUESTIONS 1-9: 23
7. TROUBLE CONCENTRATING ON THINGS, SUCH AS READING THE NEWSPAPER OR WATCHING TELEVISION: SEVERAL DAYS
5. POOR APPETITE OR OVEREATING: NEARLY EVERY DAY
2. FEELING DOWN, DEPRESSED OR HOPELESS: NEARLY EVERY DAY
9. THOUGHTS THAT YOU WOULD BE BETTER OFF DEAD, OR OF HURTING YOURSELF: SEVERAL DAYS
SUM OF ALL RESPONSES TO PHQ QUESTIONS 1-9: 23

## 2025-04-14 ASSESSMENT — ANXIETY QUESTIONNAIRES
1. FEELING NERVOUS, ANXIOUS, OR ON EDGE: SEVERAL DAYS
GAD7 TOTAL SCORE: 10
4. TROUBLE RELAXING: SEVERAL DAYS
5. BEING SO RESTLESS THAT IT IS HARD TO SIT STILL: NOT AT ALL
7. FEELING AFRAID AS IF SOMETHING AWFUL MIGHT HAPPEN: SEVERAL DAYS
3. WORRYING TOO MUCH ABOUT DIFFERENT THINGS: NEARLY EVERY DAY
2. NOT BEING ABLE TO STOP OR CONTROL WORRYING: NEARLY EVERY DAY
6. BECOMING EASILY ANNOYED OR IRRITABLE: SEVERAL DAYS

## 2025-04-14 ASSESSMENT — ENCOUNTER SYMPTOMS
BACK PAIN: 1
CONSTIPATION: 1
SHORTNESS OF BREATH: 1

## 2025-04-14 ASSESSMENT — PAIN DESCRIPTION - LOCATION
LOCATION: BACK

## 2025-04-14 ASSESSMENT — PAIN - FUNCTIONAL ASSESSMENT: PAIN_FUNCTIONAL_ASSESSMENT: 0-10

## 2025-04-14 ASSESSMENT — PAIN SCALES - GENERAL
PAINLEVEL_OUTOF10: 10
PAINLEVEL_OUTOF10: 8
PAINLEVEL_OUTOF10: 6
PAINLEVEL_OUTOF10: 8

## 2025-04-14 NOTE — ED TRIAGE NOTES
Pt arrives to ED from home. Pt had kyphoplasty 4/9 for L3 compression fracture. Difficulty with BM, walking and doing ADLs since procedure due to pain.

## 2025-04-14 NOTE — ED PROVIDER NOTES
[facility] EMERGENCY DEPT  EMERGENCY DEPARTMENT HISTORY AND PHYSICAL EXAM      Date of evaluation: 4/14/2025  Patient Name: Johanna Dunaway  Birthdate 1960  MRN: [id number]  ED Provider: Jose Ellington DO   Note Started: 2:25 PM EDT 4/14/25    HISTORY OF PRESENT ILLNESS     Chief Complaint   Patient presents with    Back Pain    Constipation     History Provided By: Patient and Patient's Sister    HPI: Johanna Dunaway is a 64-year-old female with a past medical history of type 2 diabetes, liver fibrosis, kidney failure, hypertension, and bipolar disorder who presents with back pain and constipation. She underwent kyphoplasty five days ago for an L3 compression fracture and has had continued difficulty with ambulation and bowel movements since discharge. She reports her last bowel movement was on 4/6 and has been taking PO opioids at home for pain. She now reports persistent mid-lower back pain and mild epigastric discomfort.    PAST MEDICAL HISTORY   Past Medical History:  Past Medical History:   Diagnosis Date    Anxiety 6/22/2020    Arthritis     Bipolar 1 disorder (HCC) 1/23/2018    Congestive heart failure (CHF) ([facility])     PT DENIES THIS DX, STATES AN ER DR TOLD HER THIS WHEN SHE HAD COVID AND SAYS HER PCP HAS NEVER TOLD HER THIS AND WHEN SHE SAW CARDIOLOGY 5YRS AGO, ALL HER TESTS CAME BACK FINE PER PT    Diabetes mellitus type 2, controlled ([facility]) 6/22/2020    Diverticulosis     Hx of blood clots 2016    SMALL PE    Hypertension 6/22/2020    Kidney failure     Liver fibrosis 6/22/2020    Major depression 6/22/2020    Migraine 6/22/2020    Sleep apnea 1/23/2018    USES CPAP    Suicide attempt (HCC) 6/22/2020    2013    UTI (urinary tract infection)        Past Surgical History:  Past Surgical History:   Procedure Laterality Date    APPENDECTOMY      CHOLECYSTECTOMY      DIALYSIS FISTULA CREATION Left 10/11/2024    LEFT UPPER ARM DIALYSIS GRAFT PLACEMENT performed by David Monk MD at [facility] MAIN OR    DILATION AND

## 2025-04-14 NOTE — PATIENT INSTRUCTIONS
about how more activity might affect your health, have a checkup before you start. Follow any special advice your doctor gives you for getting a smart start.  Where can you learn more?  Go to https://www.[employer].net/patientEd and enter W332 to learn more about \"Learning About Being Physically Active.\"  Current as of: July 31, 2024  Content Version: 14.4  © 3492-3635 [employer].   Care instructions adapted under license by [employer]. If you have questions about a medical condition or this instruction, always ask your healthcare professional. [employer] disclaims any warranty or liability for your use of this information.

## 2025-04-15 LAB
GLUCOSE BLD STRIP.AUTO-MCNC: 119 MG/DL (ref 65–100)
GLUCOSE BLD STRIP.AUTO-MCNC: 160 MG/DL (ref 65–100)
GLUCOSE BLD STRIP.AUTO-MCNC: 50 MG/DL (ref 65–100)
GLUCOSE BLD STRIP.AUTO-MCNC: 54 MG/DL (ref 65–100)
GLUCOSE BLD STRIP.AUTO-MCNC: 56 MG/DL (ref 65–100)
GLUCOSE BLD STRIP.AUTO-MCNC: 60 MG/DL (ref 65–100)
GLUCOSE BLD STRIP.AUTO-MCNC: 80 MG/DL (ref 65–100)
GLUCOSE BLD STRIP.AUTO-MCNC: 87 MG/DL (ref 65–100)
GLUCOSE BLD STRIP.AUTO-MCNC: 98 MG/DL (ref 65–100)
PERFORMED BY:: ABNORMAL
PERFORMED BY:: NORMAL

## 2025-04-15 PROCEDURE — 6370000000 HC RX 637 (ALT 250 FOR IP): Performed by: INTERNAL MEDICINE

## 2025-04-15 PROCEDURE — 82962 GLUCOSE BLOOD TEST: CPT

## 2025-04-15 PROCEDURE — 1100000000 HC RM PRIVATE

## 2025-04-15 PROCEDURE — 2709999900 HC NON-CHARGEABLE SUPPLY

## 2025-04-15 PROCEDURE — 2700000000 HC OXYGEN THERAPY PER DAY

## 2025-04-15 PROCEDURE — 2500000003 HC RX 250 WO HCPCS: Performed by: INTERNAL MEDICINE

## 2025-04-15 PROCEDURE — 6370000000 HC RX 637 (ALT 250 FOR IP)

## 2025-04-15 PROCEDURE — 94761 N-INVAS EAR/PLS OXIMETRY MLT: CPT

## 2025-04-15 PROCEDURE — 90935 HEMODIALYSIS ONE EVALUATION: CPT

## 2025-04-15 PROCEDURE — 6360000002 HC RX W HCPCS: Performed by: INTERNAL MEDICINE

## 2025-04-15 RX ORDER — INSULIN GLARGINE 100 [IU]/ML
20 INJECTION, SOLUTION SUBCUTANEOUS NIGHTLY
Status: DISCONTINUED | OUTPATIENT
Start: 2025-04-15 | End: 2025-04-16

## 2025-04-15 RX ORDER — DEXTROSE MONOHYDRATE 100 MG/ML
INJECTION, SOLUTION INTRAVENOUS CONTINUOUS PRN
Status: DISCONTINUED | OUTPATIENT
Start: 2025-04-15 | End: 2025-04-23 | Stop reason: HOSPADM

## 2025-04-15 RX ORDER — GLUCAGON 1 MG/ML
1 KIT INJECTION PRN
Status: DISCONTINUED | OUTPATIENT
Start: 2025-04-15 | End: 2025-04-23 | Stop reason: HOSPADM

## 2025-04-15 RX ORDER — LABETALOL HYDROCHLORIDE 5 MG/ML
10 INJECTION, SOLUTION INTRAVENOUS EVERY 4 HOURS PRN
Status: COMPLETED | OUTPATIENT
Start: 2025-04-15 | End: 2025-04-18

## 2025-04-15 RX ORDER — OXYCODONE AND ACETAMINOPHEN 5; 325 MG/1; MG/1
1 TABLET ORAL EVERY 4 HOURS PRN
Refills: 0 | Status: DISCONTINUED | OUTPATIENT
Start: 2025-04-15 | End: 2025-04-23 | Stop reason: HOSPADM

## 2025-04-15 RX ORDER — HYDRALAZINE HYDROCHLORIDE 50 MG/1
50 TABLET, FILM COATED ORAL 3 TIMES DAILY
Status: DISCONTINUED | OUTPATIENT
Start: 2025-04-15 | End: 2025-04-22

## 2025-04-15 RX ORDER — OXYCODONE AND ACETAMINOPHEN 10; 325 MG/1; MG/1
1 TABLET ORAL EVERY 4 HOURS PRN
Refills: 0 | Status: DISCONTINUED | OUTPATIENT
Start: 2025-04-15 | End: 2025-04-23 | Stop reason: HOSPADM

## 2025-04-15 RX ADMIN — ALUMINUM HYDROXIDE, MAGNESIUM HYDROXIDE, AND SIMETHICONE 30 ML: 1200; 120; 1200 SUSPENSION ORAL at 14:17

## 2025-04-15 RX ADMIN — HEPARIN SODIUM 5000 UNITS: 5000 INJECTION INTRAVENOUS; SUBCUTANEOUS at 05:41

## 2025-04-15 RX ADMIN — HYDRALAZINE HYDROCHLORIDE 50 MG: 50 TABLET ORAL at 20:58

## 2025-04-15 RX ADMIN — OXYCODONE AND ACETAMINOPHEN 1 TABLET: 325; 5 TABLET ORAL at 21:56

## 2025-04-15 RX ADMIN — BUPROPION HYDROCHLORIDE 150 MG: 150 TABLET, EXTENDED RELEASE ORAL at 05:41

## 2025-04-15 RX ADMIN — BUPROPION HYDROCHLORIDE 150 MG: 150 TABLET, EXTENDED RELEASE ORAL at 16:55

## 2025-04-15 RX ADMIN — HYDRALAZINE HYDROCHLORIDE 50 MG: 50 TABLET ORAL at 14:17

## 2025-04-15 RX ADMIN — SODIUM CHLORIDE, PRESERVATIVE FREE 10 ML: 5 INJECTION INTRAVENOUS at 00:30

## 2025-04-15 RX ADMIN — GABAPENTIN 100 MG: 100 CAPSULE ORAL at 01:10

## 2025-04-15 RX ADMIN — LABETALOL HYDROCHLORIDE 10 MG: 5 INJECTION, SOLUTION INTRAVENOUS at 21:58

## 2025-04-15 RX ADMIN — INSULIN GLARGINE 20 UNITS: 100 INJECTION, SOLUTION SUBCUTANEOUS at 20:59

## 2025-04-15 RX ADMIN — Medication 3 MG: at 20:58

## 2025-04-15 RX ADMIN — HEPARIN SODIUM 5000 UNITS: 5000 INJECTION INTRAVENOUS; SUBCUTANEOUS at 21:26

## 2025-04-15 RX ADMIN — Medication 3 MG: at 01:10

## 2025-04-15 RX ADMIN — ACETAMINOPHEN 650 MG: 325 TABLET ORAL at 00:28

## 2025-04-15 RX ADMIN — PANTOPRAZOLE SODIUM 40 MG: 40 TABLET, DELAYED RELEASE ORAL at 01:10

## 2025-04-15 RX ADMIN — ARIPIPRAZOLE 20 MG: 5 TABLET ORAL at 20:58

## 2025-04-15 RX ADMIN — PANTOPRAZOLE SODIUM 40 MG: 40 TABLET, DELAYED RELEASE ORAL at 20:58

## 2025-04-15 RX ADMIN — INSULIN GLARGINE 35 UNITS: 100 INJECTION, SOLUTION SUBCUTANEOUS at 01:12

## 2025-04-15 RX ADMIN — HYDRALAZINE HYDROCHLORIDE 100 MG: 50 TABLET ORAL at 00:27

## 2025-04-15 RX ADMIN — SODIUM CHLORIDE, PRESERVATIVE FREE 10 ML: 5 INJECTION INTRAVENOUS at 20:59

## 2025-04-15 RX ADMIN — LABETALOL HYDROCHLORIDE 10 MG: 5 INJECTION, SOLUTION INTRAVENOUS at 01:31

## 2025-04-15 RX ADMIN — SODIUM CHLORIDE, PRESERVATIVE FREE 10 ML: 5 INJECTION INTRAVENOUS at 09:47

## 2025-04-15 RX ADMIN — HEPARIN SODIUM 5000 UNITS: 5000 INJECTION INTRAVENOUS; SUBCUTANEOUS at 14:18

## 2025-04-15 RX ADMIN — GABAPENTIN 100 MG: 100 CAPSULE ORAL at 20:58

## 2025-04-15 RX ADMIN — METOPROLOL SUCCINATE 50 MG: 50 TABLET, EXTENDED RELEASE ORAL at 20:58

## 2025-04-15 RX ADMIN — ALPRAZOLAM 0.5 MG: 0.25 TABLET ORAL at 01:35

## 2025-04-15 RX ADMIN — METOPROLOL SUCCINATE 50 MG: 50 TABLET, EXTENDED RELEASE ORAL at 00:28

## 2025-04-15 RX ADMIN — METHOCARBAMOL 750 MG: 500 TABLET ORAL at 20:58

## 2025-04-15 RX ADMIN — GABAPENTIN 100 MG: 100 CAPSULE ORAL at 14:17

## 2025-04-15 RX ADMIN — ALUMINUM HYDROXIDE, MAGNESIUM HYDROXIDE, AND SIMETHICONE 30 ML: 1200; 120; 1200 SUSPENSION ORAL at 16:55

## 2025-04-15 RX ADMIN — METHOCARBAMOL 750 MG: 500 TABLET ORAL at 14:17

## 2025-04-15 RX ADMIN — METHOCARBAMOL 750 MG: 500 TABLET ORAL at 02:28

## 2025-04-15 ASSESSMENT — PAIN DESCRIPTION - LOCATION: LOCATION: BACK

## 2025-04-15 ASSESSMENT — PAIN SCALES - GENERAL
PAINLEVEL_OUTOF10: 7
PAINLEVEL_OUTOF10: 4
PAINLEVEL_OUTOF10: 5
PAINLEVEL_OUTOF10: 0
PAINLEVEL_OUTOF10: 0
PAINLEVEL_OUTOF10: 5
PAINLEVEL_OUTOF10: 7
PAINLEVEL_OUTOF10: 4
PAINLEVEL_OUTOF10: 7

## 2025-04-15 NOTE — H&P
capsule by mouth 3 times daily for 30 days. Max Daily Amount: 300 mg 4/9/25 5/9/25  Annette Caron B, ACNP   hydrALAZINE (APRESOLINE) 100 MG tablet Take 1 tablet by mouth 3 times daily 4/9/25   Gordo, Terry B, ACNP   metoprolol succinate (TOPROL XL) 50 MG extended release tablet Take 1 tablet by mouth nightly 4/9/25   Gordo Terry B, ACNP   amLODIPine (NORVASC) 5 MG tablet Take 1 tablet by mouth daily 4/10/25   Gordo, Terry B, ACNP   predniSONE (DELTASONE) 20 MG tablet Take 2 tablets by mouth 2 times daily for 1 day, THEN 1.5 tablets 2 times daily for 1 day, THEN 2 tablets daily for 1 day, THEN 1.5 tablets daily for 1 day, THEN 1 tablet daily for 1 day, THEN 0.5 tablets daily for 1 day. 4/9/25 4/15/25  Annette Caron B, ACNP   insulin glargine (LANTUS SOLOSTAR) 100 UNIT/ML injection pen Inject 35 Units into the skin nightly 4/9/25   oGrdo, Terry B, ACNP   insulin glargine (LANTUS SOLOSTAR) 100 UNIT/ML injection pen Inject 45 Units into the skin Daily 4/9/25   Annette Caron B, ACNP   furosemide (LASIX) 40 MG tablet Take 2 tablets by mouth 2 times daily 5/25/22   Automatic Reconciliation, Ar   magnesium oxide (MAG-OX) 400 MG tablet Take 1 tablet by mouth 3 times daily    Automatic Reconciliation, Ar   pantoprazole (PROTONIX) 40 MG tablet Take 1 tablet by mouth nightly    Automatic Reconciliation, Ar       Allergies   Allergen Reactions    Sulfa Antibiotics Hives and Other (See Comments)     Other reaction(s): Unknown (comments)    Wound Dressing Adhesive Other (See Comments)     Blisters FROM SILK TAPE  Blisters FROM SILK TAPE      Adhesive Tape      Other reaction(s): Unknown (comments)  Blisters    Sulfamethoxazole-Trimethoprim      Other reaction(s): Unknown (comments)    Nsaids Other (See Comments)     Other reaction(s): Unknown  RENAL FAILURE  RENAL FAILURE          Family History   Problem Relation Age of Onset    Heart Attack Mother     Heart Disease Mother     Gout Mother     Heart Attack Father     Asthma

## 2025-04-15 NOTE — ED NOTES
Attempting to get urine sample via pure wick.   
Multiple attempts at US guided IV access with no success. Discussed with charge. Charge will contact [facility] for IV access at this time.   Patient and family updated  
Pt IV attempts x3 prior to this nurse arrival. Awaiting US trained staff to assist.   
Sister Lucía Danielskunal [phone] would like update regardless of time with room number or update         0471 Family updated  
Spoke with RN Supervisor- awaiting follow-up from [facility] for IV access  
US trained staff at bedside to attempt IV.   
GOC
Mass
hyponatremia
intermittent speech difficulties
65yo F with hilar mass referred to IR for possible biopsy
Lung mass
Hyponatremia

## 2025-04-15 NOTE — DIALYSIS
Patient tolerated treatment. No fluid was removed. Patient was alert and oriented during treatment. 34.8 liters of blood was processed. Report was given to primary nurse Mare.

## 2025-04-15 NOTE — CONSULTS
Nephrology Consultation          Patient: Johanna Dunaway MRN: [id number]  SSN: [id number]    YOB: 1960  Age: 64 y.o.  Sex: female      Subjective:   Reason for the consultation.  Acute kidney injury on chronic kidney disease stage V  History of present illness.  The patient is 64-year-old woman with underlying history of chronic kidney disease stage IV/V, diabetes mellitus type 2, hypertension, history of PE, bipolar disorder, severe obesity, L3 compression fraction status post IR kyphoplasty presented to the ER with worsening low back pain.  On arrival in the ER initial chemistry showed worsening BUN of 121 and creatinine 4.2 from 3.2 on 4/8 which prompted nephrology consultation.  CT of the abdomen no evidence of hydronephrosis.  I have spoken with the patient at the bedside and plan to initiate her on hemodialysis.  She does have a functioning AVG in her left arm.  I have spoken with her sister on the phone.    Past Medical History:   Diagnosis Date    Anxiety 6/22/2020    Arthritis     Bipolar 1 disorder (HCC) 1/23/2018    Congestive heart failure (CHF) ([facility])     PT DENIES THIS DX, STATES AN ER DR TOLD HER THIS WHEN SHE HAD COVID AND SAYS HER PCP HAS NEVER TOLD HER THIS AND WHEN SHE SAW CARDIOLOGY 5YRS AGO, ALL HER TESTS CAME BACK FINE PER PT    Diabetes mellitus type 2, controlled ([facility]) 6/22/2020    Diverticulosis     Hx of blood clots 2016    SMALL PE    Hypertension 6/22/2020    Kidney failure     Liver fibrosis 6/22/2020    Major depression 6/22/2020    Migraine 6/22/2020    Sleep apnea 1/23/2018    USES CPAP    Suicide attempt (HCC) 6/22/2020    2013    UTI (urinary tract infection)      Past Surgical History:   Procedure Laterality Date    APPENDECTOMY      CHOLECYSTECTOMY      DIALYSIS FISTULA CREATION Left 10/11/2024    LEFT UPPER ARM DIALYSIS GRAFT PLACEMENT performed by David oMnk MD at [facility] MAIN OR    DILATION AND CURETTAGE OF UTERUS      IR KYPHOPLASTY LUMBAR 1 VERTEBRAL

## 2025-04-15 NOTE — CARE COORDINATION
04/15/25 1051   Service Assessment   Patient Orientation Unable to Assess   Cognition Other (see comment)  (JENNY)   History Provided By Child/Family  (Sister)   Support Systems Family Members   Patient's Healthcare Decision Maker is: Legal Next of Kin   PCP Verified by CM Yes   Last Visit to PCP Within last 3 months   Prior Functional Level Independent in ADLs/IADLs   Current Functional Level Independent in ADLs/IADLs   Can patient return to prior living arrangement Yes   Ability to make needs known: Fair   Family able to assist with home care needs: Yes   Would you like for me to discuss the discharge plan with any other family members/significant others, and if so, who? No   Financial Resources Medicare   Community Resources None   Social/Functional History   Lives With Family   Type of Home House   Home Layout One level   Home Access Stairs to enter with rails   Entrance Stairs - Number of Steps 2   Entrance Stairs - Rails Both   Bathroom Equipment Shower chair   Home Equipment Cane;Walker - Rolling   Receives Help From Family     Readmission Assessment  Number of Days since last admission?: 1-7 days  Previous Disposition: Home with Home Health  Who is being Interviewed: Caregiver (Sister)  What was the patient's/caregiver's perception as to why they think they needed to return back to the hospital?: Other (Comment) (Pain, thought may be result of procedure last admission)  Did you visit your Primary Care Physician after you left the hospital, before you returned this time?: No  Why weren't you able to visit your PCP?: Other (Comment) (follow-up appointment is scheduled for 4/17)  Did you see a specialist, such as Cardiac, Pulmonary, Orthopedic Physician, etc. after you left the hospital?: No  Who advised the patient to return to the hospital?: Self-referral  Does the patient report anything that got in the way of taking their medications?: No  In our efforts to provide the best possible care to you and others

## 2025-04-16 LAB
ANION GAP SERPL CALC-SCNC: 4 MMOL/L (ref 2–12)
BASOPHILS # BLD: 0.01 K/UL (ref 0–0.1)
BASOPHILS NFR BLD: 0.1 % (ref 0–1)
BUN SERPL-MCNC: 64 MG/DL (ref 6–20)
BUN/CREAT SERPL: 23 (ref 12–20)
CA-I BLD-MCNC: 8.8 MG/DL (ref 8.5–10.1)
CHLORIDE SERPL-SCNC: 105 MMOL/L (ref 97–108)
CO2 SERPL-SCNC: 31 MMOL/L (ref 21–32)
CREAT SERPL-MCNC: 2.73 MG/DL (ref 0.55–1.02)
DIFFERENTIAL METHOD BLD: ABNORMAL
EOSINOPHIL # BLD: 0.06 K/UL (ref 0–0.4)
EOSINOPHIL NFR BLD: 0.7 % (ref 0–7)
ERYTHROCYTE [DISTWIDTH] IN BLOOD BY AUTOMATED COUNT: 14.6 % (ref 11.5–14.5)
GLUCOSE BLD STRIP.AUTO-MCNC: 101 MG/DL (ref 65–100)
GLUCOSE BLD STRIP.AUTO-MCNC: 114 MG/DL (ref 65–100)
GLUCOSE BLD STRIP.AUTO-MCNC: 176 MG/DL (ref 65–100)
GLUCOSE BLD STRIP.AUTO-MCNC: 60 MG/DL (ref 65–100)
GLUCOSE BLD STRIP.AUTO-MCNC: 87 MG/DL (ref 65–100)
GLUCOSE BLD STRIP.AUTO-MCNC: 92 MG/DL (ref 65–100)
GLUCOSE SERPL-MCNC: 47 MG/DL (ref 65–100)
HBV SURFACE AB SER QL: REACTIVE
HBV SURFACE AB SER-ACNC: 196.63 MIU/ML
HBV SURFACE AG SER QL: 0.2 INDEX
HBV SURFACE AG SER QL: NEGATIVE
HCT VFR BLD AUTO: 26.2 % (ref 35–47)
HCV AB SER IA-ACNC: 0.03 INDEX
HCV AB SERPL QL IA: NONREACTIVE
HGB BLD-MCNC: 8.4 G/DL (ref 11.5–16)
IMM GRANULOCYTES # BLD AUTO: 0.19 K/UL (ref 0–0.04)
IMM GRANULOCYTES NFR BLD AUTO: 2.3 % (ref 0–0.5)
LYMPHOCYTES # BLD: 1.6 K/UL (ref 0.8–3.5)
LYMPHOCYTES NFR BLD: 19.2 % (ref 12–49)
MCH RBC QN AUTO: 28.4 PG (ref 26–34)
MCHC RBC AUTO-ENTMCNC: 32.1 G/DL (ref 30–36.5)
MCV RBC AUTO: 88.5 FL (ref 80–99)
MONOCYTES # BLD: 0.54 K/UL (ref 0–1)
MONOCYTES NFR BLD: 6.5 % (ref 5–13)
NEUTS SEG # BLD: 5.94 K/UL (ref 1.8–8)
NEUTS SEG NFR BLD: 71.2 % (ref 32–75)
NRBC # BLD: 0 K/UL (ref 0–0.01)
NRBC BLD-RTO: 0 PER 100 WBC
PERFORMED BY:: ABNORMAL
PERFORMED BY:: NORMAL
PERFORMED BY:: NORMAL
PLATELET # BLD AUTO: 133 K/UL (ref 150–400)
PMV BLD AUTO: 10.9 FL (ref 8.9–12.9)
POTASSIUM SERPL-SCNC: 3.9 MMOL/L (ref 3.5–5.1)
RBC # BLD AUTO: 2.96 M/UL (ref 3.8–5.2)
SODIUM SERPL-SCNC: 140 MMOL/L (ref 136–145)
WBC # BLD AUTO: 8.3 K/UL (ref 3.6–11)

## 2025-04-16 PROCEDURE — 94761 N-INVAS EAR/PLS OXIMETRY MLT: CPT

## 2025-04-16 PROCEDURE — 6360000002 HC RX W HCPCS: Performed by: INTERNAL MEDICINE

## 2025-04-16 PROCEDURE — 6370000000 HC RX 637 (ALT 250 FOR IP): Performed by: INTERNAL MEDICINE

## 2025-04-16 PROCEDURE — 90935 HEMODIALYSIS ONE EVALUATION: CPT

## 2025-04-16 PROCEDURE — 86706 HEP B SURFACE ANTIBODY: CPT

## 2025-04-16 PROCEDURE — 80048 BASIC METABOLIC PNL TOTAL CA: CPT

## 2025-04-16 PROCEDURE — 2709999900 HC NON-CHARGEABLE SUPPLY

## 2025-04-16 PROCEDURE — 2500000003 HC RX 250 WO HCPCS: Performed by: INTERNAL MEDICINE

## 2025-04-16 PROCEDURE — 86704 HEP B CORE ANTIBODY TOTAL: CPT

## 2025-04-16 PROCEDURE — 97530 THERAPEUTIC ACTIVITIES: CPT

## 2025-04-16 PROCEDURE — 97161 PT EVAL LOW COMPLEX 20 MIN: CPT

## 2025-04-16 PROCEDURE — 97165 OT EVAL LOW COMPLEX 30 MIN: CPT

## 2025-04-16 PROCEDURE — 86803 HEPATITIS C AB TEST: CPT

## 2025-04-16 PROCEDURE — 2580000003 HC RX 258

## 2025-04-16 PROCEDURE — 1100000000 HC RM PRIVATE

## 2025-04-16 PROCEDURE — 82962 GLUCOSE BLOOD TEST: CPT

## 2025-04-16 PROCEDURE — 2700000000 HC OXYGEN THERAPY PER DAY

## 2025-04-16 PROCEDURE — 6370000000 HC RX 637 (ALT 250 FOR IP)

## 2025-04-16 PROCEDURE — 87340 HEPATITIS B SURFACE AG IA: CPT

## 2025-04-16 PROCEDURE — 85025 COMPLETE CBC W/AUTO DIFF WBC: CPT

## 2025-04-16 PROCEDURE — 36415 COLL VENOUS BLD VENIPUNCTURE: CPT

## 2025-04-16 RX ORDER — INSULIN GLARGINE 100 [IU]/ML
15 INJECTION, SOLUTION SUBCUTANEOUS DAILY
Status: DISCONTINUED | OUTPATIENT
Start: 2025-04-17 | End: 2025-04-18

## 2025-04-16 RX ORDER — LIDOCAINE 4 G/G
1 PATCH TOPICAL DAILY
Status: DISCONTINUED | OUTPATIENT
Start: 2025-04-16 | End: 2025-04-23 | Stop reason: HOSPADM

## 2025-04-16 RX ORDER — GABAPENTIN 100 MG/1
200 CAPSULE ORAL 3 TIMES DAILY
Status: DISCONTINUED | OUTPATIENT
Start: 2025-04-16 | End: 2025-04-23 | Stop reason: HOSPADM

## 2025-04-16 RX ORDER — INSULIN GLARGINE 100 [IU]/ML
25 INJECTION, SOLUTION SUBCUTANEOUS DAILY
Status: DISCONTINUED | OUTPATIENT
Start: 2025-04-17 | End: 2025-04-16

## 2025-04-16 RX ORDER — BISACODYL 10 MG
10 SUPPOSITORY, RECTAL RECTAL DAILY PRN
Status: DISCONTINUED | OUTPATIENT
Start: 2025-04-16 | End: 2025-04-23 | Stop reason: HOSPADM

## 2025-04-16 RX ORDER — INSULIN GLARGINE 100 [IU]/ML
10 INJECTION, SOLUTION SUBCUTANEOUS NIGHTLY
Status: DISCONTINUED | OUTPATIENT
Start: 2025-04-16 | End: 2025-04-23 | Stop reason: HOSPADM

## 2025-04-16 RX ADMIN — BUPROPION HYDROCHLORIDE 150 MG: 150 TABLET, EXTENDED RELEASE ORAL at 05:09

## 2025-04-16 RX ADMIN — ALUMINUM HYDROXIDE, MAGNESIUM HYDROXIDE, AND SIMETHICONE 30 ML: 1200; 120; 1200 SUSPENSION ORAL at 14:01

## 2025-04-16 RX ADMIN — ONDANSETRON 4 MG: 2 INJECTION INTRAMUSCULAR; INTRAVENOUS at 22:16

## 2025-04-16 RX ADMIN — HEPARIN SODIUM 5000 UNITS: 5000 INJECTION INTRAVENOUS; SUBCUTANEOUS at 05:09

## 2025-04-16 RX ADMIN — HEPARIN SODIUM 5000 UNITS: 5000 INJECTION INTRAVENOUS; SUBCUTANEOUS at 22:19

## 2025-04-16 RX ADMIN — BUPROPION HYDROCHLORIDE 150 MG: 150 TABLET, EXTENDED RELEASE ORAL at 16:38

## 2025-04-16 RX ADMIN — SODIUM CHLORIDE, PRESERVATIVE FREE 10 ML: 5 INJECTION INTRAVENOUS at 08:35

## 2025-04-16 RX ADMIN — Medication 3 MG: at 20:27

## 2025-04-16 RX ADMIN — GABAPENTIN 100 MG: 100 CAPSULE ORAL at 14:02

## 2025-04-16 RX ADMIN — DEXTROSE 125 ML: 10 SOLUTION INTRAVENOUS at 05:00

## 2025-04-16 RX ADMIN — HYDRALAZINE HYDROCHLORIDE 50 MG: 50 TABLET ORAL at 14:02

## 2025-04-16 RX ADMIN — ERYTHROPOIETIN 10000 UNITS: 10000 INJECTION, SOLUTION INTRAVENOUS; SUBCUTANEOUS at 09:26

## 2025-04-16 RX ADMIN — OXYCODONE AND ACETAMINOPHEN 1 TABLET: 325; 5 TABLET ORAL at 02:44

## 2025-04-16 RX ADMIN — HEPARIN SODIUM 5000 UNITS: 5000 INJECTION INTRAVENOUS; SUBCUTANEOUS at 14:01

## 2025-04-16 RX ADMIN — HYDRALAZINE HYDROCHLORIDE 50 MG: 50 TABLET ORAL at 20:26

## 2025-04-16 RX ADMIN — ARIPIPRAZOLE 20 MG: 5 TABLET ORAL at 20:26

## 2025-04-16 RX ADMIN — METHOCARBAMOL 750 MG: 500 TABLET ORAL at 14:01

## 2025-04-16 RX ADMIN — SODIUM CHLORIDE, PRESERVATIVE FREE 10 ML: 5 INJECTION INTRAVENOUS at 20:34

## 2025-04-16 RX ADMIN — GABAPENTIN 200 MG: 100 CAPSULE ORAL at 20:30

## 2025-04-16 RX ADMIN — PANTOPRAZOLE SODIUM 40 MG: 40 TABLET, DELAYED RELEASE ORAL at 20:27

## 2025-04-16 RX ADMIN — POLYETHYLENE GLYCOL 3350 17 G: 17 POWDER, FOR SOLUTION ORAL at 02:44

## 2025-04-16 RX ADMIN — METOPROLOL SUCCINATE 50 MG: 50 TABLET, EXTENDED RELEASE ORAL at 20:35

## 2025-04-16 RX ADMIN — METHOCARBAMOL 750 MG: 500 TABLET ORAL at 20:35

## 2025-04-16 ASSESSMENT — PAIN SCALES - GENERAL
PAINLEVEL_OUTOF10: 0
PAINLEVEL_OUTOF10: 0
PAINLEVEL_OUTOF10: 4
PAINLEVEL_OUTOF10: 6

## 2025-04-16 NOTE — CARE COORDINATION
4/16/25 CM asked to call sister ( Lucía Dunaway @ [phone]_ stated she has to return to work on next week & feels pt cannot take care of self & needs IRF/SNF upon discharge. Per sister she is in route to visit with pt - will speak with her re: d/c plan & has CM called if needed.

## 2025-04-16 NOTE — CARE COORDINATION
4/16/25 CM met with pt, sister ( Lucía Dunaway @ [phone]) & 2 other family members & pt requesting [facility] upon discharge & signed Choice Letter - referrals sent via [facility]. [facility] liaison ( Susana) also informed via phone - will visit pt while sister/family is here. Pt goes to dialysis & to start [facility] upon discharge home ( per pt MD working on chair time). Pt uses walker & CPAP @ [facility]. Pt also signed Choice Letter to add Kim GEORGES as a FYI>

## 2025-04-16 NOTE — DIALYSIS
3 hours  of HD tx completed & well tolerated. 0 net fluid removed. Pressure applied to the arterial and venous insertion site for 10 minutes. Pt denies pain. No apparent distress noted. Report given to primary RN. See flowsheet for additional treatment details.

## 2025-04-17 ENCOUNTER — APPOINTMENT (OUTPATIENT)
Facility: HOSPITAL | Age: 65
DRG: 683 | End: 2025-04-17
Payer: MEDICARE

## 2025-04-17 LAB
ANION GAP SERPL CALC-SCNC: 2 MMOL/L (ref 2–12)
BASOPHILS # BLD: 0 K/UL (ref 0–0.1)
BASOPHILS NFR BLD: 0 % (ref 0–1)
BUN SERPL-MCNC: 41 MG/DL (ref 6–20)
BUN/CREAT SERPL: 16 (ref 12–20)
CA-I BLD-MCNC: 8.8 MG/DL (ref 8.5–10.1)
CHLORIDE SERPL-SCNC: 103 MMOL/L (ref 97–108)
CO2 SERPL-SCNC: 32 MMOL/L (ref 21–32)
CREAT SERPL-MCNC: 2.55 MG/DL (ref 0.55–1.02)
DIFFERENTIAL METHOD BLD: ABNORMAL
EOSINOPHIL # BLD: 0 K/UL (ref 0–0.4)
EOSINOPHIL NFR BLD: 0 % (ref 0–7)
ERYTHROCYTE [DISTWIDTH] IN BLOOD BY AUTOMATED COUNT: 14.5 % (ref 11.5–14.5)
EST. AVERAGE GLUCOSE BLD GHB EST-MCNC: 192 MG/DL
GLUCOSE BLD STRIP.AUTO-MCNC: 189 MG/DL (ref 65–100)
GLUCOSE BLD STRIP.AUTO-MCNC: 268 MG/DL (ref 65–100)
GLUCOSE BLD STRIP.AUTO-MCNC: 271 MG/DL (ref 65–100)
GLUCOSE BLD STRIP.AUTO-MCNC: 306 MG/DL (ref 65–100)
GLUCOSE BLD STRIP.AUTO-MCNC: 88 MG/DL (ref 65–100)
GLUCOSE SERPL-MCNC: 93 MG/DL (ref 65–100)
HBA1C MFR BLD: 8.3 % (ref 4–5.6)
HBV CORE AB SERPL QL IA: NEGATIVE
HCT VFR BLD AUTO: 25.6 % (ref 35–47)
HGB BLD-MCNC: 8 G/DL (ref 11.5–16)
IMM GRANULOCYTES # BLD AUTO: 0 K/UL
IMM GRANULOCYTES NFR BLD AUTO: 0 %
LYMPHOCYTES # BLD: 1.34 K/UL (ref 0.8–3.5)
LYMPHOCYTES NFR BLD: 17 % (ref 12–49)
MCH RBC QN AUTO: 28.2 PG (ref 26–34)
MCHC RBC AUTO-ENTMCNC: 31.3 G/DL (ref 30–36.5)
MCV RBC AUTO: 90.1 FL (ref 80–99)
METAMYELOCYTES NFR BLD MANUAL: 1 %
MONOCYTES # BLD: 0.4 K/UL (ref 0–1)
MONOCYTES NFR BLD: 5 % (ref 5–13)
NEUTS SEG # BLD: 6.08 K/UL (ref 1.8–8)
NEUTS SEG NFR BLD: 77 % (ref 32–75)
NRBC # BLD: 0 K/UL (ref 0–0.01)
NRBC BLD-RTO: 0 PER 100 WBC
PERFORMED BY:: ABNORMAL
PERFORMED BY:: NORMAL
PHOSPHATE SERPL-MCNC: 1.9 MG/DL (ref 2.6–4.7)
PLATELET # BLD AUTO: 125 K/UL (ref 150–400)
PMV BLD AUTO: 11.4 FL (ref 8.9–12.9)
POTASSIUM SERPL-SCNC: 4.1 MMOL/L (ref 3.5–5.1)
RBC # BLD AUTO: 2.84 M/UL (ref 3.8–5.2)
RBC MORPH BLD: ABNORMAL
SODIUM SERPL-SCNC: 137 MMOL/L (ref 136–145)
WBC # BLD AUTO: 7.9 K/UL (ref 3.6–11)

## 2025-04-17 PROCEDURE — 80048 BASIC METABOLIC PNL TOTAL CA: CPT

## 2025-04-17 PROCEDURE — 74018 RADEX ABDOMEN 1 VIEW: CPT

## 2025-04-17 PROCEDURE — 6370000000 HC RX 637 (ALT 250 FOR IP): Performed by: INTERNAL MEDICINE

## 2025-04-17 PROCEDURE — 83036 HEMOGLOBIN GLYCOSYLATED A1C: CPT

## 2025-04-17 PROCEDURE — 97530 THERAPEUTIC ACTIVITIES: CPT

## 2025-04-17 PROCEDURE — 84100 ASSAY OF PHOSPHORUS: CPT

## 2025-04-17 PROCEDURE — 2700000000 HC OXYGEN THERAPY PER DAY

## 2025-04-17 PROCEDURE — 6370000000 HC RX 637 (ALT 250 FOR IP)

## 2025-04-17 PROCEDURE — 2500000003 HC RX 250 WO HCPCS: Performed by: INTERNAL MEDICINE

## 2025-04-17 PROCEDURE — 85025 COMPLETE CBC W/AUTO DIFF WBC: CPT

## 2025-04-17 PROCEDURE — 82962 GLUCOSE BLOOD TEST: CPT

## 2025-04-17 PROCEDURE — 36415 COLL VENOUS BLD VENIPUNCTURE: CPT

## 2025-04-17 PROCEDURE — 6360000002 HC RX W HCPCS: Performed by: INTERNAL MEDICINE

## 2025-04-17 PROCEDURE — 94761 N-INVAS EAR/PLS OXIMETRY MLT: CPT

## 2025-04-17 PROCEDURE — 1100000000 HC RM PRIVATE

## 2025-04-17 RX ORDER — POLYETHYLENE GLYCOL 3350 17 G/17G
17 POWDER, FOR SOLUTION ORAL DAILY
Status: DISCONTINUED | OUTPATIENT
Start: 2025-04-17 | End: 2025-04-23 | Stop reason: HOSPADM

## 2025-04-17 RX ADMIN — GABAPENTIN 200 MG: 100 CAPSULE ORAL at 09:53

## 2025-04-17 RX ADMIN — ALUMINUM HYDROXIDE, MAGNESIUM HYDROXIDE, AND SIMETHICONE 30 ML: 1200; 120; 1200 SUSPENSION ORAL at 09:53

## 2025-04-17 RX ADMIN — BUPROPION HYDROCHLORIDE 150 MG: 150 TABLET, EXTENDED RELEASE ORAL at 16:21

## 2025-04-17 RX ADMIN — HYDRALAZINE HYDROCHLORIDE 50 MG: 50 TABLET ORAL at 21:41

## 2025-04-17 RX ADMIN — AMLODIPINE BESYLATE 5 MG: 5 TABLET ORAL at 09:54

## 2025-04-17 RX ADMIN — METOPROLOL SUCCINATE 50 MG: 50 TABLET, EXTENDED RELEASE ORAL at 21:40

## 2025-04-17 RX ADMIN — INSULIN LISPRO 4 UNITS: 100 INJECTION, SOLUTION INTRAVENOUS; SUBCUTANEOUS at 12:39

## 2025-04-17 RX ADMIN — INSULIN LISPRO 2 UNITS: 100 INJECTION, SOLUTION INTRAVENOUS; SUBCUTANEOUS at 16:21

## 2025-04-17 RX ADMIN — POLYETHYLENE GLYCOL 3350 17 G: 17 POWDER, FOR SOLUTION ORAL at 14:33

## 2025-04-17 RX ADMIN — METHOCARBAMOL 750 MG: 500 TABLET ORAL at 09:53

## 2025-04-17 RX ADMIN — HEPARIN SODIUM 5000 UNITS: 5000 INJECTION INTRAVENOUS; SUBCUTANEOUS at 14:33

## 2025-04-17 RX ADMIN — INSULIN GLARGINE 10 UNITS: 100 INJECTION, SOLUTION SUBCUTANEOUS at 21:41

## 2025-04-17 RX ADMIN — METHOCARBAMOL 750 MG: 500 TABLET ORAL at 14:33

## 2025-04-17 RX ADMIN — HEPARIN SODIUM 5000 UNITS: 5000 INJECTION INTRAVENOUS; SUBCUTANEOUS at 21:41

## 2025-04-17 RX ADMIN — GABAPENTIN 200 MG: 100 CAPSULE ORAL at 16:21

## 2025-04-17 RX ADMIN — OXYCODONE AND ACETAMINOPHEN 1 TABLET: 10; 325 TABLET ORAL at 11:18

## 2025-04-17 RX ADMIN — METHOCARBAMOL 750 MG: 500 TABLET ORAL at 21:39

## 2025-04-17 RX ADMIN — HEPARIN SODIUM 5000 UNITS: 5000 INJECTION INTRAVENOUS; SUBCUTANEOUS at 05:40

## 2025-04-17 RX ADMIN — BUPROPION HYDROCHLORIDE 150 MG: 150 TABLET, EXTENDED RELEASE ORAL at 10:00

## 2025-04-17 RX ADMIN — ARIPIPRAZOLE 20 MG: 5 TABLET ORAL at 21:41

## 2025-04-17 RX ADMIN — Medication 3 MG: at 21:40

## 2025-04-17 RX ADMIN — SODIUM CHLORIDE, PRESERVATIVE FREE 10 ML: 5 INJECTION INTRAVENOUS at 21:43

## 2025-04-17 RX ADMIN — PANTOPRAZOLE SODIUM 40 MG: 40 TABLET, DELAYED RELEASE ORAL at 21:40

## 2025-04-17 RX ADMIN — GABAPENTIN 200 MG: 100 CAPSULE ORAL at 21:40

## 2025-04-17 RX ADMIN — INSULIN LISPRO 6 UNITS: 100 INJECTION, SOLUTION INTRAVENOUS; SUBCUTANEOUS at 21:43

## 2025-04-17 RX ADMIN — ALPRAZOLAM 0.5 MG: 0.25 TABLET ORAL at 01:42

## 2025-04-17 RX ADMIN — ALUMINUM HYDROXIDE, MAGNESIUM HYDROXIDE, AND SIMETHICONE 30 ML: 1200; 120; 1200 SUSPENSION ORAL at 16:22

## 2025-04-17 RX ADMIN — SODIUM CHLORIDE, PRESERVATIVE FREE 10 ML: 5 INJECTION INTRAVENOUS at 09:41

## 2025-04-17 RX ADMIN — INSULIN GLARGINE 15 UNITS: 100 INJECTION, SOLUTION SUBCUTANEOUS at 09:52

## 2025-04-17 RX ADMIN — CITALOPRAM HYDROBROMIDE 20 MG: 20 TABLET ORAL at 09:54

## 2025-04-17 ASSESSMENT — PAIN DESCRIPTION - LOCATION
LOCATION: BACK
LOCATION: GENERALIZED
LOCATION: BACK

## 2025-04-17 ASSESSMENT — PAIN SCALES - GENERAL
PAINLEVEL_OUTOF10: 8
PAINLEVEL_OUTOF10: 5
PAINLEVEL_OUTOF10: 10

## 2025-04-17 ASSESSMENT — PAIN DESCRIPTION - DESCRIPTORS
DESCRIPTORS: ACHING
DESCRIPTORS: ACHING
DESCRIPTORS: TINGLING

## 2025-04-17 ASSESSMENT — PAIN - FUNCTIONAL ASSESSMENT: PAIN_FUNCTIONAL_ASSESSMENT: ACTIVITIES ARE NOT PREVENTED

## 2025-04-17 ASSESSMENT — PAIN DESCRIPTION - ORIENTATION
ORIENTATION: RIGHT;LEFT
ORIENTATION: LOWER
ORIENTATION: LOWER

## 2025-04-17 NOTE — CARE COORDINATION
4/17/25 Per Mynor pt accepted to [facility] - awaiting auth from insurance. Sister ( Lucía Dunaway @  [phone] ) informed of above update. Nsg updated. D/C Plan is [facility] upon discharge via transport if auth approved.

## 2025-04-17 NOTE — CARE COORDINATION
4/17/25 CM received call from pt's sister ( Lucía Dunaway @ [phone] ) stating she lives 1 hr away & will not mahendra able to transport pt to [facility] if approved - CM informed sister that CM will set up transport & will keep her updated when auth is approved/not. CM then received call from CM office stating pt's sister had left several messages for CM - CM called sister - per sister CM had answered her question regarding transport. Sister informed again that CM will keep her updated.

## 2025-04-17 NOTE — CARE COORDINATION
4/17/25 CM spoke with MD ( Dr. Boudreaux) pt can discharge to [facility] once auth received. Ref #: [id number] per [facility].

## 2025-04-18 LAB
ANION GAP SERPL CALC-SCNC: 6 MMOL/L (ref 2–12)
BUN SERPL-MCNC: 45 MG/DL (ref 6–20)
BUN/CREAT SERPL: 16 (ref 12–20)
CA-I BLD-MCNC: 8.6 MG/DL (ref 8.5–10.1)
CHLORIDE SERPL-SCNC: 102 MMOL/L (ref 97–108)
CO2 SERPL-SCNC: 30 MMOL/L (ref 21–32)
CREAT SERPL-MCNC: 2.88 MG/DL (ref 0.55–1.02)
GLUCOSE BLD STRIP.AUTO-MCNC: 132 MG/DL (ref 65–100)
GLUCOSE BLD STRIP.AUTO-MCNC: 246 MG/DL (ref 65–100)
GLUCOSE BLD STRIP.AUTO-MCNC: 258 MG/DL (ref 65–100)
GLUCOSE SERPL-MCNC: 153 MG/DL (ref 65–100)
PERFORMED BY:: ABNORMAL
POTASSIUM SERPL-SCNC: 4.1 MMOL/L (ref 3.5–5.1)
SODIUM SERPL-SCNC: 138 MMOL/L (ref 136–145)

## 2025-04-18 PROCEDURE — 6370000000 HC RX 637 (ALT 250 FOR IP): Performed by: INTERNAL MEDICINE

## 2025-04-18 PROCEDURE — 6360000002 HC RX W HCPCS: Performed by: INTERNAL MEDICINE

## 2025-04-18 PROCEDURE — 90935 HEMODIALYSIS ONE EVALUATION: CPT

## 2025-04-18 PROCEDURE — 1100000000 HC RM PRIVATE

## 2025-04-18 PROCEDURE — 6370000000 HC RX 637 (ALT 250 FOR IP)

## 2025-04-18 PROCEDURE — 82962 GLUCOSE BLOOD TEST: CPT

## 2025-04-18 PROCEDURE — 2500000003 HC RX 250 WO HCPCS: Performed by: INTERNAL MEDICINE

## 2025-04-18 PROCEDURE — 80048 BASIC METABOLIC PNL TOTAL CA: CPT

## 2025-04-18 PROCEDURE — 36415 COLL VENOUS BLD VENIPUNCTURE: CPT

## 2025-04-18 PROCEDURE — 97530 THERAPEUTIC ACTIVITIES: CPT

## 2025-04-18 PROCEDURE — 2709999900 HC NON-CHARGEABLE SUPPLY

## 2025-04-18 RX ORDER — INSULIN GLARGINE 100 [IU]/ML
10 INJECTION, SOLUTION SUBCUTANEOUS NIGHTLY
Qty: 10 ML | Refills: 3 | Status: SHIPPED | OUTPATIENT
Start: 2025-04-18

## 2025-04-18 RX ORDER — GABAPENTIN 100 MG/1
200 CAPSULE ORAL 3 TIMES DAILY
Qty: 90 CAPSULE | Refills: 3 | Status: SHIPPED | OUTPATIENT
Start: 2025-04-18 | End: 2025-06-17

## 2025-04-18 RX ORDER — INSULIN GLARGINE 100 [IU]/ML
35 INJECTION, SOLUTION SUBCUTANEOUS DAILY
Status: DISCONTINUED | OUTPATIENT
Start: 2025-04-19 | End: 2025-04-23 | Stop reason: HOSPADM

## 2025-04-18 RX ORDER — INSULIN GLARGINE 100 [IU]/ML
35 INJECTION, SOLUTION SUBCUTANEOUS DAILY
Qty: 10 ML | Refills: 3 | Status: SHIPPED | OUTPATIENT
Start: 2025-04-19

## 2025-04-18 RX ORDER — POLYETHYLENE GLYCOL 3350 17 G/17G
17 POWDER, FOR SOLUTION ORAL DAILY
Qty: 32 PACKET | Refills: 0 | Status: SHIPPED | OUTPATIENT
Start: 2025-04-18 | End: 2025-05-20

## 2025-04-18 RX ORDER — METHOCARBAMOL 750 MG/1
750 TABLET, FILM COATED ORAL 3 TIMES DAILY
Qty: 30 TABLET | Refills: 0 | Status: SHIPPED | OUTPATIENT
Start: 2025-04-18 | End: 2025-04-28

## 2025-04-18 RX ORDER — OXYCODONE AND ACETAMINOPHEN 5; 325 MG/1; MG/1
1 TABLET ORAL EVERY 6 HOURS PRN
Qty: 12 TABLET | Refills: 0 | Status: SHIPPED | OUTPATIENT
Start: 2025-04-18 | End: 2025-04-21

## 2025-04-18 RX ORDER — LIDOCAINE 4 G/G
1 PATCH TOPICAL DAILY
Qty: 30 EACH | Refills: 0 | Status: SHIPPED | OUTPATIENT
Start: 2025-04-19 | End: 2025-05-19

## 2025-04-18 RX ADMIN — HYDRALAZINE HYDROCHLORIDE 50 MG: 50 TABLET ORAL at 13:24

## 2025-04-18 RX ADMIN — HEPARIN SODIUM 5000 UNITS: 5000 INJECTION INTRAVENOUS; SUBCUTANEOUS at 22:34

## 2025-04-18 RX ADMIN — ALUMINUM HYDROXIDE, MAGNESIUM HYDROXIDE, AND SIMETHICONE 30 ML: 1200; 120; 1200 SUSPENSION ORAL at 12:16

## 2025-04-18 RX ADMIN — ONDANSETRON 4 MG: 2 INJECTION INTRAMUSCULAR; INTRAVENOUS at 09:59

## 2025-04-18 RX ADMIN — METOPROLOL SUCCINATE 50 MG: 50 TABLET, EXTENDED RELEASE ORAL at 22:33

## 2025-04-18 RX ADMIN — LABETALOL HYDROCHLORIDE 10 MG: 5 INJECTION, SOLUTION INTRAVENOUS at 03:24

## 2025-04-18 RX ADMIN — INSULIN GLARGINE 10 UNITS: 100 INJECTION, SOLUTION SUBCUTANEOUS at 22:32

## 2025-04-18 RX ADMIN — ARIPIPRAZOLE 20 MG: 5 TABLET ORAL at 22:33

## 2025-04-18 RX ADMIN — ALUMINUM HYDROXIDE, MAGNESIUM HYDROXIDE, AND SIMETHICONE 30 ML: 1200; 120; 1200 SUSPENSION ORAL at 16:56

## 2025-04-18 RX ADMIN — HEPARIN SODIUM 5000 UNITS: 5000 INJECTION INTRAVENOUS; SUBCUTANEOUS at 05:58

## 2025-04-18 RX ADMIN — BUPROPION HYDROCHLORIDE 150 MG: 150 TABLET, EXTENDED RELEASE ORAL at 07:06

## 2025-04-18 RX ADMIN — ALPRAZOLAM 0.5 MG: 0.25 TABLET ORAL at 08:23

## 2025-04-18 RX ADMIN — INSULIN LISPRO 4 UNITS: 100 INJECTION, SOLUTION INTRAVENOUS; SUBCUTANEOUS at 16:56

## 2025-04-18 RX ADMIN — SODIUM CHLORIDE, PRESERVATIVE FREE 10 ML: 5 INJECTION INTRAVENOUS at 22:34

## 2025-04-18 RX ADMIN — GABAPENTIN 200 MG: 100 CAPSULE ORAL at 13:24

## 2025-04-18 RX ADMIN — HEPARIN SODIUM 5000 UNITS: 5000 INJECTION INTRAVENOUS; SUBCUTANEOUS at 13:25

## 2025-04-18 RX ADMIN — METHOCARBAMOL 750 MG: 500 TABLET ORAL at 13:24

## 2025-04-18 RX ADMIN — PANTOPRAZOLE SODIUM 40 MG: 40 TABLET, DELAYED RELEASE ORAL at 22:33

## 2025-04-18 RX ADMIN — BUPROPION HYDROCHLORIDE 150 MG: 150 TABLET, EXTENDED RELEASE ORAL at 16:56

## 2025-04-18 RX ADMIN — INSULIN LISPRO 2 UNITS: 100 INJECTION, SOLUTION INTRAVENOUS; SUBCUTANEOUS at 12:16

## 2025-04-18 RX ADMIN — ERYTHROPOIETIN 10000 UNITS: 10000 INJECTION, SOLUTION INTRAVENOUS; SUBCUTANEOUS at 08:50

## 2025-04-18 RX ADMIN — GABAPENTIN 200 MG: 100 CAPSULE ORAL at 22:33

## 2025-04-18 RX ADMIN — Medication 3 MG: at 22:33

## 2025-04-18 RX ADMIN — OXYCODONE AND ACETAMINOPHEN 1 TABLET: 10; 325 TABLET ORAL at 13:24

## 2025-04-18 RX ADMIN — HYDRALAZINE HYDROCHLORIDE 50 MG: 50 TABLET ORAL at 22:33

## 2025-04-18 RX ADMIN — METHOCARBAMOL 750 MG: 500 TABLET ORAL at 22:33

## 2025-04-18 ASSESSMENT — PAIN - FUNCTIONAL ASSESSMENT: PAIN_FUNCTIONAL_ASSESSMENT: ACTIVITIES ARE NOT PREVENTED

## 2025-04-18 ASSESSMENT — PAIN DESCRIPTION - ORIENTATION: ORIENTATION: MID

## 2025-04-18 ASSESSMENT — PAIN SCALES - GENERAL
PAINLEVEL_OUTOF10: 0
PAINLEVEL_OUTOF10: 0
PAINLEVEL_OUTOF10: 2
PAINLEVEL_OUTOF10: 0
PAINLEVEL_OUTOF10: 7

## 2025-04-18 ASSESSMENT — PAIN DESCRIPTION - LOCATION: LOCATION: ABDOMEN;BACK

## 2025-04-18 ASSESSMENT — PAIN DESCRIPTION - DESCRIPTORS: DESCRIPTORS: ACHING

## 2025-04-18 ASSESSMENT — PAIN SCALES - WONG BAKER: WONGBAKER_NUMERICALRESPONSE: NO HURT

## 2025-04-18 NOTE — DISCHARGE SUMMARY
.                                       Discharge Summary    Name: Johanna Dunaway  [id number]  YOB: 1960 (Age: 64 y.o.)   Date of Admission: 4/14/2025  Date of Discharge: 4/18/2025  Attending Physician: Veena Boudreaux MD    Discharge Diagnosis:   Principal Problem:    JIMBO (acute kidney injury)  Resolved Problems:    * No resolved hospital problems. *       Consultations:  IP CONSULT TO CASE MANAGEMENT  IP CONSULT TO VASCULAR ACCESS TEAM  IP CONSULT TO NEPHROLOGY  IP CONSULT TO CASE MANAGEMENT      Brief Hospital Course by Main Problems:     64-year-old female with past medical history hypertension, diabetes, CKD stage V now progressing to ESRD, TAHIRA on nocturnal oxygen, compensated cirrhosis, morbid obesity recently had acute L3 compression fracture status post IR kyphoplasty presented with worsening lower back pain unable to ambulate  admitted for intractable lower back pain management and possible placement.  On admission patient noted to have abnormal lipase however radiographic evidence of pancreatitis.  Patient abdominal pain resolved.  She complained of lower back pain.  PT OT saw her, she is safe for SNF.  Patient is unable to be taking care at home.  She had intermittent hypoglycemia, her insulin regimen has been adjusted.  She was also having some constipation was given bowel regimen.  Regarding her CKD, she appears to be progressing to ESRD.  HD was started in the hospital on 4/15.  She has  left arm AV fistula.  Patient is otherwise medically stable, to be discharged to [facility] for further management.    Regarding her insulin regimen, patient at home takes 45 units in the morning, 45 units at night.  Will change the regimen to 35 units, and decrease night regimen to 10 units.  Adjust accordingly in the outpatient setting.    Regarding her low back pain, obese send on short course of opioid, gabapentin has been increased to 300 mg 3 times daily, methocarbamol also prescribed.    Patient

## 2025-04-18 NOTE — CARE COORDINATION
4/18/25 Per Mynor pt still awaiting insurance auth for [facility]. Pt lives alone & cannot take care of self @ this time d/t weakness/decreased mobility. Pt also has dialysis - with chair time being set up [facility].  Pt updated on above in dialysis this am. Nsg updated.

## 2025-04-18 NOTE — DIALYSIS
Patient had 3 hour dialysis and tolerate 1.5 L fluid removal. Seen by Dr. Rivera while on treatment.    Pt is supposed to run 3.5hours, however, patient reported symptotic intra-dialytic hypotension. BP - 70/31, (+) dizziness and nausea. Off UF, bath temp-35, BFR - 300.     Report called to Dr. Rivera, ordered to discontinue dialysis.     Patient verbalized relief after rinseback. BP - 124/64.    SHAKILA Chin given dialysis report and relayed HD events.

## 2025-04-19 LAB
ANION GAP SERPL CALC-SCNC: 1 MMOL/L (ref 2–12)
BUN SERPL-MCNC: 33 MG/DL (ref 6–20)
BUN/CREAT SERPL: 12 (ref 12–20)
CA-I BLD-MCNC: 9 MG/DL (ref 8.5–10.1)
CHLORIDE SERPL-SCNC: 103 MMOL/L (ref 97–108)
CO2 SERPL-SCNC: 33 MMOL/L (ref 21–32)
CREAT SERPL-MCNC: 2.79 MG/DL (ref 0.55–1.02)
GLUCOSE BLD STRIP.AUTO-MCNC: 195 MG/DL (ref 65–100)
GLUCOSE BLD STRIP.AUTO-MCNC: 203 MG/DL (ref 65–100)
GLUCOSE BLD STRIP.AUTO-MCNC: 228 MG/DL (ref 65–100)
GLUCOSE BLD STRIP.AUTO-MCNC: 263 MG/DL (ref 65–100)
GLUCOSE SERPL-MCNC: 173 MG/DL (ref 65–100)
PERFORMED BY:: ABNORMAL
POTASSIUM SERPL-SCNC: 4.3 MMOL/L (ref 3.5–5.1)
SODIUM SERPL-SCNC: 137 MMOL/L (ref 136–145)

## 2025-04-19 PROCEDURE — 6370000000 HC RX 637 (ALT 250 FOR IP): Performed by: INTERNAL MEDICINE

## 2025-04-19 PROCEDURE — 94660 CPAP INITIATION&MGMT: CPT

## 2025-04-19 PROCEDURE — 80048 BASIC METABOLIC PNL TOTAL CA: CPT

## 2025-04-19 PROCEDURE — 1100000000 HC RM PRIVATE

## 2025-04-19 PROCEDURE — 2500000003 HC RX 250 WO HCPCS: Performed by: INTERNAL MEDICINE

## 2025-04-19 PROCEDURE — 82962 GLUCOSE BLOOD TEST: CPT

## 2025-04-19 PROCEDURE — 6370000000 HC RX 637 (ALT 250 FOR IP)

## 2025-04-19 PROCEDURE — 36415 COLL VENOUS BLD VENIPUNCTURE: CPT

## 2025-04-19 PROCEDURE — 6360000002 HC RX W HCPCS: Performed by: INTERNAL MEDICINE

## 2025-04-19 RX ADMIN — CITALOPRAM HYDROBROMIDE 20 MG: 20 TABLET ORAL at 08:04

## 2025-04-19 RX ADMIN — GABAPENTIN 200 MG: 100 CAPSULE ORAL at 08:03

## 2025-04-19 RX ADMIN — ALUMINUM HYDROXIDE, MAGNESIUM HYDROXIDE, AND SIMETHICONE 30 ML: 1200; 120; 1200 SUSPENSION ORAL at 08:04

## 2025-04-19 RX ADMIN — BUPROPION HYDROCHLORIDE 150 MG: 150 TABLET, EXTENDED RELEASE ORAL at 17:16

## 2025-04-19 RX ADMIN — SODIUM CHLORIDE, PRESERVATIVE FREE 10 ML: 5 INJECTION INTRAVENOUS at 20:41

## 2025-04-19 RX ADMIN — GABAPENTIN 200 MG: 100 CAPSULE ORAL at 15:07

## 2025-04-19 RX ADMIN — METHOCARBAMOL 750 MG: 500 TABLET ORAL at 08:01

## 2025-04-19 RX ADMIN — METHOCARBAMOL 750 MG: 500 TABLET ORAL at 15:07

## 2025-04-19 RX ADMIN — PANTOPRAZOLE SODIUM 40 MG: 40 TABLET, DELAYED RELEASE ORAL at 20:38

## 2025-04-19 RX ADMIN — AMLODIPINE BESYLATE 5 MG: 5 TABLET ORAL at 08:04

## 2025-04-19 RX ADMIN — HEPARIN SODIUM 5000 UNITS: 5000 INJECTION INTRAVENOUS; SUBCUTANEOUS at 15:07

## 2025-04-19 RX ADMIN — Medication 3 MG: at 20:38

## 2025-04-19 RX ADMIN — HEPARIN SODIUM 5000 UNITS: 5000 INJECTION INTRAVENOUS; SUBCUTANEOUS at 21:47

## 2025-04-19 RX ADMIN — INSULIN GLARGINE 35 UNITS: 100 INJECTION, SOLUTION SUBCUTANEOUS at 08:04

## 2025-04-19 RX ADMIN — METOPROLOL SUCCINATE 50 MG: 50 TABLET, EXTENDED RELEASE ORAL at 20:38

## 2025-04-19 RX ADMIN — POLYETHYLENE GLYCOL 3350 17 G: 17 POWDER, FOR SOLUTION ORAL at 08:02

## 2025-04-19 RX ADMIN — BUPROPION HYDROCHLORIDE 150 MG: 150 TABLET, EXTENDED RELEASE ORAL at 08:03

## 2025-04-19 RX ADMIN — INSULIN LISPRO 4 UNITS: 100 INJECTION, SOLUTION INTRAVENOUS; SUBCUTANEOUS at 08:04

## 2025-04-19 RX ADMIN — INSULIN LISPRO 2 UNITS: 100 INJECTION, SOLUTION INTRAVENOUS; SUBCUTANEOUS at 11:38

## 2025-04-19 RX ADMIN — SODIUM CHLORIDE, PRESERVATIVE FREE 10 ML: 5 INJECTION INTRAVENOUS at 08:11

## 2025-04-19 RX ADMIN — OXYCODONE AND ACETAMINOPHEN 1 TABLET: 10; 325 TABLET ORAL at 11:45

## 2025-04-19 RX ADMIN — ALUMINUM HYDROXIDE, MAGNESIUM HYDROXIDE, AND SIMETHICONE 30 ML: 1200; 120; 1200 SUSPENSION ORAL at 11:38

## 2025-04-19 RX ADMIN — HYDRALAZINE HYDROCHLORIDE 50 MG: 50 TABLET ORAL at 08:03

## 2025-04-19 RX ADMIN — METHOCARBAMOL 750 MG: 500 TABLET ORAL at 20:38

## 2025-04-19 RX ADMIN — GABAPENTIN 200 MG: 100 CAPSULE ORAL at 20:38

## 2025-04-19 RX ADMIN — ARIPIPRAZOLE 20 MG: 5 TABLET ORAL at 20:39

## 2025-04-19 RX ADMIN — INSULIN GLARGINE 10 UNITS: 100 INJECTION, SOLUTION SUBCUTANEOUS at 20:49

## 2025-04-19 RX ADMIN — INSULIN LISPRO 2 UNITS: 100 INJECTION, SOLUTION INTRAVENOUS; SUBCUTANEOUS at 17:16

## 2025-04-19 RX ADMIN — HEPARIN SODIUM 5000 UNITS: 5000 INJECTION INTRAVENOUS; SUBCUTANEOUS at 06:52

## 2025-04-19 RX ADMIN — INSULIN LISPRO 2 UNITS: 100 INJECTION, SOLUTION INTRAVENOUS; SUBCUTANEOUS at 20:51

## 2025-04-19 RX ADMIN — HYDRALAZINE HYDROCHLORIDE 50 MG: 50 TABLET ORAL at 20:38

## 2025-04-19 ASSESSMENT — PAIN SCALES - GENERAL
PAINLEVEL_OUTOF10: 3
PAINLEVEL_OUTOF10: 1
PAINLEVEL_OUTOF10: 7
PAINLEVEL_OUTOF10: 0
PAINLEVEL_OUTOF10: 0

## 2025-04-19 ASSESSMENT — PAIN SCALES - WONG BAKER: WONGBAKER_NUMERICALRESPONSE: NO HURT

## 2025-04-19 ASSESSMENT — PAIN DESCRIPTION - ORIENTATION: ORIENTATION: LEFT;RIGHT

## 2025-04-19 ASSESSMENT — PAIN DESCRIPTION - LOCATION: LOCATION: BACK;BUTTOCKS

## 2025-04-19 ASSESSMENT — PAIN DESCRIPTION - DESCRIPTORS: DESCRIPTORS: ACHING

## 2025-04-19 ASSESSMENT — PAIN - FUNCTIONAL ASSESSMENT: PAIN_FUNCTIONAL_ASSESSMENT: ACTIVITIES ARE NOT PREVENTED

## 2025-04-19 NOTE — CARE COORDINATION
DC order observed.     Pt is pending auth at [facility] initiated 4/17/2025. CM sent message in [employer] to [facility] requesting status on auth. CM will await reply.     CM team will continue following.     Addendum: pt has been offered a P2P. CM will need to speak with pt about backup plan if auth denied.

## 2025-04-19 NOTE — DISCHARGE SUMMARY
.                                       Discharge Summary    Name: Johanna Dunaway  [id number]  YOB: 1960 (Age: 64 y.o.)   Date of Admission: 4/14/2025  Date of Discharge: 4/19/2025  Attending Physician: Veena Boudreaux MD    Discharge Diagnosis:   Intractable low back pain in setting of recent L3 compression fracture  Unable to ambulate  CKD progressed to ESRD  Hemodialysis  Constipation  Abnormal lipase     Consultations:  IP CONSULT TO CASE MANAGEMENT  IP CONSULT TO VASCULAR ACCESS TEAM  IP CONSULT TO NEPHROLOGY  IP CONSULT TO CASE MANAGEMENT      Brief Hospital Course by Main Problems:     64-year-old female with past medical history hypertension, diabetes, CKD stage V now progressing to ESRD, TAHIRA on nocturnal oxygen, compensated cirrhosis, morbid obesity recently had acute L3 compression fracture status post IR kyphoplasty presented with worsening lower back pain unable to ambulate  admitted for intractable lower back pain management and possible placement.  On admission patient noted to have abnormal lipase however radiographic evidence of pancreatitis.  Patient abdominal pain resolved.  She complained of lower back pain.  PT OT saw her, she is safe for SNF.  Patient is unable to be taking care at home.  She had intermittent hypoglycemia, her insulin regimen has been adjusted.  She was also having some constipation was given bowel regimen.  Regarding her CKD, she appears to be progressing to ESRD.  HD was started in the hospital on 4/15.  She has  left arm AV fistula.  Patient is otherwise medically stable, to be discharged to [facility] for further management.    Regarding her insulin regimen, patient at home takes 45 units in the morning, 45 units at night.  Will change the regimen to 35 units, and decrease night regimen to 10 units.  Adjust accordingly in the outpatient setting.    Regarding her low back pain, obese send on short course of opioid, gabapentin has been increased to 300 mg 3

## 2025-04-20 LAB
GLUCOSE BLD STRIP.AUTO-MCNC: 181 MG/DL (ref 65–100)
GLUCOSE BLD STRIP.AUTO-MCNC: 190 MG/DL (ref 65–100)
GLUCOSE BLD STRIP.AUTO-MCNC: 232 MG/DL (ref 65–100)
GLUCOSE BLD STRIP.AUTO-MCNC: 248 MG/DL (ref 65–100)
PERFORMED BY:: ABNORMAL

## 2025-04-20 PROCEDURE — 6370000000 HC RX 637 (ALT 250 FOR IP)

## 2025-04-20 PROCEDURE — 2500000003 HC RX 250 WO HCPCS: Performed by: INTERNAL MEDICINE

## 2025-04-20 PROCEDURE — 6370000000 HC RX 637 (ALT 250 FOR IP): Performed by: INTERNAL MEDICINE

## 2025-04-20 PROCEDURE — 82962 GLUCOSE BLOOD TEST: CPT

## 2025-04-20 PROCEDURE — 6360000002 HC RX W HCPCS: Performed by: INTERNAL MEDICINE

## 2025-04-20 PROCEDURE — 1100000000 HC RM PRIVATE

## 2025-04-20 RX ADMIN — INSULIN LISPRO 2 UNITS: 100 INJECTION, SOLUTION INTRAVENOUS; SUBCUTANEOUS at 11:22

## 2025-04-20 RX ADMIN — OXYCODONE AND ACETAMINOPHEN 1 TABLET: 325; 5 TABLET ORAL at 11:22

## 2025-04-20 RX ADMIN — AMLODIPINE BESYLATE 5 MG: 5 TABLET ORAL at 08:40

## 2025-04-20 RX ADMIN — HEPARIN SODIUM 5000 UNITS: 5000 INJECTION INTRAVENOUS; SUBCUTANEOUS at 05:43

## 2025-04-20 RX ADMIN — METHOCARBAMOL 750 MG: 500 TABLET ORAL at 20:02

## 2025-04-20 RX ADMIN — HEPARIN SODIUM 5000 UNITS: 5000 INJECTION INTRAVENOUS; SUBCUTANEOUS at 14:06

## 2025-04-20 RX ADMIN — SODIUM CHLORIDE, PRESERVATIVE FREE 10 ML: 5 INJECTION INTRAVENOUS at 20:04

## 2025-04-20 RX ADMIN — POLYETHYLENE GLYCOL 3350 17 G: 17 POWDER, FOR SOLUTION ORAL at 08:41

## 2025-04-20 RX ADMIN — ALUMINUM HYDROXIDE, MAGNESIUM HYDROXIDE, AND SIMETHICONE 30 ML: 1200; 120; 1200 SUSPENSION ORAL at 08:40

## 2025-04-20 RX ADMIN — INSULIN LISPRO 2 UNITS: 100 INJECTION, SOLUTION INTRAVENOUS; SUBCUTANEOUS at 08:41

## 2025-04-20 RX ADMIN — HEPARIN SODIUM 5000 UNITS: 5000 INJECTION INTRAVENOUS; SUBCUTANEOUS at 21:32

## 2025-04-20 RX ADMIN — PANTOPRAZOLE SODIUM 40 MG: 40 TABLET, DELAYED RELEASE ORAL at 20:02

## 2025-04-20 RX ADMIN — GABAPENTIN 200 MG: 100 CAPSULE ORAL at 20:32

## 2025-04-20 RX ADMIN — OXYCODONE AND ACETAMINOPHEN 1 TABLET: 10; 325 TABLET ORAL at 19:11

## 2025-04-20 RX ADMIN — HYDRALAZINE HYDROCHLORIDE 50 MG: 50 TABLET ORAL at 08:40

## 2025-04-20 RX ADMIN — INSULIN LISPRO 2 UNITS: 100 INJECTION, SOLUTION INTRAVENOUS; SUBCUTANEOUS at 17:01

## 2025-04-20 RX ADMIN — BUPROPION HYDROCHLORIDE 150 MG: 150 TABLET, EXTENDED RELEASE ORAL at 17:01

## 2025-04-20 RX ADMIN — GABAPENTIN 200 MG: 100 CAPSULE ORAL at 08:41

## 2025-04-20 RX ADMIN — METOPROLOL SUCCINATE 50 MG: 50 TABLET, EXTENDED RELEASE ORAL at 20:02

## 2025-04-20 RX ADMIN — ARIPIPRAZOLE 20 MG: 5 TABLET ORAL at 20:02

## 2025-04-20 RX ADMIN — INSULIN LISPRO 2 UNITS: 100 INJECTION, SOLUTION INTRAVENOUS; SUBCUTANEOUS at 20:32

## 2025-04-20 RX ADMIN — HYDRALAZINE HYDROCHLORIDE 50 MG: 50 TABLET ORAL at 20:02

## 2025-04-20 RX ADMIN — INSULIN GLARGINE 10 UNITS: 100 INJECTION, SOLUTION SUBCUTANEOUS at 20:32

## 2025-04-20 RX ADMIN — METHOCARBAMOL 750 MG: 500 TABLET ORAL at 14:06

## 2025-04-20 RX ADMIN — HYDRALAZINE HYDROCHLORIDE 50 MG: 50 TABLET ORAL at 14:06

## 2025-04-20 RX ADMIN — INSULIN GLARGINE 35 UNITS: 100 INJECTION, SOLUTION SUBCUTANEOUS at 08:41

## 2025-04-20 RX ADMIN — CITALOPRAM HYDROBROMIDE 20 MG: 20 TABLET ORAL at 08:41

## 2025-04-20 RX ADMIN — SODIUM CHLORIDE, PRESERVATIVE FREE 10 ML: 5 INJECTION INTRAVENOUS at 09:16

## 2025-04-20 RX ADMIN — GABAPENTIN 200 MG: 100 CAPSULE ORAL at 14:06

## 2025-04-20 RX ADMIN — METHOCARBAMOL 750 MG: 500 TABLET ORAL at 08:41

## 2025-04-20 RX ADMIN — BUPROPION HYDROCHLORIDE 150 MG: 150 TABLET, EXTENDED RELEASE ORAL at 05:43

## 2025-04-20 RX ADMIN — OXYCODONE AND ACETAMINOPHEN 1 TABLET: 10; 325 TABLET ORAL at 01:27

## 2025-04-20 RX ADMIN — Medication 3 MG: at 20:02

## 2025-04-20 ASSESSMENT — PAIN DESCRIPTION - DESCRIPTORS
DESCRIPTORS: ACHING

## 2025-04-20 ASSESSMENT — PAIN DESCRIPTION - ORIENTATION
ORIENTATION: MID
ORIENTATION: RIGHT
ORIENTATION: MID

## 2025-04-20 ASSESSMENT — PAIN SCALES - GENERAL
PAINLEVEL_OUTOF10: 6
PAINLEVEL_OUTOF10: 7
PAINLEVEL_OUTOF10: 7
PAINLEVEL_OUTOF10: 3
PAINLEVEL_OUTOF10: 5

## 2025-04-20 ASSESSMENT — PAIN SCALES - WONG BAKER: WONGBAKER_NUMERICALRESPONSE: NO HURT

## 2025-04-20 ASSESSMENT — PAIN - FUNCTIONAL ASSESSMENT
PAIN_FUNCTIONAL_ASSESSMENT: ACTIVITIES ARE NOT PREVENTED
PAIN_FUNCTIONAL_ASSESSMENT: ACTIVITIES ARE NOT PREVENTED

## 2025-04-20 ASSESSMENT — PAIN DESCRIPTION - LOCATION
LOCATION: HIP
LOCATION: BACK;BUTTOCKS
LOCATION: BACK

## 2025-04-20 NOTE — CASE COMMUNICATION
Discharge Acknowledged:  Patient awaits auth for [facility].  CM contacted [facility] to inquire about auth.  CM awaits a response from [facility].    If denied, as previous note states, Patient has been offered a P2P. By Monday at 1pm.  CM will continue to follow.

## 2025-04-20 NOTE — DISCHARGE SUMMARY
.                                       Discharge Summary    Name: Johanna Dunaway  [id number]  YOB: 1960 (Age: 64 y.o.)   Date of Admission: 4/14/2025  Date of Discharge: 4/20/2025  Attending Physician: Veena Boudreaux MD    Discharge Diagnosis:   Intractable low back pain in setting of recent L3 compression fracture  Unable to ambulate  CKD progressed to ESRD  Hemodialysis  Constipation  Abnormal lipase     Consultations:  IP CONSULT TO CASE MANAGEMENT  IP CONSULT TO VASCULAR ACCESS TEAM  IP CONSULT TO NEPHROLOGY  IP CONSULT TO CASE MANAGEMENT      Brief Hospital Course by Main Problems:     64-year-old female with past medical history hypertension, diabetes, CKD stage V now progressing to ESRD, TAHIRA on nocturnal oxygen, compensated cirrhosis, morbid obesity recently had acute L3 compression fracture status post IR kyphoplasty presented with worsening lower back pain unable to ambulate  admitted for intractable lower back pain management and possible placement.  On admission patient noted to have abnormal lipase however radiographic evidence of pancreatitis.  Patient abdominal pain resolved.  She complained of lower back pain.  PT OT saw her, she is safe for SNF.  Patient is unable to be taking care at home.  She had intermittent hypoglycemia, her insulin regimen has been adjusted.  She was also having some constipation was given bowel regimen.  Regarding her CKD, she appears to be progressing to ESRD.  HD was started in the hospital on 4/15.  She has  left arm AV fistula.  Patient is otherwise medically stable, to be discharged to [facility] for further management.    Regarding her insulin regimen, patient at home takes 45 units in the morning, 45 units at night.  Will change the regimen to 35 units, and decrease night regimen to 10 units.  Adjust accordingly in the outpatient setting.    Regarding her low back pain, obese send on short course of opioid, gabapentin has been increased to 300 mg 3

## 2025-04-21 ENCOUNTER — APPOINTMENT (OUTPATIENT)
Facility: HOSPITAL | Age: 65
DRG: 683 | End: 2025-04-21
Payer: MEDICARE

## 2025-04-21 LAB
ALBUMIN SERPL-MCNC: 2.6 G/DL (ref 3.5–5)
ANION GAP SERPL CALC-SCNC: 2 MMOL/L (ref 2–12)
BUN SERPL-MCNC: 44 MG/DL (ref 6–20)
BUN/CREAT SERPL: 13 (ref 12–20)
CA-I BLD-MCNC: 9 MG/DL (ref 8.5–10.1)
CA-I BLD-MCNC: 9 MG/DL (ref 8.5–10.1)
CHLORIDE SERPL-SCNC: 103 MMOL/L (ref 97–108)
CO2 SERPL-SCNC: 30 MMOL/L (ref 21–32)
CREAT SERPL-MCNC: 3.5 MG/DL (ref 0.55–1.02)
GLUCOSE BLD STRIP.AUTO-MCNC: 126 MG/DL (ref 65–100)
GLUCOSE BLD STRIP.AUTO-MCNC: 163 MG/DL (ref 65–100)
GLUCOSE BLD STRIP.AUTO-MCNC: 230 MG/DL (ref 65–100)
GLUCOSE SERPL-MCNC: 163 MG/DL (ref 65–100)
PERFORMED BY:: ABNORMAL
PHOSPHATE SERPL-MCNC: 2 MG/DL (ref 2.6–4.7)
POTASSIUM SERPL-SCNC: 4.9 MMOL/L (ref 3.5–5.1)
PTH-INTACT SERPL-MCNC: 73.7 PG/ML (ref 18.4–88)
SODIUM SERPL-SCNC: 135 MMOL/L (ref 136–145)

## 2025-04-21 PROCEDURE — 1100000000 HC RM PRIVATE

## 2025-04-21 PROCEDURE — 71045 X-RAY EXAM CHEST 1 VIEW: CPT

## 2025-04-21 PROCEDURE — 6370000000 HC RX 637 (ALT 250 FOR IP): Performed by: INTERNAL MEDICINE

## 2025-04-21 PROCEDURE — 6360000002 HC RX W HCPCS: Performed by: INTERNAL MEDICINE

## 2025-04-21 PROCEDURE — 6370000000 HC RX 637 (ALT 250 FOR IP)

## 2025-04-21 PROCEDURE — 36415 COLL VENOUS BLD VENIPUNCTURE: CPT

## 2025-04-21 PROCEDURE — 94761 N-INVAS EAR/PLS OXIMETRY MLT: CPT

## 2025-04-21 PROCEDURE — 80069 RENAL FUNCTION PANEL: CPT

## 2025-04-21 PROCEDURE — 2709999900 HC NON-CHARGEABLE SUPPLY

## 2025-04-21 PROCEDURE — 83970 ASSAY OF PARATHORMONE: CPT

## 2025-04-21 PROCEDURE — 82962 GLUCOSE BLOOD TEST: CPT

## 2025-04-21 PROCEDURE — 90935 HEMODIALYSIS ONE EVALUATION: CPT

## 2025-04-21 PROCEDURE — 2500000003 HC RX 250 WO HCPCS: Performed by: INTERNAL MEDICINE

## 2025-04-21 PROCEDURE — 97530 THERAPEUTIC ACTIVITIES: CPT

## 2025-04-21 RX ADMIN — METHOCARBAMOL 750 MG: 500 TABLET ORAL at 14:54

## 2025-04-21 RX ADMIN — PANTOPRAZOLE SODIUM 40 MG: 40 TABLET, DELAYED RELEASE ORAL at 20:16

## 2025-04-21 RX ADMIN — GABAPENTIN 200 MG: 100 CAPSULE ORAL at 20:51

## 2025-04-21 RX ADMIN — GABAPENTIN 200 MG: 100 CAPSULE ORAL at 14:54

## 2025-04-21 RX ADMIN — HYDRALAZINE HYDROCHLORIDE 50 MG: 50 TABLET ORAL at 20:16

## 2025-04-21 RX ADMIN — METHOCARBAMOL 750 MG: 500 TABLET ORAL at 20:15

## 2025-04-21 RX ADMIN — ERYTHROPOIETIN 10000 UNITS: 10000 INJECTION, SOLUTION INTRAVENOUS; SUBCUTANEOUS at 08:38

## 2025-04-21 RX ADMIN — OXYCODONE AND ACETAMINOPHEN 1 TABLET: 10; 325 TABLET ORAL at 11:20

## 2025-04-21 RX ADMIN — INSULIN GLARGINE 10 UNITS: 100 INJECTION, SOLUTION SUBCUTANEOUS at 20:51

## 2025-04-21 RX ADMIN — METOPROLOL SUCCINATE 50 MG: 50 TABLET, EXTENDED RELEASE ORAL at 20:16

## 2025-04-21 RX ADMIN — ALUMINUM HYDROXIDE, MAGNESIUM HYDROXIDE, AND SIMETHICONE 30 ML: 1200; 120; 1200 SUSPENSION ORAL at 17:08

## 2025-04-21 RX ADMIN — ALUMINUM HYDROXIDE, MAGNESIUM HYDROXIDE, AND SIMETHICONE 30 ML: 1200; 120; 1200 SUSPENSION ORAL at 11:20

## 2025-04-21 RX ADMIN — BUPROPION HYDROCHLORIDE 150 MG: 150 TABLET, EXTENDED RELEASE ORAL at 05:42

## 2025-04-21 RX ADMIN — HEPARIN SODIUM 5000 UNITS: 5000 INJECTION INTRAVENOUS; SUBCUTANEOUS at 05:42

## 2025-04-21 RX ADMIN — SODIUM CHLORIDE, PRESERVATIVE FREE 10 ML: 5 INJECTION INTRAVENOUS at 20:18

## 2025-04-21 RX ADMIN — OXYCODONE AND ACETAMINOPHEN 1 TABLET: 10; 325 TABLET ORAL at 20:16

## 2025-04-21 RX ADMIN — BUPROPION HYDROCHLORIDE 150 MG: 150 TABLET, EXTENDED RELEASE ORAL at 17:08

## 2025-04-21 RX ADMIN — ARIPIPRAZOLE 20 MG: 5 TABLET ORAL at 20:15

## 2025-04-21 RX ADMIN — INSULIN LISPRO 2 UNITS: 100 INJECTION, SOLUTION INTRAVENOUS; SUBCUTANEOUS at 17:08

## 2025-04-21 RX ADMIN — Medication 3 MG: at 20:16

## 2025-04-21 RX ADMIN — HYDRALAZINE HYDROCHLORIDE 50 MG: 50 TABLET ORAL at 14:54

## 2025-04-21 RX ADMIN — HEPARIN SODIUM 5000 UNITS: 5000 INJECTION INTRAVENOUS; SUBCUTANEOUS at 21:51

## 2025-04-21 RX ADMIN — HEPARIN SODIUM 5000 UNITS: 5000 INJECTION INTRAVENOUS; SUBCUTANEOUS at 14:55

## 2025-04-21 ASSESSMENT — PAIN SCALES - GENERAL
PAINLEVEL_OUTOF10: 4
PAINLEVEL_OUTOF10: 0
PAINLEVEL_OUTOF10: 2
PAINLEVEL_OUTOF10: 8

## 2025-04-21 ASSESSMENT — PAIN DESCRIPTION - DESCRIPTORS: DESCRIPTORS: ACHING

## 2025-04-21 ASSESSMENT — PAIN SCALES - WONG BAKER: WONGBAKER_NUMERICALRESPONSE: NO HURT

## 2025-04-21 ASSESSMENT — PAIN DESCRIPTION - ORIENTATION: ORIENTATION: POSTERIOR;LOWER

## 2025-04-21 ASSESSMENT — PAIN DESCRIPTION - LOCATION: LOCATION: BACK;BUTTOCKS

## 2025-04-21 NOTE — DISCHARGE SUMMARY
.                                       Discharge Summary    Name: Johanna Dunaway  [id number]  YOB: 1960 (Age: 64 y.o.)   Date of Admission: 4/14/2025  Date of Discharge: 4/21/2025  Attending Physician: Veena Boudreaux MD    Discharge Diagnosis:   Intractable low back pain in setting of recent L3 compression fracture  Unable to ambulate  CKD progressed to ESRD  Hemodialysis  Constipation  Abnormal lipase     Consultations:  IP CONSULT TO CASE MANAGEMENT  IP CONSULT TO VASCULAR ACCESS TEAM  IP CONSULT TO NEPHROLOGY  IP CONSULT TO CASE MANAGEMENT      Brief Hospital Course by Main Problems:     64-year-old female with past medical history hypertension, diabetes, CKD stage V now progressing to ESRD, TAHIRA on nocturnal oxygen, compensated cirrhosis, morbid obesity recently had acute L3 compression fracture status post IR kyphoplasty presented with worsening lower back pain unable to ambulate  admitted for intractable lower back pain management and possible placement.  On admission patient noted to have abnormal lipase however radiographic evidence of pancreatitis.  Patient abdominal pain resolved.  She complained of lower back pain.  PT OT saw her, she is safe for SNF.  Patient is unable to be taking care at home.  She had intermittent hypoglycemia, her insulin regimen has been adjusted.  She was also having some constipation was given bowel regimen.  Regarding her CKD, she appears to be progressing to ESRD.  HD was started in the hospital on 4/15.  She has  left arm AV fistula.  Patient is otherwise medically stable, to be discharged to [facility] for further management.    Regarding her insulin regimen, patient at home takes 45 units in the morning, 45 units at night.  Will change the regimen to 35 units, and decrease night regimen to 10 units.  Adjust accordingly in the outpatient setting.    Regarding her low back pain, obese send on short course of opioid, gabapentin has been increased to 300 mg 3

## 2025-04-21 NOTE — CARE COORDINATION
Peer to peer completed, patient denied for IRF by insurance.    CM met with patient to discuss DCP, she called sister Vita on phone to discuss together.    CM explained recc for SNF, patient and sister declined, stating she would not get therapy, stated she should return home at this time with , [facility]    Patient's dialysis chair has not yet been approved and assigned.    Patient will need to discuss with family regarding dialysis transport, patient states she will transport  herself to dialysis.    Sister states she can transport her home after 4 if her chair time is later.    CM continues to follow and monitor for needs.     3:11 PM - CM received call from patient's sister, they gave choice for SNF, [facility], patient now agreeable.    Referral sent, awaiting possible acceptance.

## 2025-04-21 NOTE — DIALYSIS
Completed 3 hour dialysis and tolerated 1L fluid removal.    Seen by Dr. Rivera while on dialysis. No issues encountered.    Dialysis Report called to SHAKILA Valentine.

## 2025-04-22 LAB
GLUCOSE BLD STRIP.AUTO-MCNC: 126 MG/DL (ref 65–100)
GLUCOSE BLD STRIP.AUTO-MCNC: 171 MG/DL (ref 65–100)
GLUCOSE BLD STRIP.AUTO-MCNC: 304 MG/DL (ref 65–100)
GLUCOSE BLD STRIP.AUTO-MCNC: 86 MG/DL (ref 65–100)
GLUCOSE BLD STRIP.AUTO-MCNC: 95 MG/DL (ref 65–100)
PERFORMED BY:: ABNORMAL
PERFORMED BY:: NORMAL
PERFORMED BY:: NORMAL

## 2025-04-22 PROCEDURE — 82962 GLUCOSE BLOOD TEST: CPT

## 2025-04-22 PROCEDURE — 97530 THERAPEUTIC ACTIVITIES: CPT

## 2025-04-22 PROCEDURE — 1100000000 HC RM PRIVATE

## 2025-04-22 PROCEDURE — 6370000000 HC RX 637 (ALT 250 FOR IP)

## 2025-04-22 PROCEDURE — 6370000000 HC RX 637 (ALT 250 FOR IP): Performed by: INTERNAL MEDICINE

## 2025-04-22 PROCEDURE — 2500000003 HC RX 250 WO HCPCS: Performed by: INTERNAL MEDICINE

## 2025-04-22 PROCEDURE — 6360000002 HC RX W HCPCS: Performed by: INTERNAL MEDICINE

## 2025-04-22 RX ORDER — HYDRALAZINE HYDROCHLORIDE 25 MG/1
25 TABLET, FILM COATED ORAL 3 TIMES DAILY
Status: DISCONTINUED | OUTPATIENT
Start: 2025-04-22 | End: 2025-04-23 | Stop reason: HOSPADM

## 2025-04-22 RX ADMIN — SODIUM CHLORIDE, PRESERVATIVE FREE 10 ML: 5 INJECTION INTRAVENOUS at 20:55

## 2025-04-22 RX ADMIN — METHOCARBAMOL 750 MG: 500 TABLET ORAL at 20:48

## 2025-04-22 RX ADMIN — GABAPENTIN 200 MG: 100 CAPSULE ORAL at 20:48

## 2025-04-22 RX ADMIN — Medication 3 MG: at 20:47

## 2025-04-22 RX ADMIN — HEPARIN SODIUM 5000 UNITS: 5000 INJECTION INTRAVENOUS; SUBCUTANEOUS at 05:44

## 2025-04-22 RX ADMIN — ARIPIPRAZOLE 20 MG: 5 TABLET ORAL at 20:47

## 2025-04-22 RX ADMIN — GABAPENTIN 200 MG: 100 CAPSULE ORAL at 15:40

## 2025-04-22 RX ADMIN — ALUMINUM HYDROXIDE, MAGNESIUM HYDROXIDE, AND SIMETHICONE 30 ML: 1200; 120; 1200 SUSPENSION ORAL at 09:29

## 2025-04-22 RX ADMIN — HEPARIN SODIUM 5000 UNITS: 5000 INJECTION INTRAVENOUS; SUBCUTANEOUS at 21:58

## 2025-04-22 RX ADMIN — HYDRALAZINE HYDROCHLORIDE 50 MG: 50 TABLET ORAL at 15:40

## 2025-04-22 RX ADMIN — BUPROPION HYDROCHLORIDE 150 MG: 150 TABLET, EXTENDED RELEASE ORAL at 15:40

## 2025-04-22 RX ADMIN — PANTOPRAZOLE SODIUM 40 MG: 40 TABLET, DELAYED RELEASE ORAL at 20:48

## 2025-04-22 RX ADMIN — GABAPENTIN 200 MG: 100 CAPSULE ORAL at 09:30

## 2025-04-22 RX ADMIN — SODIUM CHLORIDE, PRESERVATIVE FREE 10 ML: 5 INJECTION INTRAVENOUS at 09:32

## 2025-04-22 RX ADMIN — OXYCODONE AND ACETAMINOPHEN 1 TABLET: 10; 325 TABLET ORAL at 11:20

## 2025-04-22 RX ADMIN — METHOCARBAMOL 750 MG: 500 TABLET ORAL at 15:40

## 2025-04-22 RX ADMIN — ALUMINUM HYDROXIDE, MAGNESIUM HYDROXIDE, AND SIMETHICONE 30 ML: 1200; 120; 1200 SUSPENSION ORAL at 17:11

## 2025-04-22 RX ADMIN — INSULIN GLARGINE 10 UNITS: 100 INJECTION, SOLUTION SUBCUTANEOUS at 20:49

## 2025-04-22 RX ADMIN — CITALOPRAM HYDROBROMIDE 20 MG: 20 TABLET ORAL at 09:30

## 2025-04-22 RX ADMIN — POLYETHYLENE GLYCOL 3350 17 G: 17 POWDER, FOR SOLUTION ORAL at 09:33

## 2025-04-22 RX ADMIN — METHOCARBAMOL 750 MG: 500 TABLET ORAL at 09:29

## 2025-04-22 RX ADMIN — METOPROLOL SUCCINATE 50 MG: 50 TABLET, EXTENDED RELEASE ORAL at 20:49

## 2025-04-22 RX ADMIN — BUPROPION HYDROCHLORIDE 150 MG: 150 TABLET, EXTENDED RELEASE ORAL at 05:43

## 2025-04-22 RX ADMIN — INSULIN LISPRO 6 UNITS: 100 INJECTION, SOLUTION INTRAVENOUS; SUBCUTANEOUS at 09:29

## 2025-04-22 RX ADMIN — HYDRALAZINE HYDROCHLORIDE 25 MG: 25 TABLET ORAL at 20:49

## 2025-04-22 RX ADMIN — OXYCODONE AND ACETAMINOPHEN 1 TABLET: 325; 5 TABLET ORAL at 18:19

## 2025-04-22 RX ADMIN — HEPARIN SODIUM 5000 UNITS: 5000 INJECTION INTRAVENOUS; SUBCUTANEOUS at 15:40

## 2025-04-22 RX ADMIN — INSULIN GLARGINE 35 UNITS: 100 INJECTION, SOLUTION SUBCUTANEOUS at 11:20

## 2025-04-22 ASSESSMENT — PAIN DESCRIPTION - ORIENTATION
ORIENTATION: LOWER
ORIENTATION: LOWER
ORIENTATION: LEFT

## 2025-04-22 ASSESSMENT — PAIN DESCRIPTION - DESCRIPTORS
DESCRIPTORS: ACHING
DESCRIPTORS: THROBBING
DESCRIPTORS: ACHING

## 2025-04-22 ASSESSMENT — PAIN DESCRIPTION - LOCATION
LOCATION: BACK

## 2025-04-22 ASSESSMENT — PAIN SCALES - GENERAL
PAINLEVEL_OUTOF10: 6
PAINLEVEL_OUTOF10: 2
PAINLEVEL_OUTOF10: 4
PAINLEVEL_OUTOF10: 10

## 2025-04-22 NOTE — CARE COORDINATION
CM noted discharge order.  Patient requested SNF placement as of yesterday afternoon.    Patient has been accepted at [facility] and [facility].  CM has asked [facility] to start auth today.    SUSAN also had a message from [facility].  CM sent requested information and is awaiting a confirmed chair time.  Likely to be available tomorrow.    CM confirmed with patient that her sister will be able to transport her to and from dialysis.    CM has updated [facility] of patient's current plans.

## 2025-04-22 NOTE — DISCHARGE SUMMARY
.                                       Discharge Summary    Name: Johanna Dunaway  [id number]  YOB: 1960 (Age: 64 y.o.)   Date of Admission: 4/14/2025  Date of Discharge: 4/22/2025  Attending Physician: Ozzy Nguyen MD    Discharge Diagnosis:   Intractable low back pain in setting of recent L3 compression fracture  Unable to ambulate  CKD progressed to ESRD  Hemodialysis  Constipation  Abnormal lipase     Consultations:  IP CONSULT TO CASE MANAGEMENT  IP CONSULT TO VASCULAR ACCESS TEAM  IP CONSULT TO NEPHROLOGY  IP CONSULT TO CASE MANAGEMENT      Brief Hospital Course by Main Problems:     64-year-old female with past medical history hypertension, diabetes, CKD stage V now progressing to ESRD, TAHIRA on nocturnal oxygen, compensated cirrhosis, morbid obesity recently had acute L3 compression fracture status post IR kyphoplasty presented with worsening lower back pain unable to ambulate  admitted for intractable lower back pain management and possible placement.  On admission patient noted to have abnormal lipase however radiographic evidence of pancreatitis.  Patient abdominal pain resolved.  She complained of lower back pain.  PT OT saw her, she is safe for SNF.  Patient is unable to be taking care at home.  She had intermittent hypoglycemia, her insulin regimen has been adjusted.  She was also having some constipation was given bowel regimen.  Regarding her CKD, she appears to be progressing to ESRD.  HD was started in the hospital on 4/15.  She has  left arm AV fistula.  Patient is otherwise medically stable, to be discharged to [facility] for further management.    Regarding her insulin regimen, patient at home takes 45 units in the morning, 45 units at night.  Will change the regimen to 35 units, and decrease night regimen to 10 units.  Adjust accordingly in the outpatient setting.    Regarding her low back pain, obese send on short course of opioid, gabapentin has been increased to 300 mg 3

## 2025-04-23 VITALS
DIASTOLIC BLOOD PRESSURE: 71 MMHG | SYSTOLIC BLOOD PRESSURE: 159 MMHG | WEIGHT: 224 LBS | OXYGEN SATURATION: 99 % | TEMPERATURE: 98.4 F | HEIGHT: 60 IN | BODY MASS INDEX: 43.98 KG/M2 | HEART RATE: 65 BPM | RESPIRATION RATE: 16 BRPM

## 2025-04-23 LAB
GLUCOSE BLD STRIP.AUTO-MCNC: 88 MG/DL (ref 65–100)
PERFORMED BY:: NORMAL

## 2025-04-23 PROCEDURE — 82962 GLUCOSE BLOOD TEST: CPT

## 2025-04-23 PROCEDURE — 6360000002 HC RX W HCPCS: Performed by: INTERNAL MEDICINE

## 2025-04-23 PROCEDURE — 6370000000 HC RX 637 (ALT 250 FOR IP): Performed by: INTERNAL MEDICINE

## 2025-04-23 PROCEDURE — 90935 HEMODIALYSIS ONE EVALUATION: CPT

## 2025-04-23 PROCEDURE — 2709999900 HC NON-CHARGEABLE SUPPLY

## 2025-04-23 PROCEDURE — 2500000003 HC RX 250 WO HCPCS: Performed by: INTERNAL MEDICINE

## 2025-04-23 PROCEDURE — 6370000000 HC RX 637 (ALT 250 FOR IP)

## 2025-04-23 RX ADMIN — ERYTHROPOIETIN 10000 UNITS: 10000 INJECTION, SOLUTION INTRAVENOUS; SUBCUTANEOUS at 10:18

## 2025-04-23 RX ADMIN — METHOCARBAMOL 750 MG: 500 TABLET ORAL at 14:06

## 2025-04-23 RX ADMIN — OXYCODONE AND ACETAMINOPHEN 1 TABLET: 10; 325 TABLET ORAL at 14:06

## 2025-04-23 RX ADMIN — BUPROPION HYDROCHLORIDE 150 MG: 150 TABLET, EXTENDED RELEASE ORAL at 06:06

## 2025-04-23 RX ADMIN — SODIUM CHLORIDE, PRESERVATIVE FREE 10 ML: 5 INJECTION INTRAVENOUS at 11:46

## 2025-04-23 RX ADMIN — HEPARIN SODIUM 5000 UNITS: 5000 INJECTION INTRAVENOUS; SUBCUTANEOUS at 06:06

## 2025-04-23 RX ADMIN — GABAPENTIN 200 MG: 100 CAPSULE ORAL at 14:06

## 2025-04-23 RX ADMIN — OXYCODONE AND ACETAMINOPHEN 1 TABLET: 325; 5 TABLET ORAL at 06:10

## 2025-04-23 ASSESSMENT — PAIN DESCRIPTION - LOCATION: LOCATION: BACK

## 2025-04-23 ASSESSMENT — PAIN SCALES - GENERAL
PAINLEVEL_OUTOF10: 0
PAINLEVEL_OUTOF10: 6
PAINLEVEL_OUTOF10: 0

## 2025-04-23 ASSESSMENT — PAIN DESCRIPTION - ORIENTATION: ORIENTATION: LOWER

## 2025-04-23 ASSESSMENT — PAIN DESCRIPTION - DESCRIPTORS: DESCRIPTORS: ACHING

## 2025-04-23 NOTE — PROGRESS NOTES
Nephrology follow-up          Patient: Johanna Dunaway MRN: [id number]  SSN: [id number]    YOB: 1960  Age: 64 y.o.  Sex: female      Subjective:   The patient is seen on dialysis  She looks comfortable  Her blood pressures are normal      Past Medical History:   Diagnosis Date    Anxiety 6/22/2020    Arthritis     Bipolar 1 disorder (HCC) 1/23/2018    Congestive heart failure (CHF) ([facility])     PT DENIES THIS DX, STATES AN ER DR TOLD HER THIS WHEN SHE HAD COVID AND SAYS HER PCP HAS NEVER TOLD HER THIS AND WHEN SHE SAW CARDIOLOGY 5YRS AGO, ALL HER TESTS CAME BACK FINE PER PT    Diabetes mellitus type 2, controlled ([facility]) 6/22/2020    Diverticulosis     Hx of blood clots 2016    SMALL PE    Hypertension 6/22/2020    Kidney failure     Liver fibrosis 6/22/2020    Major depression 6/22/2020    Migraine 6/22/2020    Sleep apnea 1/23/2018    USES CPAP    Suicide attempt ([facility]) 6/22/2020    2013    UTI (urinary tract infection)      Past Surgical History:   Procedure Laterality Date    APPENDECTOMY      CHOLECYSTECTOMY      DIALYSIS FISTULA CREATION Left 10/11/2024    LEFT UPPER ARM DIALYSIS GRAFT PLACEMENT performed by David Monk MD at [facility] MAIN OR    DILATION AND CURETTAGE OF UTERUS      IR KYPHOPLASTY LUMBAR 1 VERTEBRAL BODY  4/7/2025    IR KYPHOPLASTY LUMBAR 1 VERTEBRAL BODY 4/7/2025 Adiel Brunner MD [facility] RAD ANGIO IR    WISDOM TOOTH EXTRACTION        Family History   Problem Relation Age of Onset    Heart Attack Mother     Heart Disease Mother     Gout Mother     Heart Attack Father     Asthma Father     Heart Disease Father     Anesth Problems Neg Hx      Social History     Tobacco Use    Smoking status: Never    Smokeless tobacco: Never   Substance Use Topics    Alcohol use: Not Currently      Current Facility-Administered Medications   Medication Dose Route Frequency Provider Last Rate Last Admin    insulin glargine (LANTUS) injection vial 10 Units  10 Units SubCUTAneous Nightly Veena Boudreaux 
         Nephrology follow-up          Patient: Johanna Dunaway MRN: [id number]  SSN: [id number]    YOB: 1960  Age: 64 y.o.  Sex: female      Subjective:   I have seen patient on dialysis  She looks comfortable  Blood pressures are normal  Past Medical History:   Diagnosis Date    Anxiety 6/22/2020    Arthritis     Bipolar 1 disorder (HCC) 1/23/2018    Congestive heart failure (CHF) ([facility])     PT DENIES THIS DX, STATES AN ER DR TOLD HER THIS WHEN SHE HAD COVID AND SAYS HER PCP HAS NEVER TOLD HER THIS AND WHEN SHE SAW CARDIOLOGY 5YRS AGO, ALL HER TESTS CAME BACK FINE PER PT    Diabetes mellitus type 2, controlled ([facility]) 6/22/2020    Diverticulosis     Hx of blood clots 2016    SMALL PE    Hypertension 6/22/2020    Kidney failure     Liver fibrosis 6/22/2020    Major depression 6/22/2020    Migraine 6/22/2020    Sleep apnea 1/23/2018    USES CPAP    Suicide attempt ([facility]) 6/22/2020    2013    UTI (urinary tract infection)      Past Surgical History:   Procedure Laterality Date    APPENDECTOMY      CHOLECYSTECTOMY      DIALYSIS FISTULA CREATION Left 10/11/2024    LEFT UPPER ARM DIALYSIS GRAFT PLACEMENT performed by David Monk MD at [facility] MAIN OR    DILATION AND CURETTAGE OF UTERUS      IR KYPHOPLASTY LUMBAR 1 VERTEBRAL BODY  4/7/2025    IR KYPHOPLASTY LUMBAR 1 VERTEBRAL BODY 4/7/2025 Adiel Brunner MD [facility] RAD ANGIO IR    WISDOM TOOTH EXTRACTION        Family History   Problem Relation Age of Onset    Heart Attack Mother     Heart Disease Mother     Gout Mother     Heart Attack Father     Asthma Father     Heart Disease Father     Anesth Problems Neg Hx      Social History     Tobacco Use    Smoking status: Never    Smokeless tobacco: Never   Substance Use Topics    Alcohol use: Not Currently      Current Facility-Administered Medications   Medication Dose Route Frequency Provider Last Rate Last Admin    phosphorus (K PHOS NEUTRAL) 2 tablet  500 mg Oral Once Veena Boudreaux MD        insulin glargine 
         Nephrology follow-up          Patient: Johanna Dunaway MRN: [id number]  SSN: [id number]    YOB: 1960  Age: 64 y.o.  Sex: female      Subjective:   The patient is seen at the bedside  She looks comfortable  Her blood pressures are normal  She was dialyzed yesterday    Past Medical History:   Diagnosis Date    Anxiety 6/22/2020    Arthritis     Bipolar 1 disorder (HCC) 1/23/2018    Congestive heart failure (CHF) ([facility])     PT DENIES THIS DX, STATES AN ER DR TOLD HER THIS WHEN SHE HAD COVID AND SAYS HER PCP HAS NEVER TOLD HER THIS AND WHEN SHE SAW CARDIOLOGY 5YRS AGO, ALL HER TESTS CAME BACK FINE PER PT    Diabetes mellitus type 2, controlled ([facility]) 6/22/2020    Diverticulosis     Hx of blood clots 2016    SMALL PE    Hypertension 6/22/2020    Kidney failure     Liver fibrosis 6/22/2020    Major depression 6/22/2020    Migraine 6/22/2020    Sleep apnea 1/23/2018    USES CPAP    Suicide attempt ([facility]) 6/22/2020    2013    UTI (urinary tract infection)      Past Surgical History:   Procedure Laterality Date    APPENDECTOMY      CHOLECYSTECTOMY      DIALYSIS FISTULA CREATION Left 10/11/2024    LEFT UPPER ARM DIALYSIS GRAFT PLACEMENT performed by David Monk MD at [facility] MAIN OR    DILATION AND CURETTAGE OF UTERUS      IR KYPHOPLASTY LUMBAR 1 VERTEBRAL BODY  4/7/2025    IR KYPHOPLASTY LUMBAR 1 VERTEBRAL BODY 4/7/2025 Adiel Brunner MD [facility] RAD ANGIO IR    WISDOM TOOTH EXTRACTION        Family History   Problem Relation Age of Onset    Heart Attack Mother     Heart Disease Mother     Gout Mother     Heart Attack Father     Asthma Father     Heart Disease Father     Anesth Problems Neg Hx      Social History     Tobacco Use    Smoking status: Never    Smokeless tobacco: Never   Substance Use Topics    Alcohol use: Not Currently      Current Facility-Administered Medications   Medication Dose Route Frequency Provider Last Rate Last Admin    insulin glargine (LANTUS) injection vial 10 Units  10 Units 
         Nephrology follow-up          Patient: Johanna Dunaway MRN: [id number]  SSN: [id number]    YOB: 1960  Age: 64 y.o.  Sex: female      Subjective:   I have seen patient on dialysis  She looks comfortable  Blood pressures are normal   Past Medical History:   Diagnosis Date    Anxiety 6/22/2020    Arthritis     Bipolar 1 disorder (HCC) 1/23/2018    Congestive heart failure (CHF) ([facility])     PT DENIES THIS DX, STATES AN ER DR TOLD HER THIS WHEN SHE HAD COVID AND SAYS HER PCP HAS NEVER TOLD HER THIS AND WHEN SHE SAW CARDIOLOGY 5YRS AGO, ALL HER TESTS CAME BACK FINE PER PT    Diabetes mellitus type 2, controlled ([facility]) 6/22/2020    Diverticulosis     Hx of blood clots 2016    SMALL PE    Hypertension 6/22/2020    Kidney failure     Liver fibrosis 6/22/2020    Major depression 6/22/2020    Migraine 6/22/2020    Sleep apnea 1/23/2018    USES CPAP    Suicide attempt ([facility]) 6/22/2020    2013    UTI (urinary tract infection)      Past Surgical History:   Procedure Laterality Date    APPENDECTOMY      CHOLECYSTECTOMY      DIALYSIS FISTULA CREATION Left 10/11/2024    LEFT UPPER ARM DIALYSIS GRAFT PLACEMENT performed by David Monk MD at [facility] MAIN OR    DILATION AND CURETTAGE OF UTERUS      IR KYPHOPLASTY LUMBAR 1 VERTEBRAL BODY  04/07/2025    IR KYPHOPLASTY LUMBAR 1 VERTEBRAL BODY 4/7/2025 Adiel Brunner MD [facility] RAD ANGIO IR    WISDOM TOOTH EXTRACTION        Family History   Problem Relation Age of Onset    Heart Attack Mother     Heart Disease Mother     Gout Mother     Heart Attack Father     Asthma Father     Heart Disease Father     Anesth Problems Neg Hx      Social History     Tobacco Use    Smoking status: Never    Smokeless tobacco: Never   Substance Use Topics    Alcohol use: Not Currently      Current Facility-Administered Medications   Medication Dose Route Frequency Provider Last Rate Last Admin    epoetin edward (EPOGEN;PROCRIT) injection 10,000 Units  10,000 Units IntraVENous Once per day on 
         Nephrology follow-up          Patient: Johanna Dunaway MRN: [id number]  SSN: [id number]    YOB: 1960  Age: 64 y.o.  Sex: female      Subjective:   I have seen patient at the bedside  She looks comfortable  Blood pressures are normal to soft  Past Medical History:   Diagnosis Date    Anxiety 6/22/2020    Arthritis     Bipolar 1 disorder (HCC) 1/23/2018    Congestive heart failure (CHF) ([facility])     PT DENIES THIS DX, STATES AN ER DR TOLD HER THIS WHEN SHE HAD COVID AND SAYS HER PCP HAS NEVER TOLD HER THIS AND WHEN SHE SAW CARDIOLOGY 5YRS AGO, ALL HER TESTS CAME BACK FINE PER PT    Diabetes mellitus type 2, controlled ([facility]) 6/22/2020    Diverticulosis     Hx of blood clots 2016    SMALL PE    Hypertension 6/22/2020    Kidney failure     Liver fibrosis 6/22/2020    Major depression 6/22/2020    Migraine 6/22/2020    Sleep apnea 1/23/2018    USES CPAP    Suicide attempt ([facility]) 6/22/2020 2013    UTI (urinary tract infection)      Past Surgical History:   Procedure Laterality Date    APPENDECTOMY      CHOLECYSTECTOMY      DIALYSIS FISTULA CREATION Left 10/11/2024    LEFT UPPER ARM DIALYSIS GRAFT PLACEMENT performed by David Monk MD at [facility] MAIN OR    DILATION AND CURETTAGE OF UTERUS      IR KYPHOPLASTY LUMBAR 1 VERTEBRAL BODY  4/7/2025    IR KYPHOPLASTY LUMBAR 1 VERTEBRAL BODY 4/7/2025 Adiel Brunner MD [facility] RAD ANGIO IR    WISDOM TOOTH EXTRACTION        Family History   Problem Relation Age of Onset    Heart Attack Mother     Heart Disease Mother     Gout Mother     Heart Attack Father     Asthma Father     Heart Disease Father     Anesth Problems Neg Hx      Social History     Tobacco Use    Smoking status: Never    Smokeless tobacco: Never   Substance Use Topics    Alcohol use: Not Currently      Current Facility-Administered Medications   Medication Dose Route Frequency Provider Last Rate Last Admin    phosphorus (K PHOS NEUTRAL) 2 tablet  500 mg Oral Once Veena Boudreaux MD        insulin 
         Nephrology follow-up          Patient: Johanna Dunaway MRN: [id number]  SSN: [id number]    YOB: 1960  Age: 64 y.o.  Sex: female      Subjective:   The patient is seen on dialysis  She looks comfortable  Blood pressures are okay    Past Medical History:   Diagnosis Date    Anxiety 6/22/2020    Arthritis     Bipolar 1 disorder (HCC) 1/23/2018    Congestive heart failure (CHF) ([facility])     PT DENIES THIS DX, STATES AN ER DR TOLD HER THIS WHEN SHE HAD COVID AND SAYS HER PCP HAS NEVER TOLD HER THIS AND WHEN SHE SAW CARDIOLOGY 5YRS AGO, ALL HER TESTS CAME BACK FINE PER PT    Diabetes mellitus type 2, controlled ([facility]) 6/22/2020    Diverticulosis     Hx of blood clots 2016    SMALL PE    Hypertension 6/22/2020    Kidney failure     Liver fibrosis 6/22/2020    Major depression 6/22/2020    Migraine 6/22/2020    Sleep apnea 1/23/2018    USES CPAP    Suicide attempt ([facility]) 6/22/2020    2013    UTI (urinary tract infection)      Past Surgical History:   Procedure Laterality Date    APPENDECTOMY      CHOLECYSTECTOMY      DIALYSIS FISTULA CREATION Left 10/11/2024    LEFT UPPER ARM DIALYSIS GRAFT PLACEMENT performed by David Monk MD at [facility] MAIN OR    DILATION AND CURETTAGE OF UTERUS      IR KYPHOPLASTY LUMBAR 1 VERTEBRAL BODY  4/7/2025    IR KYPHOPLASTY LUMBAR 1 VERTEBRAL BODY 4/7/2025 Adiel Brunner MD [facility] RAD ANGIO IR    WISDOM TOOTH EXTRACTION        Family History   Problem Relation Age of Onset    Heart Attack Mother     Heart Disease Mother     Gout Mother     Heart Attack Father     Asthma Father     Heart Disease Father     Anesth Problems Neg Hx      Social History     Tobacco Use    Smoking status: Never    Smokeless tobacco: Never   Substance Use Topics    Alcohol use: Not Currently      Current Facility-Administered Medications   Medication Dose Route Frequency Provider Last Rate Last Admin    polyethylene glycol (GLYCOLAX) packet 17 g  17 g Oral Daily Veena Boudreaux MD   17 g at 04/17/25 
.      Hospitalist Progress Note    NAME:   Johanna Dunaway   : 1960   MRN: [id number]     Date/Time: 2025 4:59 PM  Patient PCP: Shanelle Booker FNP    Estimated discharge date:24 -48 hrs   Barriers: PT/OT recommendation     Hospital Course:      Assessment / Plan:  # Intractable Acute on chronic Low back pain  # Recent L3 compression fracture  # Spinal stenosis  - 2025 MRI showed mild spinal canal narrowing at L2-3 and L3-4, as well as mild bilateral neuroforaminal narrowing at L3-4 and L4-5, no saddle anesthesia or urinary incontinence, difficulty ambulating due to pain  - CT showed no new changes to recent compression fracture post kyphoplasty.   - percocet PRN   - lidocaine patch  - robaxin  - PT/OT   - Eval [facility] rehab pending [facility]     # JIMBO on CKD stage IV (eGRR 15-29) --->ESRD   - suspect pre-renal given poor oral intake  - IVF ordered  - Follow-up UA  - nephrology consulted to under HD has AV fistula   -Dialysis will be arranged in the outpatient setting     # Diabetes  # Hypoglycemia  - POC + correctional insulin   -Takes Lantus 45 in the morning, 35 units at night, will decrease nightly Lantus to 10 units and Day lantus to 15 units   - Hba1c 8.0   - Patient was hypoglycemic thus drastic changes in Lantus regimen  - Will continue this regimen     # Essential hypertension  - Continue home medications     # Compensated cirrhosis  - outpatient f/u     # TAHIRA, on nocturnal oxygen       # Epigastric discomfort, improved   No radiologic findings indicative of acute pancreatitis.   - Elevated lipase likely from underlying cirrhosis. hold off further action for now.  - likely secondary to gastritis. Trial of maalox with meals, continue PO protonix.     # Uterine fibroid  - Follow-up outpatient    # Constipation  - KUB shows moderate stool burden mostly in the rectum  -Dulcolax does not work, will do soapsuds enema  -Will do daily MiraLAX    Placement: Patient was medically 
.      Hospitalist Progress Note    NAME:   Johanna Dunaway   : 1960   MRN: [id number]     Date/Time: 4/15/2025 8:40 AM  Patient PCP: Shanelle Booker FNP    Estimated discharge date:  Barriers:     Hospital Course:      Assessment / Plan:  # Intractable Acute on chronic Low back pain  # Recent L3 compression fracture  # Spinal stenosis  - 2025 MRI showed mild spinal canal narrowing at L2-3 and L3-4, as well as mild bilateral neuroforaminal narrowing at L3-4 and L4-5, no saddle anesthesia or urinary incontinence, difficulty ambulating due to pain  - CT showed no new changes to recent compression fracture post kyphoplasty.   - percocet PRN   - lidocaine patch  - robaxin  - PT/OT   - Eval for rehab     # Epigastric discomfort  - symptoms not convincing of acute pancreatitis; No radiologic findings indicative of acute pancreatitis.   - Elevated lipase likely from underlying cirrhosis. hold off further action for now.  - likely secondary to gastritis. Trial of maalox with meals, continue PO protonix.      # JIMBO on CKD stage IV (eGRR 15-29) --->ESRD   - suspect pre-renal given poor oral intake  - IVF ordered  - Follow-up UA  - nephrology consulted to under HD has AV fistula      # Diabetes  - Continue home dose lantus   - POC + correctional insulin   -Takes Lantus 45 in the morning, 35 units at night, will decrease nightly Lantus to 20 units for now  - Monitor for hypoglycemia due to insulin with serial glucose checks  - Hypoglycemia protocol     # Essential hypertension, not controlled  - Continue home medications     # Compensated cirrhosis  - outpatient f/u       Medical Decision Making     [x] High (any 2)     A. Problems (any 1)  [x] Acute/Chronic Illness/injury posing threat to life or bodily function:    [] Severe exacerbation of chronic illness:    ---------------------------------------------------------------------  B. Risk of Treatment (any 1)   [x] Drugs/treatments that require intensive 
.      Hospitalist Progress Note    NAME:   Johanna Dunaway   : 1960   MRN: [id number]     Date/Time: 2025 5:12 PM  Patient PCP: Shanelle Booker FNP    Estimated discharge date:24 -48 hrs   Barriers: PT/OT recommendation     Hospital Course:      Assessment / Plan:  # Intractable Acute on chronic Low back pain  # Recent L3 compression fracture  # Spinal stenosis  - 2025 MRI showed mild spinal canal narrowing at L2-3 and L3-4, as well as mild bilateral neuroforaminal narrowing at L3-4 and L4-5, no saddle anesthesia or urinary incontinence, difficulty ambulating due to pain  - CT showed no new changes to recent compression fracture post kyphoplasty.   - percocet PRN   - lidocaine patch  - robaxin  - PT/OT   - Eval for rehab pending [facility]     # JIMBO on CKD stage IV (eGRR 15-29) --->ESRD   - suspect pre-renal given poor oral intake  - IVF ordered  - Follow-up UA  - nephrology consulted to under HD has AV fistula   -Dialysis will be arranged in the outpatient setting     # Diabetes  # Hypoglycemia  - POC + correctional insulin   -Takes Lantus 45 in the morning, 35 units at night, will decrease nightly Lantus to 10 units and Day lantus to 15 units   -F/u Hba1c   - Patient was hypoglycemic thus drastic changes in Lantus regimen  - Will continue this regimen     # Essential hypertension  - Continue home medications     # Compensated cirrhosis  - outpatient f/u     # TAHIRA, on nocturnal oxygen       # Epigastric discomfort, improved  - symptoms not convincing of acute pancreatitis; No radiologic findings indicative of acute pancreatitis.   - Elevated lipase likely from underlying cirrhosis. hold off further action for now.  - likely secondary to gastritis. Trial of maalox with meals, continue PO protonix.     # Uterine fibroid  - Follow-up outpatient      Medical Decision Making     [x] High (any 2)     A. Problems (any 1)  [x] Acute/Chronic Illness/injury posing threat to life or bodily 
.      Hospitalist Progress Note    NAME:   Johanna Dunaway   : 1960   MRN: [id number]     Date/Time: 2025 9:03 AM  Patient PCP: Shanelle Booker FNP    Estimated discharge date:24 -48 hrs   Barriers: PT/OT recommendation     Hospital Course:      Assessment / Plan:  # Intractable Acute on chronic Low back pain  # Recent L3 compression fracture  # Spinal stenosis  - 2025 MRI showed mild spinal canal narrowing at L2-3 and L3-4, as well as mild bilateral neuroforaminal narrowing at L3-4 and L4-5, no saddle anesthesia or urinary incontinence, difficulty ambulating due to pain  - CT showed no new changes to recent compression fracture post kyphoplasty.   - percocet PRN   - lidocaine patch  - robaxin  - PT/OT   - Eval for rehab     # Epigastric discomfort  - symptoms not convincing of acute pancreatitis; No radiologic findings indicative of acute pancreatitis.   - Elevated lipase likely from underlying cirrhosis. hold off further action for now.  - likely secondary to gastritis. Trial of maalox with meals, continue PO protonix.      # JIMBO on CKD stage IV (eGRR 15-29) --->ESRD   - suspect pre-renal given poor oral intake  - IVF ordered  - Follow-up UA  - nephrology consulted to under HD has AV fistula      # Diabetes  - Continue home dose lantus   - POC + correctional insulin   -Takes Lantus 45 in the morning, 35 units at night, will decrease nightly Lantus to 10 units and Day lantus to 15 units   - Monitor for hypoglycemia due to insulin with serial glucose checks  - Hypoglycemia protocol     # Essential hypertension, not controlled  - Continue home medications     # Compensated cirrhosis  - outpatient f/u       Medical Decision Making     [x] High (any 2)     A. Problems (any 1)  [x] Acute/Chronic Illness/injury posing threat to life or bodily function:    [] Severe exacerbation of chronic illness:    ---------------------------------------------------------------------  B. Risk of Treatment (any 1) 
4 Eyes Skin Assessment     NAME:  Johanna Dunaway  YOB: 1960  MEDICAL RECORD NUMBER:  [id number]    The patient is being assessed for  Other weekly    I agree that at least one RN has performed a thorough Head to Toe Skin Assessment on the patient. ALL assessment sites listed below have been assessed.      Areas assessed by both nurses:    Head, Face, Ears, Shoulders, Back, Chest, Arms, Elbows, Hands, Sacrum. Buttock, Coccyx, Ischium, Legs. Feet and Heels, and Under Medical Devices   Pt has a scar to mid back      Does the Patient have a Wound? No noted wound(s)       Leon Prevention initiated by RN: Yes  Wound Care Orders initiated by RN: No    Pressure Injury (Stage 3,4, Unstageable, DTI, NWPT, and Complex wounds) if present, place Wound referral order by RN under : No    New Ostomies, if present place, Ostomy referral order under : No     Nurse 1 eSignature: Electronically signed by Karla Messina RN on 4/23/25 at 2:06 AM EDT    **SHARE this note so that the co-signing nurse can place an eSignature**    Nurse 2 eSignature: Electronically signed by Cris Spangler RN on 4/23/25 at 2:10 AM EDT   
4/18/2025              To Whom It May Concern,    Please excuse from work on 4/15/2025 as she was present at the hospital        Sincerely,          Elsy Monte RN      
Attempted Pt eval, patient is OLMAN for HD.we simin try later.  
Called report for this patient to HEMANT Acosta. All questions and concerns was addressed at th is time. Midline removed by primary nurse.  
Chuckie Lopez contacted [phone] -  states this is correct nephro on call  - voicemail left  
Comprehensive Nutrition Assessment    Type and Reason for Visit:  Initial, LOS    Nutrition Recommendations/Plan:   Continue regular diet as tolerated     Malnutrition Assessment:  Malnutrition Status:  At risk for malnutrition (04/22/25 1237)    Context:  Acute Illness     Findings of the 6 clinical characteristics of malnutrition:  Energy Intake:  75% or less of estimated energy requirements for 7 or more days  Weight Loss:  No weight loss (Comp weight hx endorses weight gain 96.6kg > 101.6 kg weight gain within 6 months)     Body Fat Loss:  Unable to assess     Muscle Mass Loss:  Unable to assess    Fluid Accumulation:  Unable to assess     Strength:  Not Performed    Nutrition Assessment:    Pt admitted for JIMBO; RD screened for LOS. Tolerating regular diet with intakes documented mostly <50% at meals; added magic Cup BID to supplement kcal protein intake. Noted pt pending DC 2/2 to authorization with Medicare management.    Nutrition Related Findings:    AAOx0; GCS 15; glasses; + laxative; soft obese abdomen; active bowel sounds: lvm 4/20; + constipation; non-pitting edema; Labs: Na 135, BUN 44, Creat 3.50, Glucose 160s, Phos 2.0, hba1c 8.3; Meds: mallow, ability, Wellbutrin, celexa, Neurontin, heparin, apresoline, insulin, protonix Wound Type: None       Current Nutrition Intake & Therapies:    Average Meal Intake: 26-50%  Average Supplements Intake: None Ordered  ADULT DIET; Regular    Anthropometric Measures:  Height: 152.4 cm (5')  Ideal Body Weight (IBW): 100 lbs (45 kg)    Admission Body Weight: 101.6 kg (223 lb 15.8 oz)  Current Body Weight: 101.6 kg (223 lb 15.8 oz), 224 % IBW. Weight Source: Bed scale  Current BMI (kg/m2): 43.7  BMI Categories: Obese Class 3 (BMI 40.0 or greater)    Estimated Daily Nutrient Needs:  Energy Requirements Based On: Formula  Weight Used for Energy Requirements: Current  Energy (kcal/day): MSJ x 1.1 x 1.2 = 4702-3968  Weight Used for Protein Requirements: Current  Protein 
ED TO INPATIENT SBAR HANDOFF    Patient Name: Johanna Dunaway   Preferred Name: Johanna  : 1960  64 y.o.   Family/Caregiver Present: no   Code Status Order: Full Code  PO Status: NPO:No  Telemetry Order:   C-SSRS: Risk of Suicide: No Risk  Sitter no   Restraints:     Sepsis Risk Score      Situation  Chief Complaint   Patient presents with    Back Pain    Constipation     Brief Description of Patient's Condition: admitted for JIMBO, constipation, and pancreatitis  Mental Status: oriented, alert, coherent, logical, thought processes intact, and able to concentrate and follow conversation  Arrived from:Home  Imaging:   CT ABDOMEN PELVIS WO CONTRAST Additional Contrast? None   Final Result   1.  No acute abnormalities.   2.  Cirrhotic morphology of the liver.   3.  Uterine fibroids.   4.  L3 compression fracture status post vertebroplasty. No acute bony   abnormalities.      Electronically signed by KAEL Prince        Abnormal labs:   Abnormal Labs Reviewed   BRAIN NATRIURETIC PEPTIDE - Abnormal; Notable for the following components:       Result Value    NT Pro- (*)     All other components within normal limits   CBC WITH AUTO DIFFERENTIAL - Abnormal; Notable for the following components:    RBC 2.96 (*)     Hemoglobin 8.6 (*)     Hematocrit 26.0 (*)     RDW 14.6 (*)     Platelets 147 (*)     Neutrophils % 83.8 (*)     Lymphocytes % 9.5 (*)     Immature Granulocytes % 1.4 (*)     Neutrophils Absolute 8.68 (*)     Immature Granulocytes Absolute 0.14 (*)     All other components within normal limits   COMPREHENSIVE METABOLIC PANEL - Abnormal; Notable for the following components:    Potassium 3.4 (*)     Glucose 236 (*)      (*)     Creatinine 4.29 (*)     BUN/Creatinine Ratio 28 (*)     Est, Glom Filt Rate 11 (*)     Alk Phosphatase 149 (*)     Total Protein 6.0 (*)     Albumin 2.9 (*)     Albumin/Globulin Ratio 0.9 (*)     All other components within normal limits   LIPASE - Abnormal; Notable for the 
I have seen patient at the bedside  I have seen the patient yesterday in my office    Worsening renal functions    Creatinine 4.1  She already has left arm AV fistula    Plan to initiate on hemodialysis  I have informed the patient and she agreed to proceed  I have informed her sister via phone  I have consulted  for outpatient hemodialysis chair set up  
Left VM for on call Nephrologist for clearance , will update chart when call returned  
Miguel paged for nephro clearance for midline.     Clearance given for midline in Right arm and Dr. Rivera requesting consult on admission.   
Notified hospitalist patient was very drowsy this morning and unable to take medication.  
OT tx attempted at 0930 however pt off the floor at dialysis. Will continue to follow and re-attempt OT at a later time. Thank you.     
Patient is being transported to dialysisat this time.  
Patient out of the room for dialysis at this time.  
Spiritual Health History and Assessment/Progress Note  [facility]    Initial Encounter,  ,  ,      Name: Johanna Dunaway MRN: [id number]    Age: 64 y.o.     Sex: female   Language: English   Hoahaoism: Jehovah's witness   JIMBO (acute kidney injury)     Date: 4/20/2025            Total Time Calculated: 6 min              Spiritual Assessment began in [facility]        Referral/Consult From: Friend   Encounter Overview/Reason: Initial Encounter  Service Provided For: Patient and family together    Deborah, Belief, Meaning:   Patient identifies as spiritual, is connected with a deborah tradition or spiritual practice, and has beliefs or practices that help with coping during difficult times  Family/Friends identify as spiritual, are connected with a deborah tradition or spiritual practice, and have beliefs or practices that help with coping during difficult times      Importance and Influence:  Patient has spiritual/personal beliefs that influence decisions regarding their health  Family/Friends have spiritual/personal beliefs that influence decisions regarding the patient's health    Community:  Patient feels well-supported. Support system includes: Extended family  Family/Friends feel well-supported. Support system includes: Extended family    Assessment and Plan of Care:     Patient Interventions include: Facilitated expression of thoughts and feelings, Explored spiritual coping/struggle/distress, Engaged in theological reflection, Affirmed coping skills/support systems, and Facilitated life review and/ or legacy  Family/Friends Interventions include: Facilitated expression of thoughts and feelings, Explored spiritual coping/struggle/distress, Engaged in theological reflection, Affirmed coping skills/support systems, and Facilitated life review and/or legacy    Patient Plan of Care: Spiritual Care available upon further referral  Family/Friends Plan of Care: Spiritual Care available upon further 
Spiritual Health History and Assessment/Progress Note  [facility]    Attempted Encounter,  ,  ,      Name: Johanna Dunaway MRN: [id number]    Age: 64 y.o.     Sex: female   Language: English   Catholic: Tenriism   JIMBO (acute kidney injury)     Date: 4/15/2025            Total Time Calculated: 5 min              Spiritual Assessment began in [facility]            Encounter Overview/Reason: Attempted Encounter  Service Provided For: Patient not available    Deborah, Belief, Meaning:   Patient unable to assess at this time  Family/Friends No family/friends present      Importance and Influence:  Patient unable to assess at this time  Family/Friends No family/friends present    Community:  Patient Other: unable to assess at this time  Family/Friends No family/friends present    Assessment and Plan of Care:     Patient Interventions include: Other: n/a  Family/Friends Interventions include: No family/friends present    Patient Plan of Care: Spiritual Care available upon further referral  Family/Friends Plan of Care: No family/friends present    Patient off the floor at time of attempted routine visit. Spiritual assessment incomplete at this time; I left a note of greeting on a spiritual services contact card for patient or family to review when able.  support remains available upon further referral or request.    Electronically signed by  Darci Whelan MDiv  Chaplain Resident   on 4/15/2025 at 11:30 AM    
Spoke with dynamic access - 1.5 hour ETA and requesting nephro clearance for midline placement  
0543    citalopram (CELEXA) tablet 20 mg  20 mg Oral Daily Aden Llanes MD   20 mg at 04/20/25 0841    melatonin tablet 3 mg  3 mg Oral Nightly Aden Llanes MD   3 mg at 04/19/25 2038    metoprolol succinate (TOPROL XL) extended release tablet 50 mg  50 mg Oral Nightly Aden Llanes MD   50 mg at 04/19/25 2038    pantoprazole (PROTONIX) tablet 40 mg  40 mg Oral QHS Aden Llanes MD   40 mg at 04/19/25 2038    sodium chloride flush 0.9 % injection 5-40 mL  5-40 mL IntraVENous 2 times per day Aden Llanes MD   10 mL at 04/20/25 0916    sodium chloride flush 0.9 % injection 5-40 mL  5-40 mL IntraVENous PRN Aden Llanes MD        0.9 % sodium chloride infusion   IntraVENous PRN Aden Llanes MD        ondansetron (ZOFRAN-ODT) disintegrating tablet 4 mg  4 mg Oral Q8H PRN Aden Llanes MD        Or    ondansetron (ZOFRAN) injection 4 mg  4 mg IntraVENous Q6H PRN Aden Llanes MD   4 mg at 04/18/25 0959    polyethylene glycol (GLYCOLAX) packet 17 g  17 g Oral Daily PRN Aden Llanes MD   17 g at 04/16/25 0244    acetaminophen (TYLENOL) tablet 650 mg  650 mg Oral Q6H PRN Aden Llanes MD   650 mg at 04/15/25 0028    Or    acetaminophen (TYLENOL) suppository 650 mg  650 mg Rectal Q6H PRN Aden Llanes MD        heparin (porcine) injection 5,000 Units  5,000 Units SubCUTAneous 3 times per day Aden Llanes MD   5,000 Units at 04/20/25 0543    aluminum & magnesium hydroxide-simethicone (MAALOX PLUS) 200-200-20 MG/5ML suspension 30 mL  30 mL Oral TID WC Aden Llanes MD   30 mL at 04/20/25 0840    insulin lispro (HUMALOG,ADMELOG) injection vial 0-8 Units  0-8 Units SubCUTAneous 4x Daily AC & HS Aden Llanes MD   2 Units at 04/20/25 1122    methocarbamol (ROBAXIN) tablet 750 mg  750 mg Oral TID Aden Llanes MD   750 mg at 04/20/25 0841        Allergies   Allergen Reactions    Sulfa Antibiotics Hives and Other (See Comments)     Other reaction(s): Unknown (comments)    Wound Dressing Adhesive Other (See 
at 04/19/25 0803    citalopram (CELEXA) tablet 20 mg  20 mg Oral Daily Aden Llanes MD   20 mg at 04/19/25 0804    melatonin tablet 3 mg  3 mg Oral Nightly Aden Llanes MD   3 mg at 04/18/25 2233    metoprolol succinate (TOPROL XL) extended release tablet 50 mg  50 mg Oral Nightly Aden Llanes MD   50 mg at 04/18/25 2233    pantoprazole (PROTONIX) tablet 40 mg  40 mg Oral QHS Aden Llanes MD   40 mg at 04/18/25 2233    sodium chloride flush 0.9 % injection 5-40 mL  5-40 mL IntraVENous 2 times per day Aden Llanes MD   10 mL at 04/19/25 0811    sodium chloride flush 0.9 % injection 5-40 mL  5-40 mL IntraVENous PRN Aden Llanes MD        0.9 % sodium chloride infusion   IntraVENous PRN Aden Llanes MD        ondansetron (ZOFRAN-ODT) disintegrating tablet 4 mg  4 mg Oral Q8H PRN Aden Llanes MD        Or    ondansetron (ZOFRAN) injection 4 mg  4 mg IntraVENous Q6H PRN Aden Llanes MD   4 mg at 04/18/25 0959    polyethylene glycol (GLYCOLAX) packet 17 g  17 g Oral Daily PRN Aden Llanes MD   17 g at 04/16/25 0244    acetaminophen (TYLENOL) tablet 650 mg  650 mg Oral Q6H PRN Aden Llanes MD   650 mg at 04/15/25 0028    Or    acetaminophen (TYLENOL) suppository 650 mg  650 mg Rectal Q6H PRN Aden Llanes MD        heparin (porcine) injection 5,000 Units  5,000 Units SubCUTAneous 3 times per day Aden Llanes MD   5,000 Units at 04/19/25 0652    aluminum & magnesium hydroxide-simethicone (MAALOX PLUS) 200-200-20 MG/5ML suspension 30 mL  30 mL Oral TID WC Aden Llanes MD   30 mL at 04/19/25 1138    insulin lispro (HUMALOG,ADMELOG) injection vial 0-8 Units  0-8 Units SubCUTAneous 4x Daily AC & HS Aden Llanes MD   2 Units at 04/19/25 1138    methocarbamol (ROBAXIN) tablet 750 mg  750 mg Oral TID Aden Llanes MD   750 mg at 04/19/25 0801        Allergies   Allergen Reactions    Sulfa Antibiotics Hives and Other (See Comments)     Other reaction(s): Unknown (comments)    Wound Dressing Adhesive

## 2025-04-23 NOTE — PLAN OF CARE
PHYSICAL THERAPY TREATMENT     Patient: Johanna Dunaway (64 y.o. female)  Date: 4/17/2025  Diagnosis: JIMBO (acute kidney injury) [N17.9]  Acute pancreatitis, unspecified complication status, unspecified pancreatitis type [K85.90]  Midline low back pain, unspecified chronicity, unspecified whether sciatica present [M54.50] JIMBO (acute kidney injury)      Precautions: Fall Risk, Bed Alarm             Spinal Precautions: No Bending, No Twisting, No Lifting        Recommendations for nursing mobility: Encourage HEP in prep for ADLs/mobility; see handout for details, Frequent repositioning to prevent skin breakdown, Use of bed/chair alarm for safety, and LE elevation for management of edema    In place during session: Peripheral IV, External Catheter, and EKG/telemetry   Chart, physical therapy assessment, plan of care and goals were reviewed.  ASSESSMENT  Patient continues with skilled PT services and is progressing towards goals. Pt semi supine upon PT arrival, agreeable to session. Pt A&O x 4. (See below for objective details and assist levels).     Overall pt tolerated session fair/good today with PT. Patient sitting in the chair,patient performed LE exs in chair as table below.Patient than able to walk with RW and sba/cg. Will continue to benefit from skilled PT services, and will continue to progress as tolerated. Current PT DC recommendation Moderate intensity short-term skilled physical therapy up to 5x/week once medically appropriate.      GOALS:    Problem: Physical Therapy - Adult  Goal: By Discharge: Performs mobility at highest level of function for planned discharge setting.  See evaluation for individualized goals.  Description: FUNCTIONAL STATUS PRIOR TO ADMISSION: Patient was modified independent using a rolling walker and single point cane for functional mobility.    HOME SUPPORT PRIOR TO ADMISSION: patient lives with sister    Physical Therapy Goals  Initiated 4/16/2025  Pt stated goal: Get 
  Problem: Chronic Conditions and Co-morbidities  Goal: Patient's chronic conditions and co-morbidity symptoms are monitored and maintained or improved  4/16/2025 1127 by Mare Marin RN  Outcome: Progressing  4/15/2025 2358 by Maria A Billings RN  Outcome: Progressing     Problem: Discharge Planning  Goal: Discharge to home or other facility with appropriate resources  4/16/2025 1127 by Mare Marin RN  Outcome: Progressing  4/15/2025 2358 by Maria A Billings RN  Outcome: Progressing     Problem: Pain  Goal: Verbalizes/displays adequate comfort level or baseline comfort level  4/16/2025 1127 by Mare Marin RN  Outcome: Progressing  4/15/2025 2358 by Maria A Billings RN  Outcome: Progressing     Problem: Skin/Tissue Integrity  Goal: Skin integrity remains intact  Description: 1.  Monitor for areas of redness and/or skin breakdown2.  Assess vascular access sites hourly3.  Every 4-6 hours minimum:  Change oxygen saturation probe site4.  Every 4-6 hours:  If on nasal continuous positive airway pressure, respiratory therapy assess nares and determine need for appliance change or resting period  4/16/2025 1127 by Mare Marin RN  Outcome: Progressing  4/15/2025 2358 by Maria A Billings RN  Outcome: Progressing     Problem: ABCDS Injury Assessment  Goal: Absence of physical injury  4/16/2025 1127 by Mare Marin RN  Outcome: Progressing  4/15/2025 2358 by Maria A Billings RN  Outcome: Progressing     Problem: Safety - Adult  Goal: Free from fall injury  4/16/2025 1127 by Mare Marin RN  Outcome: Progressing  4/15/2025 2358 by Maria A Billings RN  Outcome: Progressing     
  Problem: Chronic Conditions and Co-morbidities  Goal: Patient's chronic conditions and co-morbidity symptoms are monitored and maintained or improved  4/19/2025 1109 by Elsy Monte, RN  Outcome: Progressing  Flowsheets  Taken 4/19/2025 1109  Care Plan - Patient's Chronic Conditions and Co-Morbidity Symptoms are Monitored and Maintained or Improved:   Monitor and assess patient's chronic conditions and comorbid symptoms for stability, deterioration, or improvement   Collaborate with multidisciplinary team to address chronic and comorbid conditions and prevent exacerbation or deterioration   Update acute care plan with appropriate goals if chronic or comorbid symptoms are exacerbated and prevent overall improvement and discharge  Taken 4/19/2025 1045  Care Plan - Patient's Chronic Conditions and Co-Morbidity Symptoms are Monitored and Maintained or Improved: Monitor and assess patient's chronic conditions and comorbid symptoms for stability, deterioration, or improvement  4/19/2025 0242 by Desmond Mohan, RN  Outcome: Progressing     Problem: Discharge Planning  Goal: Discharge to home or other facility with appropriate resources  4/19/2025 1109 by Elsy Monte, RN  Outcome: Progressing  Flowsheets  Taken 4/19/2025 1109  Discharge to home or other facility with appropriate resources:   Identify barriers to discharge with patient and caregiver   Identify discharge learning needs (meds, wound care, etc)   Refer to discharge planning if patient needs post-hospital services based on physician order or complex needs related to functional status, cognitive ability or social support system  Taken 4/19/2025 1045  Discharge to home or other facility with appropriate resources: Identify barriers to discharge with patient and caregiver  4/19/2025 0242 by Desmond Mohan, RN  Outcome: Progressing     Problem: Pain  Goal: Verbalizes/displays adequate comfort level or baseline comfort level  4/19/2025 1109 by Lavell 
  Problem: Chronic Conditions and Co-morbidities  Goal: Patient's chronic conditions and co-morbidity symptoms are monitored and maintained or improved  Outcome: Progressing     Problem: Discharge Planning  Goal: Discharge to home or other facility with appropriate resources  Outcome: Progressing     Problem: Pain  Goal: Verbalizes/displays adequate comfort level or baseline comfort level  Outcome: Progressing     Problem: Skin/Tissue Integrity  Goal: Skin integrity remains intact  Description: 1.  Monitor for areas of redness and/or skin breakdown2.  Assess vascular access sites hourly3.  Every 4-6 hours minimum:  Change oxygen saturation probe site4.  Every 4-6 hours:  If on nasal continuous positive airway pressure, respiratory therapy assess nares and determine need for appliance change or resting period  Outcome: Progressing     Problem: ABCDS Injury Assessment  Goal: Absence of physical injury  Outcome: Progressing     Problem: Safety - Adult  Goal: Free from fall injury  Outcome: Progressing     Problem: Neurosensory - Adult  Goal: Achieves stable or improved neurological status  Outcome: Progressing     Problem: Respiratory - Adult  Goal: Achieves optimal ventilation and oxygenation  Outcome: Progressing     Problem: Cardiovascular - Adult  Goal: Maintains optimal cardiac output and hemodynamic stability  Outcome: Progressing     Problem: Skin/Tissue Integrity - Adult  Goal: Skin integrity remains intact  Description: 1.  Monitor for areas of redness and/or skin breakdown2.  Assess vascular access sites hourly3.  Every 4-6 hours minimum:  Change oxygen saturation probe site4.  Every 4-6 hours:  If on nasal continuous positive airway pressure, respiratory therapy assess nares and determine need for appliance change or resting period  Outcome: Progressing     Problem: Musculoskeletal - Adult  Goal: Return mobility to safest level of function  Outcome: Progressing     Problem: Gastrointestinal - Adult  Goal: 
  Problem: Chronic Conditions and Co-morbidities  Goal: Patient's chronic conditions and co-morbidity symptoms are monitored and maintained or improved  Outcome: Progressing     Problem: Discharge Planning  Goal: Discharge to home or other facility with appropriate resources  Outcome: Progressing     Problem: Pain  Goal: Verbalizes/displays adequate comfort level or baseline comfort level  Outcome: Progressing     Problem: Skin/Tissue Integrity  Goal: Skin integrity remains intact  Description: 1.  Monitor for areas of redness and/or skin breakdown2.  Assess vascular access sites hourly3.  Every 4-6 hours minimum:  Change oxygen saturation probe site4.  Every 4-6 hours:  If on nasal continuous positive airway pressure, respiratory therapy assess nares and determine need for appliance change or resting period  Outcome: Progressing     Problem: ABCDS Injury Assessment  Goal: Absence of physical injury  Outcome: Progressing     Problem: Safety - Adult  Goal: Free from fall injury  Outcome: Progressing     Problem: Occupational Therapy - Adult  Goal: By Discharge: Performs self-care activities at highest level of function for planned discharge setting.  See evaluation for individualized goals.  Description: FUNCTIONAL STATUS PRIOR TO ADMISSION:  Pt was independent with ADLs and modified independent using SPC/RW. Pt required increased assistance and was mostly staying in bed following her procedure.    HOME SUPPORT: The patient lived with her sister.    Occupational Therapy Goals:  Initiated 4/16/2025  Patient/Family stated goal: Move with less pain.  1.  Patient will perform lower body dressing with Modified [place] using LRAD within 7 day(s).  2.  Patient will perform grooming with Modified [place] within 7 day(s).  3.  Patient will perform bathing with Modified [place] within 7 day(s).  4.  Patient will perform toilet transfers with Modified Hutch[place]  within 7 day(s).  5.  Patient will perform all 
  Problem: Chronic Conditions and Co-morbidities  Goal: Patient's chronic conditions and co-morbidity symptoms are monitored and maintained or improved  Outcome: Progressing     Problem: Discharge Planning  Goal: Discharge to home or other facility with appropriate resources  Outcome: Progressing     Problem: Pain  Goal: Verbalizes/displays adequate comfort level or baseline comfort level  Outcome: Progressing     Problem: Skin/Tissue Integrity  Goal: Skin integrity remains intact  Outcome: Progressing     Problem: ABCDS Injury Assessment  Goal: Absence of physical injury  Outcome: Progressing     Problem: Safety - Adult  Goal: Free from fall injury  Outcome: Progressing     Problem: Occupational Therapy - Adult  Goal: By Discharge: Performs self-care activities at highest level of function for planned discharge setting.  See evaluation for individualized goals.  4/18/2025 1452 by Brittany Eisenberg OTA  Outcome: Progressing     Problem: Neurosensory - Adult  Goal: Achieves stable or improved neurological status  Outcome: Progressing     Problem: Respiratory - Adult  Goal: Achieves optimal ventilation and oxygenation  Outcome: Progressing     Problem: Cardiovascular - Adult  Goal: Maintains optimal cardiac output and hemodynamic stability  Outcome: Progressing     Problem: Skin/Tissue Integrity - Adult  Goal: Skin integrity remains intact  Outcome: Progressing     Problem: Musculoskeletal - Adult  Goal: Return mobility to safest level of function  Outcome: Progressing     Problem: Gastrointestinal - Adult  Goal: Minimal or absence of nausea and vomiting  Outcome: Progressing  Goal: Maintains or returns to baseline bowel function  Outcome: Progressing  Goal: Maintains adequate nutritional intake  Outcome: Progressing     Problem: Genitourinary - Adult  Goal: Absence of urinary retention  Outcome: Progressing  Goal: Urinary catheter remains patent  Outcome: Progressing     Problem: Metabolic/Fluid and Electrolytes - 
  Problem: Chronic Conditions and Co-morbidities  Goal: Patient's chronic conditions and co-morbidity symptoms are monitored and maintained or improved  Outcome: Progressing     Problem: Discharge Planning  Goal: Discharge to home or other facility with appropriate resources  Outcome: Progressing  Flowsheets (Taken 4/22/2025 2057)  Discharge to home or other facility with appropriate resources: Identify barriers to discharge with patient and caregiver     Problem: Pain  Goal: Verbalizes/displays adequate comfort level or baseline comfort level  Outcome: Progressing     Problem: Skin/Tissue Integrity  Goal: Skin integrity remains intact  Description: 1.  Monitor for areas of redness and/or skin breakdown2.  Assess vascular access sites hourly3.  Every 4-6 hours minimum:  Change oxygen saturation probe site4.  Every 4-6 hours:  If on nasal continuous positive airway pressure, respiratory therapy assess nares and determine need for appliance change or resting period  Outcome: Progressing     Problem: ABCDS Injury Assessment  Goal: Absence of physical injury  Outcome: Progressing     Problem: Safety - Adult  Goal: Free from fall injury  Outcome: Progressing     Problem: Respiratory - Adult  Goal: Achieves optimal ventilation and oxygenation  Outcome: Progressing     Problem: Cardiovascular - Adult  Goal: Maintains optimal cardiac output and hemodynamic stability  Outcome: Progressing     Problem: Skin/Tissue Integrity - Adult  Goal: Skin integrity remains intact  Description: 1.  Monitor for areas of redness and/or skin breakdown2.  Assess vascular access sites hourly3.  Every 4-6 hours minimum:  Change oxygen saturation probe site4.  Every 4-6 hours:  If on nasal continuous positive airway pressure, respiratory therapy assess nares and determine need for appliance change or resting period  Outcome: Progressing     
  Problem: Chronic Conditions and Co-morbidities  Goal: Patient's chronic conditions and co-morbidity symptoms are monitored and maintained or improved  Outcome: Progressing     Problem: Pain  Goal: Verbalizes/displays adequate comfort level or baseline comfort level  Outcome: Progressing     Problem: Skin/Tissue Integrity  Goal: Skin integrity remains intact  Description: 1.  Monitor for areas of redness and/or skin breakdown2.  Assess vascular access sites hourly3.  Every 4-6 hours minimum:  Change oxygen saturation probe site4.  Every 4-6 hours:  If on nasal continuous positive airway pressure, respiratory therapy assess nares and determine need for appliance change or resting period  Outcome: Progressing     Problem: ABCDS Injury Assessment  Goal: Absence of physical injury  Outcome: Progressing     Problem: Safety - Adult  Goal: Free from fall injury  Outcome: Progressing     
  Problem: Chronic Conditions and Co-morbidities  Goal: Patient's chronic conditions and co-morbidity symptoms are monitored and maintained or improved  Outcome: Progressing  Flowsheets (Taken 4/20/2025 1009)  Care Plan - Patient's Chronic Conditions and Co-Morbidity Symptoms are Monitored and Maintained or Improved:   Collaborate with multidisciplinary team to address chronic and comorbid conditions and prevent exacerbation or deterioration   Monitor and assess patient's chronic conditions and comorbid symptoms for stability, deterioration, or improvement   Update acute care plan with appropriate goals if chronic or comorbid symptoms are exacerbated and prevent overall improvement and discharge     Problem: Discharge Planning  Goal: Discharge to home or other facility with appropriate resources  Outcome: Progressing  Flowsheets (Taken 4/20/2025 1009)  Discharge to home or other facility with appropriate resources:   Identify barriers to discharge with patient and caregiver   Identify discharge learning needs (meds, wound care, etc)   Refer to discharge planning if patient needs post-hospital services based on physician order or complex needs related to functional status, cognitive ability or social support system     Problem: Pain  Goal: Verbalizes/displays adequate comfort level or baseline comfort level  Outcome: Progressing  Flowsheets (Taken 4/20/2025 1009)  Verbalizes/displays adequate comfort level or baseline comfort level:   Encourage patient to monitor pain and request assistance   Administer analgesics based on type and severity of pain and evaluate response   Consider cultural and social influences on pain and pain management     
  Problem: Discharge Planning  Goal: Discharge to home or other facility with appropriate resources  4/19/2025 1956 by Lore Holder, RN  Outcome: Progressing  Flowsheets (Taken 4/19/2025 1949)  Discharge to home or other facility with appropriate resources: Identify barriers to discharge with patient and caregiver  4/19/2025 1109 by Elsy Monte, RN  Outcome: Progressing  Flowsheets  Taken 4/19/2025 1109  Discharge to home or other facility with appropriate resources:   Identify barriers to discharge with patient and caregiver   Identify discharge learning needs (meds, wound care, etc)   Refer to discharge planning if patient needs post-hospital services based on physician order or complex needs related to functional status, cognitive ability or social support system  Taken 4/19/2025 1045  Discharge to home or other facility with appropriate resources: Identify barriers to discharge with patient and caregiver     Problem: Safety - Adult  Goal: Free from fall injury  Outcome: Progressing     
  Problem: Discharge Planning  Goal: Discharge to home or other facility with appropriate resources  4/20/2025 1930 by Lore Holder, RN  Outcome: Progressing  Flowsheets (Taken 4/20/2025 1928)  Discharge to home or other facility with appropriate resources: Identify barriers to discharge with patient and caregiver  4/20/2025 1009 by Elsy Monte, RN  Outcome: Progressing  Flowsheets (Taken 4/20/2025 1009)  Discharge to home or other facility with appropriate resources:   Identify barriers to discharge with patient and caregiver   Identify discharge learning needs (meds, wound care, etc)   Refer to discharge planning if patient needs post-hospital services based on physician order or complex needs related to functional status, cognitive ability or social support system     
  Problem: Discharge Planning  Goal: Discharge to home or other facility with appropriate resources  4/21/2025 2059 by Lore Holder, RN  Outcome: Progressing  Flowsheets (Taken 4/21/2025 1937)  Discharge to home or other facility with appropriate resources: Identify barriers to discharge with patient and caregiver  4/21/2025 0954 by Meme Gonzalez, RN  Outcome: Progressing     Problem: Pain  Goal: Verbalizes/displays adequate comfort level or baseline comfort level  4/21/2025 2059 by Lore Holder, RN  Outcome: Progressing  4/21/2025 0954 by Meme Gonzalez, RN  Outcome: Progressing     
.  
OCCUPATIONAL THERAPY EVALUATION  Patient: Johanna Dunaway (64 y.o. female)  Date: 4/16/2025  Primary Diagnosis: JIMBO (acute kidney injury) [N17.9]  Acute pancreatitis, unspecified complication status, unspecified pancreatitis type [K85.90]  Midline low back pain, unspecified chronicity, unspecified whether sciatica present [M54.50]       Precautions: Fall Risk, Bed Alarm         Spinal Precautions: No Bending, No Twisting, No Lifting      Recommendations for nursing mobility: Out of bed to chair for meals, Encourage HEP in prep for ADLs/mobility; see handout for details, Frequent repositioning to prevent skin breakdown, Use of bed/chair alarm for safety, AD and gt belt for bed to chair , Assist x1, Spinal precautions; no bending, lifting, or twisting, and log rolling    In place during session:External Catheter and EKG/telemetry   ASSESSMENT  Pt is a 64 y.o. female presenting to [facility] with c/o back pain and constipation, admitted 4/14/25 and currently being treated for low back pain, recent L3 compression fracture with Kyphoplasty 4/7/25, epigastric discomfort, JIMBO on CKD stage IV, diabetes, HTN, and compensated cirrhosis. CT abdomen pelvis 4/14/25 shows no acute abnormalities. Pt received semi-supine in bed upon arrival, AXO x 4, and agreeable to OT evaluation.     Based on current observations, pt presents with decreased  functional mobility, independence in ADLs, high-level IADLs, strength, body mechanics, activity tolerance, safety awareness, balance, increased pain levels (see below for objective details and assist levels).     Overall, pt tolerates session fair with c/o 6/10 back pain. Bed mobility completed with min-mod A and OOB functional mobility completed with CGA-min A and RW in prep for toileting. VC provided for log rolling during bed mobility. VC req'd for hand placement during transfers. Transport in room to take pt OLMAN so pt then returned to bed. Total A req'd for LB dressing d/t no sock aid being 
OCCUPATIONAL THERAPY TREATMENT  Patient: Johanna Dunaway (64 y.o. female)  Date: 4/18/2025  Primary Diagnosis: JIMBO (acute kidney injury) [N17.9]  Acute pancreatitis, unspecified complication status, unspecified pancreatitis type [K85.90]  Midline low back pain, unspecified chronicity, unspecified whether sciatica present [M54.50]       Precautions: Fall Risk, Bed Alarm         Spinal Precautions: No Bending, No Twisting, No Lifting      Recommendations for nursing mobility: Out of bed to chair for meals, Encourage HEP in prep for ADLs/mobility; see handout for details, Frequent repositioning to prevent skin breakdown, AD and gt belt for bed to chair , and Amb to bathroom with AD and gait belt    In place during session: External Catheter  Chart, occupational therapy assessment, plan of care, and goals were reviewed.  ASSESSMENT  Patient continues with skilled OT services and is progressing towards goals. Pt semi supine in bed upon BAXTER arrival, agreeable to session. Family present during session w/ pt permission and appear very supportive of pt.  Pt A&O x 4. Pt required min/mod A for bed mobility.  Spoke to pt and family about side rail for home use. Pt following spinal precautions w/ good accuracy. Pt ambulated w/ rw and cga to/from sink to complete adl's at sink. (See below for objective details and assist levels). Pt returned to eob and completed UE therex. Pt completed UE therex, see grid below for details, to maintain/ increase strength and endurance to aid in adl performance. Education provided on energy conservation, safety awareness and HEP w/ pt verbalizing good understanding. Pt left semi supine in bed with all known needs met.        Overall pt tolerated session well today with rest breaks. Pt pleasant and appears very motivated to increase functional level to return to PLOF. Current OT recommendations for discharge Moderate intensity short-term skilled occupational therapy up to 5x/week. Will continue to 
OCCUPATIONAL THERAPY TREATMENT  Patient: Johanna Dunaway (64 y.o. female)  Date: 4/22/2025  Primary Diagnosis: JIMBO (acute kidney injury) [N17.9]  Acute pancreatitis, unspecified complication status, unspecified pancreatitis type [K85.90]  Midline low back pain, unspecified chronicity, unspecified whether sciatica present [M54.50]       Precautions: Fall Risk, Bed Alarm         Spinal Precautions: No Bending, No Twisting, No Lifting      Recommendations for nursing mobility: Out of bed to chair for meals, Encourage HEP in prep for ADLs/mobility; see handout for details, AD and gt belt for bed to chair , Amb to bathroom with AD and gait belt, and Assist x1    In place during session: Peripheral IV  Chart, occupational therapy assessment, plan of care, and goals were reviewed.  ASSESSMENT  Patient continues with skilled OT services and is progressing towards goals. Pt seated in recliner upon BAXTER arrival, agreeable to session. Pt A&O x 4. Pt ambulated w/ rw to/from sink w/ rw and supervision.  Pt demonstrated good safety awareness and use of rw.  Pt completed oral care standing at sink w/o lob. Pt returned to recliner and completed UE therex.  Pt able to remember 3/5 HEP. Pt completed UE therex, see grid below for details, to maintain/ increase strength and endurance to aid in adl performance. Education provided on energy conservation, safety awareness and HEP. Pt left seated in recliner with all known needs met. (See below for objective details and assist levels).     Overall pt tolerated session well today with rest breaks. Pt pleasant and appears very motivated to increase functional level to return to PLOF. Current OT recommendations for discharge Moderate intensity short-term skilled occupational therapy up to 5x/week. Will continue to benefit from skilled OT services, and will continue to progress as tolerated.       GOALS:    Problem: Occupational Therapy - Adult  Goal: By Discharge: Performs self-care 
PHYSICAL THERAPY EVALUATION  Patient: Johanna Dunaway (64 y.o. female)  Date: 4/16/2025  Primary Diagnosis: JIMBO (acute kidney injury) [N17.9]  Acute pancreatitis, unspecified complication status, unspecified pancreatitis type [K85.90]  Midline low back pain, unspecified chronicity, unspecified whether sciatica present [M54.50]       Precautions: Fall Risk, Bed Alarm             Spinal Precautions: No Bending, No Twisting, No Lifting        Recommendations for nursing mobility: Encourage HEP in prep for ADLs/mobility; see handout for details, Frequent repositioning to prevent skin breakdown, Use of bed/chair alarm for safety, and LE elevation for management of edema    In place during session: Peripheral IV, External Catheter, and EKG/telemetry     ASSESSMENT  Pt is a 64 y.o. female admitted on 4/14/2025 for midline low back pain; pt currently being treated for JIMBO,acute pancreatitis . Pt semi supine upon PT arrival, agreeable to evaluation. Pt A&O x 4.      Based on the objective data described below, the patient currently presents with impaired ability to perform high-level IADLs, impaired strength, decreased activity tolerance, impaired balance, and impaired posture. (See below for objective details and assist levels).     Overall pt tolerated session fair today with PT. Pt required cg to min assist for bed mobility and transfers. Pt amb 5 feet with RW, gt belt and cg/min assist; demonstrates gen decreased in endurance. Pt will benefit from continued skilled PT to address above deficits and return to PLOF. Current PT DC recommendation Moderate intensity short-term skilled physical therapy up to 5x/week once medically appropriate.      GOALS:    Problem: Physical Therapy - Adult  Goal: By Discharge: Performs mobility at highest level of function for planned discharge setting.  See evaluation for individualized goals.  Description: FUNCTIONAL STATUS PRIOR TO ADMISSION: Patient was modified independent using a 
RN  Outcome: Adequate for Discharge  4/23/2025 0617 by Karla Messina RN  Outcome: Progressing     Problem: Respiratory - Adult  Goal: Achieves optimal ventilation and oxygenation  4/23/2025 1234 by Mare Marin RN  Outcome: Adequate for Discharge  4/23/2025 0617 by Karla Messina RN  Outcome: Progressing     Problem: Cardiovascular - Adult  Goal: Maintains optimal cardiac output and hemodynamic stability  4/23/2025 1234 by Mare Marin RN  Outcome: Adequate for Discharge  4/23/2025 0617 by Karla Messina RN  Outcome: Progressing     Problem: Skin/Tissue Integrity - Adult  Goal: Skin integrity remains intact  Description: 1.  Monitor for areas of redness and/or skin breakdown2.  Assess vascular access sites hourly3.  Every 4-6 hours minimum:  Change oxygen saturation probe site4.  Every 4-6 hours:  If on nasal continuous positive airway pressure, respiratory therapy assess nares and determine need for appliance change or resting period  4/23/2025 1234 by Mare Marin RN  Outcome: Adequate for Discharge  4/23/2025 0617 by Karla Messina RN  Outcome: Progressing     Problem: Musculoskeletal - Adult  Goal: Return mobility to safest level of function  4/23/2025 1234 by Mare Marin RN  Outcome: Adequate for Discharge  4/23/2025 0617 by Karla Messina RN  Outcome: Progressing     Problem: Gastrointestinal - Adult  Goal: Minimal or absence of nausea and vomiting  4/23/2025 1234 by Mare Marin RN  Outcome: Adequate for Discharge  4/23/2025 0617 by Karla Messina RN  Outcome: Progressing  Goal: Maintains or returns to baseline bowel function  4/23/2025 1234 by Mare Marin RN  Outcome: Adequate for Discharge  4/23/2025 0617 by Karla Messina RN  Outcome: Progressing  Goal: Maintains adequate nutritional intake  4/23/2025 1234 by Mare Marin RN  Outcome: Adequate for Discharge  4/23/2025 0617 by Karla Messina RN  Outcome: Progressing     Problem: Genitourinary - Adult  Goal: Absence of 
by Elsy Monte, RN  Outcome: Progressing  Flowsheets (Taken 4/18/2025 1106)  Verbalizes/displays adequate comfort level or baseline comfort level:   Encourage patient to monitor pain and request assistance   Administer analgesics based on type and severity of pain and evaluate response   Consider cultural and social influences on pain and pain management  4/18/2025 0403 by Nestor Henderson, RN  Outcome: Progressing     
Intact  Standing: Impaired  Standing - Static: Good  Standing - Dynamic: Fair;Good  Wheelchair Mobility:  Wheelchair Management  Wheelchair Management: No  Ambulation/Gait Training:  Gait  Gait Training: Yes  Overall Level of Assistance: Contact guard assistance  Distance (ft): 50 Feet  Assistive Device: Gait belt;Walker, rolling  Interventions: Safety awareness training;Verbal cues  Base of Support: Widened  Speed/Amy: Slow  Step Length: Right shortened;Left shortened  Gait Abnormalities: Decreased step clearance    Therapeutic Exercises:     EXERCISE   Sets   Reps   Active Active Assist   Passive Self ROM   Comments   Ankle Pumps  10 [x] [] [] []    Quad Sets/Glut Sets   [] [] [] []    Hamstring Sets   [] [] [] []    Short Arc Quads   [] [] [] []    Heel Slides   [] [] [] []    Straight Leg Raises   [] [] [] []    Hip abd/add   [] [] [] []    Long Arc Quads  10 [x] [] [] []    Marching  10 [x] [] [] []    Seated HR/TR   [] [] [] []       [] [] [] []       Pain Ratin/10 reported    Activity Tolerance:   Fair     After treatment patient left in no apparent distress:   Bed locked and returned to lowest position, Patient left in no apparent distress in bed, Call bell within reach, Bed/ chair alarm activated, and Side rails x3, and nsg updated     COMMUNICATION/COLLABORATION:   The patient’s plan of care was discussed with: Registered nurse    Patient Education  Education Given To: Patient  Education Provided: Role of Therapy;Plan of Care;Home Exercise Program;Energy Conservation;Mobility Training;Equipment;Precautions;IADL Safety;Fall Prevention Strategies;Transfer Training  Education Method: Demonstration;Verbal  Education Outcome: Verbalized understanding;Continued education needed    Matilda Hay PTA  Minutes: 14

## 2025-04-23 NOTE — DISCHARGE SUMMARY
.                                       Discharge Summary    Name: Johanna Dunaway  [id number]  YOB: 1960 (Age: 64 y.o.)   Date of Admission: 4/14/2025  Date of Discharge: 4/23/2025  Attending Physician: Ozzy Nguyen MD    Discharge Diagnosis:   Intractable low back pain in setting of recent L3 compression fracture  Unable to ambulate  CKD progressed to ESRD  Hemodialysis  Constipation  Abnormal lipase     Consultations:  IP CONSULT TO CASE MANAGEMENT  IP CONSULT TO VASCULAR ACCESS TEAM  IP CONSULT TO NEPHROLOGY  IP CONSULT TO CASE MANAGEMENT      Brief Hospital Course by Main Problems:     64-year-old female with past medical history hypertension, diabetes, CKD stage V now progressing to ESRD, TAHIRA on nocturnal oxygen, compensated cirrhosis, morbid obesity recently had acute L3 compression fracture status post IR kyphoplasty presented with worsening lower back pain unable to ambulate  admitted for intractable lower back pain management and possible placement.  On admission patient noted to have abnormal lipase however radiographic evidence of pancreatitis.  Patient abdominal pain resolved.  She complained of lower back pain.  PT OT saw her, she is safe for SNF.  Patient is unable to be taking care at home.  She had intermittent hypoglycemia, her insulin regimen has been adjusted.  She was also having some constipation was given bowel regimen.  Regarding her CKD, she appears to be progressing to ESRD.  HD was started in the hospital on 4/15.  She has  left arm AV fistula.  Patient is otherwise medically stable, to be discharged to [facility] for further management.    Regarding her insulin regimen, patient at home takes 45 units in the morning, 45 units at night.  Will change the regimen to 35 units, and decrease night regimen to 10 units.  Adjust accordingly in the outpatient setting.    Regarding her low back pain, obese send on short course of opioid, gabapentin has been increased to 300 mg 3

## 2025-04-25 ENCOUNTER — HOSPITAL ENCOUNTER (OUTPATIENT)
Facility: HOSPITAL | Age: 65
Setting detail: SPECIMEN
Discharge: HOME | End: 2025-04-28

## 2025-04-25 LAB
ALBUMIN SERPL-MCNC: 2.8 G/DL (ref 3.5–5)
ALBUMIN/GLOB SERPL: 0.8 (ref 1.1–2.2)
ALP SERPL-CCNC: 225 U/L (ref 45–117)
ALT SERPL-CCNC: 38 U/L (ref 12–78)
ANION GAP SERPL CALC-SCNC: 11 MMOL/L (ref 2–12)
AST SERPL W P-5'-P-CCNC: 26 U/L (ref 15–37)
BILIRUB SERPL-MCNC: 0.6 MG/DL (ref 0.2–1)
BUN SERPL-MCNC: 33 MG/DL (ref 6–20)
BUN/CREAT SERPL: 8 (ref 12–20)
CA-I BLD-MCNC: 8.9 MG/DL (ref 8.5–10.1)
CHLORIDE SERPL-SCNC: 102 MMOL/L (ref 97–108)
CO2 SERPL-SCNC: 27 MMOL/L (ref 21–32)
CREAT SERPL-MCNC: 4.15 MG/DL (ref 0.55–1.02)
ERYTHROCYTE [DISTWIDTH] IN BLOOD BY AUTOMATED COUNT: 17.2 % (ref 11.5–14.5)
FERRITIN SERPL-MCNC: 83 NG/ML (ref 8–252)
GLOBULIN SER CALC-MCNC: 3.7 G/DL (ref 2–4)
GLUCOSE SERPL-MCNC: 215 MG/DL (ref 65–100)
HCT VFR BLD AUTO: 27.7 % (ref 35–47)
HGB BLD-MCNC: 8.5 G/DL (ref 11.5–16)
IRON SATN MFR SERPL: 27 % (ref 20–50)
IRON SERPL-MCNC: 71 UG/DL (ref 35–150)
MCH RBC QN AUTO: 28.1 PG (ref 26–34)
MCHC RBC AUTO-ENTMCNC: 30.7 G/DL (ref 30–36.5)
MCV RBC AUTO: 91.7 FL (ref 80–99)
NRBC # BLD: 0.02 K/UL (ref 0–0.01)
NRBC BLD-RTO: 0.3 PER 100 WBC
PHOSPHATE SERPL-MCNC: 3.7 MG/DL (ref 2.6–4.7)
PLATELET # BLD AUTO: 196 K/UL (ref 150–400)
PMV BLD AUTO: 11 FL (ref 8.9–12.9)
POTASSIUM SERPL-SCNC: 4.4 MMOL/L (ref 3.5–5.1)
PROT SERPL-MCNC: 6.5 G/DL (ref 6.4–8.2)
RBC # BLD AUTO: 3.02 M/UL (ref 3.8–5.2)
SODIUM SERPL-SCNC: 140 MMOL/L (ref 136–145)
TIBC SERPL-MCNC: 262 UG/DL (ref 250–450)
WBC # BLD AUTO: 6.3 K/UL (ref 3.6–11)

## 2025-04-25 PROCEDURE — 85027 COMPLETE CBC AUTOMATED: CPT

## 2025-04-25 PROCEDURE — 84520 ASSAY OF UREA NITROGEN: CPT

## 2025-04-25 PROCEDURE — 84100 ASSAY OF PHOSPHORUS: CPT

## 2025-04-25 PROCEDURE — 83540 ASSAY OF IRON: CPT

## 2025-04-25 PROCEDURE — 82728 ASSAY OF FERRITIN: CPT

## 2025-04-25 PROCEDURE — 83970 ASSAY OF PARATHORMONE: CPT

## 2025-04-25 PROCEDURE — 80053 COMPREHEN METABOLIC PANEL: CPT

## 2025-04-26 LAB
CA-I BLD-MCNC: 9.2 MG/DL (ref 8.5–10.1)
PTH-INTACT SERPL-MCNC: 122.9 PG/ML (ref 18.4–88)

## 2025-04-28 LAB — BUN SERPL-MCNC: 8 MG/DL (ref 6–20)

## 2025-05-08 NOTE — PROGRESS NOTES
Psychiatric Assessment Follow-Up Progress Note    CHIEF COMPLAINT:  Johanna Dunaway is a 64 y.o. female and was seen today for follow-up of their psychiatric condition and psychotropic medication management.   Patient appears to be a reliable historian.   Chief Complaint   Patient presents with    Follow-up    Bipolar    Depression    Back Pain      Johanna Dunaway  states they are \"I've been doing fair, broke my back, dialysis for 4 weeks\"    HPI:    Johanna reports the following psychiatric symptoms by hx:  depression, anxiety, delusions, and hallucinations.  Overall symptoms have been present for 9 year(s).   Currently Depression (bipolar) is of moderate severity. The symptoms exacerbated by physical concerns: began dialysis 4 wks ago, \"broke my back\" in a fall and had surgery to repair fracture.    Currently Hallucinations/delusions are in remission.  Currently Anxiety is of mild-moderate severity. The symptoms occur rarely, worry over physical concerns.  Currently [place] is no present. The symptoms have not occurred since last visit.  Medications  Abilify 20mg Daily Efficacy: working well  Side Effects:  none.   Alprazolam 0.5mg TID Efficacy: helps her sleep  Side Effects:  none and possibly falls . \"Taking 2 at night and that's all.\" Patient educated to take after she has gone to bed, and to try to take only 1 if possible.  Bupropion SR 150mg BID Efficacy: working well   Side Effects:  none.  Citalopram 20mg daily Efficacy: working well   Side Effects:  none.  Melatonin 3mg HS Efficacy: no difference  Side Effects:  none.  Overall medications are effective. Patient Appetite:good and no change from normal. Sleep: no change, reports she can't sleep without the xanax    4/14/25: Johanna Dunaway presents with symptoms of depression, anxiety, and follow-up for hallucinations/delusions. 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 Notably, she also

## 2025-05-10 ENCOUNTER — APPOINTMENT (OUTPATIENT)
Facility: HOSPITAL | Age: 65
End: 2025-05-10
Attending: EMERGENCY MEDICINE
Payer: MEDICARE

## 2025-05-10 ENCOUNTER — HOSPITAL ENCOUNTER (EMERGENCY)
Facility: HOSPITAL | Age: 65
Discharge: HOME | End: 2025-05-10
Attending: EMERGENCY MEDICINE
Payer: MEDICARE

## 2025-05-10 VITALS
OXYGEN SATURATION: 97 % | BODY MASS INDEX: 36.8 KG/M2 | DIASTOLIC BLOOD PRESSURE: 58 MMHG | HEART RATE: 76 BPM | HEIGHT: 62 IN | RESPIRATION RATE: 18 BRPM | TEMPERATURE: 97.8 F | SYSTOLIC BLOOD PRESSURE: 112 MMHG | WEIGHT: 200 LBS

## 2025-05-10 DIAGNOSIS — S39.012A STRAIN OF LUMBAR REGION, INITIAL ENCOUNTER: Primary | ICD-10-CM

## 2025-05-10 DIAGNOSIS — M62.838 SPASM OF MUSCLE: ICD-10-CM

## 2025-05-10 PROCEDURE — 72131 CT LUMBAR SPINE W/O DYE: CPT

## 2025-05-10 PROCEDURE — 99284 EMERGENCY DEPT VISIT MOD MDM: CPT

## 2025-05-10 PROCEDURE — 6370000000 HC RX 637 (ALT 250 FOR IP): Performed by: EMERGENCY MEDICINE

## 2025-05-10 RX ORDER — HYDROCODONE BITARTRATE AND ACETAMINOPHEN 5; 325 MG/1; MG/1
1 TABLET ORAL
Refills: 0 | Status: COMPLETED | OUTPATIENT
Start: 2025-05-10 | End: 2025-05-10

## 2025-05-10 RX ORDER — METHOCARBAMOL 750 MG/1
750 TABLET, FILM COATED ORAL
Qty: 10 TABLET | Refills: 0 | Status: SHIPPED | OUTPATIENT
Start: 2025-05-10

## 2025-05-10 RX ADMIN — HYDROCODONE BITARTRATE AND ACETAMINOPHEN 1 TABLET: 5; 325 TABLET ORAL at 18:04

## 2025-05-10 ASSESSMENT — PAIN DESCRIPTION - LOCATION: LOCATION: BACK

## 2025-05-10 ASSESSMENT — PAIN - FUNCTIONAL ASSESSMENT
PAIN_FUNCTIONAL_ASSESSMENT: 0-10
PAIN_FUNCTIONAL_ASSESSMENT: ACTIVITIES ARE NOT PREVENTED

## 2025-05-10 ASSESSMENT — PAIN DESCRIPTION - DESCRIPTORS: DESCRIPTORS: ACHING

## 2025-05-10 ASSESSMENT — PAIN SCALES - GENERAL: PAINLEVEL_OUTOF10: 10

## 2025-05-10 NOTE — DISCHARGE INSTRUCTIONS
Thank you for choosing our Emergency Department for your care.  It is our privilege to care for you in your time of need.  In the next several days, you may receive a survey via email or mailed to your home about your experience with our team.  We would greatly appreciate you taking a few minutes to complete the survey, as we use this information to learn what we have done well and what we could be doing better. Thank you for trusting us with your care!    Below you will find a list of your tests from today's visit.   Labs and Radiology Studies  No results found for this or any previous visit (from the past 12 hours).  CT LUMBAR SPINE WO CONTRAST  Result Date: 5/10/2025  EXAM:  CT LUMBAR SPINE WITHOUT  CONTRAST INDICATION: had kyphoplasty L3 one month ago, here with worsening pain L3-L5 region. Reason for exam:->had kyphoplasty L3 one month ago, here with worsening pain L3-L5 region COMPARISON: Prekyphoplasty MRI and CT performed on 4/6/2025 and 4/3/2025. CONTRAST:  None. TECHNIQUE: Multislice helical CT of the lumbar spine was performed without intravenous contrast administration.  Coronal and sagittal reformats were generated.  CT dose reduction was achieved through use of a standardized protocol tailored for this examination and automatic exposure control for dose modulation. FINDINGS: 5 lumbar-type vertebral bodies. Status post L3 kyphoplasty for L3 fracture. There is persistent bony retropulsion and canal stenosis at the L3 level. T12-L1 is unremarkable. L1-2 demonstrates mild disc bulge. No significant canal stenosis or foraminal narrowing. L2-3 demonstrates disc bulge and bony retropulsion with severe canal stenosis. Moderate to severe left and right foraminal narrowing. L3-4 demonstrates disc bulge and facet ligamentum flavum hypertrophy with severe canal stenosis. There is severe left and right foraminal narrowing. L4-5 demonstrates disc bulge and facet hypertrophy with severe canal stenosis. There is

## 2025-05-10 NOTE — ED PROVIDER NOTES
[facility] EMERGENCY DEPT  EMERGENCY DEPARTMENT HISTORY AND PHYSICAL EXAM      Date of evaluation: 5/10/2025  Patient Name: Johanna Dunaway  Birthdate 1960  MRN: [id number]  ED Provider: Katy Bansal MD   Note Started: 4:51 PM EDT 5/10/25    HISTORY OF PRESENT ILLNESS     Chief Complaint   Patient presents with    Back Pain       History Provided By: Patient, {Historian:[id number]}     HPI: Johanna Dunaway is a 64 y.o. female ***    PAST MEDICAL HISTORY   Past Medical History:  Past Medical History:   Diagnosis Date    Anxiety 6/22/2020    Arthritis     Bipolar 1 disorder (HCC) 1/23/2018    Congestive heart failure (CHF) (HCC)     PT DENIES THIS DX, STATES AN ER DR TOLD HER THIS WHEN SHE HAD COVID AND SAYS HER PCP HAS NEVER TOLD HER THIS AND WHEN SHE SAW CARDIOLOGY 5YRS AGO, ALL HER TESTS CAME BACK FINE PER PT    Diabetes mellitus type 2, controlled (HCC) 6/22/2020    Dialysis patient     Diverticulosis     Hx of blood clots 2016    SMALL PE    Hypertension 6/22/2020    Kidney failure     Liver fibrosis 6/22/2020    Major depression 6/22/2020    Migraine 6/22/2020    Sleep apnea 1/23/2018    USES CPAP    Suicide attempt (HCC) 6/22/2020    2013    UTI (urinary tract infection)        Past Surgical History:  Past Surgical History:   Procedure Laterality Date    APPENDECTOMY      CHOLECYSTECTOMY      DIALYSIS FISTULA CREATION Left 10/11/2024    LEFT UPPER ARM DIALYSIS GRAFT PLACEMENT performed by David Monk MD at [facility] MAIN OR    DILATION AND CURETTAGE OF UTERUS      IR KYPHOPLASTY LUMBAR 1 VERTEBRAL BODY  04/07/2025    IR KYPHOPLASTY LUMBAR 1 VERTEBRAL BODY 4/7/2025 Adiel Brunner MD [facility]    WISDOM TOOTH EXTRACTION         Family History:  Family History   Problem Relation Age of Onset    Heart Attack Mother     Heart Disease Mother     Gout Mother     Heart Attack Father     Asthma Father     Heart Disease Father     Anesth Problems Neg Hx        Social History:  Social History     Tobacco Use    Smoking

## 2025-05-10 NOTE — ED TRIAGE NOTES
Patient presents for complaint of back pain.  States 1 month ago, she suffered a fall which resulted in L3 fracture which was surgically repaired.  Ambulatory into the department with a walker.

## 2025-05-12 ENCOUNTER — OFFICE VISIT (OUTPATIENT)
Age: 65
End: 2025-05-12
Payer: MEDICARE

## 2025-05-12 VITALS
BODY MASS INDEX: 37.77 KG/M2 | WEIGHT: 205.25 LBS | HEART RATE: 70 BPM | DIASTOLIC BLOOD PRESSURE: 62 MMHG | TEMPERATURE: 98 F | SYSTOLIC BLOOD PRESSURE: 99 MMHG | OXYGEN SATURATION: 97 % | HEIGHT: 62 IN | RESPIRATION RATE: 18 BRPM

## 2025-05-12 DIAGNOSIS — F31.4 BIPOLAR 1 DISORDER, DEPRESSED, SEVERE (HCC): Chronic | ICD-10-CM

## 2025-05-12 DIAGNOSIS — F41.9 ANXIETY: Chronic | ICD-10-CM

## 2025-05-12 DIAGNOSIS — G47.00 INSOMNIA DISORDER WITH NON-SLEEP DISORDER MENTAL COMORBIDITY: ICD-10-CM

## 2025-05-12 PROCEDURE — 3078F DIAST BP <80 MM HG: CPT

## 2025-05-12 PROCEDURE — 99214 OFFICE O/P EST MOD 30 MIN: CPT

## 2025-05-12 PROCEDURE — 3074F SYST BP LT 130 MM HG: CPT

## 2025-05-12 RX ORDER — ALPRAZOLAM 0.5 MG
1 TABLET ORAL
Qty: 60 TABLET | Refills: 0 | Status: SHIPPED | OUTPATIENT
Start: 2025-05-12 | End: 2025-06-11

## 2025-05-12 RX ORDER — BUPROPION HYDROCHLORIDE 150 MG/1
150 TABLET, EXTENDED RELEASE ORAL
Qty: 60 TABLET | Refills: 0 | Status: SHIPPED | OUTPATIENT
Start: 2025-05-12

## 2025-05-12 RX ORDER — CITALOPRAM HYDROBROMIDE 20 MG/1
20 TABLET ORAL DAILY
Qty: 30 TABLET | Refills: 0 | Status: SHIPPED | OUTPATIENT
Start: 2025-05-12

## 2025-05-12 ASSESSMENT — PATIENT HEALTH QUESTIONNAIRE - PHQ9
8. MOVING OR SPEAKING SO SLOWLY THAT OTHER PEOPLE COULD HAVE NOTICED. OR THE OPPOSITE, BEING SO FIGETY OR RESTLESS THAT YOU HAVE BEEN MOVING AROUND A LOT MORE THAN USUAL: NOT AT ALL
7. TROUBLE CONCENTRATING ON THINGS, SUCH AS READING THE NEWSPAPER OR WATCHING TELEVISION: NOT AT ALL
10. IF YOU CHECKED OFF ANY PROBLEMS, HOW DIFFICULT HAVE THESE PROBLEMS MADE IT FOR YOU TO DO YOUR WORK, TAKE CARE OF THINGS AT HOME, OR GET ALONG WITH OTHER PEOPLE: SOMEWHAT DIFFICULT
SUM OF ALL RESPONSES TO PHQ QUESTIONS 1-9: 1
4. FEELING TIRED OR HAVING LITTLE ENERGY: SEVERAL DAYS
9. THOUGHTS THAT YOU WOULD BE BETTER OFF DEAD, OR OF HURTING YOURSELF: NOT AT ALL
SUM OF ALL RESPONSES TO PHQ QUESTIONS 1-9: 1
2. FEELING DOWN, DEPRESSED OR HOPELESS: NOT AT ALL
3. TROUBLE FALLING OR STAYING ASLEEP: NOT AT ALL
SUM OF ALL RESPONSES TO PHQ QUESTIONS 1-9: 1
5. POOR APPETITE OR OVEREATING: NOT AT ALL
SUM OF ALL RESPONSES TO PHQ QUESTIONS 1-9: 1
6. FEELING BAD ABOUT YOURSELF - OR THAT YOU ARE A FAILURE OR HAVE LET YOURSELF OR YOUR FAMILY DOWN: NOT AT ALL
1. LITTLE INTEREST OR PLEASURE IN DOING THINGS: NOT AT ALL

## 2025-05-12 ASSESSMENT — ENCOUNTER SYMPTOMS
RESPIRATORY NEGATIVE: 1
ALLERGIC/IMMUNOLOGIC NEGATIVE: 1
EYES NEGATIVE: 1
GASTROINTESTINAL NEGATIVE: 1

## 2025-05-12 ASSESSMENT — ANXIETY QUESTIONNAIRES
4. TROUBLE RELAXING: SEVERAL DAYS
5. BEING SO RESTLESS THAT IT IS HARD TO SIT STILL: NOT AT ALL
3. WORRYING TOO MUCH ABOUT DIFFERENT THINGS: SEVERAL DAYS
IF YOU CHECKED OFF ANY PROBLEMS ON THIS QUESTIONNAIRE, HOW DIFFICULT HAVE THESE PROBLEMS MADE IT FOR YOU TO DO YOUR WORK, TAKE CARE OF THINGS AT HOME, OR GET ALONG WITH OTHER PEOPLE: SOMEWHAT DIFFICULT
2. NOT BEING ABLE TO STOP OR CONTROL WORRYING: NOT AT ALL
1. FEELING NERVOUS, ANXIOUS, OR ON EDGE: SEVERAL DAYS
6. BECOMING EASILY ANNOYED OR IRRITABLE: SEVERAL DAYS
GAD7 TOTAL SCORE: 4
7. FEELING AFRAID AS IF SOMETHING AWFUL MIGHT HAPPEN: NOT AT ALL

## 2025-05-12 NOTE — PROGRESS NOTES
Chief Complaint   Patient presents with    Follow-up    Bipolar    Depression    Back Pain     \"Have you been to the ER, urgent care clinic since your last visit?  Hospitalized since your last visit?\"    YES - When: approximately 2 days ago.  Where and Why: Back pain.  [facility].    “Have you seen or consulted any other health care providers outside our system since your last visit?”    NO    BP 99/62 (BP Site: Right Upper Arm, Patient Position: Sitting, BP Cuff Size: Medium Adult)   Pulse 70   Temp 98 °F (36.7 °C) (Oral)   Resp 18   Ht 1.575 m (5' 2\")   Wt 93.1 kg (205 lb 4 oz)   SpO2 97%   BMI 37.54 kg/m²      Patient c/o back pain due to surgery 4 weeks ago.

## 2025-08-11 ENCOUNTER — TELEPHONE (OUTPATIENT)
Age: 65
End: 2025-08-11

## 2025-09-03 RX ORDER — ALPRAZOLAM 0.5 MG
TABLET ORAL
COMMUNITY
Start: 2025-08-05 | End: 2025-09-04 | Stop reason: SDUPTHER

## 2025-09-04 ENCOUNTER — OFFICE VISIT (OUTPATIENT)
Age: 65
End: 2025-09-04
Payer: MEDICARE

## 2025-09-04 VITALS
OXYGEN SATURATION: 97 % | DIASTOLIC BLOOD PRESSURE: 65 MMHG | HEIGHT: 62 IN | SYSTOLIC BLOOD PRESSURE: 139 MMHG | RESPIRATION RATE: 20 BRPM | TEMPERATURE: 97.4 F | BODY MASS INDEX: 34.96 KG/M2 | HEART RATE: 99 BPM | WEIGHT: 190 LBS

## 2025-09-04 DIAGNOSIS — F41.9 ANXIETY: Chronic | ICD-10-CM

## 2025-09-04 DIAGNOSIS — F31.4 BIPOLAR 1 DISORDER, DEPRESSED, SEVERE (HCC): Chronic | ICD-10-CM

## 2025-09-04 PROBLEM — Z99.2 DEPENDENCE ON RENAL DIALYSIS: Status: ACTIVE | Noted: 2025-04-23

## 2025-09-04 PROBLEM — F43.12 CHRONIC POST-TRAUMATIC STRESS DISORDER: Status: ACTIVE | Noted: 2025-06-24

## 2025-09-04 PROBLEM — F60.3 BORDERLINE PERSONALITY DISORDER (HCC): Status: ACTIVE | Noted: 2025-06-24

## 2025-09-04 PROCEDURE — 3078F DIAST BP <80 MM HG: CPT

## 2025-09-04 PROCEDURE — 99214 OFFICE O/P EST MOD 30 MIN: CPT

## 2025-09-04 PROCEDURE — 3075F SYST BP GE 130 - 139MM HG: CPT

## 2025-09-04 RX ORDER — CITALOPRAM HYDROBROMIDE 20 MG/1
20 TABLET ORAL DAILY
Qty: 30 TABLET | Refills: 0 | Status: SHIPPED | OUTPATIENT
Start: 2025-09-04

## 2025-09-04 RX ORDER — CYCLOBENZAPRINE HCL 10 MG
TABLET ORAL
COMMUNITY
Start: 2025-07-17

## 2025-09-04 RX ORDER — BLOOD-GLUCOSE METER
EACH MISCELLANEOUS
COMMUNITY
Start: 2025-08-20

## 2025-09-04 RX ORDER — FUROSEMIDE 80 MG/1
TABLET ORAL
COMMUNITY
Start: 2025-09-03

## 2025-09-04 RX ORDER — HYDRALAZINE HYDROCHLORIDE 25 MG/1
TABLET, FILM COATED ORAL
COMMUNITY
Start: 2025-07-10

## 2025-09-04 RX ORDER — ALPRAZOLAM 0.5 MG
TABLET ORAL
Qty: 60 TABLET | Refills: 0 | Status: SHIPPED | OUTPATIENT
Start: 2025-09-04 | End: 2025-10-04

## 2025-09-04 RX ORDER — ARIPIPRAZOLE 5 MG/1
TABLET ORAL
COMMUNITY
Start: 2025-07-10 | End: 2025-09-04 | Stop reason: SDUPTHER

## 2025-09-04 RX ORDER — BUPROPION HYDROCHLORIDE 150 MG/1
150 TABLET ORAL EVERY MORNING
Qty: 30 TABLET | Refills: 0 | Status: SHIPPED | OUTPATIENT
Start: 2025-09-04

## 2025-09-04 RX ORDER — ARIPIPRAZOLE 20 MG/1
20 TABLET ORAL NIGHTLY
Qty: 90 TABLET | Refills: 0 | Status: SHIPPED | OUTPATIENT
Start: 2025-09-04

## 2025-09-04 ASSESSMENT — PATIENT HEALTH QUESTIONNAIRE - PHQ9
4. FEELING TIRED OR HAVING LITTLE ENERGY: SEVERAL DAYS
10. IF YOU CHECKED OFF ANY PROBLEMS, HOW DIFFICULT HAVE THESE PROBLEMS MADE IT FOR YOU TO DO YOUR WORK, TAKE CARE OF THINGS AT HOME, OR GET ALONG WITH OTHER PEOPLE: SOMEWHAT DIFFICULT
SUM OF ALL RESPONSES TO PHQ QUESTIONS 1-9: 9
7. TROUBLE CONCENTRATING ON THINGS, SUCH AS READING THE NEWSPAPER OR WATCHING TELEVISION: SEVERAL DAYS
SUM OF ALL RESPONSES TO PHQ QUESTIONS 1-9: 9
2. FEELING DOWN, DEPRESSED OR HOPELESS: SEVERAL DAYS
8. MOVING OR SPEAKING SO SLOWLY THAT OTHER PEOPLE COULD HAVE NOTICED. OR THE OPPOSITE, BEING SO FIGETY OR RESTLESS THAT YOU HAVE BEEN MOVING AROUND A LOT MORE THAN USUAL: SEVERAL DAYS
6. FEELING BAD ABOUT YOURSELF - OR THAT YOU ARE A FAILURE OR HAVE LET YOURSELF OR YOUR FAMILY DOWN: NOT AT ALL
1. LITTLE INTEREST OR PLEASURE IN DOING THINGS: SEVERAL DAYS
5. POOR APPETITE OR OVEREATING: SEVERAL DAYS
SUM OF ALL RESPONSES TO PHQ QUESTIONS 1-9: 7
9. THOUGHTS THAT YOU WOULD BE BETTER OFF DEAD, OR OF HURTING YOURSELF: MORE THAN HALF THE DAYS
SUM OF ALL RESPONSES TO PHQ QUESTIONS 1-9: 9
3. TROUBLE FALLING OR STAYING ASLEEP: SEVERAL DAYS

## 2025-09-04 ASSESSMENT — COLUMBIA-SUICIDE SEVERITY RATING SCALE - C-SSRS
6. HAVE YOU EVER DONE ANYTHING, STARTED TO DO ANYTHING, OR PREPARED TO DO ANYTHING TO END YOUR LIFE?: NO
1. WITHIN THE PAST MONTH, HAVE YOU WISHED YOU WERE DEAD OR WISHED YOU COULD GO TO SLEEP AND NOT WAKE UP?: NO
2. HAVE YOU ACTUALLY HAD ANY THOUGHTS OF KILLING YOURSELF?: NO

## 2025-09-04 ASSESSMENT — ENCOUNTER SYMPTOMS
GASTROINTESTINAL NEGATIVE: 1
RESPIRATORY NEGATIVE: 1
EYES NEGATIVE: 1
ALLERGIC/IMMUNOLOGIC NEGATIVE: 1

## (undated) DEVICE — SUTURE VICRYL + SZ 3-0 L27IN ABSRB UD L26MM SH 1/2 CIR VCP416H

## (undated) DEVICE — LIQUIBAND RAPID ADHESIVE 36/CS 0.8ML: Brand: MEDLINE

## (undated) DEVICE — SUTURE MONOCRYL + SZ 4-0 L27IN ABSRB UD L19MM PS-2 3/8 CIR MCP426H

## (undated) DEVICE — SUTURE PROL SZ 6-0 L18IN NONABSORBABLE BLU L9.3MM BV-1 3/8 8806H

## (undated) DEVICE — PACK SURG CUST AV FISTULA LF SMH

## (undated) DEVICE — SPONGE GZ W4XL4IN COT 12 PLY TYP VII WVN C FLD DSGN STERILE

## (undated) DEVICE — SUTURE PERMAHAND SZ 3-0 L30IN NONABSORBABLE BLK SILK BRAID A304H

## (undated) DEVICE — INTENT OT USE PROVIDES A STERILE INTERFACE BETWEEN THE OPERATING ROOM SURGICAL LAMPS (NON-STERILE) AND THE SURGEON OR STAFF WORKING IN THE STERILE FIELD.: Brand: ASPEN® ALC PLUS LIGHT HANDLE COVER

## (undated) DEVICE — PENCIL ES CRD L10FT HND SWCHING ROCK SWCH W/ EDGE COAT BLDE

## (undated) DEVICE — SOLUTION IV 500ML 0.9% SOD CHL PH 5 INJ USP VIAFLX PLAS

## (undated) DEVICE — Device

## (undated) DEVICE — X-RAY DETECTABLE SPONGES,16 PLY: Brand: VISTEC

## (undated) DEVICE — GLOVE ORTHO 8   MSG9480